# Patient Record
Sex: MALE | Race: WHITE | Employment: OTHER | URBAN - METROPOLITAN AREA
[De-identification: names, ages, dates, MRNs, and addresses within clinical notes are randomized per-mention and may not be internally consistent; named-entity substitution may affect disease eponyms.]

---

## 2017-11-29 PROBLEM — I42.9 MYOCARDIOPATHY (HCC): Status: ACTIVE | Noted: 2017-11-29

## 2017-11-29 LAB
LEFT VENTRICULAR EJECTION FRACTION HIGH VALUE: 45 %
LEFT VENTRICULAR EJECTION FRACTION MODE: NORMAL
LV EF: 40 %

## 2018-05-16 ENCOUNTER — OFFICE VISIT (OUTPATIENT)
Dept: CARDIOLOGY CLINIC | Age: 73
End: 2018-05-16
Payer: MEDICARE

## 2018-05-16 VITALS
HEART RATE: 54 BPM | DIASTOLIC BLOOD PRESSURE: 70 MMHG | BODY MASS INDEX: 26.92 KG/M2 | RESPIRATION RATE: 12 BRPM | HEIGHT: 70 IN | WEIGHT: 188 LBS | SYSTOLIC BLOOD PRESSURE: 112 MMHG

## 2018-05-16 DIAGNOSIS — I10 ESSENTIAL HYPERTENSION: ICD-10-CM

## 2018-05-16 DIAGNOSIS — I49.3 PVC'S (PREMATURE VENTRICULAR CONTRACTIONS): ICD-10-CM

## 2018-05-16 DIAGNOSIS — I44.4 LAFB (LEFT ANTERIOR FASCICULAR BLOCK): ICD-10-CM

## 2018-05-16 DIAGNOSIS — G24.8 FOCAL DYSTONIA: ICD-10-CM

## 2018-05-16 DIAGNOSIS — I45.4 IVCD (INTRAVENTRICULAR CONDUCTION DEFECT): ICD-10-CM

## 2018-05-16 DIAGNOSIS — I42.0 DILATED CARDIOMYOPATHY (HCC): ICD-10-CM

## 2018-05-16 DIAGNOSIS — Z98.890 H/O DETACHED RETINA REPAIR: ICD-10-CM

## 2018-05-16 DIAGNOSIS — E11.8 DM (DIABETES MELLITUS) WITH COMPLICATIONS (HCC): ICD-10-CM

## 2018-05-16 DIAGNOSIS — Z86.69 H/O DETACHED RETINA REPAIR: ICD-10-CM

## 2018-05-16 DIAGNOSIS — E78.2 MIXED HYPERLIPIDEMIA: ICD-10-CM

## 2018-05-16 DIAGNOSIS — I25.10 CORONARY ARTERY DISEASE INVOLVING NATIVE CORONARY ARTERY OF NATIVE HEART WITHOUT ANGINA PECTORIS: Primary | ICD-10-CM

## 2018-05-16 PROCEDURE — 99213 OFFICE O/P EST LOW 20 MIN: CPT | Performed by: INTERNAL MEDICINE

## 2018-05-16 PROCEDURE — 1036F TOBACCO NON-USER: CPT | Performed by: INTERNAL MEDICINE

## 2018-05-16 PROCEDURE — 2022F DILAT RTA XM EVC RTNOPTHY: CPT | Performed by: INTERNAL MEDICINE

## 2018-05-16 PROCEDURE — G8419 CALC BMI OUT NRM PARAM NOF/U: HCPCS | Performed by: INTERNAL MEDICINE

## 2018-05-16 PROCEDURE — 3017F COLORECTAL CA SCREEN DOC REV: CPT | Performed by: INTERNAL MEDICINE

## 2018-05-16 PROCEDURE — 4040F PNEUMOC VAC/ADMIN/RCVD: CPT | Performed by: INTERNAL MEDICINE

## 2018-05-16 PROCEDURE — G8599 NO ASA/ANTIPLAT THER USE RNG: HCPCS | Performed by: INTERNAL MEDICINE

## 2018-05-16 PROCEDURE — 3045F PR MOST RECENT HEMOGLOBIN A1C LEVEL 7.0-9.0%: CPT | Performed by: INTERNAL MEDICINE

## 2018-05-16 PROCEDURE — 93000 ELECTROCARDIOGRAM COMPLETE: CPT | Performed by: INTERNAL MEDICINE

## 2018-05-16 PROCEDURE — 1123F ACP DISCUSS/DSCN MKR DOCD: CPT | Performed by: INTERNAL MEDICINE

## 2018-05-16 PROCEDURE — G8427 DOCREV CUR MEDS BY ELIG CLIN: HCPCS | Performed by: INTERNAL MEDICINE

## 2018-05-16 RX ORDER — INSULIN DETEMIR 100 [IU]/ML
20 INJECTION, SOLUTION SUBCUTANEOUS 2 TIMES DAILY
COMMUNITY
Start: 2018-04-24

## 2018-08-22 ENCOUNTER — NURSE ONLY (OUTPATIENT)
Dept: FAMILY MEDICINE CLINIC | Age: 73
End: 2018-08-22
Payer: MEDICARE

## 2018-08-22 ENCOUNTER — HOSPITAL ENCOUNTER (OUTPATIENT)
Age: 73
Discharge: HOME | End: 2018-08-24
Payer: MEDICARE

## 2018-08-22 DIAGNOSIS — I10 ESSENTIAL HYPERTENSION: ICD-10-CM

## 2018-08-22 LAB
ALBUMIN SERPL-MCNC: 4.1 G/DL (ref 3.5–5.2)
ALP BLD-CCNC: 58 U/L (ref 40–129)
ALT SERPL-CCNC: 23 U/L (ref 0–40)
ANION GAP SERPL CALCULATED.3IONS-SCNC: 13 MMOL/L (ref 7–16)
AST SERPL-CCNC: 20 U/L (ref 0–39)
BASOPHILS ABSOLUTE: 0.08 E9/L (ref 0–0.2)
BASOPHILS RELATIVE PERCENT: 1 % (ref 0–2)
BILIRUB SERPL-MCNC: 0.9 MG/DL (ref 0–1.2)
BUN BLDV-MCNC: 21 MG/DL (ref 8–23)
CALCIUM SERPL-MCNC: 9.2 MG/DL (ref 8.6–10.2)
CHLORIDE BLD-SCNC: 102 MMOL/L (ref 98–107)
CHOLESTEROL, TOTAL: 168 MG/DL (ref 0–199)
CO2: 26 MMOL/L (ref 22–29)
CREAT SERPL-MCNC: 1 MG/DL (ref 0.7–1.2)
CREATININE URINE: 303 MG/DL (ref 40–278)
EOSINOPHILS ABSOLUTE: 0.45 E9/L (ref 0.05–0.5)
EOSINOPHILS RELATIVE PERCENT: 5.9 % (ref 0–6)
GFR AFRICAN AMERICAN: >60
GFR NON-AFRICAN AMERICAN: >60 ML/MIN/1.73
GLUCOSE BLD-MCNC: 198 MG/DL (ref 74–109)
HBA1C MFR BLD: 7.7 % (ref 4–5.6)
HCT VFR BLD CALC: 45.6 % (ref 37–54)
HDLC SERPL-MCNC: 57 MG/DL
HEMOGLOBIN: 15 G/DL (ref 12.5–16.5)
IMMATURE GRANULOCYTES #: 0.02 E9/L
IMMATURE GRANULOCYTES %: 0.3 % (ref 0–5)
LDL CHOLESTEROL CALCULATED: 98 MG/DL (ref 0–99)
LYMPHOCYTES ABSOLUTE: 3.05 E9/L (ref 1.5–4)
LYMPHOCYTES RELATIVE PERCENT: 39.8 % (ref 20–42)
MCH RBC QN AUTO: 31.9 PG (ref 26–35)
MCHC RBC AUTO-ENTMCNC: 32.9 % (ref 32–34.5)
MCV RBC AUTO: 97 FL (ref 80–99.9)
MICROALBUMIN UR-MCNC: <12 MG/L
MICROALBUMIN/CREAT UR-RTO: ABNORMAL (ref 0–30)
MONOCYTES ABSOLUTE: 0.78 E9/L (ref 0.1–0.95)
MONOCYTES RELATIVE PERCENT: 10.2 % (ref 2–12)
NEUTROPHILS ABSOLUTE: 3.29 E9/L (ref 1.8–7.3)
NEUTROPHILS RELATIVE PERCENT: 42.8 % (ref 43–80)
PDW BLD-RTO: 12.8 FL (ref 11.5–15)
PLATELET # BLD: 173 E9/L (ref 130–450)
PMV BLD AUTO: 12.6 FL (ref 7–12)
POTASSIUM SERPL-SCNC: 4.6 MMOL/L (ref 3.5–5)
RBC # BLD: 4.7 E12/L (ref 3.8–5.8)
SODIUM BLD-SCNC: 141 MMOL/L (ref 132–146)
T4 FREE: 0.96 NG/DL (ref 0.93–1.7)
TOTAL PROTEIN: 6.9 G/DL (ref 6.4–8.3)
TRIGL SERPL-MCNC: 67 MG/DL (ref 0–149)
TSH SERPL DL<=0.05 MIU/L-ACNC: 2.02 UIU/ML (ref 0.27–4.2)
VITAMIN B-12: 897 PG/ML (ref 211–946)
VITAMIN D 25-HYDROXY: 101 NG/ML (ref 30–100)
VLDLC SERPL CALC-MCNC: 13 MG/DL
WBC # BLD: 7.7 E9/L (ref 4.5–11.5)

## 2018-08-22 PROCEDURE — 82044 UR ALBUMIN SEMIQUANTITATIVE: CPT

## 2018-08-22 PROCEDURE — 84439 ASSAY OF FREE THYROXINE: CPT

## 2018-08-22 PROCEDURE — 83036 HEMOGLOBIN GLYCOSYLATED A1C: CPT

## 2018-08-22 PROCEDURE — 82306 VITAMIN D 25 HYDROXY: CPT

## 2018-08-22 PROCEDURE — 80053 COMPREHEN METABOLIC PANEL: CPT

## 2018-08-22 PROCEDURE — 85025 COMPLETE CBC W/AUTO DIFF WBC: CPT

## 2018-08-22 PROCEDURE — 82570 ASSAY OF URINE CREATININE: CPT

## 2018-08-22 PROCEDURE — 83721 ASSAY OF BLOOD LIPOPROTEIN: CPT

## 2018-08-22 PROCEDURE — 82607 VITAMIN B-12: CPT

## 2018-08-22 PROCEDURE — 84443 ASSAY THYROID STIM HORMONE: CPT

## 2018-08-22 PROCEDURE — 36415 COLL VENOUS BLD VENIPUNCTURE: CPT | Performed by: INTERNAL MEDICINE

## 2018-08-22 PROCEDURE — 80061 LIPID PANEL: CPT

## 2018-09-02 LAB
Lab: NORMAL
REPORT: NORMAL
THIS TEST SENT TO: NORMAL

## 2018-11-19 ENCOUNTER — HOSPITAL ENCOUNTER (OUTPATIENT)
Age: 73
Discharge: HOME | End: 2018-11-21
Payer: MEDICARE

## 2018-11-19 ENCOUNTER — NURSE ONLY (OUTPATIENT)
Dept: FAMILY MEDICINE CLINIC | Age: 73
End: 2018-11-19

## 2018-11-19 DIAGNOSIS — I10 ESSENTIAL HYPERTENSION: ICD-10-CM

## 2018-11-19 LAB
ALBUMIN SERPL-MCNC: 4.6 G/DL (ref 3.5–5.2)
ALP BLD-CCNC: 62 U/L (ref 40–129)
ALT SERPL-CCNC: 28 U/L (ref 0–40)
ANION GAP SERPL CALCULATED.3IONS-SCNC: 17 MMOL/L (ref 7–16)
AST SERPL-CCNC: 22 U/L (ref 0–39)
BILIRUB SERPL-MCNC: 1 MG/DL (ref 0–1.2)
BUN BLDV-MCNC: 27 MG/DL (ref 8–23)
CALCIUM SERPL-MCNC: 9.7 MG/DL (ref 8.6–10.2)
CHLORIDE BLD-SCNC: 99 MMOL/L (ref 98–107)
CHOLESTEROL, TOTAL: 174 MG/DL (ref 0–199)
CO2: 26 MMOL/L (ref 22–29)
CREAT SERPL-MCNC: 1.2 MG/DL (ref 0.7–1.2)
CREATININE URINE: 253 MG/DL (ref 40–278)
GFR AFRICAN AMERICAN: >60
GFR NON-AFRICAN AMERICAN: 59 ML/MIN/1.73
GLUCOSE BLD-MCNC: 211 MG/DL (ref 74–99)
HBA1C MFR BLD: 7.9 % (ref 4–5.6)
HDLC SERPL-MCNC: 63 MG/DL
LDL CHOLESTEROL CALCULATED: 95 MG/DL (ref 0–99)
MICROALBUMIN UR-MCNC: <12 MG/L
MICROALBUMIN/CREAT UR-RTO: ABNORMAL (ref 0–30)
POTASSIUM SERPL-SCNC: 4.6 MMOL/L (ref 3.5–5)
SODIUM BLD-SCNC: 142 MMOL/L (ref 132–146)
T4 FREE: 1.08 NG/DL (ref 0.93–1.7)
TOTAL PROTEIN: 7.3 G/DL (ref 6.4–8.3)
TRIGL SERPL-MCNC: 79 MG/DL (ref 0–149)
TSH SERPL DL<=0.05 MIU/L-ACNC: 2.85 UIU/ML (ref 0.27–4.2)
VITAMIN B-12: 945 PG/ML (ref 211–946)
VITAMIN D 25-HYDROXY: 86 NG/ML (ref 30–100)
VLDLC SERPL CALC-MCNC: 16 MG/DL

## 2018-11-19 PROCEDURE — 83721 ASSAY OF BLOOD LIPOPROTEIN: CPT

## 2018-11-19 PROCEDURE — 80053 COMPREHEN METABOLIC PANEL: CPT

## 2018-11-19 PROCEDURE — 80061 LIPID PANEL: CPT

## 2018-11-19 PROCEDURE — 82044 UR ALBUMIN SEMIQUANTITATIVE: CPT

## 2018-11-19 PROCEDURE — 85025 COMPLETE CBC W/AUTO DIFF WBC: CPT

## 2018-11-19 PROCEDURE — 85027 COMPLETE CBC AUTOMATED: CPT

## 2018-11-19 PROCEDURE — 83036 HEMOGLOBIN GLYCOSYLATED A1C: CPT

## 2018-11-19 PROCEDURE — 82607 VITAMIN B-12: CPT

## 2018-11-19 PROCEDURE — 84439 ASSAY OF FREE THYROXINE: CPT

## 2018-11-19 PROCEDURE — 84443 ASSAY THYROID STIM HORMONE: CPT

## 2018-11-19 PROCEDURE — 82570 ASSAY OF URINE CREATININE: CPT

## 2018-11-19 PROCEDURE — 82306 VITAMIN D 25 HYDROXY: CPT

## 2018-11-20 LAB
BASOPHILS ABSOLUTE: ABNORMAL E9/L (ref 0–0.2)
BASOPHILS RELATIVE PERCENT: ABNORMAL % (ref 0–2)
EOSINOPHILS ABSOLUTE: ABNORMAL E9/L (ref 0.05–0.5)
EOSINOPHILS RELATIVE PERCENT: ABNORMAL % (ref 0–6)
HCT VFR BLD CALC: 48 % (ref 37–54)
HEMOGLOBIN: 16.1 G/DL (ref 12.5–16.5)
IMMATURE GRANULOCYTES #: ABNORMAL E9/L
IMMATURE GRANULOCYTES %: ABNORMAL % (ref 0–5)
LYMPHOCYTES ABSOLUTE: ABNORMAL E9/L (ref 1.5–4)
LYMPHOCYTES RELATIVE PERCENT: ABNORMAL % (ref 20–42)
MCH RBC QN AUTO: 32 PG (ref 26–35)
MCHC RBC AUTO-ENTMCNC: 33.5 % (ref 32–34.5)
MCV RBC AUTO: 95.4 FL (ref 80–99.9)
MONOCYTES ABSOLUTE: ABNORMAL E9/L (ref 0.1–0.95)
MONOCYTES RELATIVE PERCENT: ABNORMAL % (ref 2–12)
NEUTROPHILS ABSOLUTE: ABNORMAL E9/L (ref 1.8–7.3)
NEUTROPHILS RELATIVE PERCENT: ABNORMAL % (ref 43–80)
PDW BLD-RTO: 12.4 FL (ref 11.5–15)
PLATELET # BLD: 188 E9/L (ref 130–450)
PMV BLD AUTO: 12.4 FL (ref 7–12)
RBC # BLD: 5.03 E12/L (ref 3.8–5.8)
WBC # BLD: 9 E9/L (ref 4.5–11.5)

## 2018-11-26 LAB
Lab: NORMAL
REPORT: NORMAL
THIS TEST SENT TO: NORMAL

## 2019-02-26 ENCOUNTER — NURSE ONLY (OUTPATIENT)
Dept: FAMILY MEDICINE CLINIC | Age: 74
End: 2019-02-26

## 2019-02-26 ENCOUNTER — HOSPITAL ENCOUNTER (OUTPATIENT)
Age: 74
Discharge: HOME | End: 2019-02-28
Payer: MEDICARE

## 2019-02-26 DIAGNOSIS — E11.8 DM (DIABETES MELLITUS) WITH COMPLICATIONS (HCC): ICD-10-CM

## 2019-02-26 LAB
ALBUMIN SERPL-MCNC: 4.2 G/DL (ref 3.5–5.2)
ALP BLD-CCNC: 54 U/L (ref 40–129)
ALT SERPL-CCNC: 19 U/L (ref 0–40)
ANION GAP SERPL CALCULATED.3IONS-SCNC: 10 MMOL/L (ref 7–16)
AST SERPL-CCNC: 21 U/L (ref 0–39)
BASOPHILS ABSOLUTE: 0.05 E9/L (ref 0–0.2)
BASOPHILS RELATIVE PERCENT: 0.7 % (ref 0–2)
BILIRUB SERPL-MCNC: 0.9 MG/DL (ref 0–1.2)
BUN BLDV-MCNC: 27 MG/DL (ref 8–23)
C-REACTIVE PROTEIN: <0.1 MG/DL (ref 0–0.4)
CALCIUM SERPL-MCNC: 8.9 MG/DL (ref 8.6–10.2)
CHLORIDE BLD-SCNC: 99 MMOL/L (ref 98–107)
CHOLESTEROL, TOTAL: 152 MG/DL (ref 0–199)
CO2: 29 MMOL/L (ref 22–29)
CREAT SERPL-MCNC: 1.1 MG/DL (ref 0.7–1.2)
EOSINOPHILS ABSOLUTE: 0.23 E9/L (ref 0.05–0.5)
EOSINOPHILS RELATIVE PERCENT: 3.4 % (ref 0–6)
GFR AFRICAN AMERICAN: >60
GFR NON-AFRICAN AMERICAN: >60 ML/MIN/1.73
GLUCOSE BLD-MCNC: 168 MG/DL (ref 74–99)
HCT VFR BLD CALC: 45.5 % (ref 37–54)
HDLC SERPL-MCNC: 51 MG/DL
HEMOGLOBIN: 15.1 G/DL (ref 12.5–16.5)
IMMATURE GRANULOCYTES #: 0.01 E9/L
IMMATURE GRANULOCYTES %: 0.1 % (ref 0–5)
LDL CHOLESTEROL CALCULATED: 85 MG/DL (ref 0–99)
LYMPHOCYTES ABSOLUTE: 3.07 E9/L (ref 1.5–4)
LYMPHOCYTES RELATIVE PERCENT: 45.7 % (ref 20–42)
MCH RBC QN AUTO: 31.5 PG (ref 26–35)
MCHC RBC AUTO-ENTMCNC: 33.2 % (ref 32–34.5)
MCV RBC AUTO: 95 FL (ref 80–99.9)
MONOCYTES ABSOLUTE: 0.75 E9/L (ref 0.1–0.95)
MONOCYTES RELATIVE PERCENT: 11.2 % (ref 2–12)
NEUTROPHILS ABSOLUTE: 2.61 E9/L (ref 1.8–7.3)
NEUTROPHILS RELATIVE PERCENT: 38.9 % (ref 43–80)
PDW BLD-RTO: 12.3 FL (ref 11.5–15)
PLATELET # BLD: 169 E9/L (ref 130–450)
PMV BLD AUTO: 12.5 FL (ref 7–12)
POTASSIUM SERPL-SCNC: 4.5 MMOL/L (ref 3.5–5)
RBC # BLD: 4.79 E12/L (ref 3.8–5.8)
SODIUM BLD-SCNC: 138 MMOL/L (ref 132–146)
T4 FREE: 1.15 NG/DL (ref 0.93–1.7)
TOTAL PROTEIN: 6.7 G/DL (ref 6.4–8.3)
TRIGL SERPL-MCNC: 78 MG/DL (ref 0–149)
TSH SERPL DL<=0.05 MIU/L-ACNC: 2.28 UIU/ML (ref 0.27–4.2)
VLDLC SERPL CALC-MCNC: 16 MG/DL
WBC # BLD: 6.7 E9/L (ref 4.5–11.5)

## 2019-02-26 PROCEDURE — 86140 C-REACTIVE PROTEIN: CPT

## 2019-02-26 PROCEDURE — 85025 COMPLETE CBC W/AUTO DIFF WBC: CPT

## 2019-02-26 PROCEDURE — 84443 ASSAY THYROID STIM HORMONE: CPT

## 2019-02-26 PROCEDURE — 84439 ASSAY OF FREE THYROXINE: CPT

## 2019-02-26 PROCEDURE — 80053 COMPREHEN METABOLIC PANEL: CPT

## 2019-02-26 PROCEDURE — 84681 ASSAY OF C-PEPTIDE: CPT

## 2019-02-26 PROCEDURE — 80061 LIPID PANEL: CPT

## 2019-03-01 LAB — C-PEPTIDE: 0.6 NG/ML (ref 0.8–3.5)

## 2019-05-15 ENCOUNTER — OFFICE VISIT (OUTPATIENT)
Dept: CARDIOLOGY CLINIC | Age: 74
End: 2019-05-15
Payer: MEDICARE

## 2019-05-15 VITALS
SYSTOLIC BLOOD PRESSURE: 138 MMHG | DIASTOLIC BLOOD PRESSURE: 72 MMHG | HEIGHT: 70 IN | RESPIRATION RATE: 16 BRPM | BODY MASS INDEX: 27.26 KG/M2 | WEIGHT: 190.4 LBS | HEART RATE: 59 BPM

## 2019-05-15 DIAGNOSIS — E78.2 MIXED HYPERLIPIDEMIA: ICD-10-CM

## 2019-05-15 DIAGNOSIS — I25.10 CORONARY ARTERY DISEASE INVOLVING NATIVE CORONARY ARTERY OF NATIVE HEART WITHOUT ANGINA PECTORIS: Primary | ICD-10-CM

## 2019-05-15 DIAGNOSIS — R05.8 ACE-INHIBITOR COUGH: ICD-10-CM

## 2019-05-15 DIAGNOSIS — I42.8 NON-ISCHEMIC CARDIOMYOPATHY (HCC): ICD-10-CM

## 2019-05-15 DIAGNOSIS — E11.9 INSULIN-REQUIRING OR DEPENDENT TYPE II DIABETES MELLITUS (HCC): ICD-10-CM

## 2019-05-15 DIAGNOSIS — I44.4 LAFB (LEFT ANTERIOR FASCICULAR BLOCK): ICD-10-CM

## 2019-05-15 DIAGNOSIS — T46.4X5A ACE-INHIBITOR COUGH: ICD-10-CM

## 2019-05-15 DIAGNOSIS — I10 ESSENTIAL HYPERTENSION: ICD-10-CM

## 2019-05-15 DIAGNOSIS — Z79.4 INSULIN-REQUIRING OR DEPENDENT TYPE II DIABETES MELLITUS (HCC): ICD-10-CM

## 2019-05-15 DIAGNOSIS — I45.4 IVCD (INTRAVENTRICULAR CONDUCTION DEFECT): ICD-10-CM

## 2019-05-15 DIAGNOSIS — I49.3 PVC'S (PREMATURE VENTRICULAR CONTRACTIONS): ICD-10-CM

## 2019-05-15 PROCEDURE — G8427 DOCREV CUR MEDS BY ELIG CLIN: HCPCS | Performed by: INTERNAL MEDICINE

## 2019-05-15 PROCEDURE — 93000 ELECTROCARDIOGRAM COMPLETE: CPT | Performed by: INTERNAL MEDICINE

## 2019-05-15 PROCEDURE — 3046F HEMOGLOBIN A1C LEVEL >9.0%: CPT | Performed by: INTERNAL MEDICINE

## 2019-05-15 PROCEDURE — G8599 NO ASA/ANTIPLAT THER USE RNG: HCPCS | Performed by: INTERNAL MEDICINE

## 2019-05-15 PROCEDURE — 3017F COLORECTAL CA SCREEN DOC REV: CPT | Performed by: INTERNAL MEDICINE

## 2019-05-15 PROCEDURE — 1036F TOBACCO NON-USER: CPT | Performed by: INTERNAL MEDICINE

## 2019-05-15 PROCEDURE — 1123F ACP DISCUSS/DSCN MKR DOCD: CPT | Performed by: INTERNAL MEDICINE

## 2019-05-15 PROCEDURE — 2022F DILAT RTA XM EVC RTNOPTHY: CPT | Performed by: INTERNAL MEDICINE

## 2019-05-15 PROCEDURE — 4040F PNEUMOC VAC/ADMIN/RCVD: CPT | Performed by: INTERNAL MEDICINE

## 2019-05-15 PROCEDURE — G8419 CALC BMI OUT NRM PARAM NOF/U: HCPCS | Performed by: INTERNAL MEDICINE

## 2019-05-15 PROCEDURE — 99213 OFFICE O/P EST LOW 20 MIN: CPT | Performed by: INTERNAL MEDICINE

## 2019-05-15 RX ORDER — TRIAMTERENE AND HYDROCHLOROTHIAZIDE 37.5; 25 MG/1; MG/1
TABLET ORAL
COMMUNITY
End: 2019-05-15 | Stop reason: SDUPTHER

## 2019-05-15 RX ORDER — ROSUVASTATIN CALCIUM 40 MG/1
40 TABLET, COATED ORAL EVERY OTHER DAY
COMMUNITY
Start: 2019-03-20

## 2019-05-15 RX ORDER — LOSARTAN POTASSIUM 50 MG/1
50 TABLET ORAL DAILY
COMMUNITY

## 2019-05-15 SDOH — HEALTH STABILITY: MENTAL HEALTH: HOW MANY STANDARD DRINKS CONTAINING ALCOHOL DO YOU HAVE ON A TYPICAL DAY?: 1 OR 2

## 2019-05-15 SDOH — HEALTH STABILITY: MENTAL HEALTH: HOW OFTEN DO YOU HAVE A DRINK CONTAINING ALCOHOL?: 2-3 TIMES A WEEK

## 2019-05-15 NOTE — PATIENT INSTRUCTIONS
Patient Education        A Healthy Heart: Care Instructions  Your Care Instructions    Heart disease occurs when a substance called plaque builds up in the vessels that supply oxygen-rich blood to your heart. This can narrow the blood vessels and reduce blood flow. A heart attack happens when blood flow is completely blocked. A high-fat diet, smoking, and other factors increase the risk of heart disease. Your doctor has found that you have a chance of having heart disease. You can do lots of things to keep your heart healthy. It may not be easy, but you can change your diet, exercise more, and quit smoking. These steps really work to lower your chance of heart disease. Follow-up care is a key part of your treatment and safety. Be sure to make and go to all appointments, and call your doctor if you are having problems. It's also a good idea to know your test results and keep a list of the medicines you take. How can you care for yourself at home? Diet    · Use less salt when you cook and eat. This helps lower your blood pressure. Taste food before salting. Add only a little salt when you think you need it. With time, your taste buds will adjust to less salt.     · Eat fewer snack items, fast foods, canned soups, and other high-salt, high-fat, processed foods.     · Read food labels and try to avoid saturated and trans fats. They increase your risk of heart disease by raising cholesterol levels.     · Limit the amount of solid fat-butter, margarine, and shortening-you eat. Use olive, peanut, or canola oil when you cook. Bake, broil, and steam foods instead of frying them.     · Eating fish can lower your risk for heart disease. Eat at least 2 servings of fish a week. [place], mackerel, herring, sardines, and chunk light tuna are very good choices. These fish contain omega-3 fatty acids.     · Eat a variety of fruit and vegetables every day.  Dark green, deep orange, red, or yellow fruits and vegetables are especially good for you. Examples include spinach, carrots, peaches, and berries.     · Foods high in fiber can reduce your cholesterol and provide important vitamins and minerals. High-fiber foods include whole-grain cereals and breads, oatmeal, beans, brown rice, citrus fruits, and apples.     · Limit drinks and foods with added sugar. These include candy, desserts, and soda pop.    Lifestyle changes    · If your doctor recommends it, get more exercise. Walking is a good choice. Bit by bit, increase the amount you walk every day. Try for at least 30 minutes on most days of the week. You also may want to swim, bike, or do other activities.     · Do not smoke. If you need help quitting, talk to your doctor about stop-smoking programs and medicines. These can increase your chances of quitting for good. Quitting smoking may be the most important step you can take to protect your heart. It is never too late to quit. You will get health benefits right away.     · Limit alcohol to 2 drinks a day for men and 1 drink a day for women. Too much alcohol can cause health problems. Medicines    · Take your medicines exactly as prescribed. Call your doctor if you think you are having a problem with your medicine.     · If your doctor recommends aspirin, take the amount directed each day. Make sure you take aspirin and not another kind of pain reliever, such as acetaminophen (Tylenol). If you take ibuprofen (such as Advil or Motrin) for other problems, take aspirin at least 2 hours before taking ibuprofen. When should you call for help? Call 911 if you have symptoms of a heart attack.  These may include:    · Chest pain or pressure, or a strange feeling in the chest.     · Sweating.     · Shortness of breath.     · Pain, pressure, or a strange feeling in the back, neck, jaw, or upper belly or in one or both shoulders or arms.     · Lightheadedness or sudden weakness.     · A fast or irregular heartbeat.    After you call 911, the  may tell you to chew 1 adult-strength or 2 to 4 low-dose aspirin. Wait for an ambulance. Do not try to drive yourself.   Watch closely for changes in your health, and be sure to contact your doctor if you have any problems. Where can you learn more? Go to [email or web address] and sign in to your [employer] account. Enter J025 in the [employer] box to learn more about \"A Healthy Heart: Care Instructions. \"     If you do not have an account, please click on the \"Sign Up Now\" link. Current as of: July 22, 2018  Content Version: 12.0  © 9428-0678 [employer], [employer]. Care instructions adapted under license by [facility] ([facility]). If you have questions about a medical condition or this instruction, always ask your healthcare professional. [address] any warranty or liability for your use of this information.

## 2019-05-15 NOTE — PROGRESS NOTES
OFFICE VISIT        PRIMARY CARE PHYSICIAN:    Vito Arnold MD       ALLERGIES / SENSITIVITIES:    Allergies   Allergen Reactions    Atorvastatin     Bethaprim [Sulfamethoxazole-Trimethoprim]     Erythromycin     Lipitor      Muscle aches. REVIEWED MEDICATIONS:      Current Outpatient Medications:     losartan (COZAAR) 50 MG tablet, Take 50 mg by mouth daily, Disp: , Rfl:     rosuvastatin (CRESTOR) 40 MG tablet, Take 40 mg by mouth daily , Disp: , Rfl:     LEVEMIR FLEXTOUCH 100 UNIT/ML injection pen, Inject 18 Units into the skin 2 times daily , Disp: , Rfl:     metoprolol succinate (TOPROL XL) 50 MG extended release tablet, Take 1 tablet by mouth daily, Disp: 90 tablet, Rfl: 3    Omega-3 Fatty Acids (FISH OIL) 1000 MG CAPS, Take 1,000 mg by mouth daily, Disp: , Rfl:     triamterene-hydrochlorothiazide (MAXZIDE-25) 37.5-25 MG per tablet, Take 1 tablet by mouth every other day , Disp: , Rfl:     insulin aspart (NOVOLOG FLEXPEN) 100 UNIT/ML injection, Inject 5 units with breakfast,6 units lunch,8-10 units at supper ,4 units with bedtime snack (Patient taking differently: Follows sliding scale qid), Disp: 15 Pen, Rfl: 1    Cholecalciferol (VITAMIN D3) 3000 UNITS TABS, Take 5,000 Units by mouth every other day , Disp: , Rfl:     Multiple Vitamin (THERAPEUTIC) TABS, Take  by mouth. qd , Disp: , Rfl:     Alpha-Lipoic Acid 300 MG CAPS, Take 200 mg by mouth daily , Disp: , Rfl:     Grape Seed 100 MG CAPS, Take 100 mg by mouth every other day , Disp: , Rfl:     Garlic 337 MG TABS, Take 500 mg by mouth daily. , Disp: , Rfl:     Chromium 200 MCG CAPS, Take 200 mg by mouth every other day , Disp: , Rfl:     Coenzyme Q10 (COQ10) 150 MG CAPS, Take 100 mg by mouth daily , Disp: , Rfl:     B Complex Vitamins (VITAMIN B COMPLEX) CAPS, Take 100 mg by mouth every other day , Disp: , Rfl:     Loratadine (CLARITIN) 10 MG CAPS, Take 10 mg by mouth as needed.   , Disp: , Rfl:       S: REASON FOR VISIT: size and function. Mildly dilated atria. Aneurysmal intraatrial septum, mild prolapse of the posterior mitral leaflet with trace mitral regurgitation. Mild tricuspid regurgitation. Mild pulmonary hypertension. b.  Echocardiogram done on 11/29/2017 showed a mildly dilated left ventricle with normal left ventricular wall thickness with no regional wall motion abnormality but with [id number] impaired global left ventricular systolic function with an estimated ejection fraction of 40-45% with stage I left ventricular diastolic dysfunction. Mildly dilated but normally contractile right ventricle. Moderately dilated left atrium with mildly dilated right atrium. Aneurysmal interatrial septum. Mild prolapse of the posterior mitral leaflet with mild mitral regurgitation. Mild tricuspid regurgitation. 5.  Coronary artery disease:   a. 9/25/02: Treadmill nuclear stress test: Samy protocol. 11 minutes and 30 seconds. 98% of the maximum predicted heart rate. No chest pain. No ischemic EKG changes. Physiologic blood pressure   response. Nuclear images showed significant attenuation artifacts but non transmural myocardial infarction with mild additional stress induced ischemia involving the anteroseptal wall could not be   totally excluded. The computer calculated ejection fraction was 44%. b. 10/4/03 - Cardiac Catheterization. Left main non existent, separate ostia to the LAD and the LCX. LAD, mild luminal irregularities with 20% proximal luminal narrowing. LCX, very large and dominant   vessel with no disease. RCA, trivial nondominant vessel with no disease. Normal left ventricular size with mildly impaired left ventricular systolic function with an ejection fraction estimated at 45%. c. 11/6/2013 Treadmill nuclear stress test: Samy protocol. 7 minutes and 1 second. 94% of the maximum predicted heart rate. Physiologic blood pressure response. No chest pain. No ischemic   EKG changes.  Frequent ventricular ectopy. The myocardial perfusion images were within normal limits showing soft tissue attenuation artifact with no convincing evidence of any scars or any  stress-induced ischemia with the gated views showing paradoxical septal motion with mild generalized hypokinesis with an estimated ejection fraction of 45-50% (calculated at 55). 6.  Hypertension. 7.  IVCD. 8.  Frequent PVC's noted on ECG in 10/2013 and again on EKG in 2017:  a.  Holter monitor done on 2017 for 24 hours showed very frequent PVC's (23%). b.  Holter monitor done on 2017 for 24 hours, after patient was started on beta blocker therapy, showed significant reduction in the PVC's at 9.8%.     PAST MEDICAL HISTORY:   1.  As under cardiovascular history. 2.  Kidney stone in . 3.  Focal dystonia of right hand in . 4.  Basal cell carcinoma, excision x2 (left temporal area). 5.  Status post right inguinal herniorrhaphy in . 6.  Focal dystonia right hand diagnosed in , for which he receives Botox injections. 7.  Detached retina, left eye on 07, laser repair. , residual scar tissue detected on exam in : Patient had reported some deterioration in visual acuity. 8.  Sudden left ear hearing loss (now comes and goes) in 2012.      FAMILY HISTORY:   Mother  at age 80: Had diabetes mellitus, cardiomyopathy and congestive heart failure. Father  at age 59 from myocardial infarction complicated by LV thrombus and CHF.      SOCIAL HISTORY:   Patient does not smoke. He drinks alcohol on social occasions. O:  COMPLETE PHYSICAL EXAM:      /72   Pulse 59   Resp 16   Ht 5' 10\" (1.778 m)   Wt 190 lb 6.4 oz (86.4 kg)   BMI 27.32 kg/m²      General:  Elderly male in no acute distress. Head/Neck:  Atraumatic, normocephalic. No JVD. No carotid bruits. Carotid upstrokes normal bilaterally. No thyromegaly. Sclerae nonicteric. No xanthelasmas.  Mucous membranes moist.   Chest:

## 2020-04-16 ENCOUNTER — TELEPHONE (OUTPATIENT)
Dept: CARDIOLOGY CLINIC | Age: 75
End: 2020-04-16

## 2020-04-27 ENCOUNTER — VIRTUAL VISIT (OUTPATIENT)
Dept: CARDIOLOGY CLINIC | Age: 75
End: 2020-04-27
Payer: MEDICARE

## 2020-04-27 VITALS
HEIGHT: 70 IN | HEART RATE: 70 BPM | SYSTOLIC BLOOD PRESSURE: 131 MMHG | WEIGHT: 178 LBS | BODY MASS INDEX: 25.48 KG/M2 | DIASTOLIC BLOOD PRESSURE: 83 MMHG

## 2020-04-27 PROCEDURE — 3017F COLORECTAL CA SCREEN DOC REV: CPT | Performed by: INTERNAL MEDICINE

## 2020-04-27 PROCEDURE — 4040F PNEUMOC VAC/ADMIN/RCVD: CPT | Performed by: INTERNAL MEDICINE

## 2020-04-27 PROCEDURE — 99441 PR PHYS/QHP TELEPHONE EVALUATION 5-10 MIN: CPT | Performed by: INTERNAL MEDICINE

## 2020-04-27 PROCEDURE — 2022F DILAT RTA XM EVC RTNOPTHY: CPT | Performed by: INTERNAL MEDICINE

## 2020-04-27 PROCEDURE — 3046F HEMOGLOBIN A1C LEVEL >9.0%: CPT | Performed by: INTERNAL MEDICINE

## 2020-04-27 PROCEDURE — G8417 CALC BMI ABV UP PARAM F/U: HCPCS | Performed by: INTERNAL MEDICINE

## 2020-04-27 PROCEDURE — 1036F TOBACCO NON-USER: CPT | Performed by: INTERNAL MEDICINE

## 2020-04-27 PROCEDURE — G8427 DOCREV CUR MEDS BY ELIG CLIN: HCPCS | Performed by: INTERNAL MEDICINE

## 2020-04-27 PROCEDURE — 1123F ACP DISCUSS/DSCN MKR DOCD: CPT | Performed by: INTERNAL MEDICINE

## 2020-11-18 ENCOUNTER — OFFICE VISIT (OUTPATIENT)
Dept: CARDIOLOGY CLINIC | Age: 75
End: 2020-11-18
Payer: MEDICARE

## 2020-11-18 VITALS
HEART RATE: 61 BPM | DIASTOLIC BLOOD PRESSURE: 74 MMHG | HEIGHT: 70 IN | RESPIRATION RATE: 16 BRPM | WEIGHT: 185 LBS | BODY MASS INDEX: 26.48 KG/M2 | SYSTOLIC BLOOD PRESSURE: 112 MMHG

## 2020-11-18 PROCEDURE — G8484 FLU IMMUNIZE NO ADMIN: HCPCS | Performed by: INTERNAL MEDICINE

## 2020-11-18 PROCEDURE — 93000 ELECTROCARDIOGRAM COMPLETE: CPT | Performed by: INTERNAL MEDICINE

## 2020-11-18 PROCEDURE — 1123F ACP DISCUSS/DSCN MKR DOCD: CPT | Performed by: INTERNAL MEDICINE

## 2020-11-18 PROCEDURE — G8427 DOCREV CUR MEDS BY ELIG CLIN: HCPCS | Performed by: INTERNAL MEDICINE

## 2020-11-18 PROCEDURE — 99213 OFFICE O/P EST LOW 20 MIN: CPT | Performed by: INTERNAL MEDICINE

## 2020-11-18 PROCEDURE — 4040F PNEUMOC VAC/ADMIN/RCVD: CPT | Performed by: INTERNAL MEDICINE

## 2020-11-18 PROCEDURE — G8417 CALC BMI ABV UP PARAM F/U: HCPCS | Performed by: INTERNAL MEDICINE

## 2020-11-18 PROCEDURE — 1036F TOBACCO NON-USER: CPT | Performed by: INTERNAL MEDICINE

## 2020-11-18 RX ORDER — ASCORBIC ACID 500 MG
500 TABLET ORAL EVERY OTHER DAY
COMMUNITY

## 2020-11-18 RX ORDER — ASPIRIN 81 MG/1
81 TABLET ORAL EVERY OTHER DAY
COMMUNITY

## 2020-11-19 NOTE — PROGRESS NOTES
OFFICE VISIT        PRIMARY CARE PHYSICIAN:    Sona Sanon MD       ALLERGIES / SENSITIVITIES:    Allergies   Allergen Reactions    Atorvastatin     Bethaprim [Sulfamethoxazole-Trimethoprim]     Erythromycin     Lipitor      Muscle aches. REVIEWED MEDICATIONS:      Current Outpatient Medications:     aspirin 81 MG EC tablet, Take 81 mg by mouth every other day, Disp: , Rfl:     HYDROcodone-homatropine (HYCODAN) 5-1.5 MG/5ML syrup, Take 2.5 mLs by mouth 4 times daily as needed. , Disp: , Rfl:     TURMERIC PO, Take by mouth, Disp: , Rfl:     vitamin C (ASCORBIC ACID) 500 MG tablet, Take 500 mg by mouth every other day, Disp: , Rfl:     losartan (COZAAR) 50 MG tablet, Take 50 mg by mouth daily, Disp: , Rfl:     rosuvastatin (CRESTOR) 40 MG tablet, Take 40 mg by mouth every other day , Disp: , Rfl:     LEVEMIR FLEXTOUCH 100 UNIT/ML injection pen, Inject 19 Units into the skin 2 times daily , Disp: , Rfl:     metoprolol succinate (TOPROL XL) 50 MG extended release tablet, Take 1 tablet by mouth daily, Disp: 90 tablet, Rfl: 3    Omega-3 Fatty Acids (FISH OIL) 1000 MG CAPS, Take 1,000 mg by mouth daily, Disp: , Rfl:     triamterene-hydrochlorothiazide (MAXZIDE-25) 37.5-25 MG per tablet, Take 1 tablet by mouth every other day , Disp: , Rfl:     insulin aspart (NOVOLOG FLEXPEN) 100 UNIT/ML injection, Inject 5 units with breakfast,6 units lunch,8-10 units at supper ,4 units with bedtime snack (Patient taking differently: Follows sliding scale qid), Disp: 15 Pen, Rfl: 1    Cholecalciferol (VITAMIN D3) 3000 UNITS TABS, Take 5,000 Units by mouth every other day , Disp: , Rfl:     Multiple Vitamin (THERAPEUTIC) TABS, Take  by mouth. qd , Disp: , Rfl:     Alpha-Lipoic Acid 300 MG CAPS, Take 200 mg by mouth daily , Disp: , Rfl:     Grape Seed 100 MG CAPS, Take 100 mg by mouth every other day , Disp: , Rfl:     Garlic 700 MG TABS, Take 500 mg by mouth daily.   , Disp: , Rfl:     Chromium 200 MCG CAPS, Take 200 mg by mouth every other day , Disp: , Rfl:     Coenzyme Q10 (COQ10) 150 MG CAPS, Take 100 mg by mouth daily , Disp: , Rfl:     B Complex Vitamins (VITAMIN B COMPLEX) CAPS, Take 100 mg by mouth every other day , Disp: , Rfl:     Loratadine (CLARITIN) 10 MG CAPS, Take 10 mg by mouth as needed. , Disp: , Rfl:         S: REASON FOR VISIT:    Cardiomyopathy with LV systolic dysfunction, coronary artery disease and history of frequent PVC's. Dandy Benson is a pleasant, 58-year-old gentleman with cardiovascular history as described below. He goes to the gym twice a week. He also walks on a track. He mows his lawn. He does weights and bench pressing. He denies any chest pain or dyspnea with his daily activities. He denies orthopnea, PND's, lower extremity swelling, palpitations, dizziness, presyncope or syncope. His most recent Botox injection for his right arm was on 11/10/2020. REVIEW OF SYSTEMS:    CONSTITUTIONAL: Denies fevers, chills, night sweats, or fatigue. HEENT: Denies any unusual headaches. He has diminished visual acuity in his left eye, but denies any recent changes in vision. Denies any recent changes in hearing. Denies dysphagia, hoarseness, hemoptysis, hematemesis, or epistaxis. ENDOCRINE: Denies polyuria, polydipsia, polyphagia.  He denies heat or cold intolerance.    MUSCULOSKELETAL: Denies arthralgias or myalgias. SKIN: Denies rashes, ulcers, or itching. HEME/LYMPH: Denies lymphadenopathy or bleeding. HEART: As above. LUNGS: Denies cough or sputum production. GI: Denies abdominal pain, nausea, vomiting, diarrhea, constipation, rectal bleeding, or tarry stools. : Denies hematuria or dysuria. PSYCHIATRIC: Denies mood changes, anxiety or depression. NEUROLOGIC: He has right hand focal dystonia. Denies any motor weakness otherwise.  Denies tingling, tremors or memory loss.        PAST MEDICAL / SURGICAL HISTORY:   1.  Diabetes mellitus diagnosed in 1995.    2.  Brief episode disease. Normal left ventricular size with mildly impaired left ventricular systolic function with an ejection fraction estimated at 45%. c. 2013 Treadmill nuclear stress test: Samy protocol. 7 minutes and 1 second. 94% of the maximum predicted heart rate. Physiologic blood pressure response. No chest pain. No ischemic   EKG changes. Frequent ventricular ectopy. The myocardial perfusion images were within normal limits showing soft tissue attenuation artifact with no convincing evidence of any scars or any  stress-induced ischemia with the gated views showing paradoxical septal motion with mild generalized hypokinesis with an estimated ejection fraction of 45-50% (calculated at 55). 6.  Hypertension. 7.  IVCD. 8.  Frequent PVC's noted on ECG in 10/2013 and again on EKG in 2017:  a. Avanell Nidhi monitor done on 2017 for 24 hours showed very frequent PVC's (23%). b. Avanell Nidhi monitor done on 2017 for 24 hours, after patient was started on beta blocker therapy, showed significant reduction in the PVC's at 9.8%. 9.  Kidney stone in . 10.  Focal dystonia of right hand in . 11.  Basal cell carcinoma, excision x2 (left temporal area). 12.  Status post right inguinal herniorrhaphy in . 15.  Focal dystonia right hand diagnosed in , for which he receives Botox injections.   14.  Detached retina, left eye on 07, laser repair. , residual scar tissue detected on exam in : Patient had reported some deterioration in visual acuity. 13.  Sudden left ear hearing loss (now comes and goes) in 2012.      FAMILY HISTORY:   Mother  at age 80: Had diabetes mellitus, cardiomyopathy and congestive heart failure. Father  at age 59 from myocardial infarction complicated by LV thrombus and CHF.      SOCIAL HISTORY:   Patient does not smoke.  He drinks alcohol on social occasions.       O:  COMPLETE PHYSICAL EXAM:      /74   Pulse 61   Resp 16   Ht 5' 10\" (1.778 m)   Wt 185 lb (83.9 kg)   BMI 26.54 kg/m²      General:   Well-developed, well-nourished male in no acute distress. Head/Neck:   Atraumatic, normocephalic. No JVD. No carotid bruits. Carotid upstrokes normal bilaterally. No thyromegaly. Sclerae   nonicteric. No xanthelasmas. Mucous membranes moist.   Chest:   Symmetrical and nontender. No deformities. Lungs:   Clear to auscultation bilaterally. Heart:    Normal S1 and S2. No S3 or S4. No murmurs or rubs. Abdomen:   Soft, nontender, without organomegaly or masses. Normal bowel sounds.  No bruits. Extremities:   No edema. Dorsalis pedis and posterior tibialis pulses normal bilaterally. Skin:    Normal turgor. No rashes or ulcers noted. Neuro:   Oriented x3. No motor or sensory deficit detected. REVIEW OF DIAGNOSTIC TESTS:    1. Blood tests from 11/9/2020 reviewed:  Hemoglobin A1C 7.9, total cholesterol 161, triglycerides 74. HDL 61, LDL 85. TSH and free T4 normal.  CBC unremarkable, BUN 18, creatinine 0.88, potassium 4.2, GFR > 60, LFT's unremarkable. 2.  EKG done today showed sinus rhythm with IVCD/left anterior fascicular block. ASSESSMENT / DIAGNOSIS:   1.  Coronary artery disease: Clinically stable. 2.  Cardiomyopathy with mild LV systolic dysfunction:  Compensated with no signs or symptoms of congestive heart failure/fluid overload. 3.  Diabetes mellitus: On insulin, not adequately controlled. 4.  Hyperlipidemia: Adequately controlled. 5.  Hypertension: Adequately controlled. 6.  IVCD and LAFB. 7.  History of frequent PVC's. 8.  Lisinopril associated cough. 9.  Mild prolapse of the posterior mitral leaflet with mild mitral regurgitation and mild tricuspid regurgitation on echocardiogram in 11/2017. TREATMENT PLAN:  1. Reassure. 2.  Continue current medications. 3.  Diabetes mellitus management as per Endocrinology. 4.  Patient advised to remain active as is.    5.  Follow up with Cardiology in 1 year or on a sarai nicolas. [address].  [facility] [id number]  [phone]

## 2022-01-07 ENCOUNTER — OFFICE VISIT (OUTPATIENT)
Dept: CARDIOLOGY CLINIC | Age: 77
End: 2022-01-07
Payer: MEDICARE

## 2022-01-07 VITALS
HEIGHT: 70 IN | WEIGHT: 188.8 LBS | HEART RATE: 65 BPM | BODY MASS INDEX: 27.03 KG/M2 | RESPIRATION RATE: 18 BRPM | SYSTOLIC BLOOD PRESSURE: 138 MMHG | OXYGEN SATURATION: 97 % | DIASTOLIC BLOOD PRESSURE: 64 MMHG

## 2022-01-07 DIAGNOSIS — E11.9 INSULIN-REQUIRING OR DEPENDENT TYPE II DIABETES MELLITUS (HCC): ICD-10-CM

## 2022-01-07 DIAGNOSIS — I34.1 MITRAL VALVE PROLAPSE: ICD-10-CM

## 2022-01-07 DIAGNOSIS — I49.3 PVC'S (PREMATURE VENTRICULAR CONTRACTIONS): ICD-10-CM

## 2022-01-07 DIAGNOSIS — G24.8 FOCAL DYSTONIA: ICD-10-CM

## 2022-01-07 DIAGNOSIS — I44.4 LAFB (LEFT ANTERIOR FASCICULAR BLOCK): ICD-10-CM

## 2022-01-07 DIAGNOSIS — I10 PRIMARY HYPERTENSION: ICD-10-CM

## 2022-01-07 DIAGNOSIS — R05.8 ACE-INHIBITOR COUGH: ICD-10-CM

## 2022-01-07 DIAGNOSIS — T46.4X5A ACE-INHIBITOR COUGH: ICD-10-CM

## 2022-01-07 DIAGNOSIS — I25.10 CORONARY ARTERY DISEASE INVOLVING NATIVE CORONARY ARTERY OF NATIVE HEART WITHOUT ANGINA PECTORIS: Primary | ICD-10-CM

## 2022-01-07 DIAGNOSIS — I45.4 IVCD (INTRAVENTRICULAR CONDUCTION DEFECT): ICD-10-CM

## 2022-01-07 DIAGNOSIS — Z79.4 INSULIN-REQUIRING OR DEPENDENT TYPE II DIABETES MELLITUS (HCC): ICD-10-CM

## 2022-01-07 DIAGNOSIS — E78.2 MIXED HYPERLIPIDEMIA: ICD-10-CM

## 2022-01-07 DIAGNOSIS — I42.8 NON-ISCHEMIC CARDIOMYOPATHY (HCC): ICD-10-CM

## 2022-01-07 DIAGNOSIS — I34.0 MILD MITRAL REGURGITATION BY PRIOR ECHOCARDIOGRAM: ICD-10-CM

## 2022-01-07 PROCEDURE — G8484 FLU IMMUNIZE NO ADMIN: HCPCS | Performed by: INTERNAL MEDICINE

## 2022-01-07 PROCEDURE — G8427 DOCREV CUR MEDS BY ELIG CLIN: HCPCS | Performed by: INTERNAL MEDICINE

## 2022-01-07 PROCEDURE — 93000 ELECTROCARDIOGRAM COMPLETE: CPT | Performed by: INTERNAL MEDICINE

## 2022-01-07 PROCEDURE — 1036F TOBACCO NON-USER: CPT | Performed by: INTERNAL MEDICINE

## 2022-01-07 PROCEDURE — 4040F PNEUMOC VAC/ADMIN/RCVD: CPT | Performed by: INTERNAL MEDICINE

## 2022-01-07 PROCEDURE — 1123F ACP DISCUSS/DSCN MKR DOCD: CPT | Performed by: INTERNAL MEDICINE

## 2022-01-07 PROCEDURE — G8417 CALC BMI ABV UP PARAM F/U: HCPCS | Performed by: INTERNAL MEDICINE

## 2022-01-07 PROCEDURE — 99213 OFFICE O/P EST LOW 20 MIN: CPT | Performed by: INTERNAL MEDICINE

## 2022-01-07 RX ORDER — B-COMPLEX WITH VITAMIN C
TABLET ORAL
COMMUNITY

## 2022-01-10 NOTE — PROGRESS NOTES
OFFICE VISIT        PRIMARY CARE PHYSICIAN:    Urvashi Richardson MD     ALLERGIES / SENSITIVITIES:    Allergies   Allergen Reactions    Atorvastatin     Bethaprim [Sulfamethoxazole-Trimethoprim]     Erythromycin     Lipitor      Muscle aches. REVIEWED MEDICATIONS:      Current Outpatient Medications:     Zinc 100 MG TABS, Take by mouth, Disp: , Rfl:     aspirin 81 MG EC tablet, Take 81 mg by mouth every other day, Disp: , Rfl:     HYDROcodone-homatropine (HYCODAN) 5-1.5 MG/5ML syrup, Take 2.5 mLs by mouth 4 times daily as needed. , Disp: , Rfl:     TURMERIC PO, Take by mouth, Disp: , Rfl:     vitamin C (ASCORBIC ACID) 500 MG tablet, Take 500 mg by mouth every other day, Disp: , Rfl:     losartan (COZAAR) 50 MG tablet, Take 50 mg by mouth daily, Disp: , Rfl:     rosuvastatin (CRESTOR) 40 MG tablet, Take 40 mg by mouth every other day , Disp: , Rfl:     LEVEMIR FLEXTOUCH 100 UNIT/ML injection pen, Inject 20 Units into the skin 2 times daily , Disp: , Rfl:     metoprolol succinate (TOPROL XL) 50 MG extended release tablet, Take 1 tablet by mouth daily, Disp: 90 tablet, Rfl: 3    Omega-3 Fatty Acids (FISH OIL) 1000 MG CAPS, Take 1,000 mg by mouth daily, Disp: , Rfl:     triamterene-hydrochlorothiazide (MAXZIDE-25) 37.5-25 MG per tablet, Take 1 tablet by mouth every other day , Disp: , Rfl:     insulin aspart (NOVOLOG FLEXPEN) 100 UNIT/ML injection, Inject 5 units with breakfast,6 units lunch,8-10 units at supper ,4 units with bedtime snack (Patient taking differently: Follows sliding scale qid), Disp: 15 Pen, Rfl: 1    Cholecalciferol (VITAMIN D3) 3000 UNITS TABS, Take 5,000 Units by mouth every other day , Disp: , Rfl:     Multiple Vitamin (THERAPEUTIC) TABS, Take  by mouth. qd , Disp: , Rfl:     Alpha-Lipoic Acid 300 MG CAPS, Take 200 mg by mouth daily , Disp: , Rfl:     Grape Seed 100 MG CAPS, Take 100 mg by mouth every other day , Disp: , Rfl:     Garlic 426 MG TABS, Take 500 mg by mouth daily. , Disp: , Rfl:     Chromium 200 MCG CAPS, Take 200 mg by mouth every other day , Disp: , Rfl:     Coenzyme Q10 (COQ10) 150 MG CAPS, Take 100 mg by mouth daily , Disp: , Rfl:     B Complex Vitamins (VITAMIN B COMPLEX) CAPS, Take 100 mg by mouth every other day , Disp: , Rfl:     Loratadine (CLARITIN) 10 MG CAPS, Take 10 mg by mouth as needed. , Disp: , Rfl:     S: REASON FOR VISIT:    Cardiomyopathy with LV systolic dysfunction, CAD and history of frequent PVCs. Polina Bennett is a pleasant 27-year-old male with cardiovascular history as described below. He remains active and goes to the gym twice a week and walks on a track, mows his lawn, does weights and bench pressing. He denies chest pain or dyspnea with his daily activities. He denies orthopnea, PNDs, lower extremity swelling, palpitations, dizziness, presyncope or syncope. He suffered from Covid-19 injection in 12/2020 and tells me that he needed two weeks after the infection resolved to go back to his same level of energy. REVIEW OF SYSTEMS:    CONSTITUTIONAL: Denies fevers, chills, night sweats, or fatigue. HEENT: Denies any unusual headaches. He has diminished visual acuity in his left eye, but denies any recent changes in vision. Denies any recent changes in hearing. Denies dysphagia, hoarseness, hemoptysis, hematemesis, or epistaxis. ENDOCRINE: Denies polyuria, polydipsia, polyphagia.  He denies heat or cold intolerance.    MUSCULOSKELETAL: Denies arthralgias or myalgias. SKIN: Denies rashes, ulcers, or itching. HEME/LYMPH: Denies lymphadenopathy or bleeding. HEART: As above. LUNGS: Denies cough or sputum production. GI: Denies abdominal pain, nausea, vomiting, diarrhea, constipation, rectal bleeding, or tarry stools. : Denies hematuria or dysuria. PSYCHIATRIC: Denies mood changes, anxiety or depression. NEUROLOGIC: He has right hand focal dystonia.  Denies any motor weakness otherwise.  Denies tingling, tremors or memory loss.      PAST MEDICAL / SURGICAL HISTORY:   1.  Diabetes mellitus diagnosed in 1995. 2.  Brief episode of atrial fibrillation in 1984. 3.  Hyperlipidemia. 4.  Cardiomyopathy:  a.  Echocardiogram done on 11/18/2013 showed normal left ventricular size and wall thickness with mildly reduced global left ventricular systolic function with an estimated ejection fraction of 45-50% with stage I left ventricular diastolic dysfunction, normal right ventricular size and function. Mildly dilated atria. Aneurysmal intraatrial septum, mild prolapse of the posterior mitral leaflet with trace mitral regurgitation. Mild tricuspid regurgitation. Mild pulmonary hypertension.   b.  Echocardiogram done on 11/29/2017 showed a mildly dilated left ventricle with normal left ventricular wall thickness with no regional wall motion abnormality but with [id number] impaired global left ventricular systolic function with an estimated ejection fraction of 40-45% with stage I left ventricular diastolic dysfunction.  Mildly dilated but normally contractile right ventricle.  Moderately dilated left atrium with mildly dilated right atrium.  Aneurysmal interatrial septum.  Mild prolapse of the posterior mitral leaflet with mild mitral regurgitation.  Mild tricuspid regurgitation.    5.  Coronary artery disease:   a. 9/25/02: Treadmill nuclear stress test: Samy protocol. 11 minutes and 30 seconds. 98% of the maximum predicted heart rate. No chest pain. No ischemic EKG changes. Physiologic blood pressure   response. Nuclear images showed significant attenuation artifacts but non transmural myocardial infarction with mild additional stress induced ischemia involving the anteroseptal wall could not be   totally excluded. The computer calculated ejection fraction was 44%. b. 10/4/03 - Cardiac Catheterization. Left main non existent, separate ostia to the LAD and the LCX. LAD, mild luminal irregularities with 20% proximal luminal narrowing.  LCX, very large and dominant   vessel with no disease. RCA, trivial nondominant vessel with no disease. Normal left ventricular size with mildly impaired left ventricular systolic function with an ejection fraction estimated at 45%. c. 2013 Treadmill nuclear stress test: Samy protocol. 7 minutes and 1 second. 94% of the maximum predicted heart rate. Physiologic blood pressure response. No chest pain. No ischemic   EKG changes. Frequent ventricular ectopy. The myocardial perfusion images were within normal limits showing soft tissue attenuation artifact with no convincing evidence of any scars or any  stress-induced ischemia with the gated views showing paradoxical septal motion with mild generalized hypokinesis with an estimated ejection fraction of 45-50% (calculated at 55). 6.  Hypertension. 7.  IVCD. 8.  Frequent PVC's noted on ECG in 10/2013 and again on EKG in 2017:  a. Trejo Lowe monitor done on 2017 for 24 hours showed very frequent PVC's (23%). b. Trejo Lowe monitor done on 2017 for 24 hours, after patient was started on beta blocker therapy, showed significant reduction in the PVC's at 9.8%. 9.  Kidney stone in .   10.  Focal dystonia of right hand in .   11.  Basal cell carcinoma, excision x2 (left temporal area).   12.  Status post right inguinal herniorrhaphy in .   15.  Focal dystonia right hand diagnosed in , for which he receives Botox injections.   14.  Detached retina, left eye on 07, laser repair. , residual scar tissue detected on exam in : Patient had reported some deterioration in visual acuity.   15.  Sudden left ear hearing loss (now comes and goes) in 2012. 12.  Covid-19 infection in 2020.     FAMILY HISTORY:   Mother  at age 80: Had diabetes mellitus, cardiomyopathy and congestive heart failure. Father  at age 59 from myocardial infarction complicated by LV thrombus and CHF.      SOCIAL HISTORY:   Patient does not smoke.  He drinks alcohol on social occasions.     O:  COMPLETE PHYSICAL EXAM:      /64 (Site: Left Upper Arm, Position: Sitting, Cuff Size: Medium Adult)   Pulse 65   Resp 18   Ht 5' 10\" (1.778 m)   Wt 188 lb 12.8 oz (85.6 kg)   SpO2 97%   BMI 27.09 kg/m²      General:          Well-developed, well-nourished male in no acute distress. Head/Neck:     Atraumatic, normocephalic. No JVD. No carotid bruits. Carotid upstrokes normal bilaterally. No thyromegaly. Sclerae nonicteric. No xanthelasmas. Mucous membranes moist.   Chest:              Symmetrical and nontender. No deformities. Lungs:             Clear to auscultation bilaterally. Heart:              Normal S1 and S2. No S3 or S4. No murmurs or rubs. Abdomen:        Soft, nontender, without organomegaly or masses. Normal bowel sounds.  No bruits. Extremities:     No edema. Dorsalis pedis and posterior tibialis pulses normal bilaterally. Skin:                Normal turgor. No rashes or ulcers noted. Neuro:             Oriented x3. No motor or sensory deficit detected.                  REVIEW OF DIAGNOSTIC TESTS:    -Blood tests from 11/15/2021 reviewed:  HGB A1c 8.4. BUN 21, creatinine 1.04, GFR >60, potassium 4.3; LFTs normal.  -Blood tests from 07/22/2021 reviewed: Total cholesterol 163, triglycerides 91, HDL 57, LDL 88. Vitamin D 25 hydroxy normal.  CBC unremarkable. -EKG done today showed sinus rhythm with frequent PVCs with LAFB/IVCD.                 ASSESSMENT / DIAGNOSIS:   1.  Coronary artery disease: Clinically stable. 2.  Cardiomyopathy with mild LV systolic dysfunction:  Compensated with no signs or symptoms of congestive heart failure/fluid overload. 3.  Diabetes mellitus: On insulin, not adequately controlled. 4.  Hyperlipidemia: Adequately controlled. 5.  Hypertension: Adequately controlled. 6.  IVCD and LAFB. 7.  Frequent PVC's. 8.  Lisinopril associated cough.    9.  Mild prolapse of the posterior mitral leaflet with mild mitral regurgitation and mild tricuspid regurgitation on echocardiogram in 11/2017.    10. Covid-19 infection in 12/2020. TREATMENT PLAN:  1. Reassure. 2.  Continue current cardiac medications. 3.  Patient strongly advised watching diet more carefully and losing a few pounds. 4.  Follow up with Cardiology in one year or on prn basis. Nura Gonzalez.  [place] [id number]  [phone]

## 2022-11-07 LAB
ALBUMIN SERPL-MCNC: NORMAL G/DL
ALP BLD-CCNC: NORMAL U/L
ALT SERPL-CCNC: NORMAL U/L
ANION GAP SERPL CALCULATED.3IONS-SCNC: NORMAL MMOL/L
AST SERPL-CCNC: NORMAL U/L
AVERAGE GLUCOSE: NORMAL
BILIRUB SERPL-MCNC: NORMAL MG/DL
BUN BLDV-MCNC: NORMAL MG/DL
CALCIUM SERPL-MCNC: NORMAL MG/DL
CHLORIDE BLD-SCNC: NORMAL MMOL/L
CHOLESTEROL, TOTAL: 153 MG/DL
CHOLESTEROL/HDL RATIO: 4
CO2: NORMAL
CREAT SERPL-MCNC: NORMAL MG/DL
CREATININE, URINE: 156
GFR SERPL CREATININE-BSD FRML MDRD: NORMAL ML/MIN/{1.73_M2}
GLUCOSE BLD-MCNC: NORMAL MG/DL
HBA1C MFR BLD: 8.4 %
HDLC SERPL-MCNC: 40 MG/DL (ref 35–70)
LDL CHOLESTEROL CALCULATED: 67 MG/DL (ref 0–160)
MICROALBUMIN/CREAT 24H UR: 2.42 MG/G{CREAT}
MICROALBUMIN/CREAT UR-RTO: 15.5
NONHDLC SERPL-MCNC: NORMAL MG/DL
POTASSIUM SERPL-SCNC: NORMAL MMOL/L
SODIUM BLD-SCNC: NORMAL MMOL/L
TOTAL PROTEIN: NORMAL
TRIGL SERPL-MCNC: 228 MG/DL
VITAMIN D 25-HYDROXY: NORMAL
VITAMIN D2, 25 HYDROXY: NORMAL
VITAMIN D3,25 HYDROXY: NORMAL
VLDLC SERPL CALC-MCNC: NORMAL MG/DL

## 2023-01-27 ENCOUNTER — OFFICE VISIT (OUTPATIENT)
Dept: CARDIOLOGY CLINIC | Age: 78
End: 2023-01-27

## 2023-01-27 VITALS
SYSTOLIC BLOOD PRESSURE: 136 MMHG | HEART RATE: 64 BPM | HEIGHT: 70 IN | WEIGHT: 190.2 LBS | BODY MASS INDEX: 27.23 KG/M2 | RESPIRATION RATE: 20 BRPM | DIASTOLIC BLOOD PRESSURE: 60 MMHG | OXYGEN SATURATION: 98 %

## 2023-01-27 DIAGNOSIS — T46.4X5A ACE-INHIBITOR COUGH: ICD-10-CM

## 2023-01-27 DIAGNOSIS — E11.9 INSULIN-REQUIRING OR DEPENDENT TYPE II DIABETES MELLITUS (HCC): ICD-10-CM

## 2023-01-27 DIAGNOSIS — I49.3 PVC'S (PREMATURE VENTRICULAR CONTRACTIONS): ICD-10-CM

## 2023-01-27 DIAGNOSIS — I44.7 LBBB (LEFT BUNDLE BRANCH BLOCK): ICD-10-CM

## 2023-01-27 DIAGNOSIS — E78.2 MIXED HYPERLIPIDEMIA: ICD-10-CM

## 2023-01-27 DIAGNOSIS — Z79.4 INSULIN-REQUIRING OR DEPENDENT TYPE II DIABETES MELLITUS (HCC): ICD-10-CM

## 2023-01-27 DIAGNOSIS — I34.0 MILD MITRAL REGURGITATION BY PRIOR ECHOCARDIOGRAM: ICD-10-CM

## 2023-01-27 DIAGNOSIS — I44.4 LAFB (LEFT ANTERIOR FASCICULAR BLOCK): ICD-10-CM

## 2023-01-27 DIAGNOSIS — G24.8 FOCAL DYSTONIA: ICD-10-CM

## 2023-01-27 DIAGNOSIS — I45.4 IVCD (INTRAVENTRICULAR CONDUCTION DEFECT): ICD-10-CM

## 2023-01-27 DIAGNOSIS — R05.8 ACE-INHIBITOR COUGH: ICD-10-CM

## 2023-01-27 DIAGNOSIS — I38 VHD (VALVULAR HEART DISEASE): ICD-10-CM

## 2023-01-27 DIAGNOSIS — I25.10 CORONARY ARTERY DISEASE INVOLVING NATIVE CORONARY ARTERY OF NATIVE HEART WITHOUT ANGINA PECTORIS: Primary | ICD-10-CM

## 2023-01-27 DIAGNOSIS — I34.1 MITRAL VALVE PROLAPSE: ICD-10-CM

## 2023-01-27 DIAGNOSIS — I42.8 NON-ISCHEMIC CARDIOMYOPATHY (HCC): ICD-10-CM

## 2023-01-27 DIAGNOSIS — I10 PRIMARY HYPERTENSION: ICD-10-CM

## 2023-01-27 NOTE — PROGRESS NOTES
OFFICE VISIT        PRIMARY CARE PHYSICIAN:    Florina Venegas MD     ALLERGIES / SENSITIVITIES:    Allergies   Allergen Reactions    Atorvastatin     Bethaprim [Sulfamethoxazole-Trimethoprim]     Erythromycin     Lipitor      Muscle aches. REVIEWED MEDICATIONS:      Current Outpatient Medications:     Zinc 100 MG TABS, Take 50 mg by mouth every other day, Disp: , Rfl:     aspirin 81 MG EC tablet, Take 81 mg by mouth every other day, Disp: , Rfl:     HYDROcodone-homatropine (HYCODAN) 5-1.5 MG/5ML syrup, Take 2.5 mLs by mouth 4 times daily as needed. , Disp: , Rfl:     TURMERIC PO, Take by mouth every other day, Disp: , Rfl:     vitamin C (ASCORBIC ACID) 500 MG tablet, Take 500 mg by mouth every other day, Disp: , Rfl:     losartan (COZAAR) 50 MG tablet, Take 50 mg by mouth daily, Disp: , Rfl:     rosuvastatin (CRESTOR) 40 MG tablet, Take 40 mg by mouth three times a week, Disp: , Rfl:     LEVEMIR FLEXTOUCH 100 UNIT/ML injection pen, Inject 20 Units into the skin 2 times daily , Disp: , Rfl:     metoprolol succinate (TOPROL XL) 50 MG extended release tablet, Take 1 tablet by mouth daily, Disp: 90 tablet, Rfl: 3    Omega-3 Fatty Acids (FISH OIL) 1000 MG CAPS, Take 1,000 mg by mouth daily, Disp: , Rfl:     triamterene-hydrochlorothiazide (MAXZIDE-25) 37.5-25 MG per tablet, Take 1 tablet by mouth three times a week, Disp: , Rfl:     insulin aspart (NOVOLOG FLEXPEN) 100 UNIT/ML injection, Inject 5 units with breakfast,6 units lunch,8-10 units at supper ,4 units with bedtime snack (Patient taking differently: Follows sliding scale qid), Disp: 15 Pen, Rfl: 1    Cholecalciferol (VITAMIN D3) 3000 UNITS TABS, Take 5,000 Units by mouth every other day , Disp: , Rfl:     Multiple Vitamin (THERAPEUTIC) TABS, Take 1 tablet by mouth daily, Disp: , Rfl:     Alpha-Lipoic Acid 300 MG CAPS, Take 200 mg by mouth daily , Disp: , Rfl:     Grape Seed 100 MG CAPS, Take 100 mg by mouth every other day , Disp: , Rfl:     Garlic 481 MG TABS, Take 500 mg by mouth daily. , Disp: , Rfl:     Chromium 200 MCG CAPS, Take 200 mg by mouth every other day , Disp: , Rfl:     Coenzyme Q10 (COQ10) 150 MG CAPS, Take 100 mg by mouth daily , Disp: , Rfl:     B Complex Vitamins (VITAMIN B COMPLEX) CAPS, Take 100 mg by mouth every other day , Disp: , Rfl:     loratadine (CLARITIN) 10 MG capsule, Take 10 mg by mouth as needed. , Disp: , Rfl:     S: REASON FOR VISIT:    Cardiomyopathy with LV systolic dysfunction, coronary artery disease and frequent PVCs. Rogerio Mendez is a pleasant 74-year-old male with cardiovascular history as described below. He remains active and continues to go to the gym twice a week walking on a track for 2 miles, walking on a treadmill and bench pressing. He denies chest pain or dyspnea with his daily activities. He denies orthopnea, PNDs or significant lower extremity swelling. He denies palpitations, dizziness, presyncope or syncope. He had left eye cataract surgery on 01/23/2023. He is scheduled to have right eye cataract surgery on 02/06/2023. REVIEW OF SYSTEMS:    CONSTITUTIONAL: Denies fevers, chills, night sweats, or fatigue. HEENT: Denies any unusual headaches. He has diminished visual acuity in his left eye, but denies any recent changes in vision. Denies any recent changes in hearing. Denies dysphagia, hoarseness, hemoptysis, hematemesis, or epistaxis. ENDOCRINE: Denies polyuria, polydipsia, polyphagia. He denies heat or cold intolerance. MUSCULOSKELETAL: Denies arthralgias or myalgias. SKIN: Denies rashes, ulcers, or itching. HEME/LYMPH: Denies lymphadenopathy or bleeding. HEART: As above. LUNGS: Denies cough or sputum production. GI: Denies abdominal pain, nausea, vomiting, diarrhea, constipation, rectal bleeding, or tarry stools. : Denies hematuria or dysuria. PSYCHIATRIC: Denies mood changes, anxiety or depression. NEUROLOGIC: He has right hand focal dystonia. Denies any motor weakness otherwise. Denies tingling, tremors or memory loss. PAST MEDICAL / SURGICAL HISTORY:   1.  Diabetes mellitus diagnosed in 1995.   2.  Brief episode of atrial fibrillation in 1984. 3.  Hyperlipidemia. 4.  Cardiomyopathy:  a.  Echocardiogram done on 11/18/2013 showed normal left ventricular size and wall thickness with mildly reduced global left ventricular systolic function with an estimated ejection fraction of 45-50% with stage I left ventricular diastolic dysfunction, normal right ventricular size and function. Mildly dilated atria. Aneurysmal intraatrial septum, mild prolapse of the posterior mitral leaflet with trace mitral regurgitation. Mild tricuspid regurgitation. Mild pulmonary hypertension. b.  Echocardiogram done on 11/29/2017 showed a mildly dilated left ventricle with normal left ventricular wall thickness with no regional wall motion abnormality but with [id number] impaired global left ventricular systolic function with an estimated ejection fraction of 40-45% with stage I left ventricular diastolic dysfunction. Mildly dilated but normally contractile right ventricle. Moderately dilated left atrium with mildly dilated right atrium. Aneurysmal interatrial septum. Mild prolapse of the posterior mitral leaflet with mild mitral regurgitation. Mild tricuspid regurgitation. 5.  Coronary artery disease:   a. 9/25/02: Treadmill nuclear stress test: Samy protocol. 11 minutes and 30 seconds. 98% of the maximum predicted heart rate. No chest pain. No ischemic EKG changes. Physiologic blood pressure   response. Nuclear images showed significant attenuation artifacts but non transmural myocardial infarction with mild additional stress induced ischemia involving the anteroseptal wall could not be   totally excluded. The computer calculated ejection fraction was 44%. b. 10/4/03 - Cardiac Catheterization. Left main non existent, separate ostia to the LAD and the LCX.  LAD, mild luminal irregularities with 20% proximal luminal narrowing. LCX, very large and dominant   vessel with no disease. RCA, trivial nondominant vessel with no disease. Normal left ventricular size with mildly impaired left ventricular systolic function with an ejection fraction estimated at 45%. c. 2013 Treadmill nuclear stress test: Samy protocol. 7 minutes and 1 second. 94% of the maximum predicted heart rate. Physiologic blood pressure response. No chest pain. No ischemic   EKG changes. Frequent ventricular ectopy. The myocardial perfusion images were within normal limits showing soft tissue attenuation artifact with no convincing evidence of any scars or any  stress-induced ischemia with the gated views showing paradoxical septal motion with mild generalized hypokinesis with an estimated ejection fraction of 45-50% (calculated at 55). 6.  Hypertension. 7. LAFB/IVCD/LBBB. 8.  Frequent PVC's noted on ECG in 10/2013 and again on EKG in 2017:  a.  Holter monitor done on 2017 for 24 hours showed very frequent PVC's (23%). b.  Holter monitor done on 2017 for 24 hours, after patient was started on beta blocker therapy, showed significant reduction in the PVC's at 9.8%. 9.  Kidney stone in .   10.  Focal dystonia of right hand in .   11.  Basal cell carcinoma, excision x2 (left temporal area). 12.  Status post right inguinal herniorrhaphy in . 13.  Focal dystonia right hand diagnosed in , for which he receives Botox injections. 14.  Detached retina, left eye on 07, laser repair. , residual scar tissue detected on exam in : Patient had reported some deterioration in visual acuity. 15.  Sudden left ear hearing loss (now comes and goes) in 2012. 16.  Covid-19 infection in 2020. 17.  Left eye cataract surgery with lens implant 2023. Scheduled for right eye cataract surgery with lens implant 2023. FAMILY HISTORY:   Mother  at age 80:  Had diabetes mellitus, cardiomyopathy and congestive heart failure. Father  at age 59 from myocardial infarction complicated by LV thrombus and CHF. SOCIAL HISTORY:   Patient does not smoke. He drinks alcohol on social occasions. O:  COMPLETE PHYSICAL EXAM:      /60 (Site: Left Upper Arm, Position: Sitting, Cuff Size: Medium Adult)   Pulse 64   Resp 20   Ht 5' 10\" (1.778 m)   Wt 190 lb 3.2 oz (86.3 kg)   SpO2 98%   BMI 27.29 kg/m²      General:          Well-developed, well-nourished male in no acute distress. Head/Neck:     Atraumatic, normocephalic. No JVD. No carotid bruits. Carotid upstrokes normal bilaterally. No thyromegaly. Sclerae nonicteric. No xanthelasmas. Mucous membranes moist.   Chest:              Symmetrical and nontender. No deformities. Lungs:             Clear to auscultation bilaterally. Heart:              Normal S1 and S2. No S3 or S4. Grade 1/6 systolic murmur heard at the right upper sternal border. Extra systoles auscultated. Abdomen:        Soft, nontender, without organomegaly or masses. Normal bowel sounds. No bruits. Extremities:     No edema. Dorsalis pedis and posterior tibialis pulses normal bilaterally. Skin:                Normal turgor. No rashes or ulcers noted. Neuro:             Oriented x3. No motor or sensory deficit detected. REVIEW OF DIAGNOSTIC TESTS:    -Blood tests from his Endocrinologist from 2022 obtained/reviewed:  Vitamin D 25 hydroxy 54.7. BUN 11, creatinine 0.90, GFR >60, potassium 4.6; LFTs unremarkable. TSH 1.87. Triglycerides 228, total cholesterol 153, LDL 67, HDL 40. HGB A1c 8.4. CBC unremarkable. -EKG done today showed sinus rhythm with a heart rate of 64 BPM with PACs and PVCs with LAFB/IVCD/LBBB. ASSESSMENT / DIAGNOSIS:   1. Coronary artery disease: Clinically stable.    2.  Cardiomyopathy with mild LV systolic dysfunction:  Compensated with no signs or symptoms of congestive heart failure/fluid overload. 3.  Diabetes mellitus: On insulin, not adequately controlled. 4.  Hyperlipidemia: Adequately controlled. 5.  Hypertension: Adequately controlled. 6. LAFB/IVCD/LBBB. 7.  Frequent PVC's. 8.  Lisinopril associated cough. 9.  Mild prolapse of the posterior mitral leaflet with mild mitral regurgitation and mild tricuspid regurgitation on echocardiogram in 11/2017. 10.  Covid-19 infection in 12/2020. TREATMENT PLAN:  Reassure. Continue current cardiac medications. Patient advised to remain active and to follow a heart-healthy, diabetic diet and try to lose a few pounds. Echocardiogram for follow-up valvular heart disease. Tentative follow up with Cardiology in one year or on prn basis. Nura Mayer.  [facility] [id number]  [phone]

## 2023-03-01 ENCOUNTER — HOSPITAL ENCOUNTER (OUTPATIENT)
Dept: CARDIOLOGY | Age: 78
Discharge: HOME | End: 2023-03-01
Payer: MEDICARE

## 2023-03-01 DIAGNOSIS — I34.1 MITRAL VALVE PROLAPSE: ICD-10-CM

## 2023-03-01 DIAGNOSIS — I34.0 MILD MITRAL REGURGITATION BY PRIOR ECHOCARDIOGRAM: ICD-10-CM

## 2023-03-01 DIAGNOSIS — I42.8 NON-ISCHEMIC CARDIOMYOPATHY (HCC): ICD-10-CM

## 2023-03-01 DIAGNOSIS — I38 VHD (VALVULAR HEART DISEASE): ICD-10-CM

## 2023-03-01 LAB
LV EF: 40 %
LVEF MODALITY: NORMAL

## 2023-03-01 PROCEDURE — 93306 TTE W/DOPPLER COMPLETE: CPT

## 2024-01-17 ENCOUNTER — OFFICE VISIT (OUTPATIENT)
Dept: CARDIOLOGY CLINIC | Age: 79
End: 2024-01-17
Payer: COMMERCIAL

## 2024-01-17 VITALS
WEIGHT: 191.8 LBS | HEIGHT: 70 IN | HEART RATE: 75 BPM | SYSTOLIC BLOOD PRESSURE: 122 MMHG | RESPIRATION RATE: 16 BRPM | DIASTOLIC BLOOD PRESSURE: 74 MMHG | BODY MASS INDEX: 27.46 KG/M2

## 2024-01-17 DIAGNOSIS — I10 PRIMARY HYPERTENSION: ICD-10-CM

## 2024-01-17 DIAGNOSIS — I34.0 MILD MITRAL REGURGITATION BY PRIOR ECHOCARDIOGRAM: ICD-10-CM

## 2024-01-17 DIAGNOSIS — G24.8 FOCAL DYSTONIA: ICD-10-CM

## 2024-01-17 DIAGNOSIS — E78.2 MIXED HYPERLIPIDEMIA: ICD-10-CM

## 2024-01-17 DIAGNOSIS — Z79.4 INSULIN-REQUIRING OR DEPENDENT TYPE II DIABETES MELLITUS (HCC): ICD-10-CM

## 2024-01-17 DIAGNOSIS — I34.1 MITRAL VALVE PROLAPSE: ICD-10-CM

## 2024-01-17 DIAGNOSIS — E11.9 INSULIN-REQUIRING OR DEPENDENT TYPE II DIABETES MELLITUS (HCC): ICD-10-CM

## 2024-01-17 DIAGNOSIS — T46.4X5A ACE-INHIBITOR COUGH: ICD-10-CM

## 2024-01-17 DIAGNOSIS — I44.7 LBBB (LEFT BUNDLE BRANCH BLOCK): ICD-10-CM

## 2024-01-17 DIAGNOSIS — I38 VHD (VALVULAR HEART DISEASE): ICD-10-CM

## 2024-01-17 DIAGNOSIS — I25.10 CORONARY ARTERY DISEASE INVOLVING NATIVE CORONARY ARTERY OF NATIVE HEART WITHOUT ANGINA PECTORIS: ICD-10-CM

## 2024-01-17 DIAGNOSIS — R05.8 ACE-INHIBITOR COUGH: ICD-10-CM

## 2024-01-17 DIAGNOSIS — I45.4 IVCD (INTRAVENTRICULAR CONDUCTION DEFECT): ICD-10-CM

## 2024-01-17 DIAGNOSIS — I42.8 NON-ISCHEMIC CARDIOMYOPATHY (HCC): Primary | ICD-10-CM

## 2024-01-17 DIAGNOSIS — I49.3 PVC'S (PREMATURE VENTRICULAR CONTRACTIONS): ICD-10-CM

## 2024-01-17 DIAGNOSIS — I44.4 LAFB (LEFT ANTERIOR FASCICULAR BLOCK): ICD-10-CM

## 2024-01-17 PROCEDURE — 99214 OFFICE O/P EST MOD 30 MIN: CPT | Performed by: INTERNAL MEDICINE

## 2024-01-17 PROCEDURE — 3074F SYST BP LT 130 MM HG: CPT | Performed by: INTERNAL MEDICINE

## 2024-01-17 PROCEDURE — 3078F DIAST BP <80 MM HG: CPT | Performed by: INTERNAL MEDICINE

## 2024-01-17 PROCEDURE — G8417 CALC BMI ABV UP PARAM F/U: HCPCS | Performed by: INTERNAL MEDICINE

## 2024-01-17 PROCEDURE — G8484 FLU IMMUNIZE NO ADMIN: HCPCS | Performed by: INTERNAL MEDICINE

## 2024-01-17 PROCEDURE — 93000 ELECTROCARDIOGRAM COMPLETE: CPT | Performed by: INTERNAL MEDICINE

## 2024-01-17 PROCEDURE — 1123F ACP DISCUSS/DSCN MKR DOCD: CPT | Performed by: INTERNAL MEDICINE

## 2024-01-17 PROCEDURE — 1036F TOBACCO NON-USER: CPT | Performed by: INTERNAL MEDICINE

## 2024-01-17 PROCEDURE — G8427 DOCREV CUR MEDS BY ELIG CLIN: HCPCS | Performed by: INTERNAL MEDICINE

## 2024-01-17 NOTE — PROGRESS NOTES
OFFICE VISIT        PRIMARY CARE PHYSICIAN:    Terry Giraldo MD         ALLERGIES / SENSITIVITIES:    Allergies   Allergen Reactions    Atorvastatin     Bethaprim [Sulfamethoxazole-Trimethoprim]     Erythromycin     Lipitor      Muscle aches.          REVIEWED MEDICATIONS:      Current Outpatient Medications:     Zinc 100 MG TABS, Take 50 mg by mouth every other day, Disp: , Rfl:     aspirin 81 MG EC tablet, Take 1 tablet by mouth every other day, Disp: , Rfl:     HYDROcodone-homatropine (HYCODAN) 5-1.5 MG/5ML syrup, Take 2.5 mLs by mouth 4 times daily as needed., Disp: , Rfl:     TURMERIC PO, Take by mouth every other day, Disp: , Rfl:     vitamin C (ASCORBIC ACID) 500 MG tablet, Take 1 tablet by mouth every other day, Disp: , Rfl:     losartan (COZAAR) 50 MG tablet, Take 1 tablet by mouth daily, Disp: , Rfl:     rosuvastatin (CRESTOR) 40 MG tablet, Take 1 tablet by mouth three times a week, Disp: , Rfl:     LEVEMIR FLEXTOUCH 100 UNIT/ML injection pen, Inject 20 Units into the skin 2 times daily, Disp: , Rfl:     metoprolol succinate (TOPROL XL) 50 MG extended release tablet, Take 1 tablet by mouth daily, Disp: 90 tablet, Rfl: 3    Omega-3 Fatty Acids (FISH OIL) 1000 MG CAPS, Take 1 capsule by mouth daily, Disp: , Rfl:     triamterene-hydrochlorothiazide (MAXZIDE-25) 37.5-25 MG per tablet, Take 1 tablet by mouth three times a week, Disp: , Rfl:     insulin aspart (NOVOLOG FLEXPEN) 100 UNIT/ML injection, Inject 5 units with breakfast,6 units lunch,8-10 units at supper ,4 units with bedtime snack (Patient taking differently: Follows sliding scale qid), Disp: 15 Pen, Rfl: 1    Cholecalciferol (VITAMIN D3) 3000 UNITS TABS, Take 5,000 Units by mouth every other day , Disp: , Rfl:     Multiple Vitamin (THERAPEUTIC) TABS, Take 1 tablet by mouth daily, Disp: , Rfl:     Alpha-Lipoic Acid 300 MG CAPS, Take 200 mg by mouth daily , Disp: , Rfl:     Grape Seed 100 MG CAPS, Take 100 mg by mouth every other day , Disp: , Rfl:

## 2024-09-12 ENCOUNTER — OFFICE VISIT (OUTPATIENT)
Dept: PODIATRY | Age: 79
End: 2024-09-12
Payer: COMMERCIAL

## 2024-09-12 VITALS — HEIGHT: 70 IN | WEIGHT: 185 LBS | BODY MASS INDEX: 26.48 KG/M2

## 2024-09-12 DIAGNOSIS — M79.675 PAIN OF TOE OF LEFT FOOT: ICD-10-CM

## 2024-09-12 DIAGNOSIS — I87.2 VENOUS INSUFFICIENCY (CHRONIC) (PERIPHERAL): ICD-10-CM

## 2024-09-12 DIAGNOSIS — B35.1 ONYCHOMYCOSIS: Primary | ICD-10-CM

## 2024-09-12 DIAGNOSIS — I73.9 PVD (PERIPHERAL VASCULAR DISEASE) (HCC): ICD-10-CM

## 2024-09-12 DIAGNOSIS — R26.2 DIFFICULTY WALKING: ICD-10-CM

## 2024-09-12 DIAGNOSIS — M79.674 PAIN OF TOE OF RIGHT FOOT: ICD-10-CM

## 2024-09-12 DIAGNOSIS — E11.51 TYPE II DIABETES MELLITUS WITH PERIPHERAL CIRCULATORY DISORDER (HCC): ICD-10-CM

## 2024-09-12 PROCEDURE — G8417 CALC BMI ABV UP PARAM F/U: HCPCS | Performed by: PODIATRIST

## 2024-09-12 PROCEDURE — 1123F ACP DISCUSS/DSCN MKR DOCD: CPT | Performed by: PODIATRIST

## 2024-09-12 PROCEDURE — 99203 OFFICE O/P NEW LOW 30 MIN: CPT | Performed by: PODIATRIST

## 2024-09-12 PROCEDURE — G8427 DOCREV CUR MEDS BY ELIG CLIN: HCPCS | Performed by: PODIATRIST

## 2024-09-12 PROCEDURE — 11721 DEBRIDE NAIL 6 OR MORE: CPT | Performed by: PODIATRIST

## 2024-09-12 PROCEDURE — 1036F TOBACCO NON-USER: CPT | Performed by: PODIATRIST

## 2024-09-19 DIAGNOSIS — I38 VHD (VALVULAR HEART DISEASE): Primary | ICD-10-CM

## 2024-09-19 DIAGNOSIS — I45.4 IVCD (INTRAVENTRICULAR CONDUCTION DEFECT): ICD-10-CM

## 2024-09-20 ENCOUNTER — HOSPITAL ENCOUNTER (OUTPATIENT)
Age: 79
Discharge: HOME | End: 2024-09-20
Payer: MEDICARE

## 2024-09-20 DIAGNOSIS — I38 VHD (VALVULAR HEART DISEASE): ICD-10-CM

## 2024-09-20 DIAGNOSIS — I45.4 IVCD (INTRAVENTRICULAR CONDUCTION DEFECT): ICD-10-CM

## 2024-09-20 LAB
ANION GAP SERPL CALCULATED.3IONS-SCNC: 15 MMOL/L (ref 7–16)
BNP SERPL-MCNC: 2220 PG/ML (ref 0–450)
BUN SERPL-MCNC: 23 MG/DL (ref 6–23)
CALCIUM SERPL-MCNC: 9.5 MG/DL (ref 8.6–10.2)
CHLORIDE SERPL-SCNC: 98 MMOL/L (ref 98–107)
CO2 SERPL-SCNC: 23 MMOL/L (ref 22–29)
CREAT SERPL-MCNC: 1.1 MG/DL (ref 0.7–1.2)
GFR, ESTIMATED: 72 ML/MIN/1.73M2
GLUCOSE SERPL-MCNC: 244 MG/DL (ref 74–99)
POTASSIUM SERPL-SCNC: 4.3 MMOL/L (ref 3.5–5)
SODIUM SERPL-SCNC: 136 MMOL/L (ref 132–146)

## 2024-09-20 PROCEDURE — 80048 BASIC METABOLIC PNL TOTAL CA: CPT

## 2024-09-20 PROCEDURE — 83880 ASSAY OF NATRIURETIC PEPTIDE: CPT

## 2024-09-20 PROCEDURE — 36415 COLL VENOUS BLD VENIPUNCTURE: CPT

## 2024-09-21 ENCOUNTER — PATIENT MESSAGE (OUTPATIENT)
Dept: CARDIOLOGY CLINIC | Age: 79
End: 2024-09-21

## 2024-09-23 ENCOUNTER — TELEPHONE (OUTPATIENT)
Dept: CARDIOLOGY CLINIC | Age: 79
End: 2024-09-23

## 2024-09-23 RX ORDER — SPIRONOLACTONE 25 MG/1
25 TABLET ORAL DAILY
Qty: 30 TABLET | Refills: 5 | Status: ON HOLD | OUTPATIENT
Start: 2024-09-23

## 2024-09-23 RX ORDER — FUROSEMIDE 20 MG
20 TABLET ORAL DAILY
Qty: 30 TABLET | Refills: 5 | Status: ON HOLD | OUTPATIENT
Start: 2024-09-23

## 2024-09-26 ENCOUNTER — HOSPITAL ENCOUNTER (INPATIENT)
Age: 79
LOS: 25 days | Discharge: HOME HEALTH | DRG: 233 | End: 2024-10-21
Attending: EMERGENCY MEDICINE | Admitting: STUDENT IN AN ORGANIZED HEALTH CARE EDUCATION/TRAINING PROGRAM
Payer: MEDICARE

## 2024-09-26 ENCOUNTER — APPOINTMENT (OUTPATIENT)
Dept: GENERAL RADIOLOGY | Age: 79
DRG: 233 | End: 2024-09-26
Payer: MEDICARE

## 2024-09-26 DIAGNOSIS — I42.9 CARDIOMYOPATHY, UNSPECIFIED TYPE (HCC): ICD-10-CM

## 2024-09-26 DIAGNOSIS — I21.4 NSTEMI (NON-ST ELEVATED MYOCARDIAL INFARCTION) (HCC): ICD-10-CM

## 2024-09-26 DIAGNOSIS — R55 SYNCOPE AND COLLAPSE: ICD-10-CM

## 2024-09-26 DIAGNOSIS — I50.20 HFREF (HEART FAILURE WITH REDUCED EJECTION FRACTION) (HCC): ICD-10-CM

## 2024-09-26 DIAGNOSIS — I47.20 V TACH (HCC): Primary | ICD-10-CM

## 2024-09-26 DIAGNOSIS — Z01.818 PRE-OP EVALUATION: ICD-10-CM

## 2024-09-26 DIAGNOSIS — G89.18 ACUTE POST-OPERATIVE PAIN: ICD-10-CM

## 2024-09-26 DIAGNOSIS — Z95.1 S/P CABG (CORONARY ARTERY BYPASS GRAFT): ICD-10-CM

## 2024-09-26 DIAGNOSIS — I48.0 PAROXYSMAL ATRIAL FIBRILLATION (HCC): ICD-10-CM

## 2024-09-26 LAB
ALBUMIN SERPL-MCNC: 4.1 G/DL (ref 3.5–5.2)
ALP SERPL-CCNC: 78 U/L (ref 40–129)
ALT SERPL-CCNC: 46 U/L (ref 0–40)
ANION GAP SERPL CALCULATED.3IONS-SCNC: 12 MMOL/L (ref 7–16)
AST SERPL-CCNC: 42 U/L (ref 0–39)
BASOPHILS # BLD: 0.05 K/UL (ref 0–0.2)
BASOPHILS NFR BLD: 1 % (ref 0–2)
BILIRUB SERPL-MCNC: 1 MG/DL (ref 0–1.2)
BNP SERPL-MCNC: 1649 PG/ML (ref 0–450)
BUN SERPL-MCNC: 35 MG/DL (ref 6–23)
CALCIUM SERPL-MCNC: 9.5 MG/DL (ref 8.6–10.2)
CHLORIDE SERPL-SCNC: 104 MMOL/L (ref 98–107)
CO2 SERPL-SCNC: 27 MMOL/L (ref 22–29)
CREAT SERPL-MCNC: 1.4 MG/DL (ref 0.7–1.2)
EOSINOPHIL # BLD: 0.13 K/UL (ref 0.05–0.5)
EOSINOPHILS RELATIVE PERCENT: 2 % (ref 0–6)
ERYTHROCYTE [DISTWIDTH] IN BLOOD BY AUTOMATED COUNT: 13.1 % (ref 11.5–15)
GFR, ESTIMATED: 53 ML/MIN/1.73M2
GLUCOSE BLD-MCNC: 147 MG/DL (ref 74–99)
GLUCOSE SERPL-MCNC: 189 MG/DL (ref 74–99)
HCT VFR BLD AUTO: 48.5 % (ref 37–54)
HGB BLD-MCNC: 15.8 G/DL (ref 12.5–16.5)
IMM GRANULOCYTES # BLD AUTO: <0.03 K/UL (ref 0–0.58)
IMM GRANULOCYTES NFR BLD: 0 % (ref 0–5)
LYMPHOCYTES NFR BLD: 2.58 K/UL (ref 1.5–4)
LYMPHOCYTES RELATIVE PERCENT: 34 % (ref 20–42)
MAGNESIUM SERPL-MCNC: 2 MG/DL (ref 1.6–2.6)
MCH RBC QN AUTO: 30.9 PG (ref 26–35)
MCHC RBC AUTO-ENTMCNC: 32.6 G/DL (ref 32–34.5)
MCV RBC AUTO: 94.9 FL (ref 80–99.9)
MONOCYTES NFR BLD: 0.78 K/UL (ref 0.1–0.95)
MONOCYTES NFR BLD: 10 % (ref 2–12)
NEUTROPHILS NFR BLD: 53 % (ref 43–80)
NEUTS SEG NFR BLD: 4 K/UL (ref 1.8–7.3)
PLATELET, FLUORESCENCE: 132 K/UL (ref 130–450)
PMV BLD AUTO: 13.9 FL (ref 7–12)
POTASSIUM SERPL-SCNC: 4.7 MMOL/L (ref 3.5–5)
PROT SERPL-MCNC: 6.8 G/DL (ref 6.4–8.3)
RBC # BLD AUTO: 5.11 M/UL (ref 3.8–5.8)
SODIUM SERPL-SCNC: 143 MMOL/L (ref 132–146)
TROPONIN I SERPL HS-MCNC: 66 NG/L (ref 0–11)
TROPONIN I SERPL HS-MCNC: 73 NG/L (ref 0–11)
WBC OTHER # BLD: 7.6 K/UL (ref 4.5–11.5)

## 2024-09-26 PROCEDURE — 93005 ELECTROCARDIOGRAM TRACING: CPT | Performed by: EMERGENCY MEDICINE

## 2024-09-26 PROCEDURE — 71045 X-RAY EXAM CHEST 1 VIEW: CPT

## 2024-09-26 PROCEDURE — 99285 EMERGENCY DEPT VISIT HI MDM: CPT

## 2024-09-26 PROCEDURE — 80053 COMPREHEN METABOLIC PANEL: CPT

## 2024-09-26 PROCEDURE — 96375 TX/PRO/DX INJ NEW DRUG ADDON: CPT

## 2024-09-26 PROCEDURE — 82962 GLUCOSE BLOOD TEST: CPT

## 2024-09-26 PROCEDURE — 84484 ASSAY OF TROPONIN QUANT: CPT

## 2024-09-26 PROCEDURE — 83735 ASSAY OF MAGNESIUM: CPT

## 2024-09-26 PROCEDURE — 6360000002 HC RX W HCPCS: Performed by: EMERGENCY MEDICINE

## 2024-09-26 PROCEDURE — 2140000000 HC CCU INTERMEDIATE R&B

## 2024-09-26 PROCEDURE — 6370000000 HC RX 637 (ALT 250 FOR IP): Performed by: STUDENT IN AN ORGANIZED HEALTH CARE EDUCATION/TRAINING PROGRAM

## 2024-09-26 PROCEDURE — 96374 THER/PROPH/DIAG INJ IV PUSH: CPT

## 2024-09-26 PROCEDURE — 6360000002 HC RX W HCPCS: Performed by: NURSE PRACTITIONER

## 2024-09-26 PROCEDURE — 2580000003 HC RX 258: Performed by: EMERGENCY MEDICINE

## 2024-09-26 PROCEDURE — 6370000000 HC RX 637 (ALT 250 FOR IP): Performed by: NURSE PRACTITIONER

## 2024-09-26 PROCEDURE — 85025 COMPLETE CBC W/AUTO DIFF WBC: CPT

## 2024-09-26 PROCEDURE — 83880 ASSAY OF NATRIURETIC PEPTIDE: CPT

## 2024-09-26 PROCEDURE — 2580000003 HC RX 258: Performed by: NURSE PRACTITIONER

## 2024-09-26 RX ORDER — INSULIN GLARGINE 100 [IU]/ML
20 INJECTION, SOLUTION SUBCUTANEOUS 2 TIMES DAILY
Status: DISCONTINUED | OUTPATIENT
Start: 2024-09-26 | End: 2024-10-02

## 2024-09-26 RX ORDER — INSULIN LISPRO 100 [IU]/ML
0-8 INJECTION, SOLUTION INTRAVENOUS; SUBCUTANEOUS
Status: DISCONTINUED | OUTPATIENT
Start: 2024-09-27 | End: 2024-10-02

## 2024-09-26 RX ORDER — ONDANSETRON 4 MG/1
4 TABLET, ORALLY DISINTEGRATING ORAL EVERY 8 HOURS PRN
Status: DISCONTINUED | OUTPATIENT
Start: 2024-09-26 | End: 2024-10-08

## 2024-09-26 RX ORDER — ONDANSETRON 2 MG/ML
4 INJECTION INTRAMUSCULAR; INTRAVENOUS EVERY 6 HOURS PRN
Status: DISCONTINUED | OUTPATIENT
Start: 2024-09-26 | End: 2024-10-08

## 2024-09-26 RX ORDER — MAGNESIUM SULFATE IN WATER 40 MG/ML
2000 INJECTION, SOLUTION INTRAVENOUS ONCE
Status: COMPLETED | OUTPATIENT
Start: 2024-09-26 | End: 2024-09-26

## 2024-09-26 RX ORDER — INSULIN LISPRO 100 [IU]/ML
0-4 INJECTION, SOLUTION INTRAVENOUS; SUBCUTANEOUS NIGHTLY
Status: DISCONTINUED | OUTPATIENT
Start: 2024-09-26 | End: 2024-10-02

## 2024-09-26 RX ORDER — SODIUM CHLORIDE 0.9 % (FLUSH) 0.9 %
10 SYRINGE (ML) INJECTION PRN
Status: DISCONTINUED | OUTPATIENT
Start: 2024-09-26 | End: 2024-10-08

## 2024-09-26 RX ORDER — ROSUVASTATIN CALCIUM 20 MG/1
40 TABLET, COATED ORAL
Status: DISCONTINUED | OUTPATIENT
Start: 2024-09-27 | End: 2024-10-21 | Stop reason: HOSPADM

## 2024-09-26 RX ORDER — 0.9 % SODIUM CHLORIDE 0.9 %
1000 INTRAVENOUS SOLUTION INTRAVENOUS ONCE
Status: COMPLETED | OUTPATIENT
Start: 2024-09-26 | End: 2024-09-26

## 2024-09-26 RX ORDER — SPIRONOLACTONE 25 MG/1
25 TABLET ORAL DAILY
Status: DISCONTINUED | OUTPATIENT
Start: 2024-09-26 | End: 2024-10-08

## 2024-09-26 RX ORDER — DEXTROSE MONOHYDRATE 100 MG/ML
INJECTION, SOLUTION INTRAVENOUS CONTINUOUS PRN
Status: DISCONTINUED | OUTPATIENT
Start: 2024-09-26 | End: 2024-10-08

## 2024-09-26 RX ORDER — LOSARTAN POTASSIUM 50 MG/1
50 TABLET ORAL DAILY
Status: DISCONTINUED | OUTPATIENT
Start: 2024-09-26 | End: 2024-09-27

## 2024-09-26 RX ORDER — ENOXAPARIN SODIUM 100 MG/ML
40 INJECTION SUBCUTANEOUS DAILY
Status: DISCONTINUED | OUTPATIENT
Start: 2024-09-26 | End: 2024-09-28

## 2024-09-26 RX ORDER — LIDOCAINE HCL/PF 100 MG/5ML
1 SYRINGE (ML) INJECTION ONCE
Status: COMPLETED | OUTPATIENT
Start: 2024-09-26 | End: 2024-09-26

## 2024-09-26 RX ORDER — TRIAMTERENE AND HYDROCHLOROTHIAZIDE 37.5; 25 MG/1; MG/1
1 TABLET ORAL
Status: DISCONTINUED | OUTPATIENT
Start: 2024-09-27 | End: 2024-09-27

## 2024-09-26 RX ORDER — ASPIRIN 81 MG/1
81 TABLET ORAL EVERY OTHER DAY
Status: DISCONTINUED | OUTPATIENT
Start: 2024-09-26 | End: 2024-10-08

## 2024-09-26 RX ORDER — CETIRIZINE HYDROCHLORIDE 10 MG/1
10 TABLET ORAL DAILY
Status: DISCONTINUED | OUTPATIENT
Start: 2024-09-26 | End: 2024-10-08

## 2024-09-26 RX ORDER — ACETAMINOPHEN 325 MG/1
650 TABLET ORAL EVERY 6 HOURS PRN
Status: DISCONTINUED | OUTPATIENT
Start: 2024-09-26 | End: 2024-10-08

## 2024-09-26 RX ORDER — POTASSIUM CHLORIDE 7.45 MG/ML
10 INJECTION INTRAVENOUS PRN
Status: DISCONTINUED | OUTPATIENT
Start: 2024-09-26 | End: 2024-10-08

## 2024-09-26 RX ORDER — SODIUM CHLORIDE 0.9 % (FLUSH) 0.9 %
10 SYRINGE (ML) INJECTION EVERY 12 HOURS SCHEDULED
Status: DISCONTINUED | OUTPATIENT
Start: 2024-09-26 | End: 2024-10-08

## 2024-09-26 RX ORDER — ONDANSETRON 2 MG/ML
4 INJECTION INTRAMUSCULAR; INTRAVENOUS ONCE
Status: DISCONTINUED | OUTPATIENT
Start: 2024-09-26 | End: 2024-09-27

## 2024-09-26 RX ORDER — ACETAMINOPHEN 650 MG/1
650 SUPPOSITORY RECTAL EVERY 6 HOURS PRN
Status: DISCONTINUED | OUTPATIENT
Start: 2024-09-26 | End: 2024-10-08

## 2024-09-26 RX ORDER — FUROSEMIDE 20 MG/1
20 TABLET ORAL DAILY
Status: DISCONTINUED | OUTPATIENT
Start: 2024-09-26 | End: 2024-09-27

## 2024-09-26 RX ORDER — METOPROLOL SUCCINATE 50 MG/1
50 TABLET, EXTENDED RELEASE ORAL DAILY
Status: DISPENSED | OUTPATIENT
Start: 2024-09-26 | End: 2024-10-07

## 2024-09-26 RX ORDER — SODIUM CHLORIDE 9 MG/ML
INJECTION, SOLUTION INTRAVENOUS PRN
Status: DISCONTINUED | OUTPATIENT
Start: 2024-09-26 | End: 2024-10-08

## 2024-09-26 RX ORDER — GLUCAGON 1 MG/ML
1 KIT INJECTION PRN
Status: DISCONTINUED | OUTPATIENT
Start: 2024-09-26 | End: 2024-10-08

## 2024-09-26 RX ORDER — POTASSIUM CHLORIDE 1500 MG/1
40 TABLET, EXTENDED RELEASE ORAL PRN
Status: DISCONTINUED | OUTPATIENT
Start: 2024-09-26 | End: 2024-10-08

## 2024-09-26 RX ORDER — MAGNESIUM SULFATE IN WATER 40 MG/ML
2000 INJECTION, SOLUTION INTRAVENOUS PRN
Status: DISCONTINUED | OUTPATIENT
Start: 2024-09-26 | End: 2024-10-08

## 2024-09-26 RX ORDER — SENNOSIDES A AND B 8.6 MG/1
1 TABLET, FILM COATED ORAL DAILY PRN
Status: DISCONTINUED | OUTPATIENT
Start: 2024-09-26 | End: 2024-10-08

## 2024-09-26 RX ADMIN — LIDOCAINE HYDROCHLORIDE 82.6 MG: 20 INJECTION INTRAVENOUS at 18:40

## 2024-09-26 RX ADMIN — SODIUM CHLORIDE 1000 ML: 9 INJECTION, SOLUTION INTRAVENOUS at 19:05

## 2024-09-26 RX ADMIN — ENOXAPARIN SODIUM 40 MG: 100 INJECTION SUBCUTANEOUS at 22:54

## 2024-09-26 RX ADMIN — MAGNESIUM SULFATE HEPTAHYDRATE 2000 MG: 40 INJECTION, SOLUTION INTRAVENOUS at 18:45

## 2024-09-26 RX ADMIN — SODIUM CHLORIDE 1000 ML: 9 INJECTION, SOLUTION INTRAVENOUS at 18:06

## 2024-09-26 RX ADMIN — AMIODARONE HYDROCHLORIDE 1 MG/MIN: 50 INJECTION, SOLUTION INTRAVENOUS at 20:20

## 2024-09-26 RX ADMIN — INSULIN GLARGINE 20 UNITS: 100 INJECTION, SOLUTION SUBCUTANEOUS at 22:54

## 2024-09-26 RX ADMIN — SODIUM CHLORIDE, PRESERVATIVE FREE 10 ML: 5 INJECTION INTRAVENOUS at 22:55

## 2024-09-26 RX ADMIN — ASPIRIN 81 MG: 81 TABLET, COATED ORAL at 22:54

## 2024-09-26 RX ADMIN — LOSARTAN POTASSIUM 50 MG: 50 TABLET, FILM COATED ORAL at 22:54

## 2024-09-26 ASSESSMENT — PAIN - FUNCTIONAL ASSESSMENT: PAIN_FUNCTIONAL_ASSESSMENT: NONE - DENIES PAIN

## 2024-09-26 NOTE — ED PROVIDER NOTES
[facility] EMERGENCY DEPARTMENT  EMERGENCY DEPARTMENT ENCOUNTER        Pt Name: Pantera Payne  MRN: [id number]  Birthdate 1945  Date of evaluation: 9/26/2024  Provider: Yessica Moreno DO  PCP: Terry Giraldo MD  Note Started: 6:08 PM EDT 9/26/24    CHIEF COMPLAINT       Chief Complaint   Patient presents with    Loss of Consciousness     Pt reported feeling faint on the golf course. Pt states  and decided to eat an energy bar. Pt passed out on golf course and started to vomit. EMS reported frequent PVC's and runs of V-tach       HISTORY OF PRESENT ILLNESS: 1 or more Elements   History From: Patient    Limitations to history : None    Pantera Payne is a 79 y.o. male who presents with concern for syncope and collapse.  EMS did note that the patient was potentially in V. tach versus very frequent PVCs.  Patient notes that he had been out golfing, was on the third hole began to feel nauseous and lightheaded, he felt that his blood sugar was low so he was eating a normal diet in the car and that the last thing he remembered, his brother says that he fell out of the car.  Did not hit his head, he is on anticoagulation, no headaches vision changes, no chest pain or difficulty breathing, he has not been short of breath, no swelling in his lower extremities, no other associated complaints.      EXTERNAL NOTE REVIEW:      On chart review last saw pulmonology for follow-up on obstructive sleep apnea on 9/16/2024.  He last cardiology on 1/17/2024 for nonischemic cardiomyopathy.  Last echo was 3/1/2023 with an ejection fraction of 40%.    REVIEW OF SYSTEMS :      Positives and Pertinent negatives as per HPI.     SURGICAL HISTORY     Past Surgical History:   Procedure Laterality Date    CARDIOVASCULAR STRESS TEST  9/25/2002 Treadmill nuclear stress test:  Samy protocol.  11 minutes, 30 seconds.  98% of maximum predicted heart rate.     No chest pain. No ischemic EKG

## 2024-09-27 ENCOUNTER — HOSPITAL ENCOUNTER (INPATIENT)
Age: 79
Discharge: HOME | DRG: 233 | End: 2024-09-29
Attending: INTERNAL MEDICINE
Payer: MEDICARE

## 2024-09-27 ENCOUNTER — ANESTHESIA EVENT (OUTPATIENT)
Age: 79
End: 2024-09-27
Payer: MEDICARE

## 2024-09-27 ENCOUNTER — ANESTHESIA (OUTPATIENT)
Age: 79
End: 2024-09-27
Payer: MEDICARE

## 2024-09-27 VITALS
SYSTOLIC BLOOD PRESSURE: 115 MMHG | HEART RATE: 70 BPM | WEIGHT: 182 LBS | RESPIRATION RATE: 20 BRPM | HEIGHT: 70 IN | TEMPERATURE: 97.6 F | BODY MASS INDEX: 26.05 KG/M2 | OXYGEN SATURATION: 96 % | DIASTOLIC BLOOD PRESSURE: 69 MMHG

## 2024-09-27 LAB
ALBUMIN SERPL-MCNC: 4 G/DL (ref 3.5–5.2)
ALP SERPL-CCNC: 64 U/L (ref 40–129)
ALT SERPL-CCNC: 49 U/L (ref 0–40)
ANION GAP SERPL CALCULATED.3IONS-SCNC: 15 MMOL/L (ref 7–16)
AST SERPL-CCNC: 36 U/L (ref 0–39)
BASOPHILS # BLD: 0.05 K/UL (ref 0–0.2)
BASOPHILS NFR BLD: 1 % (ref 0–2)
BILIRUB DIRECT SERPL-MCNC: 0.3 MG/DL (ref 0–0.3)
BILIRUB SERPL-MCNC: 1.6 MG/DL (ref 0–1.2)
BUN SERPL-MCNC: 29 MG/DL (ref 6–23)
CALCIUM SERPL-MCNC: 8.7 MG/DL (ref 8.6–10.2)
CHLORIDE SERPL-SCNC: 104 MMOL/L (ref 98–107)
CO2 SERPL-SCNC: 21 MMOL/L (ref 22–29)
CREAT SERPL-MCNC: 1.1 MG/DL (ref 0.7–1.2)
ECHO AO SINUS VALSALVA DIAM: 3.6 CM
ECHO AO SINUS VALSALVA INDEX: 1.79 CM/M2
ECHO BSA: 2.02 M2
ECHO EST RA PRESSURE: 8 MMHG
ECHO RIGHT VENTRICULAR SYSTOLIC PRESSURE (RVSP): 40 MMHG
ECHO TV REGURGITANT MAX VELOCITY: 2.84 M/S
ECHO TV REGURGITANT PEAK GRADIENT: 32 MMHG
EKG ATRIAL RATE: 340 BPM
EKG P AXIS: -89 DEGREES
EKG P-R INTERVAL: 116 MS
EKG Q-T INTERVAL: 368 MS
EKG QRS DURATION: 132 MS
EKG QTC CALCULATION (BAZETT): 483 MS
EKG R AXIS: -63 DEGREES
EKG T AXIS: 84 DEGREES
EKG VENTRICULAR RATE: 104 BPM
EOSINOPHIL # BLD: 0.05 K/UL (ref 0.05–0.5)
EOSINOPHILS RELATIVE PERCENT: 1 % (ref 0–6)
ERYTHROCYTE [DISTWIDTH] IN BLOOD BY AUTOMATED COUNT: 13.2 % (ref 11.5–15)
GFR, ESTIMATED: 70 ML/MIN/1.73M2
GLUCOSE BLD-MCNC: 202 MG/DL (ref 74–99)
GLUCOSE BLD-MCNC: 230 MG/DL (ref 74–99)
GLUCOSE BLD-MCNC: 313 MG/DL (ref 74–99)
GLUCOSE BLD-MCNC: 322 MG/DL (ref 74–99)
GLUCOSE SERPL-MCNC: 222 MG/DL (ref 74–99)
HBA1C MFR BLD: 8 % (ref 4–5.6)
HCT VFR BLD AUTO: 46.1 % (ref 37–54)
HGB BLD-MCNC: 15 G/DL (ref 12.5–16.5)
IMM GRANULOCYTES # BLD AUTO: 0.04 K/UL (ref 0–0.58)
IMM GRANULOCYTES NFR BLD: 0 % (ref 0–5)
LYMPHOCYTES NFR BLD: 3.63 K/UL (ref 1.5–4)
LYMPHOCYTES RELATIVE PERCENT: 34 % (ref 20–42)
MCH RBC QN AUTO: 30.7 PG (ref 26–35)
MCHC RBC AUTO-ENTMCNC: 32.5 G/DL (ref 32–34.5)
MCV RBC AUTO: 94.3 FL (ref 80–99.9)
MONOCYTES NFR BLD: 0.93 K/UL (ref 0.1–0.95)
MONOCYTES NFR BLD: 9 % (ref 2–12)
NEUTROPHILS NFR BLD: 55 % (ref 43–80)
NEUTS SEG NFR BLD: 5.86 K/UL (ref 1.8–7.3)
PLATELET, FLUORESCENCE: 132 K/UL (ref 130–450)
PMV BLD AUTO: 14.4 FL (ref 7–12)
POTASSIUM SERPL-SCNC: 4 MMOL/L (ref 3.5–5)
PROT SERPL-MCNC: 6.3 G/DL (ref 6.4–8.3)
RBC # BLD AUTO: 4.89 M/UL (ref 3.8–5.8)
SODIUM SERPL-SCNC: 140 MMOL/L (ref 132–146)
TSH SERPL DL<=0.05 MIU/L-ACNC: 1.63 UIU/ML (ref 0.27–4.2)
WBC OTHER # BLD: 10.6 K/UL (ref 4.5–11.5)

## 2024-09-27 PROCEDURE — 85025 COMPLETE CBC W/AUTO DIFF WBC: CPT

## 2024-09-27 PROCEDURE — 2580000003 HC RX 258: Performed by: EMERGENCY MEDICINE

## 2024-09-27 PROCEDURE — 7100000010 HC PHASE II RECOVERY - FIRST 15 MIN: Performed by: INTERNAL MEDICINE

## 2024-09-27 PROCEDURE — 93312 ECHO TRANSESOPHAGEAL: CPT

## 2024-09-27 PROCEDURE — 36415 COLL VENOUS BLD VENIPUNCTURE: CPT

## 2024-09-27 PROCEDURE — 84443 ASSAY THYROID STIM HORMONE: CPT

## 2024-09-27 PROCEDURE — 83036 HEMOGLOBIN GLYCOSYLATED A1C: CPT

## 2024-09-27 PROCEDURE — 2500000003 HC RX 250 WO HCPCS

## 2024-09-27 PROCEDURE — 6370000000 HC RX 637 (ALT 250 FOR IP): Performed by: INTERNAL MEDICINE

## 2024-09-27 PROCEDURE — 6360000002 HC RX W HCPCS: Performed by: STUDENT IN AN ORGANIZED HEALTH CARE EDUCATION/TRAINING PROGRAM

## 2024-09-27 PROCEDURE — 99223 1ST HOSP IP/OBS HIGH 75: CPT | Performed by: INTERNAL MEDICINE

## 2024-09-27 PROCEDURE — 6370000000 HC RX 637 (ALT 250 FOR IP): Performed by: STUDENT IN AN ORGANIZED HEALTH CARE EDUCATION/TRAINING PROGRAM

## 2024-09-27 PROCEDURE — B24BZZ4 ULTRASONOGRAPHY OF HEART WITH AORTA, TRANSESOPHAGEAL: ICD-10-PCS | Performed by: INTERNAL MEDICINE

## 2024-09-27 PROCEDURE — 6370000000 HC RX 637 (ALT 250 FOR IP): Performed by: NURSE PRACTITIONER

## 2024-09-27 PROCEDURE — 3700000000 HC ANESTHESIA ATTENDED CARE: Performed by: INTERNAL MEDICINE

## 2024-09-27 PROCEDURE — 6360000002 HC RX W HCPCS: Performed by: NURSE PRACTITIONER

## 2024-09-27 PROCEDURE — 93312 ECHO TRANSESOPHAGEAL: CPT | Performed by: INTERNAL MEDICINE

## 2024-09-27 PROCEDURE — 3700000001 HC ADD 15 MINUTES (ANESTHESIA): Performed by: INTERNAL MEDICINE

## 2024-09-27 PROCEDURE — 2140000000 HC CCU INTERMEDIATE R&B

## 2024-09-27 PROCEDURE — 92960 CARDIOVERSION ELECTRIC EXT: CPT | Performed by: INTERNAL MEDICINE

## 2024-09-27 PROCEDURE — 5A2204Z RESTORATION OF CARDIAC RHYTHM, SINGLE: ICD-10-PCS | Performed by: INTERNAL MEDICINE

## 2024-09-27 PROCEDURE — 6360000002 HC RX W HCPCS: Performed by: EMERGENCY MEDICINE

## 2024-09-27 PROCEDURE — 6360000002 HC RX W HCPCS

## 2024-09-27 PROCEDURE — 93325 DOPPLER ECHO COLOR FLOW MAPG: CPT | Performed by: INTERNAL MEDICINE

## 2024-09-27 PROCEDURE — 2580000003 HC RX 258: Performed by: NURSE PRACTITIONER

## 2024-09-27 PROCEDURE — 93320 DOPPLER ECHO COMPLETE: CPT | Performed by: INTERNAL MEDICINE

## 2024-09-27 PROCEDURE — 80053 COMPREHEN METABOLIC PANEL: CPT

## 2024-09-27 PROCEDURE — 93010 ELECTROCARDIOGRAM REPORT: CPT | Performed by: INTERNAL MEDICINE

## 2024-09-27 PROCEDURE — 2580000003 HC RX 258: Performed by: STUDENT IN AN ORGANIZED HEALTH CARE EDUCATION/TRAINING PROGRAM

## 2024-09-27 PROCEDURE — 82962 GLUCOSE BLOOD TEST: CPT

## 2024-09-27 PROCEDURE — 82248 BILIRUBIN DIRECT: CPT

## 2024-09-27 PROCEDURE — 92960 CARDIOVERSION ELECTRIC EXT: CPT

## 2024-09-27 RX ORDER — SODIUM CHLORIDE 9 MG/ML
INJECTION, SOLUTION INTRAVENOUS CONTINUOUS
Status: DISCONTINUED | OUTPATIENT
Start: 2024-09-27 | End: 2024-09-27

## 2024-09-27 RX ORDER — FUROSEMIDE 20 MG/1
40 TABLET ORAL DAILY
Status: DISCONTINUED | OUTPATIENT
Start: 2024-09-28 | End: 2024-10-08

## 2024-09-27 RX ORDER — FUROSEMIDE 10 MG/ML
20 INJECTION INTRAMUSCULAR; INTRAVENOUS DAILY
Status: DISCONTINUED | OUTPATIENT
Start: 2024-09-28 | End: 2024-09-27

## 2024-09-27 RX ORDER — PROPOFOL 10 MG/ML
INJECTION, EMULSION INTRAVENOUS
Status: DISCONTINUED | OUTPATIENT
Start: 2024-09-27 | End: 2024-09-27 | Stop reason: SDUPTHER

## 2024-09-27 RX ORDER — DEXMEDETOMIDINE HYDROCHLORIDE 100 UG/ML
INJECTION, SOLUTION, CONCENTRATE INTRAVENOUS
Status: DISCONTINUED | OUTPATIENT
Start: 2024-09-27 | End: 2024-09-27 | Stop reason: SDUPTHER

## 2024-09-27 RX ORDER — AMIODARONE HYDROCHLORIDE 200 MG/1
200 TABLET ORAL EVERY 8 HOURS SCHEDULED
Status: DISCONTINUED | OUTPATIENT
Start: 2024-09-27 | End: 2024-09-28

## 2024-09-27 RX ADMIN — Medication 6.25 MG: at 10:56

## 2024-09-27 RX ADMIN — PHENYLEPHRINE HYDROCHLORIDE 100 MCG: 10 INJECTION INTRAVENOUS at 12:17

## 2024-09-27 RX ADMIN — PROPOFOL 30 MG: 10 INJECTION, EMULSION INTRAVENOUS at 12:17

## 2024-09-27 RX ADMIN — SODIUM CHLORIDE, PRESERVATIVE FREE 10 ML: 5 INJECTION INTRAVENOUS at 21:15

## 2024-09-27 RX ADMIN — AMIODARONE HYDROCHLORIDE 0.5 MG/MIN: 50 INJECTION, SOLUTION INTRAVENOUS at 02:20

## 2024-09-27 RX ADMIN — DEXMEDETOMIDINE HYDROCHLORIDE 8 MCG: 100 INJECTION, SOLUTION, CONCENTRATE INTRAVENOUS at 12:16

## 2024-09-27 RX ADMIN — AMIODARONE HYDROCHLORIDE 0.5 MG/MIN: 50 INJECTION, SOLUTION INTRAVENOUS at 19:46

## 2024-09-27 RX ADMIN — ENOXAPARIN SODIUM 40 MG: 100 INJECTION SUBCUTANEOUS at 10:27

## 2024-09-27 RX ADMIN — INSULIN GLARGINE 20 UNITS: 100 INJECTION, SOLUTION SUBCUTANEOUS at 21:39

## 2024-09-27 RX ADMIN — PHENYLEPHRINE HYDROCHLORIDE 100 MCG: 10 INJECTION INTRAVENOUS at 12:26

## 2024-09-27 RX ADMIN — INSULIN LISPRO 6 UNITS: 100 INJECTION, SOLUTION INTRAVENOUS; SUBCUTANEOUS at 17:59

## 2024-09-27 RX ADMIN — METOPROLOL SUCCINATE 50 MG: 50 TABLET, EXTENDED RELEASE ORAL at 10:22

## 2024-09-27 RX ADMIN — SACUBITRIL AND VALSARTAN 1 TABLET: 24; 26 TABLET, FILM COATED ORAL at 21:40

## 2024-09-27 RX ADMIN — PHENYLEPHRINE HYDROCHLORIDE 50 MCG: 10 INJECTION INTRAVENOUS at 12:32

## 2024-09-27 RX ADMIN — PROPOFOL 40 MG: 10 INJECTION, EMULSION INTRAVENOUS at 12:26

## 2024-09-27 RX ADMIN — AMIODARONE HYDROCHLORIDE 200 MG: 200 TABLET ORAL at 16:02

## 2024-09-27 RX ADMIN — SODIUM CHLORIDE: 9 INJECTION, SOLUTION INTRAVENOUS at 10:24

## 2024-09-27 RX ADMIN — AMIODARONE HYDROCHLORIDE 200 MG: 200 TABLET ORAL at 21:39

## 2024-09-27 RX ADMIN — INSULIN LISPRO 2 UNITS: 100 INJECTION, SOLUTION INTRAVENOUS; SUBCUTANEOUS at 13:34

## 2024-09-27 NOTE — FLOWSHEET NOTE
09/27/24 0614   Vital Signs   Temp 98.2 °F (36.8 °C)   Temp Source Oral   Pulse 98   Heart Rate Source Monitor   Respirations 19   BP (!) 106/58   MAP (Calculated) 74   BP Location Right upper arm   BP Method Automatic   Patient Position Sitting   Pain Assessment   Pain Assessment None - Denies Pain   Opioid-Induced Sedation   POSS Score 1   Oxygen Therapy   SpO2 98 %   O2 Device None (Room air)     Pt had an episode where he felt SOB, nausea. Vitals are stable as above. Per CMR, pt heart rhythm has not changed. Gave pt ginger ale and encouraged deep breathing, raised HOB. Per pt he feels improvement.

## 2024-09-27 NOTE — ACP (ADVANCE CARE PLANNING)
Advance Care Planning   Healthcare Decision Maker:    Primary Decision Maker: Shoshana Payne - Spouse - [phone]    Click here to complete Healthcare Decision Makers including selection of the Healthcare Decision Maker Relationship (ie \"Primary\").  Today we documented Decision Maker(s) consistent with Legal Next of Kin hierarchy.       Electronically signed by Laury Vale RN on 9/27/2024 at 3:27 PM

## 2024-09-27 NOTE — PROCEDURES
PROCEDURE NOTE  Date: 9/27/2024   Name: Pantera Payne  YOB: 1945    Procedures:    Preprocedure diagnosis:  New onset AF    Procedures, cardioversion elective arrhythmia external    Preprocedure diagnosis: A. Fib/flutter    Prior tests anticoagulated    Primary procedure  Written informed consent was obtained, the ANGELINA was performed under stable fasting condition, self-adhesive anterior-posterior defibrillation pads were applied, the defibrillator was programmed to deliver energy in a synchronized fashion with a EKG. The patient was set up for monitoring of surface EKG and pulse oximetry continuously. Blood pressure was monitored and with automatic cuff measurements. Supplemental oxygen was administered. The procedure was performed under anesthesia by anesthesiology. After ensuring adequate anesthesia, DC CV was performed by delivering 200 J of direct current delivered in a synchronized fashion.     Result: Successful cardioversion to sinus rhythm,  confirmed on 12-lead EKG.    Complication: None    Patient was monitored until awake and vitals remained stable.    Nicki Mason M.D.  [facility] cardiology

## 2024-09-27 NOTE — CARE COORDINATION
Internal Medicine On-call Care Coordination Note    I was called by the ED physician because they recommended admission for this patient and we cover their PCP.  The history as I understand it after discussion with the ED physician is as follows:    Out golfing today  Nauseous, syncopal episode  EMT monitor showed runs of vtach on monitor  BBB at baseline  10-11 runs of vtach  Mg, lidocaine, amio gtt    I placed admission orders.  Including:    General admission orders  Reconciled home meds  EP consult    Dr. Story, or our coverage will see the patient tomorrow for H&P.    Electronically signed by MARIAN Romero CNP on 9/26/2024 at 8:31 PM

## 2024-09-27 NOTE — CARE COORDINATION
9/27:  Transition of care:  Pt presented to the ER for syncope, collapse & Vtach from home.  Pt is on room at 96%, Iv Lasix daily, Iv Phenergan, Iv Amiodarone gtt & SQ Lovenox.  Cardiology & EP are consulted.  ANGELINA & Cardioversion done today.  CM spoke with pt,  his wife & brother to discuss CM role & dc planning.  Pt's PCP is Dr Terry Giraldo & uses [facility].  Pt lives with his wife in a ranch house with 2 steps to enter.  PTA pt was independent with a glucometer, freestyle ruel & treats with SQ injections.  Pt has no hx HHC/SNF.  Pt's dc plan is home & family can transport.  Pt was working on getting a cap at home via Dr Kemp[facility].  CM left message to verify where things stand.  Per wife they would like to use [facility].  Sw/CM will continue to follow.  Electronically signed by Laury Vale RN on 9/27/2024 at 3:27 PM     Case Management Assessment  Initial Evaluation    Date/Time of Evaluation: 9/27/2024 3:28 PM  Assessment Completed by: Laury Vale RN    If patient is discharged prior to next notation, then this note serves as note for discharge by case management.    Patient Name: Pantera Payne                   YOB: 1945  Diagnosis: Syncope and collapse [R55]  V tach (HCC) [I47.20]  V-tach (HCC) [I47.20]                   Date / Time: 9/26/2024  5:48 PM    Patient Admission Status: Inpatient   Readmission Risk (Low < 19, Mod (19-27), High > 27): Readmission Risk Score: 9.9    Current PCP: Terry Giraldo MD  PCP verified by CM? Yes    Chart Reviewed: Yes      History Provided by: Patient  Patient Orientation: Alert and Oriented, Person, Place, Situation, Self    Patient Cognition: Alert    Hospitalization in the last 30 days (Readmission):  No    If yes, Readmission Assessment in CM Navigator will be completed.    Advance Directives:      Code Status: Full Code   Patient's Primary Decision Maker is:      Primary Decision Maker: Shoshana Payne - Spouse -

## 2024-09-27 NOTE — CARE COORDINATION
9/27:  Update CM Note:  INDER spoke with Dr [facility]emp[facility] who advise that pt has an appointment on 10/9 that the sleep study was done on 9/16 & requires 2 wks to be read.  Electronically signed by Laury Vale RN on 9/27/2024 at 3:54 PM

## 2024-09-27 NOTE — H&P
Internal Medicine History & Physical     Name: Pantera Payne  : 1945  Chief Complaint: Loss of Consciousness (Pt reported feeling faint on the golf course. Pt states  and decided to eat an energy bar. Pt passed out on golf course and started to vomit. EMS reported frequent PVC's and runs of V-tach)  Primary Care Physician: Terry Giradlo MD  Admission date: 2024  Date of service: 2024     History of Present Illness  Pantera is a 79 y.o. year old male who presented with a chief complaint of LOC     Patient and brother were driving in a golf cart, patient passed out completely LOC head landed onto brothers shoulder, 10 seconds total. Stopped the cart patient regained consciousness and vomiting. He had several episodes of emesis. He was taken to [facility] for [facility] evaluation. He had COVID in  and had an episode of syncope at that time he tells me. He does not smoke, rarely drinks. Had a few sips of a beer prior to this episode but no heavy drinking. He states he was well hydrated and was eating through out the day. He checked his sugar and it was 202.     Past Medical History:   Diagnosis Date    Atrial fibrillation (HCC) , brief episode.    CAD (coronary artery disease)     Cancer (HCC)     Basal cell carcinoma, excisionx 2 (left temporal area).    Detached retina 2007    Left eye.  Laser repair.  2009: Residual scar tissue detected on exam in 2009.  Patient reported some deterioration in visual acuity.    Focal dystonia 2002    Right hand.    Hearing problem     Hyperlipidemia     Hypertension     Kidney stone .    Mild tricuspid regurgitation 13    Mitral valve prolapse 13    mild    Pulmonary hypertension (HCC) 13    Type II or unspecified type diabetes mellitus without mention of complication, not stated as uncontrolled Diagnosed in .       Past Surgical History:   Procedure Laterality Date    CARDIOVASCULAR STRESS TEST  2002 Treadmill

## 2024-09-28 LAB
ALBUMIN SERPL-MCNC: 3.9 G/DL (ref 3.5–5.2)
ALP SERPL-CCNC: 71 U/L (ref 40–129)
ALT SERPL-CCNC: 45 U/L (ref 0–40)
ANION GAP SERPL CALCULATED.3IONS-SCNC: 12 MMOL/L (ref 7–16)
AST SERPL-CCNC: 34 U/L (ref 0–39)
BILIRUB SERPL-MCNC: 1.5 MG/DL (ref 0–1.2)
BUN SERPL-MCNC: 34 MG/DL (ref 6–23)
CALCIUM SERPL-MCNC: 8.9 MG/DL (ref 8.6–10.2)
CHLORIDE SERPL-SCNC: 104 MMOL/L (ref 98–107)
CO2 SERPL-SCNC: 22 MMOL/L (ref 22–29)
CREAT SERPL-MCNC: 1.2 MG/DL (ref 0.7–1.2)
GFR, ESTIMATED: 62 ML/MIN/1.73M2
GLUCOSE BLD-MCNC: 172 MG/DL (ref 74–99)
GLUCOSE BLD-MCNC: 184 MG/DL (ref 74–99)
GLUCOSE BLD-MCNC: 245 MG/DL (ref 74–99)
GLUCOSE BLD-MCNC: 251 MG/DL (ref 74–99)
GLUCOSE BLD-MCNC: 265 MG/DL (ref 74–99)
GLUCOSE SERPL-MCNC: 209 MG/DL (ref 74–99)
POTASSIUM SERPL-SCNC: 4.5 MMOL/L (ref 3.5–5)
PROT SERPL-MCNC: 6.4 G/DL (ref 6.4–8.3)
SODIUM SERPL-SCNC: 138 MMOL/L (ref 132–146)

## 2024-09-28 PROCEDURE — 6360000002 HC RX W HCPCS: Performed by: INTERNAL MEDICINE

## 2024-09-28 PROCEDURE — 2700000000 HC OXYGEN THERAPY PER DAY

## 2024-09-28 PROCEDURE — 36415 COLL VENOUS BLD VENIPUNCTURE: CPT

## 2024-09-28 PROCEDURE — 94660 CPAP INITIATION&MGMT: CPT

## 2024-09-28 PROCEDURE — 6370000000 HC RX 637 (ALT 250 FOR IP): Performed by: STUDENT IN AN ORGANIZED HEALTH CARE EDUCATION/TRAINING PROGRAM

## 2024-09-28 PROCEDURE — 99233 SBSQ HOSP IP/OBS HIGH 50: CPT | Performed by: INTERNAL MEDICINE

## 2024-09-28 PROCEDURE — 6370000000 HC RX 637 (ALT 250 FOR IP): Performed by: INTERNAL MEDICINE

## 2024-09-28 PROCEDURE — 5A09557 ASSISTANCE WITH RESPIRATORY VENTILATION, GREATER THAN 96 CONSECUTIVE HOURS, CONTINUOUS POSITIVE AIRWAY PRESSURE: ICD-10-PCS | Performed by: STUDENT IN AN ORGANIZED HEALTH CARE EDUCATION/TRAINING PROGRAM

## 2024-09-28 PROCEDURE — 99223 1ST HOSP IP/OBS HIGH 75: CPT | Performed by: INTERNAL MEDICINE

## 2024-09-28 PROCEDURE — 82962 GLUCOSE BLOOD TEST: CPT

## 2024-09-28 PROCEDURE — 2580000003 HC RX 258: Performed by: NURSE PRACTITIONER

## 2024-09-28 PROCEDURE — 6370000000 HC RX 637 (ALT 250 FOR IP): Performed by: NURSE PRACTITIONER

## 2024-09-28 PROCEDURE — 2140000000 HC CCU INTERMEDIATE R&B

## 2024-09-28 PROCEDURE — 80053 COMPREHEN METABOLIC PANEL: CPT

## 2024-09-28 RX ORDER — ENOXAPARIN SODIUM 100 MG/ML
1 INJECTION SUBCUTANEOUS 2 TIMES DAILY
Status: DISCONTINUED | OUTPATIENT
Start: 2024-09-28 | End: 2024-10-01

## 2024-09-28 RX ORDER — AMIODARONE HYDROCHLORIDE 200 MG/1
400 TABLET ORAL EVERY 8 HOURS SCHEDULED
Status: DISCONTINUED | OUTPATIENT
Start: 2024-09-28 | End: 2024-09-29

## 2024-09-28 RX ORDER — FUROSEMIDE 10 MG/ML
40 INJECTION INTRAMUSCULAR; INTRAVENOUS ONCE
Status: COMPLETED | OUTPATIENT
Start: 2024-09-28 | End: 2024-09-28

## 2024-09-28 RX ADMIN — INSULIN GLARGINE 20 UNITS: 100 INJECTION, SOLUTION SUBCUTANEOUS at 07:55

## 2024-09-28 RX ADMIN — SACUBITRIL AND VALSARTAN 1 TABLET: 24; 26 TABLET, FILM COATED ORAL at 20:43

## 2024-09-28 RX ADMIN — AMIODARONE HYDROCHLORIDE 400 MG: 200 TABLET ORAL at 14:06

## 2024-09-28 RX ADMIN — METOPROLOL SUCCINATE 50 MG: 50 TABLET, EXTENDED RELEASE ORAL at 07:55

## 2024-09-28 RX ADMIN — SPIRONOLACTONE 25 MG: 25 TABLET ORAL at 07:55

## 2024-09-28 RX ADMIN — CETIRIZINE HYDROCHLORIDE 10 MG: 10 TABLET, FILM COATED ORAL at 07:55

## 2024-09-28 RX ADMIN — INSULIN LISPRO 4 UNITS: 100 INJECTION, SOLUTION INTRAVENOUS; SUBCUTANEOUS at 14:14

## 2024-09-28 RX ADMIN — SODIUM CHLORIDE, PRESERVATIVE FREE 10 ML: 5 INJECTION INTRAVENOUS at 20:46

## 2024-09-28 RX ADMIN — INSULIN LISPRO 2 UNITS: 100 INJECTION, SOLUTION INTRAVENOUS; SUBCUTANEOUS at 17:08

## 2024-09-28 RX ADMIN — FUROSEMIDE 40 MG: 10 INJECTION, SOLUTION INTRAMUSCULAR; INTRAVENOUS at 14:14

## 2024-09-28 RX ADMIN — FUROSEMIDE 40 MG: 40 TABLET ORAL at 07:55

## 2024-09-28 RX ADMIN — ENOXAPARIN SODIUM 90 MG: 100 INJECTION SUBCUTANEOUS at 20:43

## 2024-09-28 RX ADMIN — INSULIN GLARGINE 20 UNITS: 100 INJECTION, SOLUTION SUBCUTANEOUS at 20:43

## 2024-09-28 RX ADMIN — SACUBITRIL AND VALSARTAN 1 TABLET: 24; 26 TABLET, FILM COATED ORAL at 07:55

## 2024-09-28 RX ADMIN — ASPIRIN 81 MG: 81 TABLET, COATED ORAL at 07:55

## 2024-09-28 RX ADMIN — AMIODARONE HYDROCHLORIDE 200 MG: 200 TABLET ORAL at 06:08

## 2024-09-28 RX ADMIN — SODIUM CHLORIDE, PRESERVATIVE FREE 10 ML: 5 INJECTION INTRAVENOUS at 07:56

## 2024-09-28 RX ADMIN — AMIODARONE HYDROCHLORIDE 400 MG: 200 TABLET ORAL at 20:43

## 2024-09-29 PROBLEM — I48.19 PERSISTENT ATRIAL FIBRILLATION (HCC): Status: ACTIVE | Noted: 2024-09-29

## 2024-09-29 PROBLEM — I50.43 ACUTE ON CHRONIC COMBINED SYSTOLIC AND DIASTOLIC CONGESTIVE HEART FAILURE (HCC): Status: ACTIVE | Noted: 2024-09-29

## 2024-09-29 LAB
ALBUMIN SERPL-MCNC: 3.9 G/DL (ref 3.5–5.2)
ALP SERPL-CCNC: 68 U/L (ref 40–129)
ALT SERPL-CCNC: 39 U/L (ref 0–40)
ANION GAP SERPL CALCULATED.3IONS-SCNC: 14 MMOL/L (ref 7–16)
AST SERPL-CCNC: 29 U/L (ref 0–39)
ATYPICAL LYMPHOCYTE ABSOLUTE COUNT: 0.53 K/UL (ref 0–0.46)
ATYPICAL LYMPHOCYTES: 4 % (ref 0–4)
BASOPHILS # BLD: 0 K/UL (ref 0–0.2)
BASOPHILS NFR BLD: 0 % (ref 0–2)
BILIRUB SERPL-MCNC: 1.7 MG/DL (ref 0–1.2)
BUN SERPL-MCNC: 30 MG/DL (ref 6–23)
CALCIUM SERPL-MCNC: 8.9 MG/DL (ref 8.6–10.2)
CHLORIDE SERPL-SCNC: 101 MMOL/L (ref 98–107)
CO2 SERPL-SCNC: 24 MMOL/L (ref 22–29)
CREAT SERPL-MCNC: 1.2 MG/DL (ref 0.7–1.2)
EKG ATRIAL RATE: 64 BPM
EKG P AXIS: 71 DEGREES
EKG P-R INTERVAL: 226 MS
EKG Q-T INTERVAL: 478 MS
EKG QRS DURATION: 146 MS
EKG QTC CALCULATION (BAZETT): 493 MS
EKG R AXIS: -61 DEGREES
EKG T AXIS: 102 DEGREES
EKG VENTRICULAR RATE: 64 BPM
EOSINOPHIL # BLD: 0 K/UL (ref 0.05–0.5)
EOSINOPHILS RELATIVE PERCENT: 0 % (ref 0–6)
ERYTHROCYTE [DISTWIDTH] IN BLOOD BY AUTOMATED COUNT: 13 % (ref 11.5–15)
GFR, ESTIMATED: 64 ML/MIN/1.73M2
GLUCOSE BLD-MCNC: 138 MG/DL (ref 74–99)
GLUCOSE BLD-MCNC: 141 MG/DL (ref 74–99)
GLUCOSE BLD-MCNC: 167 MG/DL (ref 74–99)
GLUCOSE BLD-MCNC: 224 MG/DL (ref 74–99)
GLUCOSE BLD-MCNC: 292 MG/DL (ref 74–99)
GLUCOSE BLD-MCNC: 62 MG/DL (ref 74–99)
GLUCOSE SERPL-MCNC: 160 MG/DL (ref 74–99)
HCT VFR BLD AUTO: 47 % (ref 37–54)
HGB BLD-MCNC: 15.6 G/DL (ref 12.5–16.5)
LYMPHOCYTES NFR BLD: 1.87 K/UL (ref 1.5–4)
LYMPHOCYTES RELATIVE PERCENT: 12 % (ref 20–42)
MCH RBC QN AUTO: 31 PG (ref 26–35)
MCHC RBC AUTO-ENTMCNC: 33.2 G/DL (ref 32–34.5)
MCV RBC AUTO: 93.4 FL (ref 80–99.9)
MONOCYTES NFR BLD: 1.2 K/UL (ref 0.1–0.95)
MONOCYTES NFR BLD: 8 % (ref 2–12)
NEUTROPHILS NFR BLD: 76 % (ref 43–80)
NEUTS SEG NFR BLD: 11.6 K/UL (ref 1.8–7.3)
PLATELET, FLUORESCENCE: 126 K/UL (ref 130–450)
PMV BLD AUTO: 14.1 FL (ref 7–12)
POTASSIUM SERPL-SCNC: 3.6 MMOL/L (ref 3.5–5)
PROT SERPL-MCNC: 6.3 G/DL (ref 6.4–8.3)
RBC # BLD AUTO: 5.03 M/UL (ref 3.8–5.8)
RBC # BLD: ABNORMAL 10*6/UL
SODIUM SERPL-SCNC: 139 MMOL/L (ref 132–146)
WBC OTHER # BLD: 15.2 K/UL (ref 4.5–11.5)

## 2024-09-29 PROCEDURE — 99232 SBSQ HOSP IP/OBS MODERATE 35: CPT | Performed by: INTERNAL MEDICINE

## 2024-09-29 PROCEDURE — 85025 COMPLETE CBC W/AUTO DIFF WBC: CPT

## 2024-09-29 PROCEDURE — 94660 CPAP INITIATION&MGMT: CPT

## 2024-09-29 PROCEDURE — 6370000000 HC RX 637 (ALT 250 FOR IP): Performed by: INTERNAL MEDICINE

## 2024-09-29 PROCEDURE — 6370000000 HC RX 637 (ALT 250 FOR IP): Performed by: STUDENT IN AN ORGANIZED HEALTH CARE EDUCATION/TRAINING PROGRAM

## 2024-09-29 PROCEDURE — 2580000003 HC RX 258: Performed by: NURSE PRACTITIONER

## 2024-09-29 PROCEDURE — 2140000000 HC CCU INTERMEDIATE R&B

## 2024-09-29 PROCEDURE — 6360000002 HC RX W HCPCS: Performed by: INTERNAL MEDICINE

## 2024-09-29 PROCEDURE — 6370000000 HC RX 637 (ALT 250 FOR IP): Performed by: NURSE PRACTITIONER

## 2024-09-29 PROCEDURE — 80053 COMPREHEN METABOLIC PANEL: CPT

## 2024-09-29 PROCEDURE — 36415 COLL VENOUS BLD VENIPUNCTURE: CPT

## 2024-09-29 PROCEDURE — 97165 OT EVAL LOW COMPLEX 30 MIN: CPT

## 2024-09-29 PROCEDURE — 2700000000 HC OXYGEN THERAPY PER DAY

## 2024-09-29 PROCEDURE — 82962 GLUCOSE BLOOD TEST: CPT

## 2024-09-29 RX ORDER — AMIODARONE HYDROCHLORIDE 200 MG/1
400 TABLET ORAL 2 TIMES DAILY
Status: DISCONTINUED | OUTPATIENT
Start: 2024-09-29 | End: 2024-10-21 | Stop reason: HOSPADM

## 2024-09-29 RX ORDER — FUROSEMIDE 10 MG/ML
40 INJECTION INTRAMUSCULAR; INTRAVENOUS ONCE
Status: COMPLETED | OUTPATIENT
Start: 2024-09-29 | End: 2024-09-29

## 2024-09-29 RX ADMIN — INSULIN GLARGINE 20 UNITS: 100 INJECTION, SOLUTION SUBCUTANEOUS at 07:39

## 2024-09-29 RX ADMIN — ENOXAPARIN SODIUM 90 MG: 100 INJECTION SUBCUTANEOUS at 07:39

## 2024-09-29 RX ADMIN — ENOXAPARIN SODIUM 90 MG: 100 INJECTION SUBCUTANEOUS at 21:43

## 2024-09-29 RX ADMIN — AMIODARONE HYDROCHLORIDE 400 MG: 200 TABLET ORAL at 04:19

## 2024-09-29 RX ADMIN — SPIRONOLACTONE 25 MG: 25 TABLET ORAL at 07:39

## 2024-09-29 RX ADMIN — CETIRIZINE HYDROCHLORIDE 10 MG: 10 TABLET, FILM COATED ORAL at 07:39

## 2024-09-29 RX ADMIN — AMIODARONE HYDROCHLORIDE 400 MG: 200 TABLET ORAL at 21:42

## 2024-09-29 RX ADMIN — POTASSIUM BICARBONATE 20 MEQ: 782 TABLET, EFFERVESCENT ORAL at 09:15

## 2024-09-29 RX ADMIN — INSULIN LISPRO 2 UNITS: 100 INJECTION, SOLUTION INTRAVENOUS; SUBCUTANEOUS at 17:34

## 2024-09-29 RX ADMIN — SODIUM CHLORIDE, PRESERVATIVE FREE 10 ML: 5 INJECTION INTRAVENOUS at 07:42

## 2024-09-29 RX ADMIN — SODIUM CHLORIDE, PRESERVATIVE FREE 10 ML: 5 INJECTION INTRAVENOUS at 21:47

## 2024-09-29 RX ADMIN — SACUBITRIL AND VALSARTAN 1 TABLET: 24; 26 TABLET, FILM COATED ORAL at 21:42

## 2024-09-29 RX ADMIN — FUROSEMIDE 40 MG: 10 INJECTION, SOLUTION INTRAMUSCULAR; INTRAVENOUS at 09:16

## 2024-09-29 RX ADMIN — SACUBITRIL AND VALSARTAN 1 TABLET: 24; 26 TABLET, FILM COATED ORAL at 07:39

## 2024-09-29 RX ADMIN — INSULIN LISPRO 6 UNITS: 100 INJECTION, SOLUTION INTRAVENOUS; SUBCUTANEOUS at 13:19

## 2024-09-29 RX ADMIN — METOPROLOL SUCCINATE 50 MG: 50 TABLET, EXTENDED RELEASE ORAL at 07:39

## 2024-09-29 RX ADMIN — AMIODARONE HYDROCHLORIDE 400 MG: 200 TABLET ORAL at 13:20

## 2024-09-30 ENCOUNTER — APPOINTMENT (OUTPATIENT)
Dept: CT IMAGING | Age: 79
DRG: 233 | End: 2024-09-30
Payer: MEDICARE

## 2024-09-30 ENCOUNTER — APPOINTMENT (OUTPATIENT)
Dept: GENERAL RADIOLOGY | Age: 79
DRG: 233 | End: 2024-09-30
Payer: MEDICARE

## 2024-09-30 PROBLEM — I47.20 V TACH (HCC): Status: ACTIVE | Noted: 2024-09-30

## 2024-09-30 LAB
ABO + RH BLD: NORMAL
ALBUMIN SERPL-MCNC: 3.5 G/DL (ref 3.5–5.2)
ALP SERPL-CCNC: 62 U/L (ref 40–129)
ALT SERPL-CCNC: 34 U/L (ref 0–40)
ANION GAP SERPL CALCULATED.3IONS-SCNC: 13 MMOL/L (ref 7–16)
ARM BAND NUMBER: NORMAL
AST SERPL-CCNC: 26 U/L (ref 0–39)
B PARAP IS1001 DNA NPH QL NAA+NON-PROBE: NOT DETECTED
B PERT DNA SPEC QL NAA+PROBE: NOT DETECTED
BILIRUB SERPL-MCNC: 2 MG/DL (ref 0–1.2)
BILIRUB UR QL STRIP: NEGATIVE
BLOOD BANK SAMPLE EXPIRATION: NORMAL
BLOOD GROUP ANTIBODIES SERPL: NEGATIVE
BUN SERPL-MCNC: 21 MG/DL (ref 6–23)
C PNEUM DNA NPH QL NAA+NON-PROBE: NOT DETECTED
CALCIUM SERPL-MCNC: 8.8 MG/DL (ref 8.6–10.2)
CHLORIDE SERPL-SCNC: 102 MMOL/L (ref 98–107)
CLARITY UR: CLEAR
CO2 SERPL-SCNC: 25 MMOL/L (ref 22–29)
COLOR UR: ABNORMAL
COMMENT: ABNORMAL
CREAT SERPL-MCNC: 1.2 MG/DL (ref 0.7–1.2)
ECHO BSA: 2.02 M2
FLUAV RNA NPH QL NAA+NON-PROBE: NOT DETECTED
FLUBV RNA NPH QL NAA+NON-PROBE: NOT DETECTED
GFR, ESTIMATED: 63 ML/MIN/1.73M2
GLUCOSE BLD-MCNC: 125 MG/DL (ref 74–99)
GLUCOSE BLD-MCNC: 192 MG/DL (ref 74–99)
GLUCOSE BLD-MCNC: 315 MG/DL (ref 74–99)
GLUCOSE BLD-MCNC: 320 MG/DL (ref 74–99)
GLUCOSE SERPL-MCNC: 133 MG/DL (ref 74–99)
GLUCOSE UR STRIP-MCNC: 500 MG/DL
HADV DNA NPH QL NAA+NON-PROBE: NOT DETECTED
HCOV 229E RNA NPH QL NAA+NON-PROBE: NOT DETECTED
HCOV HKU1 RNA NPH QL NAA+NON-PROBE: NOT DETECTED
HCOV NL63 RNA NPH QL NAA+NON-PROBE: NOT DETECTED
HCOV OC43 RNA NPH QL NAA+NON-PROBE: NOT DETECTED
HGB UR QL STRIP.AUTO: NEGATIVE
HMPV RNA NPH QL NAA+NON-PROBE: NOT DETECTED
HPIV1 RNA NPH QL NAA+NON-PROBE: NOT DETECTED
HPIV2 RNA NPH QL NAA+NON-PROBE: NOT DETECTED
HPIV3 RNA NPH QL NAA+NON-PROBE: NOT DETECTED
HPIV4 RNA NPH QL NAA+NON-PROBE: NOT DETECTED
KETONES UR STRIP-MCNC: 15 MG/DL
LEUKOCYTE ESTERASE UR QL STRIP: NEGATIVE
M PNEUMO DNA NPH QL NAA+NON-PROBE: NOT DETECTED
NITRITE UR QL STRIP: NEGATIVE
PH UR STRIP: 5.5 [PH] (ref 5–9)
POTASSIUM SERPL-SCNC: 3.6 MMOL/L (ref 3.5–5)
PROT SERPL-MCNC: 6 G/DL (ref 6.4–8.3)
PROT UR STRIP-MCNC: NEGATIVE MG/DL
RSV RNA NPH QL NAA+NON-PROBE: NOT DETECTED
RV+EV RNA NPH QL NAA+NON-PROBE: NOT DETECTED
SARS-COV-2 RNA NPH QL NAA+NON-PROBE: NOT DETECTED
SODIUM SERPL-SCNC: 140 MMOL/L (ref 132–146)
SP GR UR STRIP: 1.02 (ref 1–1.03)
SPECIMEN DESCRIPTION: NORMAL
UROBILINOGEN UR STRIP-ACNC: 1 EU/DL (ref 0–1)

## 2024-09-30 PROCEDURE — 86850 RBC ANTIBODY SCREEN: CPT

## 2024-09-30 PROCEDURE — 6370000000 HC RX 637 (ALT 250 FOR IP): Performed by: INTERNAL MEDICINE

## 2024-09-30 PROCEDURE — 99233 SBSQ HOSP IP/OBS HIGH 50: CPT | Performed by: INTERNAL MEDICINE

## 2024-09-30 PROCEDURE — 6370000000 HC RX 637 (ALT 250 FOR IP): Performed by: STUDENT IN AN ORGANIZED HEALTH CARE EDUCATION/TRAINING PROGRAM

## 2024-09-30 PROCEDURE — 86901 BLOOD TYPING SEROLOGIC RH(D): CPT

## 2024-09-30 PROCEDURE — B2111ZZ FLUOROSCOPY OF MULTIPLE CORONARY ARTERIES USING LOW OSMOLAR CONTRAST: ICD-10-PCS | Performed by: INTERNAL MEDICINE

## 2024-09-30 PROCEDURE — 86900 BLOOD TYPING SEROLOGIC ABO: CPT

## 2024-09-30 PROCEDURE — 6370000000 HC RX 637 (ALT 250 FOR IP): Performed by: NURSE PRACTITIONER

## 2024-09-30 PROCEDURE — 87086 URINE CULTURE/COLONY COUNT: CPT

## 2024-09-30 PROCEDURE — 81003 URINALYSIS AUTO W/O SCOPE: CPT

## 2024-09-30 PROCEDURE — 71045 X-RAY EXAM CHEST 1 VIEW: CPT

## 2024-09-30 PROCEDURE — 4A023N7 MEASUREMENT OF CARDIAC SAMPLING AND PRESSURE, LEFT HEART, PERCUTANEOUS APPROACH: ICD-10-PCS | Performed by: INTERNAL MEDICINE

## 2024-09-30 PROCEDURE — 2709999900 HC NON-CHARGEABLE SUPPLY: Performed by: INTERNAL MEDICINE

## 2024-09-30 PROCEDURE — C1769 GUIDE WIRE: HCPCS | Performed by: INTERNAL MEDICINE

## 2024-09-30 PROCEDURE — 2500000003 HC RX 250 WO HCPCS: Performed by: INTERNAL MEDICINE

## 2024-09-30 PROCEDURE — 2140000000 HC CCU INTERMEDIATE R&B

## 2024-09-30 PROCEDURE — 87081 CULTURE SCREEN ONLY: CPT

## 2024-09-30 PROCEDURE — 6360000002 HC RX W HCPCS: Performed by: INTERNAL MEDICINE

## 2024-09-30 PROCEDURE — 6360000004 HC RX CONTRAST MEDICATION: Performed by: INTERNAL MEDICINE

## 2024-09-30 PROCEDURE — 94660 CPAP INITIATION&MGMT: CPT

## 2024-09-30 PROCEDURE — 82962 GLUCOSE BLOOD TEST: CPT

## 2024-09-30 PROCEDURE — 99232 SBSQ HOSP IP/OBS MODERATE 35: CPT | Performed by: INTERNAL MEDICINE

## 2024-09-30 PROCEDURE — 2580000003 HC RX 258: Performed by: NURSE PRACTITIONER

## 2024-09-30 PROCEDURE — B2151ZZ FLUOROSCOPY OF LEFT HEART USING LOW OSMOLAR CONTRAST: ICD-10-PCS | Performed by: INTERNAL MEDICINE

## 2024-09-30 PROCEDURE — C1894 INTRO/SHEATH, NON-LASER: HCPCS | Performed by: INTERNAL MEDICINE

## 2024-09-30 PROCEDURE — 36415 COLL VENOUS BLD VENIPUNCTURE: CPT

## 2024-09-30 PROCEDURE — 93458 L HRT ARTERY/VENTRICLE ANGIO: CPT | Performed by: INTERNAL MEDICINE

## 2024-09-30 PROCEDURE — 71250 CT THORAX DX C-: CPT

## 2024-09-30 PROCEDURE — 80053 COMPREHEN METABOLIC PANEL: CPT

## 2024-09-30 PROCEDURE — 0202U NFCT DS 22 TRGT SARS-COV-2: CPT

## 2024-09-30 RX ORDER — BENZONATATE 100 MG/1
200 CAPSULE ORAL 3 TIMES DAILY
Status: DISCONTINUED | OUTPATIENT
Start: 2024-09-30 | End: 2024-10-01

## 2024-09-30 RX ORDER — NITROGLYCERIN 20 MG/100ML
INJECTION INTRAVENOUS PRN
Status: DISCONTINUED | OUTPATIENT
Start: 2024-09-30 | End: 2024-09-30 | Stop reason: HOSPADM

## 2024-09-30 RX ORDER — VERAPAMIL HYDROCHLORIDE 2.5 MG/ML
INJECTION, SOLUTION INTRAVENOUS PRN
Status: DISCONTINUED | OUTPATIENT
Start: 2024-09-30 | End: 2024-09-30 | Stop reason: HOSPADM

## 2024-09-30 RX ORDER — GUAIFENESIN/DEXTROMETHORPHAN 100-10MG/5
10 SYRUP ORAL EVERY 4 HOURS PRN
Status: DISCONTINUED | OUTPATIENT
Start: 2024-09-30 | End: 2024-10-08

## 2024-09-30 RX ORDER — BENZONATATE 100 MG/1
100 CAPSULE ORAL 3 TIMES DAILY PRN
Status: DISCONTINUED | OUTPATIENT
Start: 2024-09-30 | End: 2024-09-30

## 2024-09-30 RX ORDER — HEPARIN SODIUM 10000 [USP'U]/ML
INJECTION, SOLUTION INTRAVENOUS; SUBCUTANEOUS PRN
Status: DISCONTINUED | OUTPATIENT
Start: 2024-09-30 | End: 2024-09-30 | Stop reason: HOSPADM

## 2024-09-30 RX ORDER — HYDROCODONE BITARTRATE AND HOMATROPINE METHYLBROMIDE ORAL SOLUTION 5; 1.5 MG/5ML; MG/5ML
5 LIQUID ORAL EVERY 6 HOURS PRN
Status: DISCONTINUED | OUTPATIENT
Start: 2024-09-30 | End: 2024-10-08

## 2024-09-30 RX ORDER — IOPAMIDOL 755 MG/ML
INJECTION, SOLUTION INTRAVASCULAR PRN
Status: DISCONTINUED | OUTPATIENT
Start: 2024-09-30 | End: 2024-09-30 | Stop reason: HOSPADM

## 2024-09-30 RX ADMIN — SODIUM CHLORIDE, PRESERVATIVE FREE 10 ML: 5 INJECTION INTRAVENOUS at 11:43

## 2024-09-30 RX ADMIN — METOPROLOL SUCCINATE 50 MG: 50 TABLET, EXTENDED RELEASE ORAL at 07:41

## 2024-09-30 RX ADMIN — CETIRIZINE HYDROCHLORIDE 10 MG: 10 TABLET, FILM COATED ORAL at 11:42

## 2024-09-30 RX ADMIN — HYDROCODONE BITARTRATE AND HOMATROPINE METHYLBROMIDE 5 ML: 5; 1.5 SOLUTION ORAL at 15:52

## 2024-09-30 RX ADMIN — GUAIFENESIN SYRUP AND DEXTROMETHORPHAN 10 ML: 100; 10 SYRUP ORAL at 18:27

## 2024-09-30 RX ADMIN — BENZONATATE 100 MG: 100 CAPSULE ORAL at 11:34

## 2024-09-30 RX ADMIN — SACUBITRIL AND VALSARTAN 1 TABLET: 24; 26 TABLET, FILM COATED ORAL at 07:41

## 2024-09-30 RX ADMIN — INSULIN LISPRO 6 UNITS: 100 INJECTION, SOLUTION INTRAVENOUS; SUBCUTANEOUS at 17:51

## 2024-09-30 RX ADMIN — BENZONATATE 200 MG: 100 CAPSULE ORAL at 16:07

## 2024-09-30 RX ADMIN — ASPIRIN 81 MG: 81 TABLET, COATED ORAL at 07:42

## 2024-09-30 RX ADMIN — AMIODARONE HYDROCHLORIDE 400 MG: 200 TABLET ORAL at 07:41

## 2024-09-30 RX ADMIN — ROSUVASTATIN 40 MG: 20 TABLET, FILM COATED ORAL at 11:42

## 2024-09-30 RX ADMIN — AMIODARONE HYDROCHLORIDE 400 MG: 200 TABLET ORAL at 21:04

## 2024-09-30 RX ADMIN — BENZONATATE 200 MG: 100 CAPSULE ORAL at 20:27

## 2024-09-30 RX ADMIN — ENOXAPARIN SODIUM 90 MG: 100 INJECTION SUBCUTANEOUS at 20:28

## 2024-09-30 RX ADMIN — SPIRONOLACTONE 25 MG: 25 TABLET ORAL at 11:42

## 2024-09-30 RX ADMIN — INSULIN LISPRO 4 UNITS: 100 INJECTION, SOLUTION INTRAVENOUS; SUBCUTANEOUS at 21:03

## 2024-09-30 NOTE — CARE COORDINATION
9/30:  Update CM Note:  Pt presented to the ER for syncope, collapse & Vtach from home. Pt is on room at 98% & SQ Lovenox.  Pt had a heart cath today & had CTS consulted.  Pulmonary consulted.  Pt's dc plan is home & family can transport. CM recd Sleep study testing & sent information over to Rose Mary from [facility].  Per pt, wife & family at bedside they are interested in HHC.  They have no preference & declined list.  CM sent referral to [facility] -order placed.  Sw/INDER will continue to follow.  Electronically signed by Laury Vale RN on 9/30/2024 at 3:16 PM

## 2024-10-01 ENCOUNTER — APPOINTMENT (OUTPATIENT)
Dept: ULTRASOUND IMAGING | Age: 79
DRG: 233 | End: 2024-10-01
Payer: MEDICARE

## 2024-10-01 ENCOUNTER — APPOINTMENT (OUTPATIENT)
Age: 79
DRG: 233 | End: 2024-10-01
Payer: MEDICARE

## 2024-10-01 ENCOUNTER — APPOINTMENT (OUTPATIENT)
Dept: INTERVENTIONAL RADIOLOGY/VASCULAR | Age: 79
DRG: 233 | End: 2024-10-01
Payer: MEDICARE

## 2024-10-01 PROBLEM — I48.0 PAROXYSMAL ATRIAL FIBRILLATION (HCC): Status: ACTIVE | Noted: 2024-10-01

## 2024-10-01 LAB
ALBUMIN SERPL-MCNC: 3.4 G/DL (ref 3.5–5.2)
ALP SERPL-CCNC: 57 U/L (ref 40–129)
ALT SERPL-CCNC: 29 U/L (ref 0–40)
ANION GAP SERPL CALCULATED.3IONS-SCNC: 13 MMOL/L (ref 7–16)
AST SERPL-CCNC: 21 U/L (ref 0–39)
BILIRUB SERPL-MCNC: 1.5 MG/DL (ref 0–1.2)
BUN SERPL-MCNC: 24 MG/DL (ref 6–23)
CALCIUM SERPL-MCNC: 8.7 MG/DL (ref 8.6–10.2)
CHLORIDE SERPL-SCNC: 100 MMOL/L (ref 98–107)
CO2 SERPL-SCNC: 25 MMOL/L (ref 22–29)
CREAT SERPL-MCNC: 1.3 MG/DL (ref 0.7–1.2)
ECHO AO ASC DIAM: 3.1 CM
ECHO AO ASCENDING AORTA INDEX: 1.52 CM/M2
ECHO AV AREA PEAK VELOCITY: 2.7 CM2
ECHO AV AREA VTI: 2.6 CM2
ECHO AV AREA/BSA PEAK VELOCITY: 1.3 CM2/M2
ECHO AV AREA/BSA VTI: 1.3 CM2/M2
ECHO AV CUSP MM: 1.8 CM
ECHO AV MEAN GRADIENT: 6 MMHG
ECHO AV MEAN VELOCITY: 1.1 M/S
ECHO AV PEAK GRADIENT: 10 MMHG
ECHO AV PEAK VELOCITY: 1.6 M/S
ECHO AV VELOCITY RATIO: 0.5
ECHO AV VTI: 29.3 CM
ECHO BSA: 2.02 M2
ECHO BSA: 2.02 M2
ECHO EST RA PRESSURE: 8 MMHG
ECHO LA DIAMETER INDEX: 2.75 CM/M2
ECHO LA DIAMETER: 5.6 CM
ECHO LA VOL A-L A2C: 121 ML (ref 18–58)
ECHO LA VOL A-L A4C: 148 ML (ref 18–58)
ECHO LA VOL MOD A2C: 114 ML (ref 18–58)
ECHO LA VOL MOD A4C: 137 ML (ref 18–58)
ECHO LA VOLUME AREA LENGTH: 136 ML
ECHO LA VOLUME INDEX A-L A2C: 59 ML/M2 (ref 16–34)
ECHO LA VOLUME INDEX A-L A4C: 73 ML/M2 (ref 16–34)
ECHO LA VOLUME INDEX AREA LENGTH: 67 ML/M2 (ref 16–34)
ECHO LA VOLUME INDEX MOD A2C: 56 ML/M2 (ref 16–34)
ECHO LA VOLUME INDEX MOD A4C: 67 ML/M2 (ref 16–34)
ECHO LV EDV A2C: 202 ML
ECHO LV EDV A4C: 193 ML
ECHO LV EDV BP: 198 ML (ref 67–155)
ECHO LV EDV INDEX A4C: 95 ML/M2
ECHO LV EDV INDEX BP: 97 ML/M2
ECHO LV EDV NDEX A2C: 99 ML/M2
ECHO LV EF PHYSICIAN: 35 %
ECHO LV EJECTION FRACTION A2C: 42 %
ECHO LV EJECTION FRACTION A4C: 29 %
ECHO LV EJECTION FRACTION BIPLANE: 36 % (ref 55–100)
ECHO LV ESV A2C: 117 ML
ECHO LV ESV A4C: 137 ML
ECHO LV ESV BP: 128 ML (ref 22–58)
ECHO LV ESV INDEX A2C: 57 ML/M2
ECHO LV ESV INDEX A4C: 67 ML/M2
ECHO LV ESV INDEX BP: 63 ML/M2
ECHO LV FRACTIONAL SHORTENING: 22 % (ref 28–44)
ECHO LV INTERNAL DIMENSION DIASTOLE INDEX: 2.7 CM/M2
ECHO LV INTERNAL DIMENSION DIASTOLIC: 5.5 CM (ref 4.2–5.9)
ECHO LV INTERNAL DIMENSION SYSTOLIC INDEX: 2.11 CM/M2
ECHO LV INTERNAL DIMENSION SYSTOLIC: 4.3 CM
ECHO LV IVSD: 0.9 CM (ref 0.6–1)
ECHO LV IVSS: 1 CM
ECHO LV MASS 2D: 288.2 G (ref 88–224)
ECHO LV MASS INDEX 2D: 141.3 G/M2 (ref 49–115)
ECHO LV POSTERIOR WALL DIASTOLIC: 1.6 CM (ref 0.6–1)
ECHO LV POSTERIOR WALL SYSTOLIC: 1.8 CM
ECHO LV RELATIVE WALL THICKNESS RATIO: 0.58
ECHO LVOT AREA: 4.9 CM2
ECHO LVOT AV VTI INDEX: 0.53
ECHO LVOT DIAM: 2.5 CM
ECHO LVOT MEAN GRADIENT: 1 MMHG
ECHO LVOT PEAK GRADIENT: 3 MMHG
ECHO LVOT PEAK VELOCITY: 0.8 M/S
ECHO LVOT STROKE VOLUME INDEX: 37 ML/M2
ECHO LVOT SV: 75.6 ML
ECHO LVOT VTI: 15.4 CM
ECHO MV A VELOCITY: 0.57 M/S
ECHO MV AREA PHT: 2.6 CM2
ECHO MV AREA VTI: 2.6 CM2
ECHO MV E DECELERATION TIME (DT): 195.1 MS
ECHO MV E VELOCITY: 0.94 M/S
ECHO MV E/A RATIO: 1.65
ECHO MV LVOT VTI INDEX: 1.86
ECHO MV MAX VELOCITY: 0.9 M/S
ECHO MV MEAN GRADIENT: 2 MMHG
ECHO MV MEAN VELOCITY: 0.6 M/S
ECHO MV PEAK GRADIENT: 4 MMHG
ECHO MV PRESSURE HALF TIME (PHT): 85.7 MS
ECHO MV REGURGITANT PEAK GRADIENT: 46 MMHG
ECHO MV REGURGITANT PEAK VELOCITY: 3.4 M/S
ECHO MV VTI: 28.7 CM
ECHO PV MAX VELOCITY: 0.9 M/S
ECHO PV MEAN GRADIENT: 1 MMHG
ECHO PV MEAN VELOCITY: 0.5 M/S
ECHO PV PEAK GRADIENT: 3 MMHG
ECHO PV VTI: 14.4 CM
ECHO RIGHT VENTRICULAR SYSTOLIC PRESSURE (RVSP): 59 MMHG
ECHO RV INTERNAL DIMENSION: 3.7 CM
ECHO RVOT MEAN GRADIENT: 1 MMHG
ECHO RVOT PEAK GRADIENT: 2 MMHG
ECHO RVOT PEAK VELOCITY: 0.8 M/S
ECHO RVOT VTI: 11.6 CM
ECHO TV REGURGITANT MAX VELOCITY: 3.58 M/S
ECHO TV REGURGITANT PEAK GRADIENT: 51 MMHG
GFR, ESTIMATED: 56 ML/MIN/1.73M2
GLUCOSE BLD-MCNC: 182 MG/DL (ref 74–99)
GLUCOSE BLD-MCNC: 272 MG/DL (ref 74–99)
GLUCOSE BLD-MCNC: 347 MG/DL (ref 74–99)
GLUCOSE SERPL-MCNC: 180 MG/DL (ref 74–99)
MICROORGANISM SPEC CULT: NO GROWTH
POTASSIUM SERPL-SCNC: 3.9 MMOL/L (ref 3.5–5)
PROT SERPL-MCNC: 5.7 G/DL (ref 6.4–8.3)
SODIUM SERPL-SCNC: 138 MMOL/L (ref 132–146)
SPECIMEN DESCRIPTION: NORMAL

## 2024-10-01 PROCEDURE — 80053 COMPREHEN METABOLIC PANEL: CPT

## 2024-10-01 PROCEDURE — 6370000000 HC RX 637 (ALT 250 FOR IP): Performed by: INTERNAL MEDICINE

## 2024-10-01 PROCEDURE — C8929 TTE W OR WO FOL WCON,DOPPLER: HCPCS

## 2024-10-01 PROCEDURE — 93922 UPR/L XTREMITY ART 2 LEVELS: CPT

## 2024-10-01 PROCEDURE — 99232 SBSQ HOSP IP/OBS MODERATE 35: CPT | Performed by: INTERNAL MEDICINE

## 2024-10-01 PROCEDURE — 36415 COLL VENOUS BLD VENIPUNCTURE: CPT

## 2024-10-01 PROCEDURE — 93306 TTE W/DOPPLER COMPLETE: CPT | Performed by: INTERNAL MEDICINE

## 2024-10-01 PROCEDURE — 93970 EXTREMITY STUDY: CPT

## 2024-10-01 PROCEDURE — 6360000004 HC RX CONTRAST MEDICATION: Performed by: PHYSICIAN ASSISTANT

## 2024-10-01 PROCEDURE — 2580000003 HC RX 258: Performed by: INTERNAL MEDICINE

## 2024-10-01 PROCEDURE — 94660 CPAP INITIATION&MGMT: CPT

## 2024-10-01 PROCEDURE — 99222 1ST HOSP IP/OBS MODERATE 55: CPT | Performed by: STUDENT IN AN ORGANIZED HEALTH CARE EDUCATION/TRAINING PROGRAM

## 2024-10-01 PROCEDURE — 82962 GLUCOSE BLOOD TEST: CPT

## 2024-10-01 PROCEDURE — 94375 RESPIRATORY FLOW VOLUME LOOP: CPT

## 2024-10-01 PROCEDURE — 93880 EXTRACRANIAL BILAT STUDY: CPT

## 2024-10-01 PROCEDURE — 94729 DIFFUSING CAPACITY: CPT

## 2024-10-01 PROCEDURE — 1200000000 HC SEMI PRIVATE

## 2024-10-01 PROCEDURE — 6360000002 HC RX W HCPCS: Performed by: INTERNAL MEDICINE

## 2024-10-01 PROCEDURE — 93922 UPR/L XTREMITY ART 2 LEVELS: CPT | Performed by: SURGERY

## 2024-10-01 PROCEDURE — 99233 SBSQ HOSP IP/OBS HIGH 50: CPT | Performed by: INTERNAL MEDICINE

## 2024-10-01 RX ORDER — ENOXAPARIN SODIUM 100 MG/ML
1 INJECTION SUBCUTANEOUS 2 TIMES DAILY
Status: COMPLETED | OUTPATIENT
Start: 2024-10-01 | End: 2024-10-07

## 2024-10-01 RX ADMIN — ENOXAPARIN SODIUM 90 MG: 100 INJECTION SUBCUTANEOUS at 11:19

## 2024-10-01 RX ADMIN — SPIRONOLACTONE 25 MG: 25 TABLET ORAL at 11:16

## 2024-10-01 RX ADMIN — BENZONATATE 200 MG: 100 CAPSULE ORAL at 11:16

## 2024-10-01 RX ADMIN — GUAIFENESIN SYRUP AND DEXTROMETHORPHAN 10 ML: 100; 10 SYRUP ORAL at 14:08

## 2024-10-01 RX ADMIN — INSULIN LISPRO 4 UNITS: 100 INJECTION, SOLUTION INTRAVENOUS; SUBCUTANEOUS at 12:13

## 2024-10-01 RX ADMIN — SODIUM CHLORIDE, PRESERVATIVE FREE 10 ML: 5 INJECTION INTRAVENOUS at 20:53

## 2024-10-01 RX ADMIN — HYDROCODONE BITARTRATE AND HOMATROPINE METHYLBROMIDE 5 ML: 5; 1.5 SOLUTION ORAL at 16:19

## 2024-10-01 RX ADMIN — SODIUM CHLORIDE, PRESERVATIVE FREE 10 ML: 5 INJECTION INTRAVENOUS at 11:22

## 2024-10-01 RX ADMIN — PERFLUTREN 1.5 ML: 6.52 INJECTION, SUSPENSION INTRAVENOUS at 15:12

## 2024-10-01 RX ADMIN — INSULIN LISPRO 4 UNITS: 100 INJECTION, SOLUTION INTRAVENOUS; SUBCUTANEOUS at 20:52

## 2024-10-01 RX ADMIN — AMIODARONE HYDROCHLORIDE 400 MG: 200 TABLET ORAL at 11:15

## 2024-10-01 RX ADMIN — INSULIN LISPRO 6 UNITS: 100 INJECTION, SOLUTION INTRAVENOUS; SUBCUTANEOUS at 18:24

## 2024-10-01 RX ADMIN — ENOXAPARIN SODIUM 90 MG: 100 INJECTION SUBCUTANEOUS at 20:53

## 2024-10-01 RX ADMIN — HYDROCODONE BITARTRATE AND HOMATROPINE METHYLBROMIDE 5 ML: 5; 1.5 SOLUTION ORAL at 08:44

## 2024-10-01 RX ADMIN — GUAIFENESIN SYRUP AND DEXTROMETHORPHAN 10 ML: 100; 10 SYRUP ORAL at 18:17

## 2024-10-01 RX ADMIN — AMIODARONE HYDROCHLORIDE 400 MG: 200 TABLET ORAL at 20:52

## 2024-10-01 RX ADMIN — METOPROLOL SUCCINATE 50 MG: 50 TABLET, EXTENDED RELEASE ORAL at 11:18

## 2024-10-01 RX ADMIN — CETIRIZINE HYDROCHLORIDE 10 MG: 10 TABLET, FILM COATED ORAL at 11:18

## 2024-10-01 NOTE — CARE COORDINATION
10/1:  Update CM Note:  Pt presented to the ER for syncope, collapse & Vtach from home. Pt is on 2L/NC at 100% & SQ Lovenox.  Per CTS - best served with surgical intervention.  TTE & Cardiac MRI will need need.  Pt's dc plan is home & family can transport. CM recd Sleep study testing & sent order to Rose Mary from [facility].  Per pt, wife & family at bedside they are interested in HHC.  They have no preference & declined list.  CM sent referral to [facility] -order placed.  Sw/INDER will continue to follow.  Electronically signed by Laury Vale RN on 10/1/2024 at 2:09 PM

## 2024-10-02 PROBLEM — Z91.119 DIETARY NONCOMPLIANCE: Status: ACTIVE | Noted: 2024-10-02

## 2024-10-02 LAB
ALBUMIN SERPL-MCNC: 3.4 G/DL (ref 3.5–5.2)
ALP SERPL-CCNC: 65 U/L (ref 40–129)
ALT SERPL-CCNC: 30 U/L (ref 0–40)
ANION GAP SERPL CALCULATED.3IONS-SCNC: 10 MMOL/L (ref 7–16)
AST SERPL-CCNC: 25 U/L (ref 0–39)
BILIRUB SERPL-MCNC: 0.9 MG/DL (ref 0–1.2)
BUN SERPL-MCNC: 22 MG/DL (ref 6–23)
CALCIUM SERPL-MCNC: 8.7 MG/DL (ref 8.6–10.2)
CHLORIDE SERPL-SCNC: 98 MMOL/L (ref 98–107)
CO2 SERPL-SCNC: 25 MMOL/L (ref 22–29)
CREAT SERPL-MCNC: 1.1 MG/DL (ref 0.7–1.2)
GFR, ESTIMATED: 69 ML/MIN/1.73M2
GLUCOSE BLD-MCNC: 226 MG/DL (ref 74–99)
GLUCOSE BLD-MCNC: 246 MG/DL (ref 74–99)
GLUCOSE BLD-MCNC: 303 MG/DL (ref 74–99)
GLUCOSE SERPL-MCNC: 264 MG/DL (ref 74–99)
MICROORGANISM SPEC CULT: NORMAL
POTASSIUM SERPL-SCNC: 4.2 MMOL/L (ref 3.5–5)
PROT SERPL-MCNC: 5.7 G/DL (ref 6.4–8.3)
SODIUM SERPL-SCNC: 133 MMOL/L (ref 132–146)
SPECIMEN DESCRIPTION: NORMAL

## 2024-10-02 PROCEDURE — 82962 GLUCOSE BLOOD TEST: CPT

## 2024-10-02 PROCEDURE — 6360000002 HC RX W HCPCS: Performed by: INTERNAL MEDICINE

## 2024-10-02 PROCEDURE — 80053 COMPREHEN METABOLIC PANEL: CPT

## 2024-10-02 PROCEDURE — 6370000000 HC RX 637 (ALT 250 FOR IP): Performed by: INTERNAL MEDICINE

## 2024-10-02 PROCEDURE — 36415 COLL VENOUS BLD VENIPUNCTURE: CPT

## 2024-10-02 PROCEDURE — 99232 SBSQ HOSP IP/OBS MODERATE 35: CPT | Performed by: INTERNAL MEDICINE

## 2024-10-02 PROCEDURE — 94729 DIFFUSING CAPACITY: CPT | Performed by: INTERNAL MEDICINE

## 2024-10-02 PROCEDURE — 94010 BREATHING CAPACITY TEST: CPT | Performed by: INTERNAL MEDICINE

## 2024-10-02 PROCEDURE — 2700000000 HC OXYGEN THERAPY PER DAY

## 2024-10-02 PROCEDURE — 94660 CPAP INITIATION&MGMT: CPT

## 2024-10-02 PROCEDURE — 2580000003 HC RX 258: Performed by: INTERNAL MEDICINE

## 2024-10-02 PROCEDURE — 99222 1ST HOSP IP/OBS MODERATE 55: CPT | Performed by: INTERNAL MEDICINE

## 2024-10-02 PROCEDURE — 1200000000 HC SEMI PRIVATE

## 2024-10-02 PROCEDURE — 6370000000 HC RX 637 (ALT 250 FOR IP): Performed by: STUDENT IN AN ORGANIZED HEALTH CARE EDUCATION/TRAINING PROGRAM

## 2024-10-02 RX ORDER — INSULIN LISPRO 100 [IU]/ML
7 INJECTION, SOLUTION INTRAVENOUS; SUBCUTANEOUS ONCE
Status: COMPLETED | OUTPATIENT
Start: 2024-10-02 | End: 2024-10-02

## 2024-10-02 RX ORDER — INSULIN LISPRO 100 [IU]/ML
0-4 INJECTION, SOLUTION INTRAVENOUS; SUBCUTANEOUS NIGHTLY
Status: DISCONTINUED | OUTPATIENT
Start: 2024-10-02 | End: 2024-10-08

## 2024-10-02 RX ORDER — INSULIN LISPRO 100 [IU]/ML
6 INJECTION, SOLUTION INTRAVENOUS; SUBCUTANEOUS
Status: DISCONTINUED | OUTPATIENT
Start: 2024-10-02 | End: 2024-10-04

## 2024-10-02 RX ORDER — FUROSEMIDE 10 MG/ML
40 INJECTION INTRAMUSCULAR; INTRAVENOUS ONCE
Status: COMPLETED | OUTPATIENT
Start: 2024-10-02 | End: 2024-10-02

## 2024-10-02 RX ORDER — INSULIN GLARGINE 100 [IU]/ML
22 INJECTION, SOLUTION SUBCUTANEOUS NIGHTLY
Status: DISCONTINUED | OUTPATIENT
Start: 2024-10-02 | End: 2024-10-03

## 2024-10-02 RX ORDER — INSULIN LISPRO 100 [IU]/ML
0-12 INJECTION, SOLUTION INTRAVENOUS; SUBCUTANEOUS
Status: DISCONTINUED | OUTPATIENT
Start: 2024-10-02 | End: 2024-10-08

## 2024-10-02 RX ADMIN — INSULIN LISPRO 6 UNITS: 100 INJECTION, SOLUTION INTRAVENOUS; SUBCUTANEOUS at 12:42

## 2024-10-02 RX ADMIN — SODIUM CHLORIDE, PRESERVATIVE FREE 10 ML: 5 INJECTION INTRAVENOUS at 09:30

## 2024-10-02 RX ADMIN — CETIRIZINE HYDROCHLORIDE 10 MG: 10 TABLET, FILM COATED ORAL at 09:30

## 2024-10-02 RX ADMIN — INSULIN LISPRO 6 UNITS: 100 INJECTION, SOLUTION INTRAVENOUS; SUBCUTANEOUS at 17:34

## 2024-10-02 RX ADMIN — INSULIN LISPRO 4 UNITS: 100 INJECTION, SOLUTION INTRAVENOUS; SUBCUTANEOUS at 17:34

## 2024-10-02 RX ADMIN — AMIODARONE HYDROCHLORIDE 400 MG: 200 TABLET ORAL at 09:29

## 2024-10-02 RX ADMIN — ENOXAPARIN SODIUM 90 MG: 100 INJECTION SUBCUTANEOUS at 20:17

## 2024-10-02 RX ADMIN — AMIODARONE HYDROCHLORIDE 400 MG: 200 TABLET ORAL at 20:18

## 2024-10-02 RX ADMIN — HYDROCODONE BITARTRATE AND HOMATROPINE METHYLBROMIDE 5 ML: 5; 1.5 SOLUTION ORAL at 06:08

## 2024-10-02 RX ADMIN — SODIUM CHLORIDE, PRESERVATIVE FREE 10 ML: 5 INJECTION INTRAVENOUS at 20:18

## 2024-10-02 RX ADMIN — INSULIN LISPRO 2 UNITS: 100 INJECTION, SOLUTION INTRAVENOUS; SUBCUTANEOUS at 09:31

## 2024-10-02 RX ADMIN — ASPIRIN 81 MG: 81 TABLET, COATED ORAL at 11:18

## 2024-10-02 RX ADMIN — INSULIN LISPRO 7 UNITS: 100 INJECTION, SOLUTION INTRAVENOUS; SUBCUTANEOUS at 12:42

## 2024-10-02 RX ADMIN — ROSUVASTATIN 40 MG: 20 TABLET, FILM COATED ORAL at 09:30

## 2024-10-02 RX ADMIN — METOPROLOL SUCCINATE 50 MG: 50 TABLET, EXTENDED RELEASE ORAL at 09:30

## 2024-10-02 RX ADMIN — SPIRONOLACTONE 25 MG: 25 TABLET ORAL at 09:30

## 2024-10-02 RX ADMIN — FUROSEMIDE 40 MG: 10 INJECTION, SOLUTION INTRAMUSCULAR; INTRAVENOUS at 11:18

## 2024-10-02 RX ADMIN — ENOXAPARIN SODIUM 90 MG: 100 INJECTION SUBCUTANEOUS at 09:30

## 2024-10-02 NOTE — PROCEDURES
[facility]              [address] [place] [address]                           PULMONARY FUNCTION      PATIENT NAME: BISHOP MILLER             : 1945  MED REC NO: [id number]                        ROOM: 6421  ACCOUNT NO: [id number]                       ADMIT DATE: 2024  PROVIDER: Jb Lai MD      DATE OF PROCEDURE: 10/01/2024    SURGEON:  Jb Lai MD    The spirometry shows severe reduction in FVC and moderate reduction in FEV1.  The FEV1/FVC is more than the predicted value.  The maximum voluntary ventilation is 60% of the predicted value.    According to Z-score criteria, the FVC and FEV1 are outside the area of curve, more than standard deviation of -1.64 whereas FEV1/FVC under the area of curve, within standard deviation of -1.64.    The diffusion capacity is mildly decreased.    IMPRESSION:  The above study shows severe restrictive ventilatory impairment.    The restrictive impairment may be due to pleural effusion and compression atelectasis as reported on the image done 2024.  Clinical correlation needed.          JB LAI MD      D:  10/01/2024 10:38:53     T:  10/02/2024 01:11:13     MAMTA/JOSE R  Job #:  [id number]     Doc#:  [id number]

## 2024-10-02 NOTE — CARE COORDINATION
10/2:  Update CM Note:  Pt presented to the ER for syncope, collapse & Vtach from home. Pt is on room air at 95% & SQ Lovenox.  Per CTS - best served with surgical intervention.  TTE & Cardiac MRI maybe needed.  CM requested input from CTS to verify plan & any new orders.  Pt's dc plan is home  with [facility] & family can transport. [facility] accepted & order placed.  CM recd Sleep study testing & sent order to Rose Mary from [facility] for [facility].  CM left vmail with Rose Mary/[facility].  Sw/INDER will continue to follow.  Electronically signed by Laury Vale RN on 10/2/2024 at 9:16 AM

## 2024-10-03 LAB
ALBUMIN SERPL-MCNC: 3.4 G/DL (ref 3.5–5.2)
ALP SERPL-CCNC: 59 U/L (ref 40–129)
ALT SERPL-CCNC: 32 U/L (ref 0–40)
ANION GAP SERPL CALCULATED.3IONS-SCNC: 17 MMOL/L (ref 7–16)
AST SERPL-CCNC: 25 U/L (ref 0–39)
BILIRUB SERPL-MCNC: 1.1 MG/DL (ref 0–1.2)
BUN SERPL-MCNC: 20 MG/DL (ref 6–23)
CALCIUM SERPL-MCNC: 8.7 MG/DL (ref 8.6–10.2)
CHLORIDE SERPL-SCNC: 96 MMOL/L (ref 98–107)
CO2 SERPL-SCNC: 24 MMOL/L (ref 22–29)
CREAT SERPL-MCNC: 1.2 MG/DL (ref 0.7–1.2)
GFR, ESTIMATED: 64 ML/MIN/1.73M2
GLUCOSE BLD-MCNC: 143 MG/DL (ref 74–99)
GLUCOSE BLD-MCNC: 165 MG/DL (ref 74–99)
GLUCOSE BLD-MCNC: 192 MG/DL (ref 74–99)
GLUCOSE BLD-MCNC: 199 MG/DL (ref 74–99)
GLUCOSE BLD-MCNC: 291 MG/DL (ref 74–99)
GLUCOSE BLD-MCNC: 299 MG/DL (ref 74–99)
GLUCOSE SERPL-MCNC: 208 MG/DL (ref 74–99)
POTASSIUM SERPL-SCNC: 4 MMOL/L (ref 3.5–5)
PROT SERPL-MCNC: 5.8 G/DL (ref 6.4–8.3)
SODIUM SERPL-SCNC: 137 MMOL/L (ref 132–146)

## 2024-10-03 PROCEDURE — 1200000000 HC SEMI PRIVATE

## 2024-10-03 PROCEDURE — 36415 COLL VENOUS BLD VENIPUNCTURE: CPT

## 2024-10-03 PROCEDURE — 2580000003 HC RX 258: Performed by: INTERNAL MEDICINE

## 2024-10-03 PROCEDURE — 6370000000 HC RX 637 (ALT 250 FOR IP): Performed by: INTERNAL MEDICINE

## 2024-10-03 PROCEDURE — 99232 SBSQ HOSP IP/OBS MODERATE 35: CPT | Performed by: INTERNAL MEDICINE

## 2024-10-03 PROCEDURE — 94660 CPAP INITIATION&MGMT: CPT

## 2024-10-03 PROCEDURE — 82962 GLUCOSE BLOOD TEST: CPT

## 2024-10-03 PROCEDURE — 99232 SBSQ HOSP IP/OBS MODERATE 35: CPT | Performed by: STUDENT IN AN ORGANIZED HEALTH CARE EDUCATION/TRAINING PROGRAM

## 2024-10-03 PROCEDURE — 6360000002 HC RX W HCPCS: Performed by: INTERNAL MEDICINE

## 2024-10-03 PROCEDURE — 80053 COMPREHEN METABOLIC PANEL: CPT

## 2024-10-03 RX ORDER — INSULIN GLARGINE 100 [IU]/ML
16 INJECTION, SOLUTION SUBCUTANEOUS NIGHTLY
Status: DISCONTINUED | OUTPATIENT
Start: 2024-10-03 | End: 2024-10-04

## 2024-10-03 RX ADMIN — CETIRIZINE HYDROCHLORIDE 10 MG: 10 TABLET, FILM COATED ORAL at 08:25

## 2024-10-03 RX ADMIN — INSULIN LISPRO 2 UNITS: 100 INJECTION, SOLUTION INTRAVENOUS; SUBCUTANEOUS at 08:41

## 2024-10-03 RX ADMIN — INSULIN LISPRO 6 UNITS: 100 INJECTION, SOLUTION INTRAVENOUS; SUBCUTANEOUS at 12:43

## 2024-10-03 RX ADMIN — SODIUM CHLORIDE, PRESERVATIVE FREE 10 ML: 5 INJECTION INTRAVENOUS at 21:06

## 2024-10-03 RX ADMIN — ENOXAPARIN SODIUM 90 MG: 100 INJECTION SUBCUTANEOUS at 21:06

## 2024-10-03 RX ADMIN — INSULIN LISPRO 6 UNITS: 100 INJECTION, SOLUTION INTRAVENOUS; SUBCUTANEOUS at 17:51

## 2024-10-03 RX ADMIN — INSULIN LISPRO 2 UNITS: 100 INJECTION, SOLUTION INTRAVENOUS; SUBCUTANEOUS at 17:50

## 2024-10-03 RX ADMIN — SPIRONOLACTONE 25 MG: 25 TABLET ORAL at 08:25

## 2024-10-03 RX ADMIN — METOPROLOL SUCCINATE 50 MG: 50 TABLET, EXTENDED RELEASE ORAL at 08:25

## 2024-10-03 RX ADMIN — INSULIN GLARGINE 16 UNITS: 100 INJECTION, SOLUTION SUBCUTANEOUS at 21:07

## 2024-10-03 RX ADMIN — INSULIN LISPRO 6 UNITS: 100 INJECTION, SOLUTION INTRAVENOUS; SUBCUTANEOUS at 08:41

## 2024-10-03 RX ADMIN — SODIUM CHLORIDE, PRESERVATIVE FREE 10 ML: 5 INJECTION INTRAVENOUS at 08:26

## 2024-10-03 RX ADMIN — AMIODARONE HYDROCHLORIDE 400 MG: 200 TABLET ORAL at 08:25

## 2024-10-03 RX ADMIN — ENOXAPARIN SODIUM 90 MG: 100 INJECTION SUBCUTANEOUS at 08:24

## 2024-10-03 RX ADMIN — AMIODARONE HYDROCHLORIDE 400 MG: 200 TABLET ORAL at 21:05

## 2024-10-03 ASSESSMENT — PAIN SCALES - GENERAL: PAINLEVEL_OUTOF10: 0

## 2024-10-03 NOTE — CARE COORDINATION
Pre-op testing completed, Entresto on hold (last dose 9/30) and patient pending cardiac MRI. Patient plans to return home, no needs, [facility] has accepted, orders placed and family to transport home when discharged. Order for bipap faxed to [facility], spoke to Suha, she states paperwork was received, additional info is needed; call transferred to [facility] to confirm what is needed, no answer; left message regarding the call.    Electronically signed by GAMALIEL Schmidt on 10/3/2024 at 2:37 PM

## 2024-10-04 ENCOUNTER — APPOINTMENT (OUTPATIENT)
Dept: MRI IMAGING | Age: 79
DRG: 233 | End: 2024-10-04
Payer: MEDICARE

## 2024-10-04 LAB
ALBUMIN SERPL-MCNC: 3.4 G/DL (ref 3.5–5.2)
ALP SERPL-CCNC: 58 U/L (ref 40–129)
ALT SERPL-CCNC: 35 U/L (ref 0–40)
ANION GAP SERPL CALCULATED.3IONS-SCNC: 14 MMOL/L (ref 7–16)
AST SERPL-CCNC: 27 U/L (ref 0–39)
BILIRUB SERPL-MCNC: 0.8 MG/DL (ref 0–1.2)
BUN SERPL-MCNC: 21 MG/DL (ref 6–23)
CALCIUM SERPL-MCNC: 8.7 MG/DL (ref 8.6–10.2)
CHLORIDE SERPL-SCNC: 99 MMOL/L (ref 98–107)
CO2 SERPL-SCNC: 23 MMOL/L (ref 22–29)
CREAT SERPL-MCNC: 1.1 MG/DL (ref 0.7–1.2)
GFR, ESTIMATED: 65 ML/MIN/1.73M2
GLUCOSE BLD-MCNC: 151 MG/DL (ref 74–99)
GLUCOSE BLD-MCNC: 185 MG/DL (ref 74–99)
GLUCOSE BLD-MCNC: 233 MG/DL (ref 74–99)
GLUCOSE BLD-MCNC: 261 MG/DL (ref 74–99)
GLUCOSE BLD-MCNC: 271 MG/DL (ref 74–99)
GLUCOSE SERPL-MCNC: 255 MG/DL (ref 74–99)
POTASSIUM SERPL-SCNC: 4.4 MMOL/L (ref 3.5–5)
PROT SERPL-MCNC: 5.8 G/DL (ref 6.4–8.3)
SODIUM SERPL-SCNC: 136 MMOL/L (ref 132–146)

## 2024-10-04 PROCEDURE — 6360000004 HC RX CONTRAST MEDICATION: Performed by: RADIOLOGY

## 2024-10-04 PROCEDURE — 99232 SBSQ HOSP IP/OBS MODERATE 35: CPT | Performed by: STUDENT IN AN ORGANIZED HEALTH CARE EDUCATION/TRAINING PROGRAM

## 2024-10-04 PROCEDURE — 94660 CPAP INITIATION&MGMT: CPT

## 2024-10-04 PROCEDURE — 6370000000 HC RX 637 (ALT 250 FOR IP): Performed by: INTERNAL MEDICINE

## 2024-10-04 PROCEDURE — 99232 SBSQ HOSP IP/OBS MODERATE 35: CPT | Performed by: INTERNAL MEDICINE

## 2024-10-04 PROCEDURE — 75561 CARDIAC MRI FOR MORPH W/DYE: CPT

## 2024-10-04 PROCEDURE — 80053 COMPREHEN METABOLIC PANEL: CPT

## 2024-10-04 PROCEDURE — A9579 GAD-BASE MR CONTRAST NOS,1ML: HCPCS | Performed by: RADIOLOGY

## 2024-10-04 PROCEDURE — 82962 GLUCOSE BLOOD TEST: CPT

## 2024-10-04 PROCEDURE — 6360000002 HC RX W HCPCS: Performed by: INTERNAL MEDICINE

## 2024-10-04 PROCEDURE — 36415 COLL VENOUS BLD VENIPUNCTURE: CPT

## 2024-10-04 PROCEDURE — 2580000003 HC RX 258: Performed by: INTERNAL MEDICINE

## 2024-10-04 PROCEDURE — 99231 SBSQ HOSP IP/OBS SF/LOW 25: CPT | Performed by: INTERNAL MEDICINE

## 2024-10-04 PROCEDURE — 1200000000 HC SEMI PRIVATE

## 2024-10-04 RX ORDER — INSULIN LISPRO 100 [IU]/ML
7 INJECTION, SOLUTION INTRAVENOUS; SUBCUTANEOUS
Status: DISCONTINUED | OUTPATIENT
Start: 2024-10-04 | End: 2024-10-08

## 2024-10-04 RX ORDER — INSULIN GLARGINE 100 [IU]/ML
18 INJECTION, SOLUTION SUBCUTANEOUS NIGHTLY
Status: DISCONTINUED | OUTPATIENT
Start: 2024-10-04 | End: 2024-10-05

## 2024-10-04 RX ORDER — INSULIN GLARGINE 100 [IU]/ML
22 INJECTION, SOLUTION SUBCUTANEOUS NIGHTLY
Status: DISCONTINUED | OUTPATIENT
Start: 2024-10-04 | End: 2024-10-04

## 2024-10-04 RX ADMIN — SODIUM CHLORIDE, PRESERVATIVE FREE 10 ML: 5 INJECTION INTRAVENOUS at 21:45

## 2024-10-04 RX ADMIN — INSULIN LISPRO 7 UNITS: 100 INJECTION, SOLUTION INTRAVENOUS; SUBCUTANEOUS at 17:35

## 2024-10-04 RX ADMIN — INSULIN LISPRO 6 UNITS: 100 INJECTION, SOLUTION INTRAVENOUS; SUBCUTANEOUS at 09:11

## 2024-10-04 RX ADMIN — ENOXAPARIN SODIUM 90 MG: 100 INJECTION SUBCUTANEOUS at 07:45

## 2024-10-04 RX ADMIN — AMIODARONE HYDROCHLORIDE 400 MG: 200 TABLET ORAL at 21:45

## 2024-10-04 RX ADMIN — INSULIN GLARGINE 18 UNITS: 100 INJECTION, SOLUTION SUBCUTANEOUS at 21:44

## 2024-10-04 RX ADMIN — METOPROLOL SUCCINATE 50 MG: 50 TABLET, EXTENDED RELEASE ORAL at 07:46

## 2024-10-04 RX ADMIN — CETIRIZINE HYDROCHLORIDE 10 MG: 10 TABLET, FILM COATED ORAL at 07:46

## 2024-10-04 RX ADMIN — INSULIN LISPRO 2 UNITS: 100 INJECTION, SOLUTION INTRAVENOUS; SUBCUTANEOUS at 17:34

## 2024-10-04 RX ADMIN — INSULIN LISPRO 7 UNITS: 100 INJECTION, SOLUTION INTRAVENOUS; SUBCUTANEOUS at 13:09

## 2024-10-04 RX ADMIN — INSULIN LISPRO 2 UNITS: 100 INJECTION, SOLUTION INTRAVENOUS; SUBCUTANEOUS at 13:09

## 2024-10-04 RX ADMIN — ENOXAPARIN SODIUM 90 MG: 100 INJECTION SUBCUTANEOUS at 21:44

## 2024-10-04 RX ADMIN — AMIODARONE HYDROCHLORIDE 400 MG: 200 TABLET ORAL at 07:45

## 2024-10-04 RX ADMIN — SODIUM CHLORIDE, PRESERVATIVE FREE 10 ML: 5 INJECTION INTRAVENOUS at 07:46

## 2024-10-04 RX ADMIN — SPIRONOLACTONE 25 MG: 25 TABLET ORAL at 07:45

## 2024-10-04 RX ADMIN — GADOTERIDOL 34 ML: 279.3 INJECTION, SOLUTION INTRAVENOUS at 11:27

## 2024-10-04 RX ADMIN — ROSUVASTATIN 40 MG: 20 TABLET, FILM COATED ORAL at 07:45

## 2024-10-04 NOTE — CARE COORDINATION
10/4:  Update CM Note:  Pt presented to the ER for syncope, collapse & Vtach from home. Pt is on room air at 97% & SQ Lovenox.  Pt is for a Cardiac MRI today.  Pt is for a possible CABG next week.  Pt's dc plan is home  with [facility] & family can transport. [facility] accepted & order placed.  CM recd Sleep study testing & sent order to Rose Mary [facility] for Bipap to verify if she recd.  She advise that once she receives the order & a note from Dr Giraldo for an Office Visit prior to 9/16/24 she can submit.  Bayron/INDER will continue to follow.  Electronically signed by Laury Vale RN on 10/4/2024 at 10:34 AM

## 2024-10-05 LAB
ALBUMIN SERPL-MCNC: 3.6 G/DL (ref 3.5–5.2)
ALP SERPL-CCNC: 57 U/L (ref 40–129)
ALT SERPL-CCNC: 33 U/L (ref 0–40)
ANION GAP SERPL CALCULATED.3IONS-SCNC: 13 MMOL/L (ref 7–16)
AST SERPL-CCNC: 23 U/L (ref 0–39)
BILIRUB SERPL-MCNC: 0.7 MG/DL (ref 0–1.2)
BUN SERPL-MCNC: 18 MG/DL (ref 6–23)
CALCIUM SERPL-MCNC: 8.7 MG/DL (ref 8.6–10.2)
CHLORIDE SERPL-SCNC: 103 MMOL/L (ref 98–107)
CO2 SERPL-SCNC: 23 MMOL/L (ref 22–29)
CREAT SERPL-MCNC: 1.1 MG/DL (ref 0.7–1.2)
GFR, ESTIMATED: 69 ML/MIN/1.73M2
GLUCOSE BLD-MCNC: 161 MG/DL (ref 74–99)
GLUCOSE BLD-MCNC: 174 MG/DL (ref 74–99)
GLUCOSE BLD-MCNC: 183 MG/DL (ref 74–99)
GLUCOSE BLD-MCNC: 277 MG/DL (ref 74–99)
GLUCOSE SERPL-MCNC: 198 MG/DL (ref 74–99)
POTASSIUM SERPL-SCNC: 4.2 MMOL/L (ref 3.5–5)
PROT SERPL-MCNC: 5.7 G/DL (ref 6.4–8.3)
SODIUM SERPL-SCNC: 139 MMOL/L (ref 132–146)

## 2024-10-05 PROCEDURE — 6360000002 HC RX W HCPCS: Performed by: INTERNAL MEDICINE

## 2024-10-05 PROCEDURE — 82962 GLUCOSE BLOOD TEST: CPT

## 2024-10-05 PROCEDURE — 75561 CARDIAC MRI FOR MORPH W/DYE: CPT | Performed by: INTERNAL MEDICINE

## 2024-10-05 PROCEDURE — 1200000000 HC SEMI PRIVATE

## 2024-10-05 PROCEDURE — 99232 SBSQ HOSP IP/OBS MODERATE 35: CPT | Performed by: INTERNAL MEDICINE

## 2024-10-05 PROCEDURE — 80053 COMPREHEN METABOLIC PANEL: CPT

## 2024-10-05 PROCEDURE — 99232 SBSQ HOSP IP/OBS MODERATE 35: CPT | Performed by: THORACIC SURGERY (CARDIOTHORACIC VASCULAR SURGERY)

## 2024-10-05 PROCEDURE — 6370000000 HC RX 637 (ALT 250 FOR IP): Performed by: INTERNAL MEDICINE

## 2024-10-05 PROCEDURE — 99233 SBSQ HOSP IP/OBS HIGH 50: CPT | Performed by: INTERNAL MEDICINE

## 2024-10-05 PROCEDURE — 2580000003 HC RX 258: Performed by: INTERNAL MEDICINE

## 2024-10-05 PROCEDURE — 94660 CPAP INITIATION&MGMT: CPT

## 2024-10-05 PROCEDURE — 36415 COLL VENOUS BLD VENIPUNCTURE: CPT

## 2024-10-05 RX ORDER — INSULIN GLARGINE 100 [IU]/ML
21 INJECTION, SOLUTION SUBCUTANEOUS NIGHTLY
Status: DISCONTINUED | OUTPATIENT
Start: 2024-10-05 | End: 2024-10-08

## 2024-10-05 RX ADMIN — SPIRONOLACTONE 25 MG: 25 TABLET ORAL at 09:11

## 2024-10-05 RX ADMIN — ENOXAPARIN SODIUM 90 MG: 100 INJECTION SUBCUTANEOUS at 09:08

## 2024-10-05 RX ADMIN — INSULIN LISPRO 6 UNITS: 100 INJECTION, SOLUTION INTRAVENOUS; SUBCUTANEOUS at 13:04

## 2024-10-05 RX ADMIN — CETIRIZINE HYDROCHLORIDE 10 MG: 10 TABLET, FILM COATED ORAL at 09:11

## 2024-10-05 RX ADMIN — INSULIN LISPRO 2 UNITS: 100 INJECTION, SOLUTION INTRAVENOUS; SUBCUTANEOUS at 17:56

## 2024-10-05 RX ADMIN — ENOXAPARIN SODIUM 90 MG: 100 INJECTION SUBCUTANEOUS at 21:39

## 2024-10-05 RX ADMIN — SODIUM CHLORIDE, PRESERVATIVE FREE 10 ML: 5 INJECTION INTRAVENOUS at 09:07

## 2024-10-05 RX ADMIN — INSULIN LISPRO 2 UNITS: 100 INJECTION, SOLUTION INTRAVENOUS; SUBCUTANEOUS at 09:10

## 2024-10-05 RX ADMIN — AMIODARONE HYDROCHLORIDE 400 MG: 200 TABLET ORAL at 09:11

## 2024-10-05 RX ADMIN — INSULIN LISPRO 7 UNITS: 100 INJECTION, SOLUTION INTRAVENOUS; SUBCUTANEOUS at 17:55

## 2024-10-05 RX ADMIN — METOPROLOL SUCCINATE 50 MG: 50 TABLET, EXTENDED RELEASE ORAL at 09:11

## 2024-10-05 RX ADMIN — AMIODARONE HYDROCHLORIDE 400 MG: 200 TABLET ORAL at 21:39

## 2024-10-05 RX ADMIN — INSULIN GLARGINE 21 UNITS: 100 INJECTION, SOLUTION SUBCUTANEOUS at 21:41

## 2024-10-05 RX ADMIN — INSULIN LISPRO 7 UNITS: 100 INJECTION, SOLUTION INTRAVENOUS; SUBCUTANEOUS at 13:04

## 2024-10-05 RX ADMIN — INSULIN LISPRO 7 UNITS: 100 INJECTION, SOLUTION INTRAVENOUS; SUBCUTANEOUS at 09:09

## 2024-10-05 RX ADMIN — SODIUM CHLORIDE, PRESERVATIVE FREE 10 ML: 5 INJECTION INTRAVENOUS at 21:42

## 2024-10-05 ASSESSMENT — PAIN SCALES - GENERAL: PAINLEVEL_OUTOF10: 0

## 2024-10-06 LAB
ALBUMIN SERPL-MCNC: 3.6 G/DL (ref 3.5–5.2)
ALP SERPL-CCNC: 61 U/L (ref 40–129)
ALT SERPL-CCNC: 47 U/L (ref 0–40)
ANION GAP SERPL CALCULATED.3IONS-SCNC: 11 MMOL/L (ref 7–16)
AST SERPL-CCNC: 33 U/L (ref 0–39)
BILIRUB SERPL-MCNC: 0.8 MG/DL (ref 0–1.2)
BUN SERPL-MCNC: 16 MG/DL (ref 6–23)
CALCIUM SERPL-MCNC: 9 MG/DL (ref 8.6–10.2)
CHLORIDE SERPL-SCNC: 104 MMOL/L (ref 98–107)
CO2 SERPL-SCNC: 25 MMOL/L (ref 22–29)
CREAT SERPL-MCNC: 1.2 MG/DL (ref 0.7–1.2)
GFR, ESTIMATED: 64 ML/MIN/1.73M2
GLUCOSE BLD-MCNC: 129 MG/DL (ref 74–99)
GLUCOSE BLD-MCNC: 208 MG/DL (ref 74–99)
GLUCOSE BLD-MCNC: 213 MG/DL (ref 74–99)
GLUCOSE BLD-MCNC: 214 MG/DL (ref 74–99)
GLUCOSE SERPL-MCNC: 130 MG/DL (ref 74–99)
POTASSIUM SERPL-SCNC: 4 MMOL/L (ref 3.5–5)
PROT SERPL-MCNC: 6.2 G/DL (ref 6.4–8.3)
SODIUM SERPL-SCNC: 140 MMOL/L (ref 132–146)

## 2024-10-06 PROCEDURE — 36415 COLL VENOUS BLD VENIPUNCTURE: CPT

## 2024-10-06 PROCEDURE — 6370000000 HC RX 637 (ALT 250 FOR IP): Performed by: INTERNAL MEDICINE

## 2024-10-06 PROCEDURE — 82962 GLUCOSE BLOOD TEST: CPT

## 2024-10-06 PROCEDURE — 6360000002 HC RX W HCPCS: Performed by: INTERNAL MEDICINE

## 2024-10-06 PROCEDURE — 99232 SBSQ HOSP IP/OBS MODERATE 35: CPT | Performed by: INTERNAL MEDICINE

## 2024-10-06 PROCEDURE — 99233 SBSQ HOSP IP/OBS HIGH 50: CPT | Performed by: INTERNAL MEDICINE

## 2024-10-06 PROCEDURE — 80053 COMPREHEN METABOLIC PANEL: CPT

## 2024-10-06 PROCEDURE — 94660 CPAP INITIATION&MGMT: CPT

## 2024-10-06 PROCEDURE — 2580000003 HC RX 258: Performed by: INTERNAL MEDICINE

## 2024-10-06 PROCEDURE — 99232 SBSQ HOSP IP/OBS MODERATE 35: CPT | Performed by: THORACIC SURGERY (CARDIOTHORACIC VASCULAR SURGERY)

## 2024-10-06 PROCEDURE — 1200000000 HC SEMI PRIVATE

## 2024-10-06 RX ADMIN — INSULIN LISPRO 7 UNITS: 100 INJECTION, SOLUTION INTRAVENOUS; SUBCUTANEOUS at 17:52

## 2024-10-06 RX ADMIN — SODIUM CHLORIDE, PRESERVATIVE FREE 10 ML: 5 INJECTION INTRAVENOUS at 09:04

## 2024-10-06 RX ADMIN — AMIODARONE HYDROCHLORIDE 400 MG: 200 TABLET ORAL at 09:07

## 2024-10-06 RX ADMIN — ENOXAPARIN SODIUM 90 MG: 100 INJECTION SUBCUTANEOUS at 21:42

## 2024-10-06 RX ADMIN — INSULIN LISPRO 4 UNITS: 100 INJECTION, SOLUTION INTRAVENOUS; SUBCUTANEOUS at 17:53

## 2024-10-06 RX ADMIN — INSULIN LISPRO 4 UNITS: 100 INJECTION, SOLUTION INTRAVENOUS; SUBCUTANEOUS at 12:20

## 2024-10-06 RX ADMIN — SPIRONOLACTONE 25 MG: 25 TABLET ORAL at 09:07

## 2024-10-06 RX ADMIN — ASPIRIN 81 MG: 81 TABLET, COATED ORAL at 12:20

## 2024-10-06 RX ADMIN — INSULIN GLARGINE 21 UNITS: 100 INJECTION, SOLUTION SUBCUTANEOUS at 21:41

## 2024-10-06 RX ADMIN — INSULIN LISPRO 7 UNITS: 100 INJECTION, SOLUTION INTRAVENOUS; SUBCUTANEOUS at 12:20

## 2024-10-06 RX ADMIN — ENOXAPARIN SODIUM 90 MG: 100 INJECTION SUBCUTANEOUS at 09:05

## 2024-10-06 RX ADMIN — METOPROLOL SUCCINATE 50 MG: 50 TABLET, EXTENDED RELEASE ORAL at 09:07

## 2024-10-06 RX ADMIN — INSULIN LISPRO 7 UNITS: 100 INJECTION, SOLUTION INTRAVENOUS; SUBCUTANEOUS at 09:06

## 2024-10-06 ASSESSMENT — PAIN SCALES - GENERAL
PAINLEVEL_OUTOF10: 0

## 2024-10-07 ENCOUNTER — ANESTHESIA EVENT (OUTPATIENT)
Dept: OPERATING ROOM | Age: 79
End: 2024-10-07
Payer: MEDICARE

## 2024-10-07 LAB
ALBUMIN SERPL-MCNC: 3.4 G/DL (ref 3.5–5.2)
ALP SERPL-CCNC: 59 U/L (ref 40–129)
ALT SERPL-CCNC: 43 U/L (ref 0–40)
ANION GAP SERPL CALCULATED.3IONS-SCNC: 13 MMOL/L (ref 7–16)
AST SERPL-CCNC: 26 U/L (ref 0–39)
BILIRUB SERPL-MCNC: 0.7 MG/DL (ref 0–1.2)
BUN SERPL-MCNC: 15 MG/DL (ref 6–23)
CALCIUM SERPL-MCNC: 9 MG/DL (ref 8.6–10.2)
CHLORIDE SERPL-SCNC: 107 MMOL/L (ref 98–107)
CO2 SERPL-SCNC: 22 MMOL/L (ref 22–29)
CREAT SERPL-MCNC: 1.1 MG/DL (ref 0.7–1.2)
ERYTHROCYTE [DISTWIDTH] IN BLOOD BY AUTOMATED COUNT: 13.2 % (ref 11.5–15)
GFR, ESTIMATED: 67 ML/MIN/1.73M2
GLUCOSE BLD-MCNC: 139 MG/DL (ref 74–99)
GLUCOSE BLD-MCNC: 161 MG/DL (ref 74–99)
GLUCOSE BLD-MCNC: 196 MG/DL (ref 74–99)
GLUCOSE BLD-MCNC: 343 MG/DL (ref 74–99)
GLUCOSE SERPL-MCNC: 144 MG/DL (ref 74–99)
HCT VFR BLD AUTO: 43.3 % (ref 37–54)
HGB BLD-MCNC: 14.4 G/DL (ref 12.5–16.5)
INR PPP: 1.2
MCH RBC QN AUTO: 31.2 PG (ref 26–35)
MCHC RBC AUTO-ENTMCNC: 33.3 G/DL (ref 32–34.5)
MCV RBC AUTO: 93.7 FL (ref 80–99.9)
PARTIAL THROMBOPLASTIN TIME: 44.1 SEC (ref 24.5–35.1)
PLATELET, FLUORESCENCE: 136 K/UL (ref 130–450)
PMV BLD AUTO: NORMAL FL (ref 7–12)
POTASSIUM SERPL-SCNC: 4 MMOL/L (ref 3.5–5)
PROT SERPL-MCNC: 5.7 G/DL (ref 6.4–8.3)
PROTHROMBIN TIME: 13.4 SEC (ref 9.3–12.4)
RBC # BLD AUTO: 4.62 M/UL (ref 3.8–5.8)
SODIUM SERPL-SCNC: 142 MMOL/L (ref 132–146)
WBC OTHER # BLD: 9.2 K/UL (ref 4.5–11.5)

## 2024-10-07 PROCEDURE — 6370000000 HC RX 637 (ALT 250 FOR IP): Performed by: PHYSICIAN ASSISTANT

## 2024-10-07 PROCEDURE — 97535 SELF CARE MNGMENT TRAINING: CPT

## 2024-10-07 PROCEDURE — 86850 RBC ANTIBODY SCREEN: CPT

## 2024-10-07 PROCEDURE — 94660 CPAP INITIATION&MGMT: CPT

## 2024-10-07 PROCEDURE — 6360000002 HC RX W HCPCS: Performed by: INTERNAL MEDICINE

## 2024-10-07 PROCEDURE — 80053 COMPREHEN METABOLIC PANEL: CPT

## 2024-10-07 PROCEDURE — 6370000000 HC RX 637 (ALT 250 FOR IP): Performed by: INTERNAL MEDICINE

## 2024-10-07 PROCEDURE — 99233 SBSQ HOSP IP/OBS HIGH 50: CPT | Performed by: INTERNAL MEDICINE

## 2024-10-07 PROCEDURE — 85730 THROMBOPLASTIN TIME PARTIAL: CPT

## 2024-10-07 PROCEDURE — 36415 COLL VENOUS BLD VENIPUNCTURE: CPT

## 2024-10-07 PROCEDURE — 86900 BLOOD TYPING SEROLOGIC ABO: CPT

## 2024-10-07 PROCEDURE — 99232 SBSQ HOSP IP/OBS MODERATE 35: CPT | Performed by: INTERNAL MEDICINE

## 2024-10-07 PROCEDURE — 86923 COMPATIBILITY TEST ELECTRIC: CPT

## 2024-10-07 PROCEDURE — 2580000003 HC RX 258: Performed by: INTERNAL MEDICINE

## 2024-10-07 PROCEDURE — 99232 SBSQ HOSP IP/OBS MODERATE 35: CPT | Performed by: STUDENT IN AN ORGANIZED HEALTH CARE EDUCATION/TRAINING PROGRAM

## 2024-10-07 PROCEDURE — 85610 PROTHROMBIN TIME: CPT

## 2024-10-07 PROCEDURE — 1200000000 HC SEMI PRIVATE

## 2024-10-07 PROCEDURE — 86901 BLOOD TYPING SEROLOGIC RH(D): CPT

## 2024-10-07 PROCEDURE — 85027 COMPLETE CBC AUTOMATED: CPT

## 2024-10-07 PROCEDURE — 82962 GLUCOSE BLOOD TEST: CPT

## 2024-10-07 RX ORDER — CHLORHEXIDINE GLUCONATE ORAL RINSE 1.2 MG/ML
15 SOLUTION DENTAL ONCE
Status: COMPLETED | OUTPATIENT
Start: 2024-10-08 | End: 2024-10-08

## 2024-10-07 RX ORDER — PROMETHAZINE HYDROCHLORIDE 12.5 MG/1
6.25 TABLET ORAL EVERY 6 HOURS PRN
Status: DISCONTINUED | OUTPATIENT
Start: 2024-10-07 | End: 2024-10-08

## 2024-10-07 RX ORDER — METOPROLOL TARTRATE 25 MG/1
12.5 TABLET, FILM COATED ORAL ONCE
Status: COMPLETED | OUTPATIENT
Start: 2024-10-08 | End: 2024-10-08

## 2024-10-07 RX ORDER — MUPIROCIN 20 MG/G
OINTMENT TOPICAL 2 TIMES DAILY
Status: DISCONTINUED | OUTPATIENT
Start: 2024-10-07 | End: 2024-10-08 | Stop reason: HOSPADM

## 2024-10-07 RX ORDER — CHLORHEXIDINE GLUCONATE 40 MG/ML
SOLUTION TOPICAL SEE ADMIN INSTRUCTIONS
Status: DISCONTINUED | OUTPATIENT
Start: 2024-10-07 | End: 2024-10-08

## 2024-10-07 RX ADMIN — AMIODARONE HYDROCHLORIDE 400 MG: 200 TABLET ORAL at 08:37

## 2024-10-07 RX ADMIN — AMIODARONE HYDROCHLORIDE 400 MG: 200 TABLET ORAL at 00:47

## 2024-10-07 RX ADMIN — MUPIROCIN: 20 OINTMENT TOPICAL at 23:12

## 2024-10-07 RX ADMIN — SODIUM CHLORIDE, PRESERVATIVE FREE 10 ML: 5 INJECTION INTRAVENOUS at 21:31

## 2024-10-07 RX ADMIN — CHLORHEXIDINE GLUCONATE 118 ML: 4 SOLUTION TOPICAL at 22:07

## 2024-10-07 RX ADMIN — SPIRONOLACTONE 25 MG: 25 TABLET ORAL at 08:38

## 2024-10-07 RX ADMIN — INSULIN LISPRO 2 UNITS: 100 INJECTION, SOLUTION INTRAVENOUS; SUBCUTANEOUS at 17:35

## 2024-10-07 RX ADMIN — INSULIN LISPRO 7 UNITS: 100 INJECTION, SOLUTION INTRAVENOUS; SUBCUTANEOUS at 12:07

## 2024-10-07 RX ADMIN — INSULIN LISPRO 4 UNITS: 100 INJECTION, SOLUTION INTRAVENOUS; SUBCUTANEOUS at 22:01

## 2024-10-07 RX ADMIN — INSULIN LISPRO 7 UNITS: 100 INJECTION, SOLUTION INTRAVENOUS; SUBCUTANEOUS at 17:35

## 2024-10-07 RX ADMIN — SODIUM CHLORIDE, PRESERVATIVE FREE 10 ML: 5 INJECTION INTRAVENOUS at 08:40

## 2024-10-07 RX ADMIN — ROSUVASTATIN 40 MG: 20 TABLET, FILM COATED ORAL at 08:38

## 2024-10-07 RX ADMIN — AMIODARONE HYDROCHLORIDE 400 MG: 200 TABLET ORAL at 21:21

## 2024-10-07 RX ADMIN — CETIRIZINE HYDROCHLORIDE 10 MG: 10 TABLET, FILM COATED ORAL at 08:38

## 2024-10-07 RX ADMIN — METOPROLOL SUCCINATE 50 MG: 50 TABLET, EXTENDED RELEASE ORAL at 08:38

## 2024-10-07 RX ADMIN — ENOXAPARIN SODIUM 90 MG: 100 INJECTION SUBCUTANEOUS at 08:37

## 2024-10-07 RX ADMIN — INSULIN LISPRO 7 UNITS: 100 INJECTION, SOLUTION INTRAVENOUS; SUBCUTANEOUS at 08:40

## 2024-10-07 RX ADMIN — INSULIN LISPRO 2 UNITS: 100 INJECTION, SOLUTION INTRAVENOUS; SUBCUTANEOUS at 12:07

## 2024-10-08 ENCOUNTER — APPOINTMENT (OUTPATIENT)
Dept: GENERAL RADIOLOGY | Age: 79
DRG: 233 | End: 2024-10-08
Payer: MEDICARE

## 2024-10-08 ENCOUNTER — ANESTHESIA (OUTPATIENT)
Dept: OPERATING ROOM | Age: 79
End: 2024-10-08
Payer: MEDICARE

## 2024-10-08 PROBLEM — R57.0 CARDIOGENIC SHOCK: Status: ACTIVE | Noted: 2024-10-08

## 2024-10-08 PROBLEM — J95.89 ACUTE POSTOPERATIVE RESPIRATORY INSUFFICIENCY: Status: ACTIVE | Noted: 2024-10-08

## 2024-10-08 PROBLEM — N18.2 STAGE 2 CHRONIC KIDNEY DISEASE: Status: ACTIVE | Noted: 2024-10-08

## 2024-10-08 PROBLEM — G89.18 ACUTE POST-OPERATIVE PAIN: Status: ACTIVE | Noted: 2024-10-08

## 2024-10-08 PROBLEM — E11.65 TYPE 2 DIABETES MELLITUS WITH HYPERGLYCEMIA, WITHOUT LONG-TERM CURRENT USE OF INSULIN (HCC): Status: ACTIVE | Noted: 2024-10-08

## 2024-10-08 LAB
AADO2: 417.4 MMHG
ACTIVATED CLOTTING TIME, HIGH RANGE: 126 SEC
ACTIVATED CLOTTING TIME, HIGH RANGE: 443 SEC
ACTIVATED CLOTTING TIME, HIGH RANGE: 464 SEC
ACTIVATED CLOTTING TIME, HIGH RANGE: 494 SEC
ACTIVATED CLOTTING TIME, HIGH RANGE: 525 SEC
ACTIVATED CLOTTING TIME, HIGH RANGE: 633 SEC
ACTIVATED CLOTTING TIME, HIGH RANGE: 92 SEC
ACTIVATED CLOTTING TIME, HIGH RANGE: >1005 SEC
ALBUMIN SERPL-MCNC: 3.9 G/DL (ref 3.5–5.2)
ALP SERPL-CCNC: 85 U/L (ref 40–129)
ALT SERPL-CCNC: 55 U/L (ref 0–40)
ANION GAP SERPL CALCULATED.3IONS-SCNC: 13 MMOL/L (ref 7–16)
ANION GAP SERPL CALCULATED.3IONS-SCNC: 14 MMOL/L (ref 7–16)
AST SERPL-CCNC: 32 U/L (ref 0–39)
B.E.: -2.5 MMOL/L (ref -3–3)
BILIRUB SERPL-MCNC: 0.7 MG/DL (ref 0–1.2)
BUN BLD-MCNC: 15 MG/DL (ref 6–23)
BUN SERPL-MCNC: 16 MG/DL (ref 6–23)
BUN SERPL-MCNC: 16 MG/DL (ref 6–23)
CA-I BLD-SCNC: 1.06 MMOL/L (ref 1.15–1.33)
CA-I BLD-SCNC: 1.12 MMOL/L (ref 1.15–1.33)
CA-I BLD-SCNC: 1.2 MMOL/L (ref 1.15–1.33)
CA-I BLD-SCNC: 1.24 MMOL/L (ref 1.15–1.33)
CALCIUM SERPL-MCNC: 8.5 MG/DL (ref 8.6–10.2)
CALCIUM SERPL-MCNC: 9 MG/DL (ref 8.6–10.2)
CHLORIDE BLD-SCNC: 100 MMOL/L (ref 100–108)
CHLORIDE BLD-SCNC: 103 MMOL/L (ref 100–108)
CHLORIDE BLD-SCNC: 98 MMOL/L (ref 100–108)
CHLORIDE SERPL-SCNC: 100 MMOL/L (ref 98–107)
CHLORIDE SERPL-SCNC: 107 MMOL/L (ref 98–107)
CO2 BLD CALC-SCNC: 23 MMOL/L (ref 22–29)
CO2 BLD CALC-SCNC: 24 MMOL/L (ref 22–29)
CO2 BLD CALC-SCNC: 27 MMOL/L (ref 22–29)
CO2 SERPL-SCNC: 20 MMOL/L (ref 22–29)
CO2 SERPL-SCNC: 22 MMOL/L (ref 22–29)
COHB: 0.4 % (ref 0–1.5)
CREAT BLD-MCNC: 1.3 MG/DL (ref 0.7–1.2)
CREAT BLD-MCNC: 1.4 MG/DL (ref 0.7–1.2)
CREAT BLD-MCNC: 1.4 MG/DL (ref 0.7–1.2)
CREAT SERPL-MCNC: 1.3 MG/DL (ref 0.7–1.2)
CREAT SERPL-MCNC: 1.5 MG/DL (ref 0.7–1.2)
CRITICAL ACTION: NORMAL
CRITICAL NOTIFICATION DATE/TIME: NORMAL
CRITICAL NOTIFICATION: NORMAL
CRITICAL VALUE READ BACK: YES
CRITICAL: ABNORMAL
DATE ANALYZED: ABNORMAL
DATE OF COLLECTION: ABNORMAL
EGFR, POC: 51 ML/MIN/1.73M2
EGFR, POC: 51 ML/MIN/1.73M2
EGFR, POC: 56 ML/MIN/1.73M2
ERYTHROCYTE [DISTWIDTH] IN BLOOD BY AUTOMATED COUNT: 13.2 % (ref 11.5–15)
FIO2: 50
FIO2: 60
FIO2: 70
FIO2: 70
FIO2: 80 %
GFR, ESTIMATED: 45 ML/MIN/1.73M2
GFR, ESTIMATED: 54 ML/MIN/1.73M2
GLUCOSE BLD-MCNC: 152 MG/DL (ref 74–99)
GLUCOSE BLD-MCNC: 165 MG/DL (ref 74–99)
GLUCOSE BLD-MCNC: 173 MG/DL (ref 74–99)
GLUCOSE BLD-MCNC: 186 MG/DL (ref 74–99)
GLUCOSE BLD-MCNC: 194 MG/DL (ref 74–99)
GLUCOSE BLD-MCNC: 196 MG/DL (ref 74–99)
GLUCOSE BLD-MCNC: 197 MG/DL (ref 74–99)
GLUCOSE BLD-MCNC: 199 MG/DL (ref 74–99)
GLUCOSE BLD-MCNC: 204 MG/DL (ref 74–99)
GLUCOSE BLD-MCNC: 209 MG/DL (ref 74–99)
GLUCOSE BLD-MCNC: 236 MG/DL (ref 74–99)
GLUCOSE BLD-MCNC: 238 MG/DL (ref 74–99)
GLUCOSE BLD-MCNC: 261 MG/DL (ref 74–99)
GLUCOSE BLD-MCNC: 273 MG/DL (ref 74–99)
GLUCOSE BLD-MCNC: 291 MG/DL (ref 74–99)
GLUCOSE SERPL-MCNC: 168 MG/DL (ref 74–99)
GLUCOSE SERPL-MCNC: 380 MG/DL (ref 74–99)
HCO3, MIXED: 23.4 MMOL/L
HCO3: 22.8 MMOL/L (ref 22–26)
HCT VFR BLD AUTO: 24 % (ref 37–54)
HCT VFR BLD AUTO: 25 % (ref 37–54)
HCT VFR BLD AUTO: 26 % (ref 37–54)
HCT VFR BLD AUTO: 28 % (ref 37–54)
HCT VFR BLD AUTO: 28 % (ref 37–54)
HCT VFR BLD AUTO: 31 % (ref 37–54)
HCT VFR BLD AUTO: 34.6 % (ref 37–54)
HGB BLD-MCNC: 11.5 G/DL (ref 12.5–16.5)
HHB: 2.2 % (ref 0–5)
INR PPP: 1.4
LAB: ABNORMAL
Lab: 1315
MAGNESIUM SERPL-MCNC: 2.8 MG/DL (ref 1.6–2.6)
MCH RBC QN AUTO: 31.8 PG (ref 26–35)
MCHC RBC AUTO-ENTMCNC: 33.2 G/DL (ref 32–34.5)
MCV RBC AUTO: 95.6 FL (ref 80–99.9)
METHB: 0.1 % (ref 0–1.5)
MODE: AC
NEGATIVE BASE EXCESS, ART: 1 MMOL/L
NEGATIVE BASE EXCESS, ART: 1.6 MMOL/L
NEGATIVE BASE EXCESS, ART: 1.8 MMOL/L
NEGATIVE BASE EXCESS, ART: 1.9 MMOL/L
NEGATIVE BASE EXCESS, ART: 2.8 MMOL/L
NEGATIVE BASE EXCESS, ART: 3.2 MMOL/L
NEGATIVE BASE EXCESS, ART: 3.9 MMOL/L
NEGATIVE BASE EXCESS, MIXED: 2.7 MMOL/L
O2 DELIVERY DEVICE: ABNORMAL
O2 SAT, MIXED: 69.8 %
O2 SATURATION: 97.8 % (ref 92–98.5)
O2HB: 97.3 % (ref 94–97)
OPERATOR ID: 359
PARTIAL THROMBOPLASTIN TIME: 28.7 SEC (ref 24.5–35.1)
PATIENT TEMP: 37 C
PCO2 MIXED: 45.5 MM HG
PCO2: 41.3 MMHG (ref 35–45)
PEEP/CPAP: 8 CMH2O
PEEP: 8
PFO2: 1.37 MMHG/%
PH BLOOD GAS: 7.36 (ref 7.35–7.45)
PH, MIXED: 7.32 (ref 7.35–7.45)
PLATELET CONFIRMATION: NORMAL
PLATELET, FLUORESCENCE: 93 K/UL (ref 130–450)
PMV BLD AUTO: 15.1 FL (ref 7–12)
PO2 MIXED: 39.7 MM HG
PO2: 109.6 MMHG (ref 75–100)
POC ANION GAP: 12 MMOL/L (ref 7–16)
POC ANION GAP: 12 MMOL/L (ref 7–16)
POC ANION GAP: 13 MMOL/L (ref 7–16)
POC HCO3: 21.5 MMOL/L (ref 22–26)
POC HCO3: 21.6 MMOL/L (ref 22–26)
POC HCO3: 21.8 MMOL/L (ref 22–26)
POC HCO3: 22.8 MMOL/L (ref 22–26)
POC HCO3: 22.8 MMOL/L (ref 22–26)
POC HCO3: 23.3 MMOL/L (ref 22–26)
POC HCO3: 23.8 MMOL/L (ref 22–26)
POC HCO3: 26.7 MMOL/L (ref 22–26)
POC HEMOGLOBIN (CALC): 10.7 G/DL (ref 12.5–15.5)
POC HEMOGLOBIN (CALC): 8.3 G/DL (ref 12.5–15.5)
POC HEMOGLOBIN (CALC): 8.7 G/DL (ref 12.5–15.5)
POC HEMOGLOBIN (CALC): 8.7 G/DL (ref 12.5–15.5)
POC HEMOGLOBIN (CALC): 8.8 G/DL (ref 12.5–15.5)
POC HEMOGLOBIN (CALC): 8.9 G/DL (ref 12.5–15.5)
POC HEMOGLOBIN (CALC): 8.9 G/DL (ref 12.5–15.5)
POC HEMOGLOBIN (CALC): 9.4 G/DL (ref 12.5–15.5)
POC HEMOGLOBIN (CALC): 9.5 G/DL (ref 12.5–15.5)
POC LACTIC ACID: 1.9 MMOL/L (ref 0.5–2.2)
POC LACTIC ACID: 2 MMOL/L (ref 0.5–2.2)
POC LACTIC ACID: 2.9 MMOL/L (ref 0.5–2.2)
POC LACTIC ACID: 3.5 MMOL/L (ref 0.5–2.2)
POC LACTIC ACID: 3.5 MMOL/L (ref 0.5–2.2)
POC LACTIC ACID: 3.6 MMOL/L (ref 0.5–2.2)
POC LACTIC ACID: 4.3 MMOL/L (ref 0.5–2.2)
POC LACTIC ACID: 5.1 MMOL/L (ref 0.5–2.2)
POC O2 SATURATION: 100 % (ref 92–98.5)
POC O2 SATURATION: 95.7 % (ref 92–98.5)
POC O2 SATURATION: 98.7 % (ref 92–98.5)
POC O2 SATURATION: 99.2 % (ref 92–98.5)
POC O2 SATURATION: 99.4 % (ref 92–98.5)
POC O2 SATURATION: 99.6 % (ref 92–98.5)
POC O2 SATURATION: 99.8 % (ref 92–98.5)
POC O2 SATURATION: 99.9 % (ref 92–98.5)
POC PCO2: 35.6 MM HG (ref 35–45)
POC PCO2: 36 MM HG (ref 35–45)
POC PCO2: 36.2 MM HG (ref 35–45)
POC PCO2: 37.7 MM HG (ref 35–45)
POC PCO2: 39.5 MM HG (ref 35–45)
POC PCO2: 39.7 MM HG (ref 35–45)
POC PCO2: 40 MM HG (ref 35–45)
POC PCO2: 40.4 MM HG (ref 35–45)
POC PH: 7.34 (ref 7.35–7.45)
POC PH: 7.38 (ref 7.35–7.45)
POC PH: 7.38 (ref 7.35–7.45)
POC PH: 7.39 (ref 7.35–7.45)
POC PH: 7.41 (ref 7.35–7.45)
POC PH: 7.43 (ref 7.35–7.45)
POC PO2: 119.6 MM HG (ref 60–80)
POC PO2: 145 MM HG (ref 60–80)
POC PO2: 159.8 MM HG (ref 60–80)
POC PO2: 175.2 MM HG (ref 60–80)
POC PO2: 212.1 MM HG (ref 60–80)
POC PO2: 354 MM HG (ref 60–80)
POC PO2: 358.7 MM HG (ref 60–80)
POC PO2: 84.4 MM HG (ref 60–80)
POSITIVE BASE EXCESS, ART: 2.1 MMOL/L (ref 0–3)
POTASSIUM BLD-SCNC: 3.1 MMOL/L (ref 3.5–5)
POTASSIUM BLD-SCNC: 3.3 MMOL/L (ref 3.5–5)
POTASSIUM BLD-SCNC: 4 MMOL/L (ref 3.5–5)
POTASSIUM BLD-SCNC: 4.3 MMOL/L (ref 3.5–5)
POTASSIUM SERPL-SCNC: 3.6 MMOL/L (ref 3.5–5)
POTASSIUM SERPL-SCNC: 4.3 MMOL/L (ref 3.5–5)
POTASSIUM SERPL-SCNC: 4.7 MMOL/L (ref 3.5–5)
PROT SERPL-MCNC: 6.6 G/DL (ref 6.4–8.3)
PROTHROMBIN TIME: 15.6 SEC (ref 9.3–12.4)
RBC # BLD AUTO: 3.62 M/UL (ref 3.8–5.8)
RI(T): 3.81
RR MECHANICAL: 15 B/MIN
SET RATE, POC: 15
SODIUM BLD-SCNC: 134 MMOL/L (ref 132–146)
SODIUM BLD-SCNC: 136 MMOL/L (ref 132–146)
SODIUM BLD-SCNC: 142 MMOL/L (ref 132–146)
SODIUM SERPL-SCNC: 134 MMOL/L (ref 132–146)
SODIUM SERPL-SCNC: 142 MMOL/L (ref 132–146)
SOURCE, BLOOD GAS: ABNORMAL
THB: 12.2 G/DL (ref 11.5–16.5)
TIDAL VOLUME: 500
TIME ANALYZED: 1319
VT MECHANICAL: 500 ML
WBC OTHER # BLD: 30.1 K/UL (ref 4.5–11.5)

## 2024-10-08 PROCEDURE — 6370000000 HC RX 637 (ALT 250 FOR IP): Performed by: NURSE ANESTHETIST, CERTIFIED REGISTERED

## 2024-10-08 PROCEDURE — 6360000002 HC RX W HCPCS: Performed by: PHYSICIAN ASSISTANT

## 2024-10-08 PROCEDURE — 7100000001 HC PACU RECOVERY - ADDTL 15 MIN

## 2024-10-08 PROCEDURE — 2500000003 HC RX 250 WO HCPCS

## 2024-10-08 PROCEDURE — 7100000000 HC PACU RECOVERY - FIRST 15 MIN

## 2024-10-08 PROCEDURE — 3700000001 HC ADD 15 MINUTES (ANESTHESIA): Performed by: STUDENT IN AN ORGANIZED HEALTH CARE EDUCATION/TRAINING PROGRAM

## 2024-10-08 PROCEDURE — P9045 ALBUMIN (HUMAN), 5%, 250 ML: HCPCS

## 2024-10-08 PROCEDURE — 33967 INSERT I-AORT PERCUT DEVICE: CPT | Performed by: THORACIC SURGERY (CARDIOTHORACIC VASCULAR SURGERY)

## 2024-10-08 PROCEDURE — 85014 HEMATOCRIT: CPT

## 2024-10-08 PROCEDURE — 2720000010 HC SURG SUPPLY STERILE: Performed by: STUDENT IN AN ORGANIZED HEALTH CARE EDUCATION/TRAINING PROGRAM

## 2024-10-08 PROCEDURE — 021109W BYPASS CORONARY ARTERY, TWO ARTERIES FROM AORTA WITH AUTOLOGOUS VENOUS TISSUE, OPEN APPROACH: ICD-10-PCS | Performed by: STUDENT IN AN ORGANIZED HEALTH CARE EDUCATION/TRAINING PROGRAM

## 2024-10-08 PROCEDURE — 37799 UNLISTED PX VASCULAR SURGERY: CPT

## 2024-10-08 PROCEDURE — 6360000002 HC RX W HCPCS: Performed by: NURSE PRACTITIONER

## 2024-10-08 PROCEDURE — 2500000003 HC RX 250 WO HCPCS: Performed by: NURSE PRACTITIONER

## 2024-10-08 PROCEDURE — 6370000000 HC RX 637 (ALT 250 FOR IP)

## 2024-10-08 PROCEDURE — 33533 CABG ARTERIAL SINGLE: CPT | Performed by: STUDENT IN AN ORGANIZED HEALTH CARE EDUCATION/TRAINING PROGRAM

## 2024-10-08 PROCEDURE — 84132 ASSAY OF SERUM POTASSIUM: CPT

## 2024-10-08 PROCEDURE — 2580000003 HC RX 258: Performed by: PHYSICIAN ASSISTANT

## 2024-10-08 PROCEDURE — 06BQ4ZZ EXCISION OF LEFT SAPHENOUS VEIN, PERCUTANEOUS ENDOSCOPIC APPROACH: ICD-10-PCS | Performed by: STUDENT IN AN ORGANIZED HEALTH CARE EDUCATION/TRAINING PROGRAM

## 2024-10-08 PROCEDURE — 82805 BLOOD GASES W/O2 SATURATION: CPT

## 2024-10-08 PROCEDURE — 5A1221Z PERFORMANCE OF CARDIAC OUTPUT, CONTINUOUS: ICD-10-PCS | Performed by: STUDENT IN AN ORGANIZED HEALTH CARE EDUCATION/TRAINING PROGRAM

## 2024-10-08 PROCEDURE — P9045 ALBUMIN (HUMAN), 5%, 250 ML: HCPCS | Performed by: NURSE PRACTITIONER

## 2024-10-08 PROCEDURE — 82962 GLUCOSE BLOOD TEST: CPT

## 2024-10-08 PROCEDURE — 83605 ASSAY OF LACTIC ACID: CPT

## 2024-10-08 PROCEDURE — 85027 COMPLETE CBC AUTOMATED: CPT

## 2024-10-08 PROCEDURE — 94664 DEMO&/EVAL PT USE INHALER: CPT

## 2024-10-08 PROCEDURE — 33967 INSERT I-AORT PERCUT DEVICE: CPT | Performed by: STUDENT IN AN ORGANIZED HEALTH CARE EDUCATION/TRAINING PROGRAM

## 2024-10-08 PROCEDURE — 80053 COMPREHEN METABOLIC PANEL: CPT

## 2024-10-08 PROCEDURE — 3600000009 HC SURGERY ROBOT BASE: Performed by: STUDENT IN AN ORGANIZED HEALTH CARE EDUCATION/TRAINING PROGRAM

## 2024-10-08 PROCEDURE — 6360000002 HC RX W HCPCS

## 2024-10-08 PROCEDURE — 33518 CABG ARTERY-VEIN TWO: CPT | Performed by: STUDENT IN AN ORGANIZED HEALTH CARE EDUCATION/TRAINING PROGRAM

## 2024-10-08 PROCEDURE — 33508 ENDOSCOPIC VEIN HARVEST: CPT | Performed by: STUDENT IN AN ORGANIZED HEALTH CARE EDUCATION/TRAINING PROGRAM

## 2024-10-08 PROCEDURE — 85610 PROTHROMBIN TIME: CPT

## 2024-10-08 PROCEDURE — 5A02210 ASSISTANCE WITH CARDIAC OUTPUT USING BALLOON PUMP, CONTINUOUS: ICD-10-PCS | Performed by: STUDENT IN AN ORGANIZED HEALTH CARE EDUCATION/TRAINING PROGRAM

## 2024-10-08 PROCEDURE — 94002 VENT MGMT INPAT INIT DAY: CPT

## 2024-10-08 PROCEDURE — 2580000003 HC RX 258: Performed by: STUDENT IN AN ORGANIZED HEALTH CARE EDUCATION/TRAINING PROGRAM

## 2024-10-08 PROCEDURE — 33533 CABG ARTERIAL SINGLE: CPT | Performed by: THORACIC SURGERY (CARDIOTHORACIC VASCULAR SURGERY)

## 2024-10-08 PROCEDURE — C1729 CATH, DRAINAGE: HCPCS | Performed by: STUDENT IN AN ORGANIZED HEALTH CARE EDUCATION/TRAINING PROGRAM

## 2024-10-08 PROCEDURE — 6360000002 HC RX W HCPCS: Performed by: STUDENT IN AN ORGANIZED HEALTH CARE EDUCATION/TRAINING PROGRAM

## 2024-10-08 PROCEDURE — 2709999900 HC NON-CHARGEABLE SUPPLY: Performed by: STUDENT IN AN ORGANIZED HEALTH CARE EDUCATION/TRAINING PROGRAM

## 2024-10-08 PROCEDURE — P9041 ALBUMIN (HUMAN),5%, 50ML: HCPCS | Performed by: NURSE ANESTHETIST, CERTIFIED REGISTERED

## 2024-10-08 PROCEDURE — 0PH000Z INSERTION OF RIGID PLATE INTERNAL FIXATION DEVICE INTO STERNUM, OPEN APPROACH: ICD-10-PCS | Performed by: STUDENT IN AN ORGANIZED HEALTH CARE EDUCATION/TRAINING PROGRAM

## 2024-10-08 PROCEDURE — 2580000003 HC RX 258: Performed by: NURSE ANESTHETIST, CERTIFIED REGISTERED

## 2024-10-08 PROCEDURE — 3700000000 HC ANESTHESIA ATTENDED CARE: Performed by: STUDENT IN AN ORGANIZED HEALTH CARE EDUCATION/TRAINING PROGRAM

## 2024-10-08 PROCEDURE — 85347 COAGULATION TIME ACTIVATED: CPT

## 2024-10-08 PROCEDURE — 80047 BASIC METABLC PNL IONIZED CA: CPT

## 2024-10-08 PROCEDURE — 02580ZZ DESTRUCTION OF CONDUCTION MECHANISM, OPEN APPROACH: ICD-10-PCS | Performed by: STUDENT IN AN ORGANIZED HEALTH CARE EDUCATION/TRAINING PROGRAM

## 2024-10-08 PROCEDURE — 36415 COLL VENOUS BLD VENIPUNCTURE: CPT

## 2024-10-08 PROCEDURE — 99291 CRITICAL CARE FIRST HOUR: CPT | Performed by: NURSE PRACTITIONER

## 2024-10-08 PROCEDURE — 94660 CPAP INITIATION&MGMT: CPT

## 2024-10-08 PROCEDURE — C1713 ANCHOR/SCREW BN/BN,TIS/BN: HCPCS | Performed by: STUDENT IN AN ORGANIZED HEALTH CARE EDUCATION/TRAINING PROGRAM

## 2024-10-08 PROCEDURE — 2000000000 HC ICU R&B

## 2024-10-08 PROCEDURE — 83735 ASSAY OF MAGNESIUM: CPT

## 2024-10-08 PROCEDURE — C1889 IMPLANT/INSERT DEVICE, NOC: HCPCS | Performed by: STUDENT IN AN ORGANIZED HEALTH CARE EDUCATION/TRAINING PROGRAM

## 2024-10-08 PROCEDURE — 85730 THROMBOPLASTIN TIME PARTIAL: CPT

## 2024-10-08 PROCEDURE — 6360000002 HC RX W HCPCS: Performed by: NURSE ANESTHETIST, CERTIFIED REGISTERED

## 2024-10-08 PROCEDURE — 2580000003 HC RX 258

## 2024-10-08 PROCEDURE — A4217 STERILE WATER/SALINE, 500 ML: HCPCS | Performed by: STUDENT IN AN ORGANIZED HEALTH CARE EDUCATION/TRAINING PROGRAM

## 2024-10-08 PROCEDURE — 80048 BASIC METABOLIC PNL TOTAL CA: CPT

## 2024-10-08 PROCEDURE — 33518 CABG ARTERY-VEIN TWO: CPT | Performed by: THORACIC SURGERY (CARDIOTHORACIC VASCULAR SURGERY)

## 2024-10-08 PROCEDURE — 82803 BLOOD GASES ANY COMBINATION: CPT

## 2024-10-08 PROCEDURE — 6370000000 HC RX 637 (ALT 250 FOR IP): Performed by: PHYSICIAN ASSISTANT

## 2024-10-08 PROCEDURE — 3600000019 HC SURGERY ROBOT ADDTL 15MIN: Performed by: STUDENT IN AN ORGANIZED HEALTH CARE EDUCATION/TRAINING PROGRAM

## 2024-10-08 PROCEDURE — 99233 SBSQ HOSP IP/OBS HIGH 50: CPT | Performed by: INTERNAL MEDICINE

## 2024-10-08 PROCEDURE — 94640 AIRWAY INHALATION TREATMENT: CPT

## 2024-10-08 PROCEDURE — 33257 ABLATE ATRIA LMTD ADD-ON: CPT | Performed by: THORACIC SURGERY (CARDIOTHORACIC VASCULAR SURGERY)

## 2024-10-08 PROCEDURE — 82330 ASSAY OF CALCIUM: CPT

## 2024-10-08 PROCEDURE — 02100Z9 BYPASS CORONARY ARTERY, ONE ARTERY FROM LEFT INTERNAL MAMMARY, OPEN APPROACH: ICD-10-PCS | Performed by: STUDENT IN AN ORGANIZED HEALTH CARE EDUCATION/TRAINING PROGRAM

## 2024-10-08 PROCEDURE — 2580000003 HC RX 258: Performed by: NURSE PRACTITIONER

## 2024-10-08 PROCEDURE — 02L70CK OCCLUSION OF LEFT ATRIAL APPENDAGE WITH EXTRALUMINAL DEVICE, OPEN APPROACH: ICD-10-PCS | Performed by: STUDENT IN AN ORGANIZED HEALTH CARE EDUCATION/TRAINING PROGRAM

## 2024-10-08 PROCEDURE — S2900 ROBOTIC SURGICAL SYSTEM: HCPCS | Performed by: STUDENT IN AN ORGANIZED HEALTH CARE EDUCATION/TRAINING PROGRAM

## 2024-10-08 PROCEDURE — 71045 X-RAY EXAM CHEST 1 VIEW: CPT

## 2024-10-08 PROCEDURE — 33257 ABLATE ATRIA LMTD ADD-ON: CPT | Performed by: STUDENT IN AN ORGANIZED HEALTH CARE EDUCATION/TRAINING PROGRAM

## 2024-10-08 PROCEDURE — 2500000003 HC RX 250 WO HCPCS: Performed by: NURSE ANESTHETIST, CERTIFIED REGISTERED

## 2024-10-08 DEVICE — LOCKING SCREW,AXS,SELF-DRILLING
Type: IMPLANTABLE DEVICE | Site: STERNUM | Status: FUNCTIONAL
Brand: AXS, SMARTLOCK

## 2024-10-08 DEVICE — STERNALPLATE, BOX: Type: IMPLANTABLE DEVICE | Site: STERNUM | Status: FUNCTIONAL

## 2024-10-08 DEVICE — DEVICE OCCL CLP L40MM PLUNG GRP FLX SHFT FOR GILLINOV: Type: IMPLANTABLE DEVICE | Status: FUNCTIONAL

## 2024-10-08 DEVICE — STERNALPLATE, X: Type: IMPLANTABLE DEVICE | Site: STERNUM | Status: FUNCTIONAL

## 2024-10-08 RX ORDER — ALBUMIN, HUMAN INJ 5% 5 %
25 SOLUTION INTRAVENOUS PRN
Status: DISCONTINUED | OUTPATIENT
Start: 2024-10-08 | End: 2024-10-14

## 2024-10-08 RX ORDER — FENTANYL CITRATE 0.05 MG/ML
INJECTION, SOLUTION INTRAMUSCULAR; INTRAVENOUS
Status: DISCONTINUED | OUTPATIENT
Start: 2024-10-08 | End: 2024-10-08 | Stop reason: SDUPTHER

## 2024-10-08 RX ORDER — ACETAMINOPHEN 325 MG/1
650 TABLET ORAL EVERY 4 HOURS PRN
Status: DISCONTINUED | OUTPATIENT
Start: 2024-10-11 | End: 2024-10-14

## 2024-10-08 RX ORDER — ETOMIDATE 2 MG/ML
INJECTION INTRAVENOUS
Status: DISCONTINUED | OUTPATIENT
Start: 2024-10-08 | End: 2024-10-08 | Stop reason: SDUPTHER

## 2024-10-08 RX ORDER — DEXTROSE MONOHYDRATE 100 MG/ML
INJECTION, SOLUTION INTRAVENOUS CONTINUOUS PRN
Status: DISCONTINUED | OUTPATIENT
Start: 2024-10-08 | End: 2024-10-14

## 2024-10-08 RX ORDER — MILRINONE LACTATE 1 MG/ML
INJECTION, SOLUTION INTRAVENOUS
Status: DISCONTINUED | OUTPATIENT
Start: 2024-10-08 | End: 2024-10-08 | Stop reason: SDUPTHER

## 2024-10-08 RX ORDER — OXYCODONE HYDROCHLORIDE 5 MG/1
5 TABLET ORAL EVERY 4 HOURS PRN
Status: DISCONTINUED | OUTPATIENT
Start: 2024-10-08 | End: 2024-10-21 | Stop reason: HOSPADM

## 2024-10-08 RX ORDER — LIDOCAINE 4 G/G
1 PATCH TOPICAL DAILY
Status: DISCONTINUED | OUTPATIENT
Start: 2024-10-08 | End: 2024-10-21 | Stop reason: HOSPADM

## 2024-10-08 RX ORDER — MEPERIDINE HYDROCHLORIDE 25 MG/ML
25 INJECTION INTRAMUSCULAR; INTRAVENOUS; SUBCUTANEOUS
Status: ACTIVE | OUTPATIENT
Start: 2024-10-08 | End: 2024-10-09

## 2024-10-08 RX ORDER — HEPARIN SODIUM 10000 [USP'U]/ML
INJECTION, SOLUTION INTRAVENOUS; SUBCUTANEOUS
Status: DISCONTINUED | OUTPATIENT
Start: 2024-10-08 | End: 2024-10-08 | Stop reason: SDUPTHER

## 2024-10-08 RX ORDER — GLUCAGON 1 MG/ML
1 KIT INJECTION PRN
Status: DISCONTINUED | OUTPATIENT
Start: 2024-10-08 | End: 2024-10-14

## 2024-10-08 RX ORDER — IPRATROPIUM BROMIDE AND ALBUTEROL SULFATE 2.5; .5 MG/3ML; MG/3ML
1 SOLUTION RESPIRATORY (INHALATION)
Status: DISCONTINUED | OUTPATIENT
Start: 2024-10-08 | End: 2024-10-14

## 2024-10-08 RX ORDER — BISACODYL 10 MG
10 SUPPOSITORY, RECTAL RECTAL DAILY PRN
Status: DISCONTINUED | OUTPATIENT
Start: 2024-10-09 | End: 2024-10-15 | Stop reason: SDUPTHER

## 2024-10-08 RX ORDER — SODIUM CHLORIDE 9 MG/ML
INJECTION, SOLUTION INTRAVENOUS PRN
Status: DISCONTINUED | OUTPATIENT
Start: 2024-10-08 | End: 2024-10-14

## 2024-10-08 RX ORDER — LACTULOSE 10 G/15ML
20 SOLUTION ORAL 3 TIMES DAILY
Status: ACTIVE | OUTPATIENT
Start: 2024-10-11 | End: 2024-10-11

## 2024-10-08 RX ORDER — MILRINONE LACTATE 0.2 MG/ML
INJECTION, SOLUTION INTRAVENOUS
Status: DISCONTINUED | OUTPATIENT
Start: 2024-10-08 | End: 2024-10-08 | Stop reason: SDUPTHER

## 2024-10-08 RX ORDER — MILRINONE LACTATE 0.2 MG/ML
0.5 INJECTION, SOLUTION INTRAVENOUS CONTINUOUS
Status: DISCONTINUED | OUTPATIENT
Start: 2024-10-08 | End: 2024-10-08

## 2024-10-08 RX ORDER — SODIUM CHLORIDE 0.9 % (FLUSH) 0.9 %
5-40 SYRINGE (ML) INJECTION EVERY 12 HOURS SCHEDULED
Status: DISCONTINUED | OUTPATIENT
Start: 2024-10-08 | End: 2024-10-21 | Stop reason: HOSPADM

## 2024-10-08 RX ORDER — SODIUM CHLORIDE 0.9 % (FLUSH) 0.9 %
5-40 SYRINGE (ML) INJECTION PRN
Status: DISCONTINUED | OUTPATIENT
Start: 2024-10-08 | End: 2024-10-21 | Stop reason: HOSPADM

## 2024-10-08 RX ORDER — ACETAMINOPHEN 650 MG/1
650 SUPPOSITORY RECTAL EVERY 4 HOURS PRN
Status: DISCONTINUED | OUTPATIENT
Start: 2024-10-08 | End: 2024-10-14

## 2024-10-08 RX ORDER — NITROGLYCERIN 5 MG/ML
INJECTION, SOLUTION INTRAVENOUS
Status: DISCONTINUED | OUTPATIENT
Start: 2024-10-08 | End: 2024-10-08

## 2024-10-08 RX ORDER — ALBUMIN, HUMAN INJ 5% 5 %
SOLUTION INTRAVENOUS
Status: COMPLETED
Start: 2024-10-08 | End: 2024-10-08

## 2024-10-08 RX ORDER — LANOLIN ALCOHOL/MO/W.PET/CERES
400 CREAM (GRAM) TOPICAL 2 TIMES DAILY
Status: DISCONTINUED | OUTPATIENT
Start: 2024-10-09 | End: 2024-10-21 | Stop reason: HOSPADM

## 2024-10-08 RX ORDER — PROPOFOL 10 MG/ML
INJECTION, EMULSION INTRAVENOUS
Status: COMPLETED
Start: 2024-10-08 | End: 2024-10-08

## 2024-10-08 RX ORDER — CHLORHEXIDINE GLUCONATE ORAL RINSE 1.2 MG/ML
15 SOLUTION DENTAL 2 TIMES DAILY
Status: DISCONTINUED | OUTPATIENT
Start: 2024-10-08 | End: 2024-10-14

## 2024-10-08 RX ORDER — POLYETHYLENE GLYCOL 3350 17 G/17G
17 POWDER, FOR SOLUTION ORAL DAILY
Status: DISCONTINUED | OUTPATIENT
Start: 2024-10-09 | End: 2024-10-15

## 2024-10-08 RX ORDER — ACETAMINOPHEN 500 MG
1000 TABLET ORAL EVERY 6 HOURS
Status: DISPENSED | OUTPATIENT
Start: 2024-10-08 | End: 2024-10-11

## 2024-10-08 RX ORDER — INSULIN LISPRO 100 [IU]/ML
0-16 INJECTION, SOLUTION INTRAVENOUS; SUBCUTANEOUS EVERY 4 HOURS
Status: DISCONTINUED | OUTPATIENT
Start: 2024-10-08 | End: 2024-10-12

## 2024-10-08 RX ORDER — ONDANSETRON 4 MG/1
4 TABLET, ORALLY DISINTEGRATING ORAL EVERY 8 HOURS PRN
Status: DISCONTINUED | OUTPATIENT
Start: 2024-10-08 | End: 2024-10-14

## 2024-10-08 RX ORDER — 0.9 % SODIUM CHLORIDE 0.9 %
250 INTRAVENOUS SOLUTION INTRAVENOUS CONTINUOUS PRN
Status: DISCONTINUED | OUTPATIENT
Start: 2024-10-08 | End: 2024-10-14

## 2024-10-08 RX ORDER — LIDOCAINE HYDROCHLORIDE 20 MG/ML
INJECTION, SOLUTION INTRAVENOUS
Status: DISCONTINUED | OUTPATIENT
Start: 2024-10-08 | End: 2024-10-08 | Stop reason: SDUPTHER

## 2024-10-08 RX ORDER — SODIUM CHLORIDE, SODIUM LACTATE, POTASSIUM CHLORIDE, CALCIUM CHLORIDE 600; 310; 30; 20 MG/100ML; MG/100ML; MG/100ML; MG/100ML
INJECTION, SOLUTION INTRAVENOUS
Status: DISCONTINUED | OUTPATIENT
Start: 2024-10-08 | End: 2024-10-08 | Stop reason: SDUPTHER

## 2024-10-08 RX ORDER — NOREPINEPHRINE BITARTRATE 0.06 MG/ML
INJECTION, SOLUTION INTRAVENOUS
Status: DISPENSED
Start: 2024-10-08 | End: 2024-10-09

## 2024-10-08 RX ORDER — ALBUMIN, HUMAN INJ 5% 5 %
SOLUTION INTRAVENOUS
Status: DISCONTINUED | OUTPATIENT
Start: 2024-10-08 | End: 2024-10-08 | Stop reason: SDUPTHER

## 2024-10-08 RX ORDER — MIDAZOLAM HYDROCHLORIDE 1 MG/ML
INJECTION INTRAMUSCULAR; INTRAVENOUS
Status: DISCONTINUED | OUTPATIENT
Start: 2024-10-08 | End: 2024-10-08 | Stop reason: SDUPTHER

## 2024-10-08 RX ORDER — ONDANSETRON 2 MG/ML
4 INJECTION INTRAMUSCULAR; INTRAVENOUS EVERY 6 HOURS PRN
Status: DISCONTINUED | OUTPATIENT
Start: 2024-10-08 | End: 2024-10-14

## 2024-10-08 RX ORDER — PANTOPRAZOLE SODIUM 40 MG/1
40 TABLET, DELAYED RELEASE ORAL
Status: DISCONTINUED | OUTPATIENT
Start: 2024-10-09 | End: 2024-10-14

## 2024-10-08 RX ORDER — NOREPINEPHRINE BITARTRATE 1 MG/ML
INJECTION, SOLUTION INTRAVENOUS
Status: DISCONTINUED | OUTPATIENT
Start: 2024-10-08 | End: 2024-10-08 | Stop reason: SDUPTHER

## 2024-10-08 RX ORDER — MAGNESIUM SULFATE IN WATER 40 MG/ML
2000 INJECTION, SOLUTION INTRAVENOUS PRN
Status: DISCONTINUED | OUTPATIENT
Start: 2024-10-08 | End: 2024-10-14

## 2024-10-08 RX ORDER — PHENYLEPHRINE HYDROCHLORIDE 10 MG/ML
INJECTION INTRAVENOUS
Status: DISCONTINUED | OUTPATIENT
Start: 2024-10-08 | End: 2024-10-08 | Stop reason: SDUPTHER

## 2024-10-08 RX ORDER — VECURONIUM BROMIDE 1 MG/ML
INJECTION, POWDER, LYOPHILIZED, FOR SOLUTION INTRAVENOUS
Status: DISCONTINUED | OUTPATIENT
Start: 2024-10-08 | End: 2024-10-08 | Stop reason: SDUPTHER

## 2024-10-08 RX ORDER — ASPIRIN 81 MG/1
81 TABLET ORAL DAILY
Status: DISCONTINUED | OUTPATIENT
Start: 2024-10-09 | End: 2024-10-14

## 2024-10-08 RX ORDER — ALBUMIN, HUMAN INJ 5% 5 %
12.5 SOLUTION INTRAVENOUS ONCE
Status: COMPLETED | OUTPATIENT
Start: 2024-10-08 | End: 2024-10-08

## 2024-10-08 RX ORDER — BISACODYL 10 MG
10 SUPPOSITORY, RECTAL RECTAL DAILY
Status: DISCONTINUED | OUTPATIENT
Start: 2024-10-11 | End: 2024-10-15

## 2024-10-08 RX ORDER — AMIODARONE HYDROCHLORIDE 200 MG/1
400 TABLET ORAL PRN
Status: DISCONTINUED | OUTPATIENT
Start: 2024-10-08 | End: 2024-10-14

## 2024-10-08 RX ORDER — AMINOCAPROIC ACID 250 MG/ML
INJECTION, SOLUTION INTRAVENOUS
Status: DISCONTINUED | OUTPATIENT
Start: 2024-10-08 | End: 2024-10-08 | Stop reason: SDUPTHER

## 2024-10-08 RX ORDER — FUROSEMIDE 10 MG/ML
INJECTION INTRAMUSCULAR; INTRAVENOUS
Status: DISCONTINUED | OUTPATIENT
Start: 2024-10-08 | End: 2024-10-08 | Stop reason: SDUPTHER

## 2024-10-08 RX ORDER — SENNA AND DOCUSATE SODIUM 50; 8.6 MG/1; MG/1
1 TABLET, FILM COATED ORAL 2 TIMES DAILY
Status: DISCONTINUED | OUTPATIENT
Start: 2024-10-08 | End: 2024-10-21 | Stop reason: HOSPADM

## 2024-10-08 RX ORDER — POTASSIUM CHLORIDE 29.8 MG/ML
20 INJECTION INTRAVENOUS PRN
Status: DISCONTINUED | OUTPATIENT
Start: 2024-10-08 | End: 2024-10-14

## 2024-10-08 RX ORDER — TAMSULOSIN HYDROCHLORIDE 0.4 MG/1
0.4 CAPSULE ORAL DAILY
Status: DISCONTINUED | OUTPATIENT
Start: 2024-10-09 | End: 2024-10-21 | Stop reason: HOSPADM

## 2024-10-08 RX ORDER — CALCIUM CHLORIDE 100 MG/ML
INJECTION INTRAVENOUS; INTRAVENTRICULAR
Status: DISCONTINUED | OUTPATIENT
Start: 2024-10-08 | End: 2024-10-08 | Stop reason: SDUPTHER

## 2024-10-08 RX ORDER — SODIUM CHLORIDE 9 MG/ML
INJECTION, SOLUTION INTRAVENOUS
Status: DISCONTINUED | OUTPATIENT
Start: 2024-10-08 | End: 2024-10-08 | Stop reason: SDUPTHER

## 2024-10-08 RX ORDER — CLOPIDOGREL BISULFATE 75 MG/1
75 TABLET ORAL DAILY
Status: DISCONTINUED | OUTPATIENT
Start: 2024-10-09 | End: 2024-10-08

## 2024-10-08 RX ORDER — PROPOFOL 10 MG/ML
10 INJECTION, EMULSION INTRAVENOUS CONTINUOUS PRN
Status: DISCONTINUED | OUTPATIENT
Start: 2024-10-08 | End: 2024-10-14

## 2024-10-08 RX ORDER — VASOPRESSIN 20 U/ML
INJECTION PARENTERAL
Status: DISCONTINUED | OUTPATIENT
Start: 2024-10-08 | End: 2024-10-08 | Stop reason: SDUPTHER

## 2024-10-08 RX ORDER — PROTAMINE SULFATE 10 MG/ML
INJECTION, SOLUTION INTRAVENOUS
Status: DISCONTINUED | OUTPATIENT
Start: 2024-10-08 | End: 2024-10-08 | Stop reason: SDUPTHER

## 2024-10-08 RX ORDER — SODIUM CHLORIDE 9 MG/ML
INJECTION, SOLUTION INTRAVENOUS CONTINUOUS
Status: DISCONTINUED | OUTPATIENT
Start: 2024-10-08 | End: 2024-10-14

## 2024-10-08 RX ORDER — MUPIROCIN 20 MG/G
OINTMENT TOPICAL 2 TIMES DAILY
Status: COMPLETED | OUTPATIENT
Start: 2024-10-08 | End: 2024-10-12

## 2024-10-08 RX ORDER — INSULIN GLARGINE 100 [IU]/ML
0.15 INJECTION, SOLUTION SUBCUTANEOUS NIGHTLY
Status: DISCONTINUED | OUTPATIENT
Start: 2024-10-09 | End: 2024-10-14

## 2024-10-08 RX ORDER — ASPIRIN 81 MG/1
81 TABLET, CHEWABLE ORAL ONCE
Status: COMPLETED | OUTPATIENT
Start: 2024-10-08 | End: 2024-10-08

## 2024-10-08 RX ORDER — NOREPINEPHRINE BITARTRATE 0.06 MG/ML
1-100 INJECTION, SOLUTION INTRAVENOUS CONTINUOUS PRN
Status: DISCONTINUED | OUTPATIENT
Start: 2024-10-08 | End: 2024-10-14

## 2024-10-08 RX ORDER — OXYCODONE HYDROCHLORIDE 10 MG/1
10 TABLET ORAL EVERY 4 HOURS PRN
Status: DISCONTINUED | OUTPATIENT
Start: 2024-10-08 | End: 2024-10-21 | Stop reason: HOSPADM

## 2024-10-08 RX ORDER — NITROGLYCERIN 5 MG/ML
INJECTION, SOLUTION INTRAVENOUS
Status: DISCONTINUED | OUTPATIENT
Start: 2024-10-08 | End: 2024-10-08 | Stop reason: SDUPTHER

## 2024-10-08 RX ADMIN — VASOPRESSIN 0.02 UNITS/MIN: 20 INJECTION INTRAVENOUS at 07:59

## 2024-10-08 RX ADMIN — SODIUM CHLORIDE: 9 INJECTION, SOLUTION INTRAVENOUS at 06:45

## 2024-10-08 RX ADMIN — NITROGLYCERIN 50 MCG: 5 INJECTION, SOLUTION INTRAVENOUS at 08:17

## 2024-10-08 RX ADMIN — SENNOSIDES AND DOCUSATE SODIUM 1 TABLET: 50; 8.6 TABLET ORAL at 23:06

## 2024-10-08 RX ADMIN — NOREPINEPHRINE BITARTRATE 8 MCG: 1 INJECTION INTRAVENOUS at 12:14

## 2024-10-08 RX ADMIN — VASOPRESSIN 1 UNITS: 20 INJECTION INTRAVENOUS at 09:37

## 2024-10-08 RX ADMIN — EPINEPHRINE 6.5 MCG/MIN: 1 INJECTION INTRAMUSCULAR; INTRAVENOUS; SUBCUTANEOUS at 13:13

## 2024-10-08 RX ADMIN — FUROSEMIDE 20 MG: 10 INJECTION, SOLUTION INTRAMUSCULAR; INTRAVENOUS at 08:30

## 2024-10-08 RX ADMIN — MILRINONE LACTATE IN DEXTROSE 0.5 MCG/KG/MIN: 200 INJECTION, SOLUTION INTRAVENOUS at 11:30

## 2024-10-08 RX ADMIN — VASOPRESSIN 0.07 UNITS/MIN: 20 INJECTION INTRAVENOUS at 22:07

## 2024-10-08 RX ADMIN — EPINEPHRINE 0.04 MCG/MIN: 1 INJECTION PARENTERAL at 11:04

## 2024-10-08 RX ADMIN — Medication 2 MCG/MIN: at 15:08

## 2024-10-08 RX ADMIN — SODIUM CHLORIDE, SODIUM LACTATE, POTASSIUM CHLORIDE, CALCIUM CHLORIDE: 600; 310; 30; 20 INJECTION, SOLUTION INTRAVENOUS at 07:30

## 2024-10-08 RX ADMIN — ALBUMIN (HUMAN) 25 G: 12.5 INJECTION, SOLUTION INTRAVENOUS at 14:10

## 2024-10-08 RX ADMIN — PHENYLEPHRINE HYDROCHLORIDE 100 MCG: 10 INJECTION, SOLUTION INTRAVENOUS at 07:47

## 2024-10-08 RX ADMIN — MUPIROCIN: 20 OINTMENT TOPICAL at 14:51

## 2024-10-08 RX ADMIN — ACETAMINOPHEN 1000 MG: 500 TABLET ORAL at 23:05

## 2024-10-08 RX ADMIN — CHLORHEXIDINE GLUCONATE 15 ML: 1.2 RINSE ORAL at 04:10

## 2024-10-08 RX ADMIN — PROPOFOL 10 MCG/KG/MIN: 10 INJECTION, EMULSION INTRAVENOUS at 13:15

## 2024-10-08 RX ADMIN — SENNOSIDES AND DOCUSATE SODIUM 1 TABLET: 50; 8.6 TABLET ORAL at 14:51

## 2024-10-08 RX ADMIN — HYDROMORPHONE HYDROCHLORIDE 0.25 MG: 1 INJECTION, SOLUTION INTRAMUSCULAR; INTRAVENOUS; SUBCUTANEOUS at 16:40

## 2024-10-08 RX ADMIN — POTASSIUM CHLORIDE 20 MEQ: 29.8 INJECTION, SOLUTION INTRAVENOUS at 17:04

## 2024-10-08 RX ADMIN — FENTANYL CITRATE 150 MCG: 50 INJECTION, SOLUTION INTRAMUSCULAR; INTRAVENOUS at 08:14

## 2024-10-08 RX ADMIN — NITROGLYCERIN 25 MCG: 5 INJECTION, SOLUTION INTRAVENOUS at 08:14

## 2024-10-08 RX ADMIN — MILRINONE LACTATE 5000 MCG: 1 INJECTION, SOLUTION INTRAVENOUS at 11:16

## 2024-10-08 RX ADMIN — VECURONIUM BROMIDE 2 MG: 1 INJECTION, POWDER, LYOPHILIZED, FOR SOLUTION INTRAVENOUS at 08:30

## 2024-10-08 RX ADMIN — PHENYLEPHRINE HYDROCHLORIDE 100 MCG: 10 INJECTION, SOLUTION INTRAVENOUS at 07:56

## 2024-10-08 RX ADMIN — HEPARIN SODIUM 35000 UNITS: 10000 INJECTION INTRAVENOUS; SUBCUTANEOUS at 09:09

## 2024-10-08 RX ADMIN — VASOPRESSIN 1 UNITS: 20 INJECTION INTRAVENOUS at 08:59

## 2024-10-08 RX ADMIN — PHENYLEPHRINE HYDROCHLORIDE 100 MCG: 10 INJECTION, SOLUTION INTRAVENOUS at 08:37

## 2024-10-08 RX ADMIN — FENTANYL CITRATE 200 MCG: 50 INJECTION, SOLUTION INTRAMUSCULAR; INTRAVENOUS at 07:32

## 2024-10-08 RX ADMIN — METOPROLOL TARTRATE 12.5 MG: 25 TABLET, FILM COATED ORAL at 04:06

## 2024-10-08 RX ADMIN — VASOPRESSIN 1 UNITS: 20 INJECTION INTRAVENOUS at 09:32

## 2024-10-08 RX ADMIN — VASOPRESSIN 1 UNITS: 20 INJECTION INTRAVENOUS at 08:39

## 2024-10-08 RX ADMIN — MIDAZOLAM 2 MG: 1 INJECTION INTRAMUSCULAR; INTRAVENOUS at 07:10

## 2024-10-08 RX ADMIN — FENTANYL CITRATE 150 MCG: 50 INJECTION, SOLUTION INTRAMUSCULAR; INTRAVENOUS at 08:06

## 2024-10-08 RX ADMIN — SODIUM CHLORIDE, POTASSIUM CHLORIDE, SODIUM LACTATE AND CALCIUM CHLORIDE: 600; 310; 30; 20 INJECTION, SOLUTION INTRAVENOUS at 07:30

## 2024-10-08 RX ADMIN — VECURONIUM BROMIDE 10 MG: 1 INJECTION, POWDER, LYOPHILIZED, FOR SOLUTION INTRAVENOUS at 07:32

## 2024-10-08 RX ADMIN — PHENYLEPHRINE HYDROCHLORIDE 300 MCG: 10 INJECTION, SOLUTION INTRAVENOUS at 08:00

## 2024-10-08 RX ADMIN — HYDROMORPHONE HYDROCHLORIDE 0.25 MG: 1 INJECTION, SOLUTION INTRAMUSCULAR; INTRAVENOUS; SUBCUTANEOUS at 20:59

## 2024-10-08 RX ADMIN — SODIUM BICARBONATE 50 MEQ: 84 INJECTION INTRAVENOUS at 15:08

## 2024-10-08 RX ADMIN — ALBUMIN (HUMAN) 12.5 G: 12.5 INJECTION, SOLUTION INTRAVENOUS at 14:52

## 2024-10-08 RX ADMIN — ETOMIDATE 20 MG: 2 INJECTION, SOLUTION INTRAVENOUS at 07:32

## 2024-10-08 RX ADMIN — PROPOFOL 15 MCG/KG/MIN: 10 INJECTION, EMULSION INTRAVENOUS at 20:03

## 2024-10-08 RX ADMIN — ALBUMIN (HUMAN) 12.5 G: 12.5 INJECTION, SOLUTION INTRAVENOUS at 22:38

## 2024-10-08 RX ADMIN — VASOPRESSIN 1 UNITS: 20 INJECTION INTRAVENOUS at 08:42

## 2024-10-08 RX ADMIN — PHENYLEPHRINE HYDROCHLORIDE 100 MCG: 10 INJECTION, SOLUTION INTRAVENOUS at 08:58

## 2024-10-08 RX ADMIN — MUPIROCIN: 20 OINTMENT TOPICAL at 23:07

## 2024-10-08 RX ADMIN — PROTAMINE SULFATE 250 MG: 10 INJECTION, SOLUTION INTRAVENOUS at 11:55

## 2024-10-08 RX ADMIN — CEFAZOLIN 2000 MG: 2 INJECTION, POWDER, FOR SOLUTION INTRAMUSCULAR; INTRAVENOUS at 11:47

## 2024-10-08 RX ADMIN — MIDAZOLAM 2 MG: 1 INJECTION INTRAMUSCULAR; INTRAVENOUS at 07:22

## 2024-10-08 RX ADMIN — VASOPRESSIN 1 UNITS: 20 INJECTION INTRAVENOUS at 07:59

## 2024-10-08 RX ADMIN — IPRATROPIUM BROMIDE AND ALBUTEROL SULFATE 1 DOSE: 2.5; .5 SOLUTION RESPIRATORY (INHALATION) at 21:11

## 2024-10-08 RX ADMIN — SODIUM CHLORIDE 4 UNITS/HR: 9 INJECTION, SOLUTION INTRAVENOUS at 08:04

## 2024-10-08 RX ADMIN — PANTOPRAZOLE SODIUM 40 MG: 40 INJECTION, POWDER, FOR SOLUTION INTRAVENOUS at 14:51

## 2024-10-08 RX ADMIN — MILRINONE LACTATE 5000 MCG: 1 INJECTION, SOLUTION INTRAVENOUS at 08:01

## 2024-10-08 RX ADMIN — WATER 2000 MG: 1 INJECTION INTRAMUSCULAR; INTRAVENOUS; SUBCUTANEOUS at 14:35

## 2024-10-08 RX ADMIN — SODIUM CHLORIDE 3 UNITS/HR: 9 INJECTION, SOLUTION INTRAVENOUS at 17:08

## 2024-10-08 RX ADMIN — IPRATROPIUM BROMIDE AND ALBUTEROL SULFATE 1 DOSE: 2.5; .5 SOLUTION RESPIRATORY (INHALATION) at 16:37

## 2024-10-08 RX ADMIN — PHENYLEPHRINE HYDROCHLORIDE 100 MCG: 10 INJECTION, SOLUTION INTRAVENOUS at 07:49

## 2024-10-08 RX ADMIN — CHLORHEXIDINE GLUCONATE, 0.12% ORAL RINSE 15 ML: 1.2 SOLUTION DENTAL at 23:06

## 2024-10-08 RX ADMIN — VASOPRESSIN 1 UNITS: 20 INJECTION INTRAVENOUS at 08:56

## 2024-10-08 RX ADMIN — CHLORHEXIDINE GLUCONATE, 0.12% ORAL RINSE 15 ML: 1.2 SOLUTION DENTAL at 14:51

## 2024-10-08 RX ADMIN — CALCIUM CHLORIDE INJECTION 1 G: 100 INJECTION, SOLUTION INTRAVENOUS at 11:55

## 2024-10-08 RX ADMIN — VASOPRESSIN 1 UNITS: 20 INJECTION INTRAVENOUS at 09:36

## 2024-10-08 RX ADMIN — LIDOCAINE HYDROCHLORIDE 60 MG: 20 INJECTION, SOLUTION INTRAVENOUS at 07:32

## 2024-10-08 RX ADMIN — CEFAZOLIN 2000 MG: 2 INJECTION, POWDER, FOR SOLUTION INTRAMUSCULAR; INTRAVENOUS at 07:47

## 2024-10-08 RX ADMIN — SODIUM BICARBONATE 50 MEQ: 84 INJECTION INTRAVENOUS at 12:39

## 2024-10-08 RX ADMIN — WATER 2000 MG: 1 INJECTION INTRAMUSCULAR; INTRAVENOUS; SUBCUTANEOUS at 23:06

## 2024-10-08 RX ADMIN — VECURONIUM BROMIDE 4 MG: 1 INJECTION, POWDER, LYOPHILIZED, FOR SOLUTION INTRAVENOUS at 10:16

## 2024-10-08 RX ADMIN — INSULIN HUMAN 7 UNITS: 100 INJECTION, SOLUTION PARENTERAL at 09:14

## 2024-10-08 RX ADMIN — PHENYLEPHRINE HYDROCHLORIDE 100 MCG: 10 INJECTION, SOLUTION INTRAVENOUS at 09:32

## 2024-10-08 RX ADMIN — FENTANYL CITRATE 50 MCG: 50 INJECTION, SOLUTION INTRAMUSCULAR; INTRAVENOUS at 08:28

## 2024-10-08 RX ADMIN — ASPIRIN 81 MG 81 MG: 81 TABLET ORAL at 14:51

## 2024-10-08 RX ADMIN — SODIUM CHLORIDE 0.04 MCG/KG/MIN: 0.9 INJECTION, SOLUTION INTRAVENOUS at 12:33

## 2024-10-08 RX ADMIN — AMINOCAPROIC ACID 5000 MG: 250 INJECTION, SOLUTION INTRAVENOUS at 07:40

## 2024-10-08 RX ADMIN — VASOPRESSIN 1 UNITS: 20 INJECTION INTRAVENOUS at 09:02

## 2024-10-08 RX ADMIN — ALBUMIN (HUMAN) 12.5 G: 12.5 INJECTION, SOLUTION INTRAVENOUS at 12:33

## 2024-10-08 RX ADMIN — NOREPINEPHRINE BITARTRATE 8 MCG: 1 INJECTION INTRAVENOUS at 12:16

## 2024-10-08 RX ADMIN — SODIUM CHLORIDE, PRESERVATIVE FREE 10 ML: 5 INJECTION INTRAVENOUS at 23:07

## 2024-10-08 RX ADMIN — NITROGLYCERIN 25 MCG: 5 INJECTION, SOLUTION INTRAVENOUS at 08:16

## 2024-10-08 RX ADMIN — FENTANYL CITRATE 100 MCG: 50 INJECTION, SOLUTION INTRAMUSCULAR; INTRAVENOUS at 13:00

## 2024-10-08 RX ADMIN — VECURONIUM BROMIDE 4 MG: 1 INJECTION, POWDER, LYOPHILIZED, FOR SOLUTION INTRAVENOUS at 09:15

## 2024-10-08 RX ADMIN — VASOPRESSIN 0.06 UNITS/MIN: 20 INJECTION INTRAVENOUS at 13:12

## 2024-10-08 RX ADMIN — ACETAMINOPHEN 1000 MG: 500 TABLET ORAL at 14:51

## 2024-10-08 RX ADMIN — AMINOCAPROIC ACID 1 G/HR: 250 INJECTION, SOLUTION INTRAVENOUS at 07:41

## 2024-10-08 RX ADMIN — PHENYLEPHRINE HYDROCHLORIDE 100 MCG: 10 INJECTION, SOLUTION INTRAVENOUS at 09:35

## 2024-10-08 RX ADMIN — INSULIN LISPRO 4 UNITS: 100 INJECTION, SOLUTION INTRAVENOUS; SUBCUTANEOUS at 13:24

## 2024-10-08 RX ADMIN — VASOPRESSIN 0.06 UNITS/MIN: 20 INJECTION INTRAVENOUS at 17:41

## 2024-10-08 RX ADMIN — INSULIN HUMAN 7 UNITS: 100 INJECTION, SOLUTION PARENTERAL at 08:45

## 2024-10-08 RX ADMIN — SODIUM CHLORIDE: 9 INJECTION, SOLUTION INTRAVENOUS at 13:10

## 2024-10-08 ASSESSMENT — PULMONARY FUNCTION TESTS
PIF_VALUE: 23
PIF_VALUE: 22
PIF_VALUE: 22
PIF_VALUE: 24
PIF_VALUE: 23
PIF_VALUE: 22
PIF_VALUE: 23
PIF_VALUE: 23
PIF_VALUE: 21
PIF_VALUE: 22
PIF_VALUE: 22
PIF_VALUE: 24
PIF_VALUE: 23
PIF_VALUE: 24
PIF_VALUE: 23
PIF_VALUE: 24
PIF_VALUE: 23
PIF_VALUE: 28
PIF_VALUE: 23
PIF_VALUE: 25
PIF_VALUE: 20
PIF_VALUE: 23
PIF_VALUE: 22
PIF_VALUE: 23
PIF_VALUE: 24
PIF_VALUE: 23
PIF_VALUE: 23

## 2024-10-08 ASSESSMENT — PAIN SCALES - GENERAL
PAINLEVEL_OUTOF10: 3
PAINLEVEL_OUTOF10: 0
PAINLEVEL_OUTOF10: 6
PAINLEVEL_OUTOF10: 0
PAINLEVEL_OUTOF10: 4
PAINLEVEL_OUTOF10: 0
PAINLEVEL_OUTOF10: 0
PAINLEVEL_OUTOF10: 4
PAINLEVEL_OUTOF10: 2
PAINLEVEL_OUTOF10: 0

## 2024-10-08 ASSESSMENT — LIFESTYLE VARIABLES: SMOKING_STATUS: 0

## 2024-10-08 ASSESSMENT — PAIN DESCRIPTION - LOCATION: LOCATION: STERNUM

## 2024-10-08 ASSESSMENT — PAIN DESCRIPTION - ORIENTATION: ORIENTATION: MID

## 2024-10-08 ASSESSMENT — PAIN - FUNCTIONAL ASSESSMENT: PAIN_FUNCTIONAL_ASSESSMENT: PREVENTS OR INTERFERES SOME ACTIVE ACTIVITIES AND ADLS

## 2024-10-08 NOTE — ANESTHESIA PROCEDURE NOTES
Arterial Line:    An arterial line was placed using ultrasound guidance and surface landmarks, in the OR for the following indication(s): continuous blood pressure monitoring and blood sampling needed.    A 20 gauge (size), 5 cm (length), Arrow (type) catheter was placed, Seldinger technique not used, into the right brachial artery, secured by tape and Tegaderm.  Anesthesia type: Local  Local infiltration: Injection    Events:  patient tolerated procedure well with no complications.10/8/2024 7:26 AM  Resident/CRNA: Ran Metz APRN - CRNA  Other anesthesia staff: Manuel Blackwell RN  Performed: Resident/CRNA and Other anesthesia staff   Preanesthetic Checklist  Completed: patient identified, IV checked, site marked, risks and benefits discussed, surgical/procedural consents, equipment checked, pre-op evaluation, timeout performed, anesthesia consent given, oxygen available and monitors applied/VS acknowledged

## 2024-10-08 NOTE — ANESTHESIA POSTPROCEDURE EVALUATION
Department of Anesthesiology  Postprocedure Note    Patient: Pantera Payne  MRN: [id number]  YOB: 1945  Date of evaluation: 10/8/2024    Procedure Summary       Date: 10/08/24 Room / Location: [address]    Anesthesia Start: 0645 Anesthesia Stop: 1310    Procedure: CORONARY ARTERY BYPASS GRAFT, MAZE, INTRA-AORTIC BALLON PUMP Diagnosis:       CAD (coronary artery disease)      (CAD (coronary artery disease) [I25.10])    Surgeons: Marcela Osuna DO Responsible Provider: Selin Vargas MD    Anesthesia Type: general ASA Status: 4            Anesthesia Type: No value filed.    Katina Phase I:      Katina Phase II:      Anesthesia Post Evaluation    Patient location during evaluation: ICU  Patient participation: complete - patient cannot participate  Level of consciousness: sedated and ventilated  Pain score: 0  Airway patency: patent  Nausea & Vomiting: no nausea  Cardiovascular status: hemodynamically stable and vasoactive/inotropes  Respiratory status: ventilator  Hydration status: stable  Multimodal analgesia pain management approach    No notable events documented.

## 2024-10-08 NOTE — OP NOTE
patient returned to sinus bradycardia.  Ventricular pacing wires were placed. Hemostasis within the pericardial well was noted to be adequate. One chest tube and two drains were placed in the mediastinum. Drains were secured. The ANGELINA was reviewed and intracardiac air was removed via the aortic root vent. The patient was then weaned from cardiopulmonary bypass with the assistance of inotropic support. Shortly after weaning bypass, pressor requirements continued to increase therefore he was placed back on cardiopulmonary bypass and an intraaortic balloon pump was placed. Under ultrasound guidance, the right common femoral artery was accessed and a wire was placed into the proximal descending aorta. The 8 Fr balloon pump sheath was placed. The balloon pump was then inserted under ultrasound guidance. IABP was placed 1:1. The patient was again weaned from cardiopulmonary bypass. The patient was decannulated in the usual fashion and protamine was given.  Mediastinum was again inspected for hemostasis.  Hemostasis of the chest wall was achieved using Bovie electrocautery.  The sternum was re-approximated using a combination of sternal wires and plates. The wound was irrigated copiously.  The remainder of the wound was closed in multiple layers of absorbable stitch.  Dressings were applied over top. The patient tolerated the procedure well.  The patient was transferred to cardiothoracic intensive care unit in stable, but guarded condition.  All sponge, instruments, and needle counts were correct at the end of the case. The cardiopulmonary bypass time was 128 minutes and crossclamp time was 65 minutes. Dr. Cooper as present and assisted for all critical portions of the procedure including exposure, anastomosis, suture placement and all other applicable steps as needed. No adequately trained resident available to assist for procedure. Hafsa Leo was present and assisted for all portions of the procedure from opening,

## 2024-10-08 NOTE — ANESTHESIA PROCEDURE NOTES
Procedure Performed: ANGELINA       Start Time:        End Time:      Anesthesia Information  Performed Personally  Anesthesiologist:  Selin Vargas MD        Preanesthesia Checklist:  Patient identified, IV assessed, risks and benefits discussed, monitors and equipment assessed, procedure being performed at surgeon's request and anesthesia consent obtained.    General Procedure Information  Diagnostic Indications for Echo:  assessment of ascending aorta, hemodynamic monitoring and assessment of valve function  Location performed:  OR  Intubated  Bite block placed  Heart visualized  Probe Insertion:  Easy  Probe Type:  3D  Modalities:  Color flow mapping, 2D only, continuous wave Doppler, pulse wave Doppler, 3D and 2D    Echocardiographic and Doppler Measurements    Ventricles    Ventricle  Cavity Size  Cavity          Dimension  Hypertrophy  Thrombus  Global FXN  EF    RV  normal    No  No  normal      LV  normal    No  No  severely impaired (<30%)  25%       Ventricular Regional Function:  1- Basal Anteroseptal:  akinetic  2- Basal Anterior:  hypokinetic  3- Basal Anterolateral:  hypokinetic  4- Basal Inferolateral:  hypokinetic  5- Basal Inferior:  akinetic  6- Basal Inferoseptal:  akinetic  7- Mid Anteroseptal:  akinetic  8- Mid Anterior:  hypokinetic  9- Mid Anterolateral:  hypokinetic  10- Mid Inferolateral:  hypokinetic  11- Mid Inferior:  akinetic  12- Mid Inferoseptal:  akinetic  13- Apical Anterior:  hypokinetic  14- Apical Lateral:  hypokinetic  15- Apical Inferior:  hypokinetic  16- Apical Septal:  hypokinetic  17- [place]:  hypokinetic        Valves     Valves  Annulus  Stenosis Measurements   Regurg  Leaflet   Morph  Leaflet   Motion Valve Comments    Aortic normal Stenosis none.            none   normal normal       Mitral normal none             mild normal normal    Tricuspid normal   not present         moderate   normal   normal      Pulmonic normal   not present         mild              Aorta

## 2024-10-08 NOTE — BRIEF OP NOTE
Brief Postoperative Note    Pantera Payne  YOB: 1945  [id number]    Pre-operative Diagnosis: syncope, ventricular tachycardia, DM, HTN, HLD, atrial fibrillation    Post-operative Diagnosis: Same    Operation: urgent sternotomy, urgent CABG x3 (LIMA0-LAD, SVG-OM, SVG-R PDA), modified MAZE procedure, left atrial appendage ligation with 35 mm Atriclip, EVH, IABP placement     Anesthesia: General    Surgeon: Thao    Assistants: Ahmet Cooper Doran    Estimated Blood Loss: 1000    Complications: none apparent    Implants: 35 mm Atriclip

## 2024-10-08 NOTE — ANESTHESIA PRE PROCEDURE
Sirisha Boss MD        Or    potassium bicarb-citric acid (EFFER-K) effervescent tablet 40 mEq  40 mEq Oral PRSirisha Cid MD        Or    potassium chloride 10 mEq/100 mL IVPB (Peripheral Line)  10 mEq IntraVENous PRSirisha Cid MD        magnesium sulfate 2000 mg in 50 mL IVPB premix  2,000 mg IntraVENous PRSirisha Cid MD        ondansetron (ZOFRAN-ODT) disintegrating tablet 4 mg  4 mg Oral Q8H PRSirisha Cid MD        Or    ondansetron (ZOFRAN) injection 4 mg  4 mg IntraVENous Q6H Sirisha Boss MD        senna (SENOKOT) tablet 8.6 mg  1 tablet Oral Daily PRSirisha Cid MD        acetaminophen (TYLENOL) tablet 650 mg  650 mg Oral Q6H Sirisha Boss MD        Or    acetaminophen (TYLENOL) suppository 650 mg  650 mg Rectal Q6H PRSirisha Cid MD        glucose chewable tablet 16 g  4 tablet Oral PRSirisha Cid MD        dextrose bolus 10% 125 mL  125 mL IntraVENous PRSirisha Cid MD        Or    dextrose bolus 10% 250 mL  250 mL IntraVENous PRSirisha Cid MD        glucagon injection 1 mg  1 mg SubCUTAneous PRSirisha Cid MD        dextrose 10 % infusion   IntraVENous Continuous PRSirisha Cid MD           Allergies:    Allergies   Allergen Reactions    Atorvastatin     Bethaprim [Sulfamethoxazole-Trimethoprim]     Erythromycin     Lipitor      Muscle aches.       Problem List:    Patient Active Problem List   Diagnosis Code    CAD (coronary artery disease) I25.10    Poorly controlled type 2 diabetes mellitus ([facility]) E11.65    Hyperlipidemia E78.5    HTN (hypertension) I10    PVC's (premature ventricular contractions) I49.3    Focal dystonia G24.8    H/O detached retina repair Z98.890, Z86.69    Myocardiopathy ([facility]) I42.9    LAFB (left anterior fascicular block) I44.4    IVCD (intraventricular conduction defect) I45.4    V-tach ([facility]) I47.20    Persistent atrial fibrillation ([facility]) I48.19    Acute on chronic combined systolic and diastolic

## 2024-10-08 NOTE — ANESTHESIA PROCEDURE NOTES
Central Venous Line:    A central venous line was placed using ultrasound guidance, in the OR for the following indication(s): central venous access and CVP monitoring.    Sterility preparation included the following: provider used sterile gloves, gown, hat and mask and maximum sterile barriers used during central venous catheter insertion.    The patient was placed in supine position.The right internal jugular vein was prepped.    The site was prepped with Chloraprep.  A 9 Fr (size), 10 (length), introducer slick was placed.    During the procedure, the following specific steps were taken: target vein identified, needle advanced into vein and blood aspirated and guidewire advanced into vein.    Intravenous verification was obtained by ultrasound and venous blood return.    Post insertion care included: all ports aspirated, all ports flushed easily, guidewire removed intact, Biopatch applied, line sutured in place and dressing applied.    During the procedure the patient experienced: patient tolerated procedure well with no complications.      Outcomes: uncomplicated  Real-time US image taken/store: Yes  Anesthesia type: localA(n) non-oximetric, 7 (size) Pulmonary Artery Catheter (PAC) was placed through the Introducer CVL in the right internal jugular vein.  The PAC placement was confirmed by pressure tracing changes and ANGELINA.  The patient experienced the following events during the procedure: patient tolerated procedure well with no complications.PA Cath placed?: Yes  Staffing  Performed: Anesthesiologist   Anesthesiologist: Selin Vargas MD  Performed by: Selin Vargas MD  Authorized by: Selin Vargas MD    Preanesthetic Checklist  Completed: patient identified, IV checked, site marked, risks and benefits discussed, surgical/procedural consents, equipment checked, pre-op evaluation, timeout performed, anesthesia consent given, oxygen available and monitors applied/VS acknowledged

## 2024-10-09 ENCOUNTER — APPOINTMENT (OUTPATIENT)
Dept: GENERAL RADIOLOGY | Age: 79
DRG: 233 | End: 2024-10-09
Payer: MEDICARE

## 2024-10-09 LAB
ALBUMIN SERPL-MCNC: 3.2 G/DL (ref 3.5–5.2)
ALBUMIN SERPL-MCNC: 3.4 G/DL (ref 3.5–5.2)
ALBUMIN SERPL-MCNC: 3.4 G/DL (ref 3.5–5.2)
ALP SERPL-CCNC: 103 U/L (ref 40–129)
ALP SERPL-CCNC: 32 U/L (ref 40–129)
ALP SERPL-CCNC: 37 U/L (ref 40–129)
ALT SERPL-CCNC: 14 U/L (ref 0–40)
ALT SERPL-CCNC: 16 U/L (ref 0–40)
ALT SERPL-CCNC: 22 U/L (ref 0–40)
ANION GAP SERPL CALCULATED.3IONS-SCNC: 10 MMOL/L (ref 7–16)
ANION GAP SERPL CALCULATED.3IONS-SCNC: 12 MMOL/L (ref 7–16)
ANION GAP SERPL CALCULATED.3IONS-SCNC: 13 MMOL/L (ref 7–16)
AST SERPL-CCNC: 43 U/L (ref 0–39)
AST SERPL-CCNC: 46 U/L (ref 0–39)
AST SERPL-CCNC: 48 U/L (ref 0–39)
BILIRUB SERPL-MCNC: 0.4 MG/DL (ref 0–1.2)
BILIRUB SERPL-MCNC: 0.6 MG/DL (ref 0–1.2)
BILIRUB SERPL-MCNC: 0.8 MG/DL (ref 0–1.2)
BUN SERPL-MCNC: 18 MG/DL (ref 6–23)
BUN SERPL-MCNC: 19 MG/DL (ref 6–23)
BUN SERPL-MCNC: 21 MG/DL (ref 6–23)
CA-I BLD-SCNC: 1.14 MMOL/L (ref 1.15–1.33)
CALCIUM SERPL-MCNC: 7.6 MG/DL (ref 8.6–10.2)
CALCIUM SERPL-MCNC: 7.9 MG/DL (ref 8.6–10.2)
CALCIUM SERPL-MCNC: 8.4 MG/DL (ref 8.6–10.2)
CHLORIDE SERPL-SCNC: 109 MMOL/L (ref 98–107)
CHLORIDE SERPL-SCNC: 112 MMOL/L (ref 98–107)
CHLORIDE SERPL-SCNC: 112 MMOL/L (ref 98–107)
CO2 SERPL-SCNC: 21 MMOL/L (ref 22–29)
CO2 SERPL-SCNC: 22 MMOL/L (ref 22–29)
CO2 SERPL-SCNC: 22 MMOL/L (ref 22–29)
CREAT SERPL-MCNC: 1.3 MG/DL (ref 0.7–1.2)
CREAT SERPL-MCNC: 1.3 MG/DL (ref 0.7–1.2)
CREAT SERPL-MCNC: 1.4 MG/DL (ref 0.7–1.2)
CRITICAL ACTION: NORMAL
CRITICAL NOTIFICATION DATE/TIME: NORMAL
CRITICAL NOTIFICATION: NORMAL
CRITICAL VALUE READ BACK: YES
ERYTHROCYTE [DISTWIDTH] IN BLOOD BY AUTOMATED COUNT: 13.4 % (ref 11.5–15)
FIO2: 40
FIO2: 50
FIO2: 50
GFR, ESTIMATED: 51 ML/MIN/1.73M2
GFR, ESTIMATED: 55 ML/MIN/1.73M2
GFR, ESTIMATED: 56 ML/MIN/1.73M2
GLUCOSE BLD-MCNC: 111 MG/DL (ref 74–99)
GLUCOSE BLD-MCNC: 115 MG/DL (ref 74–99)
GLUCOSE BLD-MCNC: 117 MG/DL (ref 74–99)
GLUCOSE BLD-MCNC: 125 MG/DL (ref 74–99)
GLUCOSE BLD-MCNC: 127 MG/DL (ref 74–99)
GLUCOSE BLD-MCNC: 133 MG/DL (ref 74–99)
GLUCOSE BLD-MCNC: 133 MG/DL (ref 74–99)
GLUCOSE BLD-MCNC: 141 MG/DL (ref 74–99)
GLUCOSE BLD-MCNC: 146 MG/DL (ref 74–99)
GLUCOSE BLD-MCNC: 152 MG/DL (ref 74–99)
GLUCOSE BLD-MCNC: 158 MG/DL (ref 74–99)
GLUCOSE BLD-MCNC: 177 MG/DL (ref 74–99)
GLUCOSE BLD-MCNC: 185 MG/DL (ref 74–99)
GLUCOSE BLD-MCNC: 217 MG/DL (ref 74–99)
GLUCOSE BLD-MCNC: 235 MG/DL (ref 74–99)
GLUCOSE BLD-MCNC: 240 MG/DL (ref 74–99)
GLUCOSE BLD-MCNC: 253 MG/DL (ref 74–99)
GLUCOSE BLD-MCNC: 87 MG/DL (ref 74–99)
GLUCOSE BLD-MCNC: 97 MG/DL (ref 74–99)
GLUCOSE SERPL-MCNC: 233 MG/DL (ref 74–99)
GLUCOSE SERPL-MCNC: 86 MG/DL (ref 74–99)
GLUCOSE SERPL-MCNC: 94 MG/DL (ref 74–99)
HCO3, MIXED: 20.9 MMOL/L
HCO3, MIXED: 21.6 MMOL/L
HCO3, MIXED: 22.8 MMOL/L
HCO3, MIXED: 23.7 MMOL/L
HCO3, MIXED: 25 MMOL/L
HCO3, MIXED: 25.1 MMOL/L
HCO3, MIXED: 25.4 MMOL/L
HCO3, MIXED: 25.7 MMOL/L
HCO3, MIXED: 25.9 MMOL/L
HCT VFR BLD AUTO: 20 % (ref 37–54)
HCT VFR BLD AUTO: 21 % (ref 37–54)
HCT VFR BLD AUTO: 22 % (ref 37–54)
HCT VFR BLD AUTO: 23 % (ref 37–54)
HCT VFR BLD AUTO: 24 % (ref 37–54)
HCT VFR BLD AUTO: 25 % (ref 37–54)
HCT VFR BLD AUTO: 25 % (ref 37–54)
HCT VFR BLD AUTO: 26 % (ref 37–54)
HCT VFR BLD AUTO: 27 % (ref 37–54)
HCT VFR BLD AUTO: 27 % (ref 37–54)
HCT VFR BLD AUTO: 29.2 % (ref 37–54)
HGB BLD-MCNC: 9.7 G/DL (ref 12.5–16.5)
INR PPP: 1.5
MAGNESIUM SERPL-MCNC: 2.2 MG/DL (ref 1.6–2.6)
MCH RBC QN AUTO: 31.9 PG (ref 26–35)
MCHC RBC AUTO-ENTMCNC: 33.2 G/DL (ref 32–34.5)
MCV RBC AUTO: 96.1 FL (ref 80–99.9)
MODE: AC
NEGATIVE BASE EXCESS, ART: 0.3 MMOL/L
NEGATIVE BASE EXCESS, ART: 1 MMOL/L
NEGATIVE BASE EXCESS, ART: 1.1 MMOL/L
NEGATIVE BASE EXCESS, ART: 1.4 MMOL/L
NEGATIVE BASE EXCESS, ART: 1.5 MMOL/L
NEGATIVE BASE EXCESS, ART: 1.5 MMOL/L
NEGATIVE BASE EXCESS, ART: 1.8 MMOL/L
NEGATIVE BASE EXCESS, ART: 2.8 MMOL/L
NEGATIVE BASE EXCESS, ART: 3 MMOL/L
NEGATIVE BASE EXCESS, ART: 3.1 MMOL/L
NEGATIVE BASE EXCESS, ART: 6.1 MMOL/L
NEGATIVE BASE EXCESS, MIXED: 0.2 MMOL/L
NEGATIVE BASE EXCESS, MIXED: 0.8 MMOL/L
NEGATIVE BASE EXCESS, MIXED: 1.5 MMOL/L
NEGATIVE BASE EXCESS, MIXED: 2.5 MMOL/L
NEGATIVE BASE EXCESS, MIXED: 3.3 MMOL/L
NEGATIVE BASE EXCESS, MIXED: 4.7 MMOL/L
O2 DELIVERY DEVICE: ABNORMAL
O2 DELIVERY DEVICE: NORMAL
O2 SAT, MIXED: 53.5 %
O2 SAT, MIXED: 55 %
O2 SAT, MIXED: 57.1 %
O2 SAT, MIXED: 59.5 %
O2 SAT, MIXED: 60.7 %
O2 SAT, MIXED: 61.9 %
O2 SAT, MIXED: 62.1 %
O2 SAT, MIXED: 62.3 %
O2 SAT, MIXED: 63 %
PATIENT TEMP: 37
PCO2 MIXED: 37.2 MM HG
PCO2 MIXED: 40 MM HG
PCO2 MIXED: 41 MM HG
PCO2 MIXED: 41.1 MM HG
PCO2 MIXED: 42.1 MM HG
PCO2 MIXED: 43.1 MM HG
PCO2 MIXED: 43.4 MM HG
PCO2 MIXED: 44.7 MM HG
PCO2 MIXED: 45.7 MM HG
PEEP: 8
PH, MIXED: 7.33 (ref 7.35–7.45)
PH, MIXED: 7.34 (ref 7.35–7.45)
PH, MIXED: 7.35 (ref 7.35–7.45)
PH, MIXED: 7.37 (ref 7.35–7.45)
PH, MIXED: 7.38 (ref 7.35–7.45)
PH, MIXED: 7.4 (ref 7.35–7.45)
PLATELET CONFIRMATION: NORMAL
PLATELET, FLUORESCENCE: 76 K/UL (ref 130–450)
PMV BLD AUTO: 14.6 FL (ref 7–12)
PO2 MIXED: 28.5 MM HG
PO2 MIXED: 29.9 MM HG
PO2 MIXED: 31.2 MM HG
PO2 MIXED: 32.8 MM HG
PO2 MIXED: 32.9 MM HG
PO2 MIXED: 33.1 MM HG
PO2 MIXED: 33.2 MM HG
PO2 MIXED: 33.3 MM HG
PO2 MIXED: 34.7 MM HG
POC HCO3: 18.8 MMOL/L (ref 22–26)
POC HCO3: 21.2 MMOL/L (ref 22–26)
POC HCO3: 21.3 MMOL/L (ref 22–26)
POC HCO3: 21.6 MMOL/L (ref 22–26)
POC HCO3: 22.5 MMOL/L (ref 22–26)
POC HCO3: 22.6 MMOL/L (ref 22–26)
POC HCO3: 22.9 MMOL/L (ref 22–26)
POC HCO3: 23 MMOL/L (ref 22–26)
POC HCO3: 23.1 MMOL/L (ref 22–26)
POC HCO3: 23.2 MMOL/L (ref 22–26)
POC HCO3: 24.3 MMOL/L (ref 22–26)
POC HCO3: 24.5 MMOL/L (ref 22–26)
POC HEMOGLOBIN (CALC): 6.9 G/DL (ref 12.5–15.5)
POC HEMOGLOBIN (CALC): 7 G/DL (ref 12.5–15.5)
POC HEMOGLOBIN (CALC): 7 G/DL (ref 12.5–15.5)
POC HEMOGLOBIN (CALC): 7.1 G/DL (ref 12.5–15.5)
POC HEMOGLOBIN (CALC): 7.2 G/DL (ref 12.5–15.5)
POC HEMOGLOBIN (CALC): 7.3 G/DL (ref 12.5–15.5)
POC HEMOGLOBIN (CALC): 7.3 G/DL (ref 12.5–15.5)
POC HEMOGLOBIN (CALC): 7.4 G/DL (ref 12.5–15.5)
POC HEMOGLOBIN (CALC): 7.5 G/DL (ref 12.5–15.5)
POC HEMOGLOBIN (CALC): 7.5 G/DL (ref 12.5–15.5)
POC HEMOGLOBIN (CALC): 7.7 G/DL (ref 12.5–15.5)
POC HEMOGLOBIN (CALC): 8.1 G/DL (ref 12.5–15.5)
POC HEMOGLOBIN (CALC): 8.4 G/DL (ref 12.5–15.5)
POC HEMOGLOBIN (CALC): 8.6 G/DL (ref 12.5–15.5)
POC HEMOGLOBIN (CALC): 8.7 G/DL (ref 12.5–15.5)
POC HEMOGLOBIN (CALC): 8.7 G/DL (ref 12.5–15.5)
POC HEMOGLOBIN (CALC): 8.8 G/DL (ref 12.5–15.5)
POC HEMOGLOBIN (CALC): 9.2 G/DL (ref 12.5–15.5)
POC HEMOGLOBIN (CALC): 9.2 G/DL (ref 12.5–15.5)
POC LACTIC ACID: 1.5 MMOL/L (ref 0.5–2.2)
POC LACTIC ACID: 1.5 MMOL/L (ref 0.5–2.2)
POC LACTIC ACID: 1.6 MMOL/L (ref 0.5–2.2)
POC LACTIC ACID: 1.7 MMOL/L (ref 0.5–2.2)
POC LACTIC ACID: 1.7 MMOL/L (ref 0.5–2.2)
POC LACTIC ACID: 1.8 MMOL/L (ref 0.5–2.2)
POC LACTIC ACID: 1.9 MMOL/L (ref 0.5–2.2)
POC LACTIC ACID: 2.3 MMOL/L (ref 0.5–2.2)
POC LACTIC ACID: 2.4 MMOL/L (ref 0.5–2.2)
POC LACTIC ACID: 3.5 MMOL/L (ref 0.5–2.2)
POC O2 SATURATION: 96.6 % (ref 92–98.5)
POC O2 SATURATION: 97.1 % (ref 92–98.5)
POC O2 SATURATION: 97.4 % (ref 92–98.5)
POC O2 SATURATION: 97.6 % (ref 92–98.5)
POC O2 SATURATION: 97.6 % (ref 92–98.5)
POC O2 SATURATION: 97.8 % (ref 92–98.5)
POC O2 SATURATION: 98.2 % (ref 92–98.5)
POC O2 SATURATION: 98.3 % (ref 92–98.5)
POC O2 SATURATION: 98.5 % (ref 92–98.5)
POC O2 SATURATION: 98.8 % (ref 92–98.5)
POC O2 SATURATION: 98.8 % (ref 92–98.5)
POC O2 SATURATION: 99.4 % (ref 92–98.5)
POC PCO2 TEMP: 33.4 MM HG
POC PCO2 TEMP: 33.6 MM HG
POC PCO2 TEMP: 33.8 MM HG
POC PCO2 TEMP: 34.4 MM HG
POC PCO2 TEMP: 35.4 MM HG
POC PCO2 TEMP: 37.2 MM HG
POC PCO2 TEMP: 38.3 MM HG
POC PCO2 TEMP: 40 MM HG
POC PCO2 TEMP: 41 MM HG
POC PCO2 TEMP: 42.1 MM HG
POC PCO2 TEMP: 43.1 MM HG
POC PCO2: 33.4 MM HG (ref 35–45)
POC PCO2: 33.6 MM HG (ref 35–45)
POC PCO2: 33.8 MM HG (ref 35–45)
POC PCO2: 34.4 MM HG (ref 35–45)
POC PCO2: 34.9 MM HG (ref 35–45)
POC PCO2: 35.3 MM HG (ref 35–45)
POC PCO2: 35.4 MM HG (ref 35–45)
POC PCO2: 35.6 MM HG (ref 35–45)
POC PCO2: 35.8 MM HG (ref 35–45)
POC PCO2: 37.2 MM HG (ref 35–45)
POC PCO2: 38.3 MM HG (ref 35–45)
POC PCO2: 38.4 MM HG (ref 35–45)
POC PH TEMP: 7.33
POC PH TEMP: 7.35
POC PH TEMP: 7.35
POC PH TEMP: 7.37
POC PH TEMP: 7.37
POC PH TEMP: 7.4
POC PH TEMP: 7.4
POC PH TEMP: 7.41
POC PH TEMP: 7.44
POC PH: 7.35 (ref 7.35–7.45)
POC PH: 7.4 (ref 7.35–7.45)
POC PH: 7.41 (ref 7.35–7.45)
POC PH: 7.42 (ref 7.35–7.45)
POC PH: 7.42 (ref 7.35–7.45)
POC PH: 7.44 (ref 7.35–7.45)
POC PO2 TEMP: 121 MM HG
POC PO2 TEMP: 28.5 MM HG
POC PO2 TEMP: 29.9 MM HG
POC PO2 TEMP: 31.2 MM HG
POC PO2 TEMP: 32.9 MM HG
POC PO2 TEMP: 33.2 MM HG
POC PO2 TEMP: 84.7 MM HG
POC PO2 TEMP: 87.1 MM HG
POC PO2 TEMP: 96.9 MM HG
POC PO2 TEMP: 97.7 MM HG
POC PO2 TEMP: 99.7 MM HG
POC PO2: 105.4 MM HG (ref 60–80)
POC PO2: 107.9 MM HG (ref 60–80)
POC PO2: 114.7 MM HG (ref 60–80)
POC PO2: 120.6 MM HG (ref 60–80)
POC PO2: 121 MM HG (ref 60–80)
POC PO2: 152.3 MM HG (ref 60–80)
POC PO2: 84.7 MM HG (ref 60–80)
POC PO2: 87.1 MM HG (ref 60–80)
POC PO2: 96.9 MM HG (ref 60–80)
POC PO2: 97.3 MM HG (ref 60–80)
POC PO2: 97.7 MM HG (ref 60–80)
POC PO2: 99.7 MM HG (ref 60–80)
POSITIVE BASE EXCESS, ART: 0 MMOL/L (ref 0–3)
POSITIVE BASE EXCESS, MIXED: 0.1 MMOL/L
POSITIVE BASE EXCESS, MIXED: 0.2 MMOL/L
POSITIVE BASE EXCESS, MIXED: 1 MMOL/L
POTASSIUM SERPL-SCNC: 4.6 MMOL/L (ref 3.5–5)
POTASSIUM SERPL-SCNC: 4.7 MMOL/L (ref 3.5–5)
POTASSIUM SERPL-SCNC: 4.9 MMOL/L (ref 3.5–5)
PROT SERPL-MCNC: 4.7 G/DL (ref 6.4–8.3)
PROT SERPL-MCNC: 4.8 G/DL (ref 6.4–8.3)
PROT SERPL-MCNC: 4.9 G/DL (ref 6.4–8.3)
PROTHROMBIN TIME: 16.3 SEC (ref 9.3–12.4)
RBC # BLD AUTO: 3.04 M/UL (ref 3.8–5.8)
SAMPLE SITE: ABNORMAL
SAMPLE SITE: NORMAL
SET RATE, POC: 14
SET RATE, POC: 15
SODIUM SERPL-SCNC: 140 MMOL/L (ref 132–146)
SODIUM SERPL-SCNC: 146 MMOL/L (ref 132–146)
SODIUM SERPL-SCNC: 147 MMOL/L (ref 132–146)
TIDAL VOLUME: 500
WBC OTHER # BLD: 16.4 K/UL (ref 4.5–11.5)

## 2024-10-09 PROCEDURE — 6370000000 HC RX 637 (ALT 250 FOR IP)

## 2024-10-09 PROCEDURE — 83605 ASSAY OF LACTIC ACID: CPT

## 2024-10-09 PROCEDURE — 82330 ASSAY OF CALCIUM: CPT

## 2024-10-09 PROCEDURE — 94640 AIRWAY INHALATION TREATMENT: CPT

## 2024-10-09 PROCEDURE — 84132 ASSAY OF SERUM POTASSIUM: CPT

## 2024-10-09 PROCEDURE — 85610 PROTHROMBIN TIME: CPT

## 2024-10-09 PROCEDURE — 2580000003 HC RX 258: Performed by: NURSE PRACTITIONER

## 2024-10-09 PROCEDURE — 71045 X-RAY EXAM CHEST 1 VIEW: CPT

## 2024-10-09 PROCEDURE — 94003 VENT MGMT INPAT SUBQ DAY: CPT

## 2024-10-09 PROCEDURE — 99291 CRITICAL CARE FIRST HOUR: CPT | Performed by: NURSE PRACTITIONER

## 2024-10-09 PROCEDURE — 6360000002 HC RX W HCPCS: Performed by: NURSE PRACTITIONER

## 2024-10-09 PROCEDURE — 85027 COMPLETE CBC AUTOMATED: CPT

## 2024-10-09 PROCEDURE — 85014 HEMATOCRIT: CPT

## 2024-10-09 PROCEDURE — 82962 GLUCOSE BLOOD TEST: CPT

## 2024-10-09 PROCEDURE — 80053 COMPREHEN METABOLIC PANEL: CPT

## 2024-10-09 PROCEDURE — 2000000000 HC ICU R&B

## 2024-10-09 PROCEDURE — 6360000002 HC RX W HCPCS

## 2024-10-09 PROCEDURE — 37799 UNLISTED PX VASCULAR SURGERY: CPT

## 2024-10-09 PROCEDURE — 82803 BLOOD GASES ANY COMBINATION: CPT

## 2024-10-09 PROCEDURE — 2500000003 HC RX 250 WO HCPCS: Performed by: NURSE PRACTITIONER

## 2024-10-09 PROCEDURE — 83735 ASSAY OF MAGNESIUM: CPT

## 2024-10-09 PROCEDURE — 99233 SBSQ HOSP IP/OBS HIGH 50: CPT | Performed by: INTERNAL MEDICINE

## 2024-10-09 PROCEDURE — 2580000003 HC RX 258

## 2024-10-09 PROCEDURE — P9047 ALBUMIN (HUMAN), 25%, 50ML: HCPCS | Performed by: NURSE PRACTITIONER

## 2024-10-09 RX ORDER — ALBUMIN (HUMAN) 12.5 G/50ML
25 SOLUTION INTRAVENOUS ONCE
Status: COMPLETED | OUTPATIENT
Start: 2024-10-09 | End: 2024-10-09

## 2024-10-09 RX ADMIN — AMIODARONE HYDROCHLORIDE 400 MG: 200 TABLET ORAL at 20:44

## 2024-10-09 RX ADMIN — TAMSULOSIN HYDROCHLORIDE 0.4 MG: 0.4 CAPSULE ORAL at 09:26

## 2024-10-09 RX ADMIN — SENNOSIDES AND DOCUSATE SODIUM 1 TABLET: 50; 8.6 TABLET ORAL at 20:44

## 2024-10-09 RX ADMIN — Medication 400 MG: at 09:26

## 2024-10-09 RX ADMIN — Medication 400 MG: at 20:44

## 2024-10-09 RX ADMIN — SODIUM CHLORIDE: 9 INJECTION, SOLUTION INTRAVENOUS at 09:18

## 2024-10-09 RX ADMIN — SODIUM CHLORIDE, PRESERVATIVE FREE 10 ML: 5 INJECTION INTRAVENOUS at 20:45

## 2024-10-09 RX ADMIN — ACETAMINOPHEN 1000 MG: 500 TABLET ORAL at 07:18

## 2024-10-09 RX ADMIN — VASOPRESSIN 0.06 UNITS/MIN: 20 INJECTION INTRAVENOUS at 20:37

## 2024-10-09 RX ADMIN — HYDROMORPHONE HYDROCHLORIDE 0.25 MG: 1 INJECTION, SOLUTION INTRAMUSCULAR; INTRAVENOUS; SUBCUTANEOUS at 00:55

## 2024-10-09 RX ADMIN — EPINEPHRINE 4 MCG/MIN: 1 INJECTION INTRAMUSCULAR; INTRAVENOUS; SUBCUTANEOUS at 08:51

## 2024-10-09 RX ADMIN — PROPOFOL 20 MCG/KG/MIN: 10 INJECTION, EMULSION INTRAVENOUS at 16:25

## 2024-10-09 RX ADMIN — ACETAMINOPHEN 1000 MG: 500 TABLET ORAL at 14:27

## 2024-10-09 RX ADMIN — VASOPRESSIN 0.04 UNITS/MIN: 20 INJECTION INTRAVENOUS at 11:09

## 2024-10-09 RX ADMIN — AMIODARONE HYDROCHLORIDE 400 MG: 200 TABLET ORAL at 09:26

## 2024-10-09 RX ADMIN — SODIUM CHLORIDE, PRESERVATIVE FREE 10 ML: 5 INJECTION INTRAVENOUS at 09:27

## 2024-10-09 RX ADMIN — VASOPRESSIN 0.06 UNITS/MIN: 20 INJECTION INTRAVENOUS at 04:30

## 2024-10-09 RX ADMIN — WATER 2000 MG: 1 INJECTION INTRAMUSCULAR; INTRAVENOUS; SUBCUTANEOUS at 07:21

## 2024-10-09 RX ADMIN — SODIUM CHLORIDE 8.9 UNITS/HR: 9 INJECTION, SOLUTION INTRAVENOUS at 08:43

## 2024-10-09 RX ADMIN — CALCIUM CHLORIDE INJECTION 1000 MG: 100 INJECTION, SOLUTION INTRAVENOUS at 15:19

## 2024-10-09 RX ADMIN — ASPIRIN 81 MG: 81 TABLET, COATED ORAL at 09:26

## 2024-10-09 RX ADMIN — IPRATROPIUM BROMIDE AND ALBUTEROL SULFATE 1 DOSE: 2.5; .5 SOLUTION RESPIRATORY (INHALATION) at 14:32

## 2024-10-09 RX ADMIN — PROPOFOL 20 MCG/KG/MIN: 10 INJECTION, EMULSION INTRAVENOUS at 07:20

## 2024-10-09 RX ADMIN — ALBUMIN (HUMAN) 25 G: 0.25 INJECTION, SOLUTION INTRAVENOUS at 08:57

## 2024-10-09 RX ADMIN — OXYCODONE HYDROCHLORIDE 10 MG: 10 TABLET ORAL at 14:42

## 2024-10-09 RX ADMIN — INSULIN GLARGINE 12 UNITS: 100 INJECTION, SOLUTION SUBCUTANEOUS at 20:43

## 2024-10-09 RX ADMIN — POLYETHYLENE GLYCOL 3350 17 G: 17 POWDER, FOR SOLUTION ORAL at 09:26

## 2024-10-09 RX ADMIN — WATER 2000 MG: 1 INJECTION INTRAMUSCULAR; INTRAVENOUS; SUBCUTANEOUS at 20:43

## 2024-10-09 RX ADMIN — HYDROMORPHONE HYDROCHLORIDE 0.5 MG: 1 INJECTION, SOLUTION INTRAMUSCULAR; INTRAVENOUS; SUBCUTANEOUS at 12:00

## 2024-10-09 RX ADMIN — IPRATROPIUM BROMIDE AND ALBUTEROL SULFATE 1 DOSE: 2.5; .5 SOLUTION RESPIRATORY (INHALATION) at 10:27

## 2024-10-09 RX ADMIN — WATER 2000 MG: 1 INJECTION INTRAMUSCULAR; INTRAVENOUS; SUBCUTANEOUS at 14:40

## 2024-10-09 RX ADMIN — IPRATROPIUM BROMIDE AND ALBUTEROL SULFATE 1 DOSE: 2.5; .5 SOLUTION RESPIRATORY (INHALATION) at 21:20

## 2024-10-09 RX ADMIN — SENNOSIDES AND DOCUSATE SODIUM 1 TABLET: 50; 8.6 TABLET ORAL at 09:26

## 2024-10-09 RX ADMIN — MUPIROCIN: 20 OINTMENT TOPICAL at 20:45

## 2024-10-09 RX ADMIN — OXYCODONE HYDROCHLORIDE 5 MG: 5 TABLET ORAL at 07:19

## 2024-10-09 RX ADMIN — ROSUVASTATIN 40 MG: 20 TABLET, FILM COATED ORAL at 09:26

## 2024-10-09 RX ADMIN — IPRATROPIUM BROMIDE AND ALBUTEROL SULFATE 1 DOSE: 2.5; .5 SOLUTION RESPIRATORY (INHALATION) at 06:30

## 2024-10-09 RX ADMIN — CHLORHEXIDINE GLUCONATE, 0.12% ORAL RINSE 15 ML: 1.2 SOLUTION DENTAL at 20:43

## 2024-10-09 RX ADMIN — CHLORHEXIDINE GLUCONATE, 0.12% ORAL RINSE 15 ML: 1.2 SOLUTION DENTAL at 09:27

## 2024-10-09 RX ADMIN — MUPIROCIN: 20 OINTMENT TOPICAL at 09:26

## 2024-10-09 RX ADMIN — ACETAMINOPHEN 1000 MG: 500 TABLET ORAL at 20:44

## 2024-10-09 ASSESSMENT — PAIN SCALES - GENERAL
PAINLEVEL_OUTOF10: 0
PAINLEVEL_OUTOF10: 6
PAINLEVEL_OUTOF10: 7
PAINLEVEL_OUTOF10: 0
PAINLEVEL_OUTOF10: 6
PAINLEVEL_OUTOF10: 0
PAINLEVEL_OUTOF10: 7
PAINLEVEL_OUTOF10: 0
PAINLEVEL_OUTOF10: 6
PAINLEVEL_OUTOF10: 0

## 2024-10-09 ASSESSMENT — PULMONARY FUNCTION TESTS
PIF_VALUE: 22
PIF_VALUE: 23
PIF_VALUE: 22
PIF_VALUE: 22
PIF_VALUE: 27
PIF_VALUE: 23
PIF_VALUE: 25
PIF_VALUE: 22
PIF_VALUE: 26
PIF_VALUE: 24
PIF_VALUE: 21
PIF_VALUE: 23
PIF_VALUE: 22
PIF_VALUE: 22
PIF_VALUE: 23
PIF_VALUE: 27
PIF_VALUE: 22
PIF_VALUE: 23
PIF_VALUE: 22
PIF_VALUE: 23
PIF_VALUE: 26
PIF_VALUE: 26
PIF_VALUE: 23
PIF_VALUE: 26
PIF_VALUE: 23
PIF_VALUE: 26
PIF_VALUE: 21
PIF_VALUE: 23
PIF_VALUE: 22
PIF_VALUE: 23
PIF_VALUE: 23
PIF_VALUE: 26
PIF_VALUE: 27
PIF_VALUE: 21
PIF_VALUE: 22
PIF_VALUE: 23
PIF_VALUE: 23
PIF_VALUE: 22
PIF_VALUE: 20
PIF_VALUE: 22
PIF_VALUE: 23
PIF_VALUE: 21
PIF_VALUE: 23
PIF_VALUE: 22

## 2024-10-09 ASSESSMENT — PAIN - FUNCTIONAL ASSESSMENT
PAIN_FUNCTIONAL_ASSESSMENT: PREVENTS OR INTERFERES SOME ACTIVE ACTIVITIES AND ADLS
PAIN_FUNCTIONAL_ASSESSMENT: PREVENTS OR INTERFERES SOME ACTIVE ACTIVITIES AND ADLS

## 2024-10-09 ASSESSMENT — PAIN DESCRIPTION - ORIENTATION
ORIENTATION: MID
ORIENTATION: MID

## 2024-10-09 ASSESSMENT — PAIN DESCRIPTION - DESCRIPTORS
DESCRIPTORS: PATIENT UNABLE TO DESCRIBE

## 2024-10-09 ASSESSMENT — PAIN DESCRIPTION - LOCATION
LOCATION: STERNUM
LOCATION: STERNUM

## 2024-10-09 NOTE — CARE COORDINATION
10/9 Care Coordination: Pt now in [facility]. POD#1, CABG x 3, Remains on Vent,On IAPB.IV Epi & Vaso,IV Propofol and Insulin drips. Per previous CM discharge plan was Home, [facility] is following. BiPAP has been set up with [facility], need to call them when discharging. CM/SW will continue to follow for discharge planning.   Curtis KNAPP,RN-CV-BC  [phone]

## 2024-10-10 ENCOUNTER — APPOINTMENT (OUTPATIENT)
Dept: GENERAL RADIOLOGY | Age: 79
DRG: 233 | End: 2024-10-10
Payer: MEDICARE

## 2024-10-10 LAB
ABO/RH: NORMAL
ALBUMIN SERPL-MCNC: 3.2 G/DL (ref 3.5–5.2)
ALBUMIN SERPL-MCNC: 3.2 G/DL (ref 3.5–5.2)
ALBUMIN SERPL-MCNC: 3.4 G/DL (ref 3.5–5.2)
ALBUMIN SERPL-MCNC: 3.4 G/DL (ref 3.5–5.2)
ALP SERPL-CCNC: 126 U/L (ref 40–129)
ALP SERPL-CCNC: 39 U/L (ref 40–129)
ALP SERPL-CCNC: 64 U/L (ref 40–129)
ALP SERPL-CCNC: 80 U/L (ref 40–129)
ALT SERPL-CCNC: 12 U/L (ref 0–40)
ALT SERPL-CCNC: 13 U/L (ref 0–40)
ALT SERPL-CCNC: 13 U/L (ref 0–40)
ALT SERPL-CCNC: 14 U/L (ref 0–40)
ANION GAP SERPL CALCULATED.3IONS-SCNC: 10 MMOL/L (ref 7–16)
ANION GAP SERPL CALCULATED.3IONS-SCNC: 11 MMOL/L (ref 7–16)
ANION GAP SERPL CALCULATED.3IONS-SCNC: 8 MMOL/L (ref 7–16)
ANION GAP SERPL CALCULATED.3IONS-SCNC: 8 MMOL/L (ref 7–16)
ANTIBODY SCREEN: NEGATIVE
ARM BAND NUMBER: NORMAL
AST SERPL-CCNC: 44 U/L (ref 0–39)
AST SERPL-CCNC: 45 U/L (ref 0–39)
AST SERPL-CCNC: 48 U/L (ref 0–39)
AST SERPL-CCNC: 48 U/L (ref 0–39)
BILIRUB SERPL-MCNC: 0.5 MG/DL (ref 0–1.2)
BILIRUB SERPL-MCNC: 0.6 MG/DL (ref 0–1.2)
BILIRUB SERPL-MCNC: 0.7 MG/DL (ref 0–1.2)
BILIRUB SERPL-MCNC: 0.8 MG/DL (ref 0–1.2)
BLOOD BANK DISPENSE STATUS: NORMAL
BLOOD BANK SAMPLE EXPIRATION: NORMAL
BPU ID: NORMAL
BUN SERPL-MCNC: 23 MG/DL (ref 6–23)
BUN SERPL-MCNC: 23 MG/DL (ref 6–23)
BUN SERPL-MCNC: 27 MG/DL (ref 6–23)
BUN SERPL-MCNC: 30 MG/DL (ref 6–23)
CA-I BLD-SCNC: 1.16 MMOL/L (ref 1.15–1.33)
CALCIUM SERPL-MCNC: 8.1 MG/DL (ref 8.6–10.2)
CALCIUM SERPL-MCNC: 8.2 MG/DL (ref 8.6–10.2)
CALCIUM SERPL-MCNC: 8.2 MG/DL (ref 8.6–10.2)
CALCIUM SERPL-MCNC: 8.3 MG/DL (ref 8.6–10.2)
CHLORIDE SERPL-SCNC: 108 MMOL/L (ref 98–107)
CHLORIDE SERPL-SCNC: 109 MMOL/L (ref 98–107)
CHLORIDE SERPL-SCNC: 109 MMOL/L (ref 98–107)
CHLORIDE SERPL-SCNC: 111 MMOL/L (ref 98–107)
CO2 SERPL-SCNC: 19 MMOL/L (ref 22–29)
CO2 SERPL-SCNC: 21 MMOL/L (ref 22–29)
CO2 SERPL-SCNC: 22 MMOL/L (ref 22–29)
CO2 SERPL-SCNC: 23 MMOL/L (ref 22–29)
COMPONENT: NORMAL
CREAT SERPL-MCNC: 1.1 MG/DL (ref 0.7–1.2)
CREAT SERPL-MCNC: 1.2 MG/DL (ref 0.7–1.2)
CREAT SERPL-MCNC: 1.2 MG/DL (ref 0.7–1.2)
CREAT SERPL-MCNC: 1.3 MG/DL (ref 0.7–1.2)
CRITICAL NOTIFICATION DATE/TIME: NORMAL
CROSSMATCH RESULT: NORMAL
ERYTHROCYTE [DISTWIDTH] IN BLOOD BY AUTOMATED COUNT: 13.8 % (ref 11.5–15)
FIO2: 40
GFR, ESTIMATED: 58 ML/MIN/1.73M2
GFR, ESTIMATED: 61 ML/MIN/1.73M2
GFR, ESTIMATED: 63 ML/MIN/1.73M2
GFR, ESTIMATED: 66 ML/MIN/1.73M2
GLUCOSE BLD-MCNC: 106 MG/DL (ref 74–99)
GLUCOSE BLD-MCNC: 118 MG/DL (ref 74–99)
GLUCOSE BLD-MCNC: 131 MG/DL (ref 74–99)
GLUCOSE BLD-MCNC: 134 MG/DL (ref 74–99)
GLUCOSE BLD-MCNC: 154 MG/DL (ref 74–99)
GLUCOSE BLD-MCNC: 168 MG/DL (ref 74–99)
GLUCOSE BLD-MCNC: 171 MG/DL (ref 74–99)
GLUCOSE BLD-MCNC: 172 MG/DL (ref 74–99)
GLUCOSE BLD-MCNC: 173 MG/DL (ref 74–99)
GLUCOSE BLD-MCNC: 188 MG/DL (ref 74–99)
GLUCOSE BLD-MCNC: 194 MG/DL (ref 74–99)
GLUCOSE BLD-MCNC: 196 MG/DL (ref 74–99)
GLUCOSE BLD-MCNC: 205 MG/DL (ref 74–99)
GLUCOSE BLD-MCNC: 217 MG/DL (ref 74–99)
GLUCOSE BLD-MCNC: 225 MG/DL (ref 74–99)
GLUCOSE BLD-MCNC: 226 MG/DL (ref 74–99)
GLUCOSE BLD-MCNC: 236 MG/DL (ref 74–99)
GLUCOSE BLD-MCNC: 81 MG/DL (ref 74–99)
GLUCOSE SERPL-MCNC: 102 MG/DL (ref 74–99)
GLUCOSE SERPL-MCNC: 147 MG/DL (ref 74–99)
GLUCOSE SERPL-MCNC: 163 MG/DL (ref 74–99)
GLUCOSE SERPL-MCNC: 200 MG/DL (ref 74–99)
HCO3, MIXED: 18.1 MMOL/L
HCO3, MIXED: 19.1 MMOL/L
HCO3, MIXED: 20.7 MMOL/L
HCO3, MIXED: 20.8 MMOL/L
HCO3, MIXED: 22.3 MMOL/L
HCO3, MIXED: 22.6 MMOL/L
HCO3, MIXED: 23.7 MMOL/L
HCO3, MIXED: 24.7 MMOL/L
HCO3, MIXED: 25.1 MMOL/L
HCT VFR BLD AUTO: 18 % (ref 37–54)
HCT VFR BLD AUTO: 19 % (ref 37–54)
HCT VFR BLD AUTO: 20 % (ref 37–54)
HCT VFR BLD AUTO: 21 % (ref 37–54)
HCT VFR BLD AUTO: 22 % (ref 37–54)
HCT VFR BLD AUTO: 23 % (ref 37–54)
HCT VFR BLD AUTO: 25.7 % (ref 37–54)
HGB BLD-MCNC: 8.2 G/DL (ref 12.5–16.5)
INR PPP: 1.6
MAGNESIUM SERPL-MCNC: 2.4 MG/DL (ref 1.6–2.6)
MCH RBC QN AUTO: 31.7 PG (ref 26–35)
MCHC RBC AUTO-ENTMCNC: 31.9 G/DL (ref 32–34.5)
MCV RBC AUTO: 99.2 FL (ref 80–99.9)
MODE: AC
NEGATIVE BASE EXCESS, ART: 0.8 MMOL/L
NEGATIVE BASE EXCESS, ART: 1.6 MMOL/L
NEGATIVE BASE EXCESS, ART: 2.6 MMOL/L
NEGATIVE BASE EXCESS, ART: 2.7 MMOL/L
NEGATIVE BASE EXCESS, ART: 4.4 MMOL/L
NEGATIVE BASE EXCESS, ART: 4.5 MMOL/L
NEGATIVE BASE EXCESS, ART: 5.3 MMOL/L
NEGATIVE BASE EXCESS, ART: 5.6 MMOL/L
NEGATIVE BASE EXCESS, MIXED: 0.2 MMOL/L
NEGATIVE BASE EXCESS, MIXED: 0.4 MMOL/L
NEGATIVE BASE EXCESS, MIXED: 1.1 MMOL/L
NEGATIVE BASE EXCESS, MIXED: 2.2 MMOL/L
NEGATIVE BASE EXCESS, MIXED: 2.8 MMOL/L
NEGATIVE BASE EXCESS, MIXED: 4.6 MMOL/L
NEGATIVE BASE EXCESS, MIXED: 4.6 MMOL/L
NEGATIVE BASE EXCESS, MIXED: 6.2 MMOL/L
NEGATIVE BASE EXCESS, MIXED: 7.5 MMOL/L
O2 DELIVERY DEVICE: ABNORMAL
O2 DELIVERY DEVICE: NORMAL
O2 DELIVERY DEVICE: NORMAL
O2 SAT, MIXED: 45.7 %
O2 SAT, MIXED: 58.7 %
O2 SAT, MIXED: 59.9 %
O2 SAT, MIXED: 61.3 %
O2 SAT, MIXED: 61.7 %
O2 SAT, MIXED: 62.7 %
O2 SAT, MIXED: 63 %
O2 SAT, MIXED: 66.1 %
O2 SAT, MIXED: 70.7 %
PCO2 MIXED: 36 MM HG
PCO2 MIXED: 36.1 MM HG
PCO2 MIXED: 37.1 MM HG
PCO2 MIXED: 38.3 MM HG
PCO2 MIXED: 38.6 MM HG
PCO2 MIXED: 38.7 MM HG
PCO2 MIXED: 39 MM HG
PCO2 MIXED: 41.8 MM HG
PCO2 MIXED: 42.8 MM HG
PEEP: 8
PH, MIXED: 7.31 (ref 7.35–7.45)
PH, MIXED: 7.33 (ref 7.35–7.45)
PH, MIXED: 7.34 (ref 7.35–7.45)
PH, MIXED: 7.34 (ref 7.35–7.45)
PH, MIXED: 7.37 (ref 7.35–7.45)
PH, MIXED: 7.38 (ref 7.35–7.45)
PH, MIXED: 7.38 (ref 7.35–7.45)
PH, MIXED: 7.39 (ref 7.35–7.45)
PH, MIXED: 7.39 (ref 7.35–7.45)
PLATELET CONFIRMATION: NORMAL
PLATELET, FLUORESCENCE: 48 K/UL (ref 130–450)
PMV BLD AUTO: ABNORMAL FL (ref 7–12)
PO2 MIXED: 25.2 MM HG
PO2 MIXED: 31.4 MM HG
PO2 MIXED: 32.1 MM HG
PO2 MIXED: 33 MM HG
PO2 MIXED: 33.4 MM HG
PO2 MIXED: 33.5 MM HG
PO2 MIXED: 33.7 MM HG
PO2 MIXED: 37.2 MM HG
PO2 MIXED: 39 MM HG
POC HCO3: 18.6 MMOL/L (ref 22–26)
POC HCO3: 19.1 MMOL/L (ref 22–26)
POC HCO3: 20 MMOL/L (ref 22–26)
POC HCO3: 20 MMOL/L (ref 22–26)
POC HCO3: 21 MMOL/L (ref 22–26)
POC HCO3: 21.7 MMOL/L (ref 22–26)
POC HCO3: 22.2 MMOL/L (ref 22–26)
POC HCO3: 23.5 MMOL/L (ref 22–26)
POC HCO3: 24.5 MMOL/L (ref 22–26)
POC HEMOGLOBIN (CALC): 6 G/DL (ref 12.5–15.5)
POC HEMOGLOBIN (CALC): 6.4 G/DL (ref 12.5–15.5)
POC HEMOGLOBIN (CALC): 6.6 G/DL (ref 12.5–15.5)
POC HEMOGLOBIN (CALC): 6.7 G/DL (ref 12.5–15.5)
POC HEMOGLOBIN (CALC): 6.7 G/DL (ref 12.5–15.5)
POC HEMOGLOBIN (CALC): 6.8 G/DL (ref 12.5–15.5)
POC HEMOGLOBIN (CALC): 6.9 G/DL (ref 12.5–15.5)
POC HEMOGLOBIN (CALC): 6.9 G/DL (ref 12.5–15.5)
POC HEMOGLOBIN (CALC): 7 G/DL (ref 12.5–15.5)
POC HEMOGLOBIN (CALC): 7 G/DL (ref 12.5–15.5)
POC HEMOGLOBIN (CALC): 7.2 G/DL (ref 12.5–15.5)
POC HEMOGLOBIN (CALC): 7.2 G/DL (ref 12.5–15.5)
POC HEMOGLOBIN (CALC): 7.3 G/DL (ref 12.5–15.5)
POC HEMOGLOBIN (CALC): 7.4 G/DL (ref 12.5–15.5)
POC HEMOGLOBIN (CALC): 7.5 G/DL (ref 12.5–15.5)
POC HEMOGLOBIN (CALC): 8 G/DL (ref 12.5–15.5)
POC LACTIC ACID: 1.1 MMOL/L (ref 0.5–2.2)
POC LACTIC ACID: 1.2 MMOL/L (ref 0.5–2.2)
POC LACTIC ACID: 1.3 MMOL/L (ref 0.5–2.2)
POC LACTIC ACID: 1.4 MMOL/L (ref 0.5–2.2)
POC O2 SATURATION: 98.5 % (ref 92–98.5)
POC O2 SATURATION: 98.6 % (ref 92–98.5)
POC O2 SATURATION: 98.7 % (ref 92–98.5)
POC O2 SATURATION: 99.4 % (ref 92–98.5)
POC O2 SATURATION: 99.6 % (ref 92–98.5)
POC O2 SATURATION: 99.6 % (ref 92–98.5)
POC O2 SATURATION: 99.7 % (ref 92–98.5)
POC PCO2: 30.4 MM HG (ref 35–45)
POC PCO2: 30.4 MM HG (ref 35–45)
POC PCO2: 31.2 MM HG (ref 35–45)
POC PCO2: 31.8 MM HG (ref 35–45)
POC PCO2: 32.7 MM HG (ref 35–45)
POC PCO2: 33.3 MM HG (ref 35–45)
POC PCO2: 34.6 MM HG (ref 35–45)
POC PCO2: 35.6 MM HG (ref 35–45)
POC PCO2: 37.2 MM HG (ref 35–45)
POC PH: 7.39 (ref 7.35–7.45)
POC PH: 7.41 (ref 7.35–7.45)
POC PH: 7.43 (ref 7.35–7.45)
POC PH: 7.43 (ref 7.35–7.45)
POC PH: 7.45 (ref 7.35–7.45)
POC PH: 7.45 (ref 7.35–7.45)
POC PO2: 112 MM HG (ref 60–80)
POC PO2: 114.3 MM HG (ref 60–80)
POC PO2: 116.2 MM HG (ref 60–80)
POC PO2: 143.7 MM HG (ref 60–80)
POC PO2: 150.8 MM HG (ref 60–80)
POC PO2: 159.5 MM HG (ref 60–80)
POC PO2: 171.4 MM HG (ref 60–80)
POC PO2: 173.4 MM HG (ref 60–80)
POC PO2: 179.5 MM HG (ref 60–80)
POSITIVE BASE EXCESS, ART: 0.1 MMOL/L (ref 0–3)
POTASSIUM SERPL-SCNC: 4.3 MMOL/L (ref 3.5–5)
POTASSIUM SERPL-SCNC: 4.5 MMOL/L (ref 3.5–5)
POTASSIUM SERPL-SCNC: 4.7 MMOL/L (ref 3.5–5)
POTASSIUM SERPL-SCNC: 4.9 MMOL/L (ref 3.5–5)
PROT SERPL-MCNC: 4.9 G/DL (ref 6.4–8.3)
PROT SERPL-MCNC: 5 G/DL (ref 6.4–8.3)
PROT SERPL-MCNC: 5.1 G/DL (ref 6.4–8.3)
PROT SERPL-MCNC: 5.1 G/DL (ref 6.4–8.3)
PROTHROMBIN TIME: 17.8 SEC (ref 9.3–12.4)
RBC # BLD AUTO: 2.59 M/UL (ref 3.8–5.8)
SET RATE, POC: 12
SET RATE, POC: 15
SET RATE, POC: 16
SODIUM SERPL-SCNC: 138 MMOL/L (ref 132–146)
SODIUM SERPL-SCNC: 139 MMOL/L (ref 132–146)
SODIUM SERPL-SCNC: 140 MMOL/L (ref 132–146)
SODIUM SERPL-SCNC: 142 MMOL/L (ref 132–146)
TIDAL VOLUME: 500
TRANSFUSION STATUS: NORMAL
UNIT DIVISION: 0
WBC OTHER # BLD: 19.9 K/UL (ref 4.5–11.5)

## 2024-10-10 PROCEDURE — 2580000003 HC RX 258

## 2024-10-10 PROCEDURE — 83735 ASSAY OF MAGNESIUM: CPT

## 2024-10-10 PROCEDURE — P9017 PLASMA 1 DONOR FRZ W/IN 8 HR: HCPCS

## 2024-10-10 PROCEDURE — 85027 COMPLETE CBC AUTOMATED: CPT

## 2024-10-10 PROCEDURE — P9073 PLATELETS PHERESIS PATH REDU: HCPCS

## 2024-10-10 PROCEDURE — 86901 BLOOD TYPING SEROLOGIC RH(D): CPT

## 2024-10-10 PROCEDURE — 36430 TRANSFUSION BLD/BLD COMPNT: CPT

## 2024-10-10 PROCEDURE — 82803 BLOOD GASES ANY COMBINATION: CPT

## 2024-10-10 PROCEDURE — 85014 HEMATOCRIT: CPT

## 2024-10-10 PROCEDURE — 6370000000 HC RX 637 (ALT 250 FOR IP)

## 2024-10-10 PROCEDURE — 37799 UNLISTED PX VASCULAR SURGERY: CPT

## 2024-10-10 PROCEDURE — 86927 PLASMA FRESH FROZEN: CPT

## 2024-10-10 PROCEDURE — 99291 CRITICAL CARE FIRST HOUR: CPT | Performed by: NURSE PRACTITIONER

## 2024-10-10 PROCEDURE — 6360000002 HC RX W HCPCS

## 2024-10-10 PROCEDURE — 2000000000 HC ICU R&B

## 2024-10-10 PROCEDURE — 71045 X-RAY EXAM CHEST 1 VIEW: CPT

## 2024-10-10 PROCEDURE — 99233 SBSQ HOSP IP/OBS HIGH 50: CPT | Performed by: INTERNAL MEDICINE

## 2024-10-10 PROCEDURE — 85610 PROTHROMBIN TIME: CPT

## 2024-10-10 PROCEDURE — 83605 ASSAY OF LACTIC ACID: CPT

## 2024-10-10 PROCEDURE — 94003 VENT MGMT INPAT SUBQ DAY: CPT

## 2024-10-10 PROCEDURE — 30233R1 TRANSFUSION OF NONAUTOLOGOUS PLATELETS INTO PERIPHERAL VEIN, PERCUTANEOUS APPROACH: ICD-10-PCS | Performed by: STUDENT IN AN ORGANIZED HEALTH CARE EDUCATION/TRAINING PROGRAM

## 2024-10-10 PROCEDURE — 94640 AIRWAY INHALATION TREATMENT: CPT

## 2024-10-10 PROCEDURE — 82962 GLUCOSE BLOOD TEST: CPT

## 2024-10-10 PROCEDURE — 82330 ASSAY OF CALCIUM: CPT

## 2024-10-10 PROCEDURE — 86900 BLOOD TYPING SEROLOGIC ABO: CPT

## 2024-10-10 PROCEDURE — 86850 RBC ANTIBODY SCREEN: CPT

## 2024-10-10 PROCEDURE — 80053 COMPREHEN METABOLIC PANEL: CPT

## 2024-10-10 PROCEDURE — 6360000002 HC RX W HCPCS: Performed by: NURSE PRACTITIONER

## 2024-10-10 PROCEDURE — 30233N1 TRANSFUSION OF NONAUTOLOGOUS RED BLOOD CELLS INTO PERIPHERAL VEIN, PERCUTANEOUS APPROACH: ICD-10-PCS | Performed by: STUDENT IN AN ORGANIZED HEALTH CARE EDUCATION/TRAINING PROGRAM

## 2024-10-10 PROCEDURE — 30233K1 TRANSFUSION OF NONAUTOLOGOUS FROZEN PLASMA INTO PERIPHERAL VEIN, PERCUTANEOUS APPROACH: ICD-10-PCS | Performed by: STUDENT IN AN ORGANIZED HEALTH CARE EDUCATION/TRAINING PROGRAM

## 2024-10-10 PROCEDURE — 2580000003 HC RX 258: Performed by: NURSE PRACTITIONER

## 2024-10-10 RX ORDER — SODIUM CHLORIDE 9 MG/ML
INJECTION, SOLUTION INTRAVENOUS PRN
Status: DISCONTINUED | OUTPATIENT
Start: 2024-10-10 | End: 2024-10-14

## 2024-10-10 RX ADMIN — Medication 400 MG: at 20:40

## 2024-10-10 RX ADMIN — Medication 400 MG: at 08:43

## 2024-10-10 RX ADMIN — TAMSULOSIN HYDROCHLORIDE 0.4 MG: 0.4 CAPSULE ORAL at 08:44

## 2024-10-10 RX ADMIN — VASOPRESSIN 0.06 UNITS/MIN: 20 INJECTION INTRAVENOUS at 03:00

## 2024-10-10 RX ADMIN — VASOPRESSIN 0.06 UNITS/MIN: 20 INJECTION INTRAVENOUS at 12:16

## 2024-10-10 RX ADMIN — ACETAMINOPHEN 1000 MG: 500 TABLET ORAL at 03:42

## 2024-10-10 RX ADMIN — INSULIN GLARGINE 12 UNITS: 100 INJECTION, SOLUTION SUBCUTANEOUS at 20:40

## 2024-10-10 RX ADMIN — MUPIROCIN: 20 OINTMENT TOPICAL at 20:40

## 2024-10-10 RX ADMIN — OXYCODONE HYDROCHLORIDE 5 MG: 5 TABLET ORAL at 03:42

## 2024-10-10 RX ADMIN — PROPOFOL 20 MCG/KG/MIN: 10 INJECTION, EMULSION INTRAVENOUS at 19:25

## 2024-10-10 RX ADMIN — AMIODARONE HYDROCHLORIDE 400 MG: 200 TABLET ORAL at 20:39

## 2024-10-10 RX ADMIN — IPRATROPIUM BROMIDE AND ALBUTEROL SULFATE 1 DOSE: 2.5; .5 SOLUTION RESPIRATORY (INHALATION) at 16:06

## 2024-10-10 RX ADMIN — PROPOFOL 20 MCG/KG/MIN: 10 INJECTION, EMULSION INTRAVENOUS at 00:52

## 2024-10-10 RX ADMIN — SODIUM CHLORIDE 3.3 UNITS/HR: 9 INJECTION, SOLUTION INTRAVENOUS at 08:26

## 2024-10-10 RX ADMIN — ACETAMINOPHEN 1000 MG: 500 TABLET ORAL at 08:44

## 2024-10-10 RX ADMIN — IPRATROPIUM BROMIDE AND ALBUTEROL SULFATE 1 DOSE: 2.5; .5 SOLUTION RESPIRATORY (INHALATION) at 20:56

## 2024-10-10 RX ADMIN — SENNOSIDES AND DOCUSATE SODIUM 1 TABLET: 50; 8.6 TABLET ORAL at 08:44

## 2024-10-10 RX ADMIN — CHLORHEXIDINE GLUCONATE, 0.12% ORAL RINSE 15 ML: 1.2 SOLUTION DENTAL at 08:44

## 2024-10-10 RX ADMIN — HYDROMORPHONE HYDROCHLORIDE 0.25 MG: 1 INJECTION, SOLUTION INTRAMUSCULAR; INTRAVENOUS; SUBCUTANEOUS at 18:20

## 2024-10-10 RX ADMIN — SENNOSIDES AND DOCUSATE SODIUM 1 TABLET: 50; 8.6 TABLET ORAL at 20:41

## 2024-10-10 RX ADMIN — PROPOFOL 20 MCG/KG/MIN: 10 INJECTION, EMULSION INTRAVENOUS at 09:58

## 2024-10-10 RX ADMIN — VASOPRESSIN 0.06 UNITS/MIN: 20 INJECTION INTRAVENOUS at 18:10

## 2024-10-10 RX ADMIN — AMIODARONE HYDROCHLORIDE 400 MG: 200 TABLET ORAL at 08:44

## 2024-10-10 RX ADMIN — SODIUM CHLORIDE, PRESERVATIVE FREE 10 ML: 5 INJECTION INTRAVENOUS at 08:45

## 2024-10-10 RX ADMIN — POLYETHYLENE GLYCOL 3350 17 G: 17 POWDER, FOR SOLUTION ORAL at 08:43

## 2024-10-10 RX ADMIN — ASPIRIN 81 MG: 81 TABLET, COATED ORAL at 08:44

## 2024-10-10 RX ADMIN — ACETAMINOPHEN 1000 MG: 500 TABLET ORAL at 20:38

## 2024-10-10 RX ADMIN — IPRATROPIUM BROMIDE AND ALBUTEROL SULFATE 1 DOSE: 2.5; .5 SOLUTION RESPIRATORY (INHALATION) at 10:33

## 2024-10-10 RX ADMIN — MUPIROCIN: 20 OINTMENT TOPICAL at 08:45

## 2024-10-10 RX ADMIN — CHLORHEXIDINE GLUCONATE, 0.12% ORAL RINSE 15 ML: 1.2 SOLUTION DENTAL at 20:39

## 2024-10-10 RX ADMIN — OXYCODONE HYDROCHLORIDE 5 MG: 5 TABLET ORAL at 16:41

## 2024-10-10 ASSESSMENT — PAIN SCALES - GENERAL
PAINLEVEL_OUTOF10: 0
PAINLEVEL_OUTOF10: 6
PAINLEVEL_OUTOF10: 0

## 2024-10-10 ASSESSMENT — PULMONARY FUNCTION TESTS
PIF_VALUE: 25
PIF_VALUE: 23
PIF_VALUE: 30
PIF_VALUE: 24
PIF_VALUE: 25
PIF_VALUE: 22
PIF_VALUE: 23
PIF_VALUE: 24
PIF_VALUE: 21
PIF_VALUE: 23
PIF_VALUE: 25
PIF_VALUE: 25
PIF_VALUE: 23
PIF_VALUE: 27
PIF_VALUE: 23
PIF_VALUE: 25
PIF_VALUE: 22
PIF_VALUE: 22
PIF_VALUE: 26
PIF_VALUE: 25
PIF_VALUE: 23
PIF_VALUE: 24
PIF_VALUE: 23
PIF_VALUE: 22
PIF_VALUE: 23
PIF_VALUE: 22
PIF_VALUE: 21
PIF_VALUE: 23
PIF_VALUE: 24
PIF_VALUE: 24
PIF_VALUE: 23
PIF_VALUE: 22
PIF_VALUE: 22
PIF_VALUE: 23
PIF_VALUE: 225
PIF_VALUE: 22
PIF_VALUE: 25
PIF_VALUE: 24
PIF_VALUE: 23
PIF_VALUE: 28
PIF_VALUE: 23
PIF_VALUE: 25
PIF_VALUE: 25
PIF_VALUE: 23
PIF_VALUE: 23
PIF_VALUE: 35
PIF_VALUE: 24
PIF_VALUE: 22
PIF_VALUE: 23
PIF_VALUE: 24
PIF_VALUE: 22
PIF_VALUE: 25
PIF_VALUE: 24
PIF_VALUE: 23

## 2024-10-10 ASSESSMENT — PAIN DESCRIPTION - DESCRIPTORS: DESCRIPTORS: PATIENT UNABLE TO DESCRIBE

## 2024-10-11 ENCOUNTER — APPOINTMENT (OUTPATIENT)
Dept: GENERAL RADIOLOGY | Age: 79
DRG: 233 | End: 2024-10-11
Payer: MEDICARE

## 2024-10-11 LAB
ALBUMIN SERPL-MCNC: 3 G/DL (ref 3.5–5.2)
ALBUMIN SERPL-MCNC: 3 G/DL (ref 3.5–5.2)
ALBUMIN SERPL-MCNC: 3.1 G/DL (ref 3.5–5.2)
ALBUMIN SERPL-MCNC: 3.2 G/DL (ref 3.5–5.2)
ALP SERPL-CCNC: 136 U/L (ref 40–129)
ALP SERPL-CCNC: 65 U/L (ref 40–129)
ALP SERPL-CCNC: 70 U/L (ref 40–129)
ALP SERPL-CCNC: 80 U/L (ref 40–129)
ALT SERPL-CCNC: 19 U/L (ref 0–40)
ALT SERPL-CCNC: 20 U/L (ref 0–40)
ANION GAP SERPL CALCULATED.3IONS-SCNC: 10 MMOL/L (ref 7–16)
ANION GAP SERPL CALCULATED.3IONS-SCNC: 11 MMOL/L (ref 7–16)
ANION GAP SERPL CALCULATED.3IONS-SCNC: 11 MMOL/L (ref 7–16)
ANION GAP SERPL CALCULATED.3IONS-SCNC: 6 MMOL/L (ref 7–16)
ARM BAND NUMBER: NORMAL
ARM BAND NUMBER: NORMAL
AST SERPL-CCNC: 41 U/L (ref 0–39)
AST SERPL-CCNC: 43 U/L (ref 0–39)
AST SERPL-CCNC: 48 U/L (ref 0–39)
AST SERPL-CCNC: 50 U/L (ref 0–39)
B.E.: -3.9 MMOL/L (ref -3–3)
BILIRUB SERPL-MCNC: 0.5 MG/DL (ref 0–1.2)
BILIRUB SERPL-MCNC: 0.5 MG/DL (ref 0–1.2)
BILIRUB SERPL-MCNC: 0.7 MG/DL (ref 0–1.2)
BILIRUB SERPL-MCNC: 0.7 MG/DL (ref 0–1.2)
BLOOD BANK BLOOD PRODUCT EXPIRATION DATE: NORMAL
BLOOD BANK BLOOD PRODUCT EXPIRATION DATE: NORMAL
BLOOD BANK DISPENSE STATUS: NORMAL
BLOOD BANK DISPENSE STATUS: NORMAL
BLOOD BANK ISBT PRODUCT BLOOD TYPE: 6200
BLOOD BANK ISBT PRODUCT BLOOD TYPE: 6200
BLOOD BANK PRODUCT CODE: NORMAL
BLOOD BANK PRODUCT CODE: NORMAL
BLOOD BANK UNIT TYPE AND RH: NORMAL
BLOOD BANK UNIT TYPE AND RH: NORMAL
BPU ID: NORMAL
BPU ID: NORMAL
BUN SERPL-MCNC: 31 MG/DL (ref 6–23)
BUN SERPL-MCNC: 31 MG/DL (ref 6–23)
BUN SERPL-MCNC: 34 MG/DL (ref 6–23)
BUN SERPL-MCNC: 35 MG/DL (ref 6–23)
CA-I BLD-SCNC: 1.16 MMOL/L (ref 1.15–1.33)
CALCIUM SERPL-MCNC: 7.4 MG/DL (ref 8.6–10.2)
CALCIUM SERPL-MCNC: 7.8 MG/DL (ref 8.6–10.2)
CALCIUM SERPL-MCNC: 8 MG/DL (ref 8.6–10.2)
CALCIUM SERPL-MCNC: 8 MG/DL (ref 8.6–10.2)
CHLORIDE SERPL-SCNC: 109 MMOL/L (ref 98–107)
CHLORIDE SERPL-SCNC: 109 MMOL/L (ref 98–107)
CHLORIDE SERPL-SCNC: 110 MMOL/L (ref 98–107)
CHLORIDE SERPL-SCNC: 111 MMOL/L (ref 98–107)
CO2 SERPL-SCNC: 21 MMOL/L (ref 22–29)
CO2 SERPL-SCNC: 22 MMOL/L (ref 22–29)
COHB: 0.3 % (ref 0–1.5)
COMPONENT: NORMAL
COMPONENT: NORMAL
CREAT SERPL-MCNC: 1.1 MG/DL (ref 0.7–1.2)
CREAT SERPL-MCNC: 1.2 MG/DL (ref 0.7–1.2)
CRITICAL: ABNORMAL
DATE ANALYZED: ABNORMAL
DATE OF COLLECTION: ABNORMAL
ERYTHROCYTE [DISTWIDTH] IN BLOOD BY AUTOMATED COUNT: 13.9 % (ref 11.5–15)
FIO2: 40
FLOW RATE: 4
GFR, ESTIMATED: 59 ML/MIN/1.73M2
GFR, ESTIMATED: 61 ML/MIN/1.73M2
GFR, ESTIMATED: 62 ML/MIN/1.73M2
GFR, ESTIMATED: 68 ML/MIN/1.73M2
GLUCOSE BLD-MCNC: 105 MG/DL (ref 74–99)
GLUCOSE BLD-MCNC: 111 MG/DL (ref 74–99)
GLUCOSE BLD-MCNC: 122 MG/DL (ref 74–99)
GLUCOSE BLD-MCNC: 129 MG/DL (ref 74–99)
GLUCOSE BLD-MCNC: 137 MG/DL (ref 74–99)
GLUCOSE BLD-MCNC: 137 MG/DL (ref 74–99)
GLUCOSE BLD-MCNC: 147 MG/DL (ref 74–99)
GLUCOSE BLD-MCNC: 176 MG/DL (ref 74–99)
GLUCOSE BLD-MCNC: 178 MG/DL (ref 74–99)
GLUCOSE BLD-MCNC: 185 MG/DL (ref 74–99)
GLUCOSE BLD-MCNC: 187 MG/DL (ref 74–99)
GLUCOSE BLD-MCNC: 191 MG/DL (ref 74–99)
GLUCOSE BLD-MCNC: 91 MG/DL (ref 74–99)
GLUCOSE SERPL-MCNC: 108 MG/DL (ref 74–99)
GLUCOSE SERPL-MCNC: 183 MG/DL (ref 74–99)
GLUCOSE SERPL-MCNC: 186 MG/DL (ref 74–99)
GLUCOSE SERPL-MCNC: 96 MG/DL (ref 74–99)
HCO3, MIXED: 21.5 MMOL/L
HCO3, MIXED: 23 MMOL/L
HCO3, MIXED: 23.3 MMOL/L
HCO3: 19.3 MMOL/L (ref 22–26)
HCT VFR BLD AUTO: 20 % (ref 37–54)
HCT VFR BLD AUTO: 20 % (ref 37–54)
HCT VFR BLD AUTO: 21 % (ref 37–54)
HCT VFR BLD AUTO: 21 % (ref 37–54)
HCT VFR BLD AUTO: 22 % (ref 37–54)
HCT VFR BLD AUTO: 24 % (ref 37–54)
HCT VFR BLD AUTO: 24.5 % (ref 37–54)
HCT VFR BLD AUTO: NORMAL % (ref 37–54)
HGB BLD-MCNC: 7.9 G/DL (ref 12.5–16.5)
HHB: 1.5 % (ref 0–5)
INR PPP: 1.4
LAB: ABNORMAL
Lab: 1615
MAGNESIUM SERPL-MCNC: 2.5 MG/DL (ref 1.6–2.6)
MCH RBC QN AUTO: 32 PG (ref 26–35)
MCHC RBC AUTO-ENTMCNC: 32.2 G/DL (ref 32–34.5)
MCV RBC AUTO: 99.2 FL (ref 80–99.9)
METHB: 0.3 % (ref 0–1.5)
MODE: ABNORMAL
MODE: ABNORMAL
MODE: AC
MODE: NORMAL
NEGATIVE BASE EXCESS, ART: 1.4 MMOL/L
NEGATIVE BASE EXCESS, ART: 2.4 MMOL/L
NEGATIVE BASE EXCESS, ART: 3.4 MMOL/L
NEGATIVE BASE EXCESS, ART: 3.9 MMOL/L
NEGATIVE BASE EXCESS, MIXED: 1.6 MMOL/L
NEGATIVE BASE EXCESS, MIXED: 1.8 MMOL/L
NEGATIVE BASE EXCESS, MIXED: 3.5 MMOL/L
O2 DELIVERY DEVICE: ABNORMAL
O2 DELIVERY DEVICE: NORMAL
O2 SAT, MIXED: 54.6 %
O2 SAT, MIXED: 57.5 %
O2 SAT, MIXED: 65.5 %
O2 SATURATION: 98.5 % (ref 92–98.5)
O2HB: 97.9 % (ref 94–97)
OPERATOR ID: 7291
PATIENT TEMP: 37
PATIENT TEMP: 37 C
PCO2 MIXED: 37.3 MM HG
PCO2 MIXED: 38.1 MM HG
PCO2 MIXED: 39.1 MM HG
PCO2: 28.2 MMHG (ref 35–45)
PEEP: 8
PH BLOOD GAS: 7.45 (ref 7.35–7.45)
PH, MIXED: 7.37 (ref 7.35–7.45)
PH, MIXED: 7.38 (ref 7.35–7.45)
PH, MIXED: 7.39 (ref 7.35–7.45)
PLATELET CONFIRMATION: NORMAL
PLATELET, FLUORESCENCE: 71 K/UL (ref 130–450)
PMV BLD AUTO: 14.4 FL (ref 7–12)
PO2 MIXED: 28.9 MM HG
PO2 MIXED: 30.4 MM HG
PO2 MIXED: 34.8 MM HG
PO2: 120.6 MMHG (ref 75–100)
POC HCO3: 20 MMOL/L (ref 22–26)
POC HCO3: 20.6 MMOL/L (ref 22–26)
POC HCO3: 21.5 MMOL/L (ref 22–26)
POC HCO3: 22.5 MMOL/L (ref 22–26)
POC HEMOGLOBIN (CALC): 6.9 G/DL (ref 12.5–15.5)
POC HEMOGLOBIN (CALC): 7 G/DL (ref 12.5–15.5)
POC HEMOGLOBIN (CALC): 7 G/DL (ref 12.5–15.5)
POC HEMOGLOBIN (CALC): 7.1 G/DL (ref 12.5–15.5)
POC HEMOGLOBIN (CALC): 7.6 G/DL (ref 12.5–15.5)
POC HEMOGLOBIN (CALC): 8 G/DL (ref 12.5–15.5)
POC HEMOGLOBIN (CALC): NORMAL G/DL (ref 12.5–15.5)
POC O2 SATURATION: 98.8 % (ref 92–98.5)
POC O2 SATURATION: 99.4 % (ref 92–98.5)
POC O2 SATURATION: 99.5 % (ref 92–98.5)
POC O2 SATURATION: 99.6 % (ref 92–98.5)
POC PCO2 TEMP: 30.8 MM HG
POC PCO2 TEMP: 30.8 MM HG
POC PCO2 TEMP: 37.3 MM HG
POC PCO2: 30.8 MM HG (ref 35–45)
POC PCO2: 30.8 MM HG (ref 35–45)
POC PCO2: 32.3 MM HG (ref 35–45)
POC PCO2: 33.3 MM HG (ref 35–45)
POC PH TEMP: 7.37
POC PH TEMP: 7.42
POC PH TEMP: 7.43
POC PH: 7.42 (ref 7.35–7.45)
POC PH: 7.43 (ref 7.35–7.45)
POC PH: 7.43 (ref 7.35–7.45)
POC PH: 7.44 (ref 7.35–7.45)
POC PO2 TEMP: 147.7 MM HG
POC PO2 TEMP: 166.7 MM HG
POC PO2 TEMP: 34.8 MM HG
POC PO2: 116.9 MM HG (ref 60–80)
POC PO2: 147.7 MM HG (ref 60–80)
POC PO2: 155.6 MM HG (ref 60–80)
POC PO2: 166.7 MM HG (ref 60–80)
POTASSIUM SERPL-SCNC: 4.3 MMOL/L (ref 3.5–5)
POTASSIUM SERPL-SCNC: 4.4 MMOL/L (ref 3.5–5)
POTASSIUM SERPL-SCNC: 4.4 MMOL/L (ref 3.5–5)
POTASSIUM SERPL-SCNC: 4.5 MMOL/L (ref 3.5–5)
PRESSURE SUPPORT: 8
PRESSURE SUPPORT: 8
PROT SERPL-MCNC: 5.1 G/DL (ref 6.4–8.3)
PROT SERPL-MCNC: 5.1 G/DL (ref 6.4–8.3)
PROT SERPL-MCNC: 5.3 G/DL (ref 6.4–8.3)
PROT SERPL-MCNC: 5.3 G/DL (ref 6.4–8.3)
PROTHROMBIN TIME: 15.3 SEC (ref 9.3–12.4)
RBC # BLD AUTO: 2.47 M/UL (ref 3.8–5.8)
SAMPLE SITE: ABNORMAL
SAMPLE SITE: ABNORMAL
SAMPLE SITE: NORMAL
SET RATE, POC: 12
SODIUM SERPL-SCNC: 138 MMOL/L (ref 132–146)
SODIUM SERPL-SCNC: 140 MMOL/L (ref 132–146)
SODIUM SERPL-SCNC: 141 MMOL/L (ref 132–146)
SODIUM SERPL-SCNC: 143 MMOL/L (ref 132–146)
SOURCE, BLOOD GAS: ABNORMAL
THB: 8.8 G/DL (ref 11.5–16.5)
TIDAL VOLUME: 500
TIME ANALYZED: 1624
TRANSFUSION STATUS: NORMAL
TRANSFUSION STATUS: NORMAL
UNIT DIVISION: 0
UNIT DIVISION: 0
UNIT ISSUE DATE/TIME: NORMAL
UNIT ISSUE DATE/TIME: NORMAL
WBC OTHER # BLD: 18.8 K/UL (ref 4.5–11.5)

## 2024-10-11 PROCEDURE — 99233 SBSQ HOSP IP/OBS HIGH 50: CPT | Performed by: INTERNAL MEDICINE

## 2024-10-11 PROCEDURE — 2000000000 HC ICU R&B

## 2024-10-11 PROCEDURE — 94660 CPAP INITIATION&MGMT: CPT

## 2024-10-11 PROCEDURE — 6370000000 HC RX 637 (ALT 250 FOR IP)

## 2024-10-11 PROCEDURE — 6360000002 HC RX W HCPCS

## 2024-10-11 PROCEDURE — 83735 ASSAY OF MAGNESIUM: CPT

## 2024-10-11 PROCEDURE — 2580000003 HC RX 258: Performed by: NURSE PRACTITIONER

## 2024-10-11 PROCEDURE — 99291 CRITICAL CARE FIRST HOUR: CPT | Performed by: NURSE PRACTITIONER

## 2024-10-11 PROCEDURE — 85027 COMPLETE CBC AUTOMATED: CPT

## 2024-10-11 PROCEDURE — 37799 UNLISTED PX VASCULAR SURGERY: CPT

## 2024-10-11 PROCEDURE — 6360000002 HC RX W HCPCS: Performed by: NURSE PRACTITIONER

## 2024-10-11 PROCEDURE — 82803 BLOOD GASES ANY COMBINATION: CPT

## 2024-10-11 PROCEDURE — 82330 ASSAY OF CALCIUM: CPT

## 2024-10-11 PROCEDURE — 85610 PROTHROMBIN TIME: CPT

## 2024-10-11 PROCEDURE — 71045 X-RAY EXAM CHEST 1 VIEW: CPT

## 2024-10-11 PROCEDURE — 85014 HEMATOCRIT: CPT

## 2024-10-11 PROCEDURE — 80053 COMPREHEN METABOLIC PANEL: CPT

## 2024-10-11 PROCEDURE — 2580000003 HC RX 258

## 2024-10-11 PROCEDURE — 82805 BLOOD GASES W/O2 SATURATION: CPT

## 2024-10-11 PROCEDURE — 94640 AIRWAY INHALATION TREATMENT: CPT

## 2024-10-11 PROCEDURE — 94669 MECHANICAL CHEST WALL OSCILL: CPT

## 2024-10-11 PROCEDURE — 94003 VENT MGMT INPAT SUBQ DAY: CPT

## 2024-10-11 PROCEDURE — 36415 COLL VENOUS BLD VENIPUNCTURE: CPT

## 2024-10-11 PROCEDURE — 94667 MNPJ CHEST WALL 1ST: CPT

## 2024-10-11 PROCEDURE — 82962 GLUCOSE BLOOD TEST: CPT

## 2024-10-11 RX ORDER — FUROSEMIDE 10 MG/ML
20 INJECTION INTRAMUSCULAR; INTRAVENOUS ONCE
Status: COMPLETED | OUTPATIENT
Start: 2024-10-11 | End: 2024-10-11

## 2024-10-11 RX ADMIN — CHLORHEXIDINE GLUCONATE, 0.12% ORAL RINSE 15 ML: 1.2 SOLUTION DENTAL at 09:15

## 2024-10-11 RX ADMIN — ASPIRIN 81 MG: 81 TABLET, COATED ORAL at 08:46

## 2024-10-11 RX ADMIN — PANTOPRAZOLE SODIUM 40 MG: 40 TABLET, DELAYED RELEASE ORAL at 05:56

## 2024-10-11 RX ADMIN — BISACODYL 10 MG: 10 SUPPOSITORY RECTAL at 09:00

## 2024-10-11 RX ADMIN — VASOPRESSIN 0.03 UNITS/MIN: 20 INJECTION INTRAVENOUS at 18:39

## 2024-10-11 RX ADMIN — OXYCODONE HYDROCHLORIDE 5 MG: 5 TABLET ORAL at 22:06

## 2024-10-11 RX ADMIN — MUPIROCIN: 20 OINTMENT TOPICAL at 09:16

## 2024-10-11 RX ADMIN — AMIODARONE HYDROCHLORIDE 400 MG: 200 TABLET ORAL at 08:46

## 2024-10-11 RX ADMIN — ACETAMINOPHEN 1000 MG: 500 TABLET ORAL at 05:56

## 2024-10-11 RX ADMIN — SODIUM CHLORIDE: 9 INJECTION, SOLUTION INTRAVENOUS at 20:40

## 2024-10-11 RX ADMIN — VASOPRESSIN 0.05 UNITS/MIN: 20 INJECTION INTRAVENOUS at 01:10

## 2024-10-11 RX ADMIN — TAMSULOSIN HYDROCHLORIDE 0.4 MG: 0.4 CAPSULE ORAL at 08:46

## 2024-10-11 RX ADMIN — MUPIROCIN: 20 OINTMENT TOPICAL at 21:05

## 2024-10-11 RX ADMIN — SENNOSIDES AND DOCUSATE SODIUM 1 TABLET: 50; 8.6 TABLET ORAL at 21:05

## 2024-10-11 RX ADMIN — Medication 400 MG: at 08:46

## 2024-10-11 RX ADMIN — SENNOSIDES AND DOCUSATE SODIUM 1 TABLET: 50; 8.6 TABLET ORAL at 08:46

## 2024-10-11 RX ADMIN — INSULIN LISPRO 4 UNITS: 100 INJECTION, SOLUTION INTRAVENOUS; SUBCUTANEOUS at 16:05

## 2024-10-11 RX ADMIN — AMIODARONE HYDROCHLORIDE 400 MG: 200 TABLET ORAL at 21:06

## 2024-10-11 RX ADMIN — SODIUM CHLORIDE, PRESERVATIVE FREE 10 ML: 5 INJECTION INTRAVENOUS at 21:06

## 2024-10-11 RX ADMIN — ROSUVASTATIN 40 MG: 20 TABLET, FILM COATED ORAL at 08:46

## 2024-10-11 RX ADMIN — FUROSEMIDE 20 MG: 10 INJECTION, SOLUTION INTRAMUSCULAR; INTRAVENOUS at 09:15

## 2024-10-11 RX ADMIN — IPRATROPIUM BROMIDE AND ALBUTEROL SULFATE 1 DOSE: 2.5; .5 SOLUTION RESPIRATORY (INHALATION) at 08:55

## 2024-10-11 RX ADMIN — Medication 400 MG: at 21:06

## 2024-10-11 RX ADMIN — IPRATROPIUM BROMIDE AND ALBUTEROL SULFATE 1 DOSE: 2.5; .5 SOLUTION RESPIRATORY (INHALATION) at 20:21

## 2024-10-11 RX ADMIN — IPRATROPIUM BROMIDE AND ALBUTEROL SULFATE 1 DOSE: 2.5; .5 SOLUTION RESPIRATORY (INHALATION) at 15:34

## 2024-10-11 RX ADMIN — SODIUM CHLORIDE, PRESERVATIVE FREE 10 ML: 5 INJECTION INTRAVENOUS at 09:16

## 2024-10-11 RX ADMIN — SODIUM CHLORIDE 2.6 UNITS/HR: 9 INJECTION, SOLUTION INTRAVENOUS at 18:18

## 2024-10-11 RX ADMIN — VASOPRESSIN 0.04 UNITS/MIN: 20 INJECTION INTRAVENOUS at 07:53

## 2024-10-11 RX ADMIN — INSULIN GLARGINE 12 UNITS: 100 INJECTION, SOLUTION SUBCUTANEOUS at 21:14

## 2024-10-11 RX ADMIN — INSULIN LISPRO 4 UNITS: 100 INJECTION, SOLUTION INTRAVENOUS; SUBCUTANEOUS at 10:39

## 2024-10-11 RX ADMIN — IPRATROPIUM BROMIDE AND ALBUTEROL SULFATE 1 DOSE: 2.5; .5 SOLUTION RESPIRATORY (INHALATION) at 11:22

## 2024-10-11 RX ADMIN — PROPOFOL 20 MCG/KG/MIN: 10 INJECTION, EMULSION INTRAVENOUS at 02:00

## 2024-10-11 RX ADMIN — ACETAMINOPHEN 1000 MG: 500 TABLET ORAL at 01:08

## 2024-10-11 RX ADMIN — EPINEPHRINE 4 MCG/MIN: 1 INJECTION INTRAMUSCULAR; INTRAVENOUS; SUBCUTANEOUS at 02:00

## 2024-10-11 RX ADMIN — CHLORHEXIDINE GLUCONATE, 0.12% ORAL RINSE 15 ML: 1.2 SOLUTION DENTAL at 21:14

## 2024-10-11 ASSESSMENT — PULMONARY FUNCTION TESTS
PIF_VALUE: 18
PIF_VALUE: 22
PIF_VALUE: 17
PIF_VALUE: 17
PIF_VALUE: 20
PIF_VALUE: 26
PIF_VALUE: 17
PIF_VALUE: 17
PIF_VALUE: 19
PIF_VALUE: 22
PIF_VALUE: 23
PIF_VALUE: 17
PIF_VALUE: 20
PIF_VALUE: 21
PIF_VALUE: 24
PIF_VALUE: 22
PIF_VALUE: 20
PIF_VALUE: 20
PIF_VALUE: 21
PIF_VALUE: 22

## 2024-10-11 ASSESSMENT — PAIN SCALES - GENERAL
PAINLEVEL_OUTOF10: 2
PAINLEVEL_OUTOF10: 5
PAINLEVEL_OUTOF10: 0
PAINLEVEL_OUTOF10: 0

## 2024-10-11 ASSESSMENT — PAIN DESCRIPTION - DESCRIPTORS: DESCRIPTORS: PRESSURE;SORE;ACHING

## 2024-10-11 ASSESSMENT — PAIN DESCRIPTION - LOCATION: LOCATION: SHOULDER

## 2024-10-11 ASSESSMENT — PAIN DESCRIPTION - ORIENTATION: ORIENTATION: LEFT

## 2024-10-11 NOTE — FLOWSHEET NOTE
Dr. Osuna updated with current VS, ABGs and cardiac parameters. States OK to leave intubated overnight and to wean vasopressin first if able to do so.

## 2024-10-12 ENCOUNTER — APPOINTMENT (OUTPATIENT)
Dept: GENERAL RADIOLOGY | Age: 79
DRG: 233 | End: 2024-10-12
Payer: MEDICARE

## 2024-10-12 LAB
AADO2: 135 MMHG
ALBUMIN SERPL-MCNC: 3 G/DL (ref 3.5–5.2)
ALBUMIN SERPL-MCNC: 3.2 G/DL (ref 3.5–5.2)
ALP SERPL-CCNC: 63 U/L (ref 40–129)
ALP SERPL-CCNC: 75 U/L (ref 40–129)
ALT SERPL-CCNC: 20 U/L (ref 0–40)
ALT SERPL-CCNC: 24 U/L (ref 0–40)
ANION GAP SERPL CALCULATED.3IONS-SCNC: 12 MMOL/L (ref 7–16)
ANION GAP SERPL CALCULATED.3IONS-SCNC: 9 MMOL/L (ref 7–16)
AST SERPL-CCNC: 39 U/L (ref 0–39)
AST SERPL-CCNC: 42 U/L (ref 0–39)
B.E.: -3.1 MMOL/L (ref -3–3)
BILIRUB SERPL-MCNC: 0.7 MG/DL (ref 0–1.2)
BILIRUB SERPL-MCNC: 0.7 MG/DL (ref 0–1.2)
BUN SERPL-MCNC: 34 MG/DL (ref 6–23)
BUN SERPL-MCNC: 35 MG/DL (ref 6–23)
CA-I BLD-SCNC: 1.16 MMOL/L (ref 1.15–1.33)
CALCIUM SERPL-MCNC: 7.9 MG/DL (ref 8.6–10.2)
CALCIUM SERPL-MCNC: 8.1 MG/DL (ref 8.6–10.2)
CHLORIDE SERPL-SCNC: 109 MMOL/L (ref 98–107)
CHLORIDE SERPL-SCNC: 111 MMOL/L (ref 98–107)
CO2 SERPL-SCNC: 21 MMOL/L (ref 22–29)
CO2 SERPL-SCNC: 21 MMOL/L (ref 22–29)
COHB: 0.1 % (ref 0–1.5)
CREAT SERPL-MCNC: 1.1 MG/DL (ref 0.7–1.2)
CREAT SERPL-MCNC: 1.1 MG/DL (ref 0.7–1.2)
CRITICAL: ABNORMAL
DATE ANALYZED: ABNORMAL
DATE OF COLLECTION: ABNORMAL
ERYTHROCYTE [DISTWIDTH] IN BLOOD BY AUTOMATED COUNT: 13.7 % (ref 11.5–15)
FIO2: 50 %
GFR, ESTIMATED: 65 ML/MIN/1.73M2
GFR, ESTIMATED: 66 ML/MIN/1.73M2
GLUCOSE BLD-MCNC: 103 MG/DL (ref 74–99)
GLUCOSE BLD-MCNC: 110 MG/DL (ref 74–99)
GLUCOSE BLD-MCNC: 115 MG/DL (ref 74–99)
GLUCOSE BLD-MCNC: 146 MG/DL (ref 74–99)
GLUCOSE BLD-MCNC: 171 MG/DL (ref 74–99)
GLUCOSE BLD-MCNC: 182 MG/DL (ref 74–99)
GLUCOSE BLD-MCNC: 196 MG/DL (ref 74–99)
GLUCOSE BLD-MCNC: 269 MG/DL (ref 74–99)
GLUCOSE BLD-MCNC: 275 MG/DL (ref 74–99)
GLUCOSE SERPL-MCNC: 117 MG/DL (ref 74–99)
GLUCOSE SERPL-MCNC: 142 MG/DL (ref 74–99)
HCO3: 19.5 MMOL/L (ref 22–26)
HCT VFR BLD AUTO: 23.9 % (ref 37–54)
HGB BLD-MCNC: 7.6 G/DL (ref 12.5–16.5)
HHB: 1.6 % (ref 0–5)
LAB: ABNORMAL
Lab: 437
MAGNESIUM SERPL-MCNC: 2.5 MG/DL (ref 1.6–2.6)
MCH RBC QN AUTO: 31.7 PG (ref 26–35)
MCHC RBC AUTO-ENTMCNC: 31.8 G/DL (ref 32–34.5)
MCV RBC AUTO: 99.6 FL (ref 80–99.9)
METHB: 3.5 % (ref 0–1.5)
MODE: ABNORMAL
O2 SATURATION: 98.3 % (ref 92–98.5)
O2HB: 94.8 % (ref 94–97)
OPERATOR ID: 7296
PATIENT TEMP: 37 C
PCO2: 26.5 MMHG (ref 35–45)
PEEP/CPAP: 6 CMH2O
PFO2: 3.83 MMHG/%
PH BLOOD GAS: 7.48 (ref 7.35–7.45)
PIP: 12 CMH2O
PLATELET, FLUORESCENCE: 101 K/UL (ref 130–450)
PMV BLD AUTO: 13.3 FL (ref 7–12)
PO2: 191.7 MMHG (ref 75–100)
POTASSIUM SERPL-SCNC: 4.2 MMOL/L (ref 3.5–5)
POTASSIUM SERPL-SCNC: 4.5 MMOL/L (ref 3.5–5)
PROT SERPL-MCNC: 5.2 G/DL (ref 6.4–8.3)
PROT SERPL-MCNC: 5.3 G/DL (ref 6.4–8.3)
RBC # BLD AUTO: 2.4 M/UL (ref 3.8–5.8)
RI(T): 0.7
SODIUM SERPL-SCNC: 141 MMOL/L (ref 132–146)
SODIUM SERPL-SCNC: 142 MMOL/L (ref 132–146)
SOURCE, BLOOD GAS: ABNORMAL
THB: 9.1 G/DL (ref 11.5–16.5)
TIME ANALYZED: 447
WBC OTHER # BLD: 14.5 K/UL (ref 4.5–11.5)

## 2024-10-12 PROCEDURE — 82805 BLOOD GASES W/O2 SATURATION: CPT

## 2024-10-12 PROCEDURE — 2580000003 HC RX 258

## 2024-10-12 PROCEDURE — 37799 UNLISTED PX VASCULAR SURGERY: CPT

## 2024-10-12 PROCEDURE — 94669 MECHANICAL CHEST WALL OSCILL: CPT

## 2024-10-12 PROCEDURE — 6370000000 HC RX 637 (ALT 250 FOR IP)

## 2024-10-12 PROCEDURE — 2700000000 HC OXYGEN THERAPY PER DAY

## 2024-10-12 PROCEDURE — 6360000002 HC RX W HCPCS: Performed by: NURSE PRACTITIONER

## 2024-10-12 PROCEDURE — 83735 ASSAY OF MAGNESIUM: CPT

## 2024-10-12 PROCEDURE — 2000000000 HC ICU R&B

## 2024-10-12 PROCEDURE — 82962 GLUCOSE BLOOD TEST: CPT

## 2024-10-12 PROCEDURE — 94660 CPAP INITIATION&MGMT: CPT

## 2024-10-12 PROCEDURE — 2580000003 HC RX 258: Performed by: NURSE PRACTITIONER

## 2024-10-12 PROCEDURE — 94640 AIRWAY INHALATION TREATMENT: CPT

## 2024-10-12 PROCEDURE — 71045 X-RAY EXAM CHEST 1 VIEW: CPT

## 2024-10-12 PROCEDURE — 80053 COMPREHEN METABOLIC PANEL: CPT

## 2024-10-12 PROCEDURE — 6360000002 HC RX W HCPCS

## 2024-10-12 PROCEDURE — 99233 SBSQ HOSP IP/OBS HIGH 50: CPT | Performed by: INTERNAL MEDICINE

## 2024-10-12 PROCEDURE — 82330 ASSAY OF CALCIUM: CPT

## 2024-10-12 PROCEDURE — 85027 COMPLETE CBC AUTOMATED: CPT

## 2024-10-12 RX ORDER — INSULIN LISPRO 100 [IU]/ML
0-16 INJECTION, SOLUTION INTRAVENOUS; SUBCUTANEOUS
Status: DISCONTINUED | OUTPATIENT
Start: 2024-10-12 | End: 2024-10-12

## 2024-10-12 RX ORDER — INSULIN LISPRO 100 [IU]/ML
0-16 INJECTION, SOLUTION INTRAVENOUS; SUBCUTANEOUS
Status: DISCONTINUED | OUTPATIENT
Start: 2024-10-12 | End: 2024-10-14

## 2024-10-12 RX ADMIN — EPINEPHRINE 2 MCG/MIN: 1 INJECTION INTRAMUSCULAR; INTRAVENOUS; SUBCUTANEOUS at 01:32

## 2024-10-12 RX ADMIN — IPRATROPIUM BROMIDE AND ALBUTEROL SULFATE 1 DOSE: 2.5; .5 SOLUTION RESPIRATORY (INHALATION) at 16:52

## 2024-10-12 RX ADMIN — MUPIROCIN: 20 OINTMENT TOPICAL at 20:58

## 2024-10-12 RX ADMIN — OXYCODONE HYDROCHLORIDE 5 MG: 5 TABLET ORAL at 10:08

## 2024-10-12 RX ADMIN — SODIUM CHLORIDE 2.7 UNITS/HR: 9 INJECTION, SOLUTION INTRAVENOUS at 10:01

## 2024-10-12 RX ADMIN — Medication 400 MG: at 10:24

## 2024-10-12 RX ADMIN — BISACODYL 10 MG: 10 SUPPOSITORY RECTAL at 10:16

## 2024-10-12 RX ADMIN — AMIODARONE HYDROCHLORIDE 150 MG: 50 INJECTION, SOLUTION INTRAVENOUS at 19:41

## 2024-10-12 RX ADMIN — Medication 400 MG: at 20:58

## 2024-10-12 RX ADMIN — PANTOPRAZOLE SODIUM 40 MG: 40 TABLET, DELAYED RELEASE ORAL at 06:15

## 2024-10-12 RX ADMIN — ASPIRIN 81 MG: 81 TABLET, COATED ORAL at 10:24

## 2024-10-12 RX ADMIN — POLYETHYLENE GLYCOL 3350 17 G: 17 POWDER, FOR SOLUTION ORAL at 10:24

## 2024-10-12 RX ADMIN — INSULIN LISPRO 8 UNITS: 100 INJECTION, SOLUTION INTRAVENOUS; SUBCUTANEOUS at 17:36

## 2024-10-12 RX ADMIN — ONDANSETRON 4 MG: 2 INJECTION INTRAMUSCULAR; INTRAVENOUS at 11:30

## 2024-10-12 RX ADMIN — SENNOSIDES AND DOCUSATE SODIUM 1 TABLET: 50; 8.6 TABLET ORAL at 10:24

## 2024-10-12 RX ADMIN — SODIUM CHLORIDE, PRESERVATIVE FREE 10 ML: 5 INJECTION INTRAVENOUS at 20:59

## 2024-10-12 RX ADMIN — INSULIN GLARGINE 12 UNITS: 100 INJECTION, SOLUTION SUBCUTANEOUS at 20:58

## 2024-10-12 RX ADMIN — AMIODARONE HYDROCHLORIDE 400 MG: 200 TABLET ORAL at 10:24

## 2024-10-12 RX ADMIN — VASOPRESSIN 0.03 UNITS/MIN: 20 INJECTION INTRAVENOUS at 19:59

## 2024-10-12 RX ADMIN — MAGNESIUM SULFATE HEPTAHYDRATE 2000 MG: 40 INJECTION, SOLUTION INTRAVENOUS at 18:57

## 2024-10-12 RX ADMIN — TAMSULOSIN HYDROCHLORIDE 0.4 MG: 0.4 CAPSULE ORAL at 10:24

## 2024-10-12 RX ADMIN — IPRATROPIUM BROMIDE AND ALBUTEROL SULFATE 1 DOSE: 2.5; .5 SOLUTION RESPIRATORY (INHALATION) at 11:40

## 2024-10-12 RX ADMIN — IPRATROPIUM BROMIDE AND ALBUTEROL SULFATE 1 DOSE: 2.5; .5 SOLUTION RESPIRATORY (INHALATION) at 20:34

## 2024-10-12 RX ADMIN — SODIUM CHLORIDE, PRESERVATIVE FREE 10 ML: 5 INJECTION INTRAVENOUS at 10:16

## 2024-10-12 RX ADMIN — SENNOSIDES AND DOCUSATE SODIUM 1 TABLET: 50; 8.6 TABLET ORAL at 20:58

## 2024-10-12 RX ADMIN — AMIODARONE HYDROCHLORIDE 400 MG: 200 TABLET ORAL at 20:57

## 2024-10-12 RX ADMIN — MUPIROCIN: 20 OINTMENT TOPICAL at 10:25

## 2024-10-12 RX ADMIN — INSULIN LISPRO 8 UNITS: 100 INJECTION, SOLUTION INTRAVENOUS; SUBCUTANEOUS at 20:58

## 2024-10-12 RX ADMIN — IPRATROPIUM BROMIDE AND ALBUTEROL SULFATE 1 DOSE: 2.5; .5 SOLUTION RESPIRATORY (INHALATION) at 08:21

## 2024-10-12 RX ADMIN — VASOPRESSIN 0.03 UNITS/MIN: 20 INJECTION INTRAVENOUS at 03:53

## 2024-10-12 RX ADMIN — OXYCODONE HYDROCHLORIDE 5 MG: 5 TABLET ORAL at 20:58

## 2024-10-12 RX ADMIN — CHLORHEXIDINE GLUCONATE, 0.12% ORAL RINSE 15 ML: 1.2 SOLUTION DENTAL at 20:58

## 2024-10-12 ASSESSMENT — PAIN - FUNCTIONAL ASSESSMENT
PAIN_FUNCTIONAL_ASSESSMENT: ACTIVITIES ARE NOT PREVENTED
PAIN_FUNCTIONAL_ASSESSMENT: ACTIVITIES ARE NOT PREVENTED

## 2024-10-12 ASSESSMENT — PAIN DESCRIPTION - FREQUENCY: FREQUENCY: INTERMITTENT

## 2024-10-12 ASSESSMENT — PAIN SCALES - GENERAL
PAINLEVEL_OUTOF10: 5
PAINLEVEL_OUTOF10: 0
PAINLEVEL_OUTOF10: 4
PAINLEVEL_OUTOF10: 0

## 2024-10-12 ASSESSMENT — PAIN DESCRIPTION - DESCRIPTORS
DESCRIPTORS: ACHING
DESCRIPTORS: ACHING;SORE

## 2024-10-12 ASSESSMENT — PAIN DESCRIPTION - LOCATION
LOCATION: STERNUM
LOCATION: CHEST

## 2024-10-12 ASSESSMENT — PAIN DESCRIPTION - PAIN TYPE: TYPE: ACUTE PAIN;SURGICAL PAIN

## 2024-10-12 ASSESSMENT — PAIN DESCRIPTION - ORIENTATION
ORIENTATION: MID
ORIENTATION: MID

## 2024-10-12 ASSESSMENT — PAIN DESCRIPTION - ONSET: ONSET: GRADUAL

## 2024-10-13 ENCOUNTER — APPOINTMENT (OUTPATIENT)
Dept: GENERAL RADIOLOGY | Age: 79
DRG: 233 | End: 2024-10-13
Payer: MEDICARE

## 2024-10-13 LAB
ABO + RH BLD: NORMAL
ABO + RH BLD: NORMAL
ALBUMIN SERPL-MCNC: 2.9 G/DL (ref 3.5–5.2)
ALP SERPL-CCNC: 71 U/L (ref 40–129)
ALT SERPL-CCNC: 49 U/L (ref 0–40)
ANION GAP SERPL CALCULATED.3IONS-SCNC: 10 MMOL/L (ref 7–16)
ARM BAND NUMBER: NORMAL
ARM BAND NUMBER: NORMAL
AST SERPL-CCNC: 67 U/L (ref 0–39)
BILIRUB SERPL-MCNC: 0.6 MG/DL (ref 0–1.2)
BLOOD BANK SAMPLE EXPIRATION: NORMAL
BLOOD BANK SAMPLE EXPIRATION: NORMAL
BLOOD GROUP ANTIBODIES SERPL: NEGATIVE
BLOOD GROUP ANTIBODIES SERPL: NEGATIVE
BUN SERPL-MCNC: 42 MG/DL (ref 6–23)
CA-I BLD-SCNC: 1.17 MMOL/L (ref 1.15–1.33)
CALCIUM SERPL-MCNC: 7.9 MG/DL (ref 8.6–10.2)
CHLORIDE SERPL-SCNC: 107 MMOL/L (ref 98–107)
CO2 SERPL-SCNC: 22 MMOL/L (ref 22–29)
CREAT SERPL-MCNC: 1.1 MG/DL (ref 0.7–1.2)
ERYTHROCYTE [DISTWIDTH] IN BLOOD BY AUTOMATED COUNT: 13.4 % (ref 11.5–15)
GFR, ESTIMATED: 71 ML/MIN/1.73M2
GLUCOSE BLD-MCNC: 172 MG/DL (ref 74–99)
GLUCOSE BLD-MCNC: 194 MG/DL (ref 74–99)
GLUCOSE BLD-MCNC: 282 MG/DL (ref 74–99)
GLUCOSE BLD-MCNC: 355 MG/DL (ref 74–99)
GLUCOSE SERPL-MCNC: 153 MG/DL (ref 74–99)
HCT VFR BLD AUTO: 24.9 % (ref 37–54)
HGB BLD-MCNC: 7.9 G/DL (ref 12.5–16.5)
MAGNESIUM SERPL-MCNC: 2.7 MG/DL (ref 1.6–2.6)
MCH RBC QN AUTO: 31.1 PG (ref 26–35)
MCHC RBC AUTO-ENTMCNC: 31.7 G/DL (ref 32–34.5)
MCV RBC AUTO: 98 FL (ref 80–99.9)
PLATELET, FLUORESCENCE: 123 K/UL (ref 130–450)
PMV BLD AUTO: 13.1 FL (ref 7–12)
POTASSIUM SERPL-SCNC: 4.6 MMOL/L (ref 3.5–5)
PROT SERPL-MCNC: 5.3 G/DL (ref 6.4–8.3)
RBC # BLD AUTO: 2.54 M/UL (ref 3.8–5.8)
SODIUM SERPL-SCNC: 139 MMOL/L (ref 132–146)
WBC OTHER # BLD: 10.3 K/UL (ref 4.5–11.5)

## 2024-10-13 PROCEDURE — 86901 BLOOD TYPING SEROLOGIC RH(D): CPT

## 2024-10-13 PROCEDURE — 2580000003 HC RX 258: Performed by: NURSE PRACTITIONER

## 2024-10-13 PROCEDURE — 74018 RADEX ABDOMEN 1 VIEW: CPT

## 2024-10-13 PROCEDURE — 6370000000 HC RX 637 (ALT 250 FOR IP)

## 2024-10-13 PROCEDURE — 94669 MECHANICAL CHEST WALL OSCILL: CPT

## 2024-10-13 PROCEDURE — 2580000003 HC RX 258

## 2024-10-13 PROCEDURE — 80053 COMPREHEN METABOLIC PANEL: CPT

## 2024-10-13 PROCEDURE — 99233 SBSQ HOSP IP/OBS HIGH 50: CPT | Performed by: INTERNAL MEDICINE

## 2024-10-13 PROCEDURE — 6360000002 HC RX W HCPCS: Performed by: NURSE PRACTITIONER

## 2024-10-13 PROCEDURE — 37799 UNLISTED PX VASCULAR SURGERY: CPT

## 2024-10-13 PROCEDURE — 82962 GLUCOSE BLOOD TEST: CPT

## 2024-10-13 PROCEDURE — 2000000000 HC ICU R&B

## 2024-10-13 PROCEDURE — 86900 BLOOD TYPING SEROLOGIC ABO: CPT

## 2024-10-13 PROCEDURE — 83735 ASSAY OF MAGNESIUM: CPT

## 2024-10-13 PROCEDURE — 6360000002 HC RX W HCPCS

## 2024-10-13 PROCEDURE — 82330 ASSAY OF CALCIUM: CPT

## 2024-10-13 PROCEDURE — 94640 AIRWAY INHALATION TREATMENT: CPT

## 2024-10-13 PROCEDURE — 2700000000 HC OXYGEN THERAPY PER DAY

## 2024-10-13 PROCEDURE — 85027 COMPLETE CBC AUTOMATED: CPT

## 2024-10-13 PROCEDURE — 86850 RBC ANTIBODY SCREEN: CPT

## 2024-10-13 PROCEDURE — 94660 CPAP INITIATION&MGMT: CPT

## 2024-10-13 RX ADMIN — VASOPRESSIN 0.03 UNITS/MIN: 20 INJECTION INTRAVENOUS at 07:03

## 2024-10-13 RX ADMIN — IPRATROPIUM BROMIDE AND ALBUTEROL SULFATE 1 DOSE: 2.5; .5 SOLUTION RESPIRATORY (INHALATION) at 16:13

## 2024-10-13 RX ADMIN — CHLORHEXIDINE GLUCONATE, 0.12% ORAL RINSE 15 ML: 1.2 SOLUTION DENTAL at 22:32

## 2024-10-13 RX ADMIN — POLYETHYLENE GLYCOL 3350 17 G: 17 POWDER, FOR SOLUTION ORAL at 10:11

## 2024-10-13 RX ADMIN — PANTOPRAZOLE SODIUM 40 MG: 40 TABLET, DELAYED RELEASE ORAL at 06:01

## 2024-10-13 RX ADMIN — SODIUM CHLORIDE, PRESERVATIVE FREE 10 ML: 5 INJECTION INTRAVENOUS at 22:32

## 2024-10-13 RX ADMIN — INSULIN LISPRO 16 UNITS: 100 INJECTION, SOLUTION INTRAVENOUS; SUBCUTANEOUS at 20:30

## 2024-10-13 RX ADMIN — ONDANSETRON 4 MG: 2 INJECTION INTRAMUSCULAR; INTRAVENOUS at 20:12

## 2024-10-13 RX ADMIN — HYDROMORPHONE HYDROCHLORIDE 0.25 MG: 1 INJECTION, SOLUTION INTRAMUSCULAR; INTRAVENOUS; SUBCUTANEOUS at 11:43

## 2024-10-13 RX ADMIN — ONDANSETRON 4 MG: 2 INJECTION INTRAMUSCULAR; INTRAVENOUS at 10:19

## 2024-10-13 RX ADMIN — ONDANSETRON 4 MG: 2 INJECTION INTRAMUSCULAR; INTRAVENOUS at 04:10

## 2024-10-13 RX ADMIN — IPRATROPIUM BROMIDE AND ALBUTEROL SULFATE 1 DOSE: 2.5; .5 SOLUTION RESPIRATORY (INHALATION) at 11:21

## 2024-10-13 RX ADMIN — INSULIN LISPRO 4 UNITS: 100 INJECTION, SOLUTION INTRAVENOUS; SUBCUTANEOUS at 08:46

## 2024-10-13 RX ADMIN — OXYCODONE HYDROCHLORIDE 10 MG: 10 TABLET ORAL at 22:00

## 2024-10-13 RX ADMIN — SODIUM CHLORIDE, PRESERVATIVE FREE 10 ML: 5 INJECTION INTRAVENOUS at 08:47

## 2024-10-13 RX ADMIN — BISACODYL 10 MG: 10 SUPPOSITORY RECTAL at 10:09

## 2024-10-13 RX ADMIN — INSULIN LISPRO 8 UNITS: 100 INJECTION, SOLUTION INTRAVENOUS; SUBCUTANEOUS at 17:06

## 2024-10-13 RX ADMIN — INSULIN GLARGINE 12 UNITS: 100 INJECTION, SOLUTION SUBCUTANEOUS at 20:30

## 2024-10-13 RX ADMIN — CHLORHEXIDINE GLUCONATE, 0.12% ORAL RINSE 15 ML: 1.2 SOLUTION DENTAL at 10:11

## 2024-10-13 ASSESSMENT — PAIN DESCRIPTION - DESCRIPTORS
DESCRIPTORS: ACHING;DISCOMFORT
DESCRIPTORS: ACHING

## 2024-10-13 ASSESSMENT — PAIN DESCRIPTION - LOCATION
LOCATION: BACK;CHEST
LOCATION: SHOULDER;INCISION

## 2024-10-13 ASSESSMENT — PAIN SCALES - GENERAL
PAINLEVEL_OUTOF10: 7
PAINLEVEL_OUTOF10: 7
PAINLEVEL_OUTOF10: 0
PAINLEVEL_OUTOF10: 0
PAINLEVEL_OUTOF10: 9
PAINLEVEL_OUTOF10: 0
PAINLEVEL_OUTOF10: 5

## 2024-10-13 ASSESSMENT — PAIN DESCRIPTION - ORIENTATION
ORIENTATION: LEFT
ORIENTATION: MID

## 2024-10-14 LAB
ALBUMIN SERPL-MCNC: 2.9 G/DL (ref 3.5–5.2)
ALP SERPL-CCNC: 70 U/L (ref 40–129)
ALT SERPL-CCNC: 89 U/L (ref 0–40)
ANION GAP SERPL CALCULATED.3IONS-SCNC: 10 MMOL/L (ref 7–16)
AST SERPL-CCNC: 106 U/L (ref 0–39)
BILIRUB SERPL-MCNC: 0.6 MG/DL (ref 0–1.2)
BUN SERPL-MCNC: 35 MG/DL (ref 6–23)
CA-I BLD-SCNC: 1.17 MMOL/L (ref 1.15–1.33)
CALCIUM SERPL-MCNC: 8.2 MG/DL (ref 8.6–10.2)
CHLORIDE SERPL-SCNC: 108 MMOL/L (ref 98–107)
CO2 SERPL-SCNC: 23 MMOL/L (ref 22–29)
CREAT SERPL-MCNC: 1 MG/DL (ref 0.7–1.2)
ERYTHROCYTE [DISTWIDTH] IN BLOOD BY AUTOMATED COUNT: 13.5 % (ref 11.5–15)
GFR, ESTIMATED: 74 ML/MIN/1.73M2
GLUCOSE BLD-MCNC: 141 MG/DL (ref 74–99)
GLUCOSE BLD-MCNC: 215 MG/DL (ref 74–99)
GLUCOSE BLD-MCNC: 319 MG/DL (ref 74–99)
GLUCOSE BLD-MCNC: 97 MG/DL (ref 74–99)
GLUCOSE SERPL-MCNC: 88 MG/DL (ref 74–99)
HCT VFR BLD AUTO: 25.7 % (ref 37–54)
HGB BLD-MCNC: 8.2 G/DL (ref 12.5–16.5)
MAGNESIUM SERPL-MCNC: 2.5 MG/DL (ref 1.6–2.6)
MCH RBC QN AUTO: 31.3 PG (ref 26–35)
MCHC RBC AUTO-ENTMCNC: 31.9 G/DL (ref 32–34.5)
MCV RBC AUTO: 98.1 FL (ref 80–99.9)
PHOSPHATE SERPL-MCNC: 2.6 MG/DL (ref 2.5–4.5)
PLATELET # BLD AUTO: 182 K/UL (ref 130–450)
PMV BLD AUTO: 12.8 FL (ref 7–12)
POTASSIUM SERPL-SCNC: 3.8 MMOL/L (ref 3.5–5)
POTASSIUM SERPL-SCNC: 4.2 MMOL/L (ref 3.5–5)
PROT SERPL-MCNC: 5.2 G/DL (ref 6.4–8.3)
RBC # BLD AUTO: 2.62 M/UL (ref 3.8–5.8)
SODIUM SERPL-SCNC: 141 MMOL/L (ref 132–146)
WBC OTHER # BLD: 11.3 K/UL (ref 4.5–11.5)

## 2024-10-14 PROCEDURE — 6370000000 HC RX 637 (ALT 250 FOR IP): Performed by: NURSE PRACTITIONER

## 2024-10-14 PROCEDURE — 6370000000 HC RX 637 (ALT 250 FOR IP)

## 2024-10-14 PROCEDURE — 82330 ASSAY OF CALCIUM: CPT

## 2024-10-14 PROCEDURE — 97166 OT EVAL MOD COMPLEX 45 MIN: CPT

## 2024-10-14 PROCEDURE — 80053 COMPREHEN METABOLIC PANEL: CPT

## 2024-10-14 PROCEDURE — 36415 COLL VENOUS BLD VENIPUNCTURE: CPT

## 2024-10-14 PROCEDURE — 85027 COMPLETE CBC AUTOMATED: CPT

## 2024-10-14 PROCEDURE — 94640 AIRWAY INHALATION TREATMENT: CPT

## 2024-10-14 PROCEDURE — 2580000003 HC RX 258: Performed by: NURSE PRACTITIONER

## 2024-10-14 PROCEDURE — 82962 GLUCOSE BLOOD TEST: CPT

## 2024-10-14 PROCEDURE — 6370000000 HC RX 637 (ALT 250 FOR IP): Performed by: INTERNAL MEDICINE

## 2024-10-14 PROCEDURE — 6370000000 HC RX 637 (ALT 250 FOR IP): Performed by: PHYSICIAN ASSISTANT

## 2024-10-14 PROCEDURE — 37799 UNLISTED PX VASCULAR SURGERY: CPT

## 2024-10-14 PROCEDURE — 94660 CPAP INITIATION&MGMT: CPT

## 2024-10-14 PROCEDURE — 2140000000 HC CCU INTERMEDIATE R&B

## 2024-10-14 PROCEDURE — 94669 MECHANICAL CHEST WALL OSCILL: CPT

## 2024-10-14 PROCEDURE — 99232 SBSQ HOSP IP/OBS MODERATE 35: CPT | Performed by: INTERNAL MEDICINE

## 2024-10-14 PROCEDURE — 99222 1ST HOSP IP/OBS MODERATE 55: CPT | Performed by: SURGERY

## 2024-10-14 PROCEDURE — 97530 THERAPEUTIC ACTIVITIES: CPT

## 2024-10-14 PROCEDURE — 6360000002 HC RX W HCPCS

## 2024-10-14 PROCEDURE — 83735 ASSAY OF MAGNESIUM: CPT

## 2024-10-14 PROCEDURE — 6360000002 HC RX W HCPCS: Performed by: NURSE PRACTITIONER

## 2024-10-14 PROCEDURE — 84132 ASSAY OF SERUM POTASSIUM: CPT

## 2024-10-14 PROCEDURE — 2580000003 HC RX 258

## 2024-10-14 PROCEDURE — 84100 ASSAY OF PHOSPHORUS: CPT

## 2024-10-14 PROCEDURE — 2700000000 HC OXYGEN THERAPY PER DAY

## 2024-10-14 PROCEDURE — 97162 PT EVAL MOD COMPLEX 30 MIN: CPT

## 2024-10-14 RX ORDER — INSULIN LISPRO 100 [IU]/ML
0-4 INJECTION, SOLUTION INTRAVENOUS; SUBCUTANEOUS NIGHTLY
Status: DISCONTINUED | OUTPATIENT
Start: 2024-10-14 | End: 2024-10-21 | Stop reason: HOSPADM

## 2024-10-14 RX ORDER — ONDANSETRON 2 MG/ML
4 INJECTION INTRAMUSCULAR; INTRAVENOUS EVERY 8 HOURS PRN
Status: DISCONTINUED | OUTPATIENT
Start: 2024-10-14 | End: 2024-10-14

## 2024-10-14 RX ORDER — AMIODARONE HYDROCHLORIDE 200 MG/1
400 TABLET ORAL PRN
Status: DISCONTINUED | OUTPATIENT
Start: 2024-10-14 | End: 2024-10-21 | Stop reason: HOSPADM

## 2024-10-14 RX ORDER — POTASSIUM CHLORIDE 1500 MG/1
20 TABLET, EXTENDED RELEASE ORAL PRN
Status: DISCONTINUED | OUTPATIENT
Start: 2024-10-14 | End: 2024-10-21 | Stop reason: HOSPADM

## 2024-10-14 RX ORDER — INSULIN GLARGINE 100 [IU]/ML
10 INJECTION, SOLUTION SUBCUTANEOUS NIGHTLY
Status: DISCONTINUED | OUTPATIENT
Start: 2024-10-14 | End: 2024-10-17

## 2024-10-14 RX ORDER — ENOXAPARIN SODIUM 100 MG/ML
40 INJECTION SUBCUTANEOUS DAILY
Status: DISCONTINUED | OUTPATIENT
Start: 2024-10-14 | End: 2024-10-21 | Stop reason: HOSPADM

## 2024-10-14 RX ORDER — LACTULOSE 10 G/15ML
20 SOLUTION ORAL ONCE
Status: COMPLETED | OUTPATIENT
Start: 2024-10-14 | End: 2024-10-14

## 2024-10-14 RX ORDER — DIPHENHYDRAMINE HCL 25 MG
25 TABLET ORAL EVERY 8 HOURS PRN
Status: DISCONTINUED | OUTPATIENT
Start: 2024-10-14 | End: 2024-10-21 | Stop reason: HOSPADM

## 2024-10-14 RX ORDER — INSULIN LISPRO 100 [IU]/ML
3 INJECTION, SOLUTION INTRAVENOUS; SUBCUTANEOUS
Status: DISCONTINUED | OUTPATIENT
Start: 2024-10-15 | End: 2024-10-17

## 2024-10-14 RX ORDER — INSULIN LISPRO 100 [IU]/ML
0-16 INJECTION, SOLUTION INTRAVENOUS; SUBCUTANEOUS
Status: DISCONTINUED | OUTPATIENT
Start: 2024-10-14 | End: 2024-10-14

## 2024-10-14 RX ORDER — ACETAMINOPHEN 325 MG/1
650 TABLET ORAL EVERY 4 HOURS PRN
Status: DISCONTINUED | OUTPATIENT
Start: 2024-10-14 | End: 2024-10-21 | Stop reason: HOSPADM

## 2024-10-14 RX ORDER — GLUCAGON 1 MG/ML
1 KIT INJECTION PRN
Status: DISCONTINUED | OUTPATIENT
Start: 2024-10-14 | End: 2024-10-21 | Stop reason: HOSPADM

## 2024-10-14 RX ORDER — BISACODYL 5 MG/1
5 TABLET, DELAYED RELEASE ORAL DAILY
Status: DISCONTINUED | OUTPATIENT
Start: 2024-10-14 | End: 2024-10-15

## 2024-10-14 RX ORDER — LACTULOSE 10 G/15ML
20 SOLUTION ORAL 3 TIMES DAILY
Status: DISCONTINUED | OUTPATIENT
Start: 2024-10-14 | End: 2024-10-15

## 2024-10-14 RX ORDER — FOLIC ACID 1 MG/1
1 TABLET ORAL DAILY
Status: DISCONTINUED | OUTPATIENT
Start: 2024-10-14 | End: 2024-10-21 | Stop reason: HOSPADM

## 2024-10-14 RX ORDER — FERROUS SULFATE 325(65) MG
325 TABLET ORAL 2 TIMES DAILY WITH MEALS
Status: DISCONTINUED | OUTPATIENT
Start: 2024-10-14 | End: 2024-10-21 | Stop reason: HOSPADM

## 2024-10-14 RX ORDER — GUAIFENESIN 400 MG/1
400 TABLET ORAL 3 TIMES DAILY
Status: DISCONTINUED | OUTPATIENT
Start: 2024-10-14 | End: 2024-10-21 | Stop reason: HOSPADM

## 2024-10-14 RX ORDER — INSULIN LISPRO 100 [IU]/ML
0-8 INJECTION, SOLUTION INTRAVENOUS; SUBCUTANEOUS
Status: DISCONTINUED | OUTPATIENT
Start: 2024-10-15 | End: 2024-10-18

## 2024-10-14 RX ORDER — MAGNESIUM SULFATE IN WATER 40 MG/ML
2000 INJECTION, SOLUTION INTRAVENOUS PRN
Status: DISCONTINUED | OUTPATIENT
Start: 2024-10-14 | End: 2024-10-21 | Stop reason: HOSPADM

## 2024-10-14 RX ORDER — ASCORBIC ACID 500 MG
500 TABLET ORAL 2 TIMES DAILY
Status: DISCONTINUED | OUTPATIENT
Start: 2024-10-14 | End: 2024-10-21 | Stop reason: HOSPADM

## 2024-10-14 RX ORDER — PROCHLORPERAZINE EDISYLATE 5 MG/ML
10 INJECTION INTRAMUSCULAR; INTRAVENOUS EVERY 6 HOURS PRN
Status: DISCONTINUED | OUTPATIENT
Start: 2024-10-14 | End: 2024-10-21 | Stop reason: HOSPADM

## 2024-10-14 RX ORDER — FUROSEMIDE 10 MG/ML
20 INJECTION INTRAMUSCULAR; INTRAVENOUS ONCE
Status: COMPLETED | OUTPATIENT
Start: 2024-10-14 | End: 2024-10-14

## 2024-10-14 RX ORDER — ASPIRIN 81 MG/1
81 TABLET ORAL DAILY
Status: DISCONTINUED | OUTPATIENT
Start: 2024-10-15 | End: 2024-10-21 | Stop reason: HOSPADM

## 2024-10-14 RX ORDER — DEXTROSE MONOHYDRATE 100 MG/ML
INJECTION, SOLUTION INTRAVENOUS CONTINUOUS PRN
Status: DISCONTINUED | OUTPATIENT
Start: 2024-10-14 | End: 2024-10-21 | Stop reason: HOSPADM

## 2024-10-14 RX ADMIN — ASPIRIN 81 MG: 81 TABLET, COATED ORAL at 09:14

## 2024-10-14 RX ADMIN — IPRATROPIUM BROMIDE AND ALBUTEROL SULFATE 1 DOSE: 2.5; .5 SOLUTION RESPIRATORY (INHALATION) at 19:48

## 2024-10-14 RX ADMIN — TAMSULOSIN HYDROCHLORIDE 0.4 MG: 0.4 CAPSULE ORAL at 09:15

## 2024-10-14 RX ADMIN — SODIUM CHLORIDE: 9 INJECTION, SOLUTION INTRAVENOUS at 01:10

## 2024-10-14 RX ADMIN — IPRATROPIUM BROMIDE AND ALBUTEROL SULFATE 1 DOSE: 2.5; .5 SOLUTION RESPIRATORY (INHALATION) at 14:43

## 2024-10-14 RX ADMIN — FUROSEMIDE 20 MG: 10 INJECTION, SOLUTION INTRAMUSCULAR; INTRAVENOUS at 18:13

## 2024-10-14 RX ADMIN — FERROUS SULFATE TAB 325 MG (65 MG ELEMENTAL FE) 325 MG: 325 (65 FE) TAB at 16:49

## 2024-10-14 RX ADMIN — INSULIN LISPRO 3 UNITS: 100 INJECTION, SOLUTION INTRAVENOUS; SUBCUTANEOUS at 22:26

## 2024-10-14 RX ADMIN — LACTULOSE 20 G: 20 SOLUTION ORAL at 06:52

## 2024-10-14 RX ADMIN — IPRATROPIUM BROMIDE AND ALBUTEROL SULFATE 1 DOSE: 2.5; .5 SOLUTION RESPIRATORY (INHALATION) at 09:08

## 2024-10-14 RX ADMIN — AMIODARONE HYDROCHLORIDE 400 MG: 200 TABLET ORAL at 09:14

## 2024-10-14 RX ADMIN — Medication 500 MG: at 22:25

## 2024-10-14 RX ADMIN — GUAIFENESIN 400 MG: 400 TABLET ORAL at 22:25

## 2024-10-14 RX ADMIN — DIPHENHYDRAMINE HYDROCHLORIDE 25 MG: 25 TABLET ORAL at 22:32

## 2024-10-14 RX ADMIN — Medication 400 MG: at 22:26

## 2024-10-14 RX ADMIN — AMIODARONE HYDROCHLORIDE 400 MG: 200 TABLET ORAL at 22:25

## 2024-10-14 RX ADMIN — BISACODYL 10 MG: 10 SUPPOSITORY RECTAL at 09:14

## 2024-10-14 RX ADMIN — POLYETHYLENE GLYCOL 3350 17 G: 17 POWDER, FOR SOLUTION ORAL at 09:13

## 2024-10-14 RX ADMIN — CHLORHEXIDINE GLUCONATE, 0.12% ORAL RINSE 15 ML: 1.2 SOLUTION DENTAL at 09:13

## 2024-10-14 RX ADMIN — INSULIN LISPRO 4 UNITS: 100 INJECTION, SOLUTION INTRAVENOUS; SUBCUTANEOUS at 18:13

## 2024-10-14 RX ADMIN — SODIUM CHLORIDE, PRESERVATIVE FREE 10 ML: 5 INJECTION INTRAVENOUS at 22:27

## 2024-10-14 RX ADMIN — SODIUM CHLORIDE, PRESERVATIVE FREE 10 ML: 5 INJECTION INTRAVENOUS at 09:15

## 2024-10-14 RX ADMIN — IPRATROPIUM BROMIDE AND ALBUTEROL SULFATE 1 DOSE: 2.5; .5 SOLUTION RESPIRATORY (INHALATION) at 11:19

## 2024-10-14 RX ADMIN — MAGNESIUM HYDROXIDE 30 ML: 400 SUSPENSION ORAL at 05:58

## 2024-10-14 RX ADMIN — SENNOSIDES AND DOCUSATE SODIUM 1 TABLET: 50; 8.6 TABLET ORAL at 09:15

## 2024-10-14 RX ADMIN — PANTOPRAZOLE SODIUM 40 MG: 40 TABLET, DELAYED RELEASE ORAL at 05:58

## 2024-10-14 RX ADMIN — ENOXAPARIN SODIUM 40 MG: 100 INJECTION SUBCUTANEOUS at 09:13

## 2024-10-14 RX ADMIN — INSULIN GLARGINE 10 UNITS: 100 INJECTION, SOLUTION SUBCUTANEOUS at 22:26

## 2024-10-14 RX ADMIN — Medication 400 MG: at 09:15

## 2024-10-14 RX ADMIN — ROSUVASTATIN 40 MG: 20 TABLET, FILM COATED ORAL at 09:14

## 2024-10-14 RX ADMIN — FOLIC ACID 1 MG: 1 TABLET ORAL at 16:49

## 2024-10-14 ASSESSMENT — PAIN SCALES - GENERAL
PAINLEVEL_OUTOF10: 0

## 2024-10-14 NOTE — CARE COORDINATION
10/14 Care Coordination: Pt remains in [facility]. POD# 6. CABG x 3.  On 2 liters O2. IABP removed on 10/10.NGT to DMITRY, ileus. Per previous CM discharge plan was Home, [facility] is following. BiPAP has been set up with [facility], need to call them when discharging. CM/SW will continue to follow for discharge planning.   Curtis KNAPP,RN--BC  [phone]

## 2024-10-15 ENCOUNTER — APPOINTMENT (OUTPATIENT)
Age: 79
DRG: 233 | End: 2024-10-15
Payer: MEDICARE

## 2024-10-15 ENCOUNTER — APPOINTMENT (OUTPATIENT)
Dept: GENERAL RADIOLOGY | Age: 79
DRG: 233 | End: 2024-10-15
Payer: MEDICARE

## 2024-10-15 PROBLEM — K56.7 ILEUS (HCC): Status: ACTIVE | Noted: 2024-10-15

## 2024-10-15 LAB
ALBUMIN SERPL-MCNC: 2.9 G/DL (ref 3.5–5.2)
ALP SERPL-CCNC: 67 U/L (ref 40–129)
ALT SERPL-CCNC: 71 U/L (ref 0–40)
ANION GAP SERPL CALCULATED.3IONS-SCNC: 12 MMOL/L (ref 7–16)
AST SERPL-CCNC: 64 U/L (ref 0–39)
BILIRUB SERPL-MCNC: 0.8 MG/DL (ref 0–1.2)
BUN SERPL-MCNC: 25 MG/DL (ref 6–23)
CALCIUM SERPL-MCNC: 8.2 MG/DL (ref 8.6–10.2)
CHLORIDE SERPL-SCNC: 111 MMOL/L (ref 98–107)
CO2 SERPL-SCNC: 24 MMOL/L (ref 22–29)
CREAT SERPL-MCNC: 1.1 MG/DL (ref 0.7–1.2)
ECHO BSA: 2.02 M2
ECHO LA DIAMETER INDEX: 2.59 CM/M2
ECHO LA DIAMETER: 5 CM
ECHO LA VOL A-L A2C: 154 ML (ref 18–58)
ECHO LA VOL A-L A4C: 163 ML (ref 18–58)
ECHO LA VOL BP: 159 ML (ref 18–58)
ECHO LA VOL MOD A2C: 148 ML (ref 18–58)
ECHO LA VOL MOD A4C: 152 ML (ref 18–58)
ECHO LA VOL/BSA BIPLANE: 82 ML/M2 (ref 16–34)
ECHO LA VOLUME AREA LENGTH: 169 ML
ECHO LA VOLUME INDEX A-L A2C: 80 ML/M2 (ref 16–34)
ECHO LA VOLUME INDEX A-L A4C: 84 ML/M2 (ref 16–34)
ECHO LA VOLUME INDEX AREA LENGTH: 88 ML/M2 (ref 16–34)
ECHO LA VOLUME INDEX MOD A2C: 77 ML/M2 (ref 16–34)
ECHO LA VOLUME INDEX MOD A4C: 79 ML/M2 (ref 16–34)
ECHO LV EF PHYSICIAN: 25 %
ECHO LV FRACTIONAL SHORTENING: 13 % (ref 28–44)
ECHO LV INTERNAL DIMENSION DIASTOLE INDEX: 2.9 CM/M2
ECHO LV INTERNAL DIMENSION DIASTOLIC: 5.6 CM (ref 4.2–5.9)
ECHO LV INTERNAL DIMENSION SYSTOLIC INDEX: 2.54 CM/M2
ECHO LV INTERNAL DIMENSION SYSTOLIC: 4.9 CM
ECHO LV IVSD: 1.1 CM (ref 0.6–1)
ECHO LV IVSS: 1.2 CM
ECHO LV MASS 2D: 219.7 G (ref 88–224)
ECHO LV MASS INDEX 2D: 113.8 G/M2 (ref 49–115)
ECHO LV POSTERIOR WALL DIASTOLIC: 0.9 CM (ref 0.6–1)
ECHO LV POSTERIOR WALL SYSTOLIC: 1.1 CM
ECHO LV RELATIVE WALL THICKNESS RATIO: 0.32
ECHO LVOT AREA: 3.1 CM2
ECHO LVOT DIAM: 2 CM
ECHO MV A VELOCITY: 0.34 M/S
ECHO MV AREA PHT: 3.1 CM2
ECHO MV E DECELERATION TIME (DT): 175.2 MS
ECHO MV E VELOCITY: 0.9 M/S
ECHO MV E/A RATIO: 2.65
ECHO MV MAX VELOCITY: 1.2 M/S
ECHO MV MEAN GRADIENT: 2 MMHG
ECHO MV MEAN VELOCITY: 0.6 M/S
ECHO MV PEAK GRADIENT: 6 MMHG
ECHO MV PRESSURE HALF TIME (PHT): 71.9 MS
ECHO MV VTI: 28.4 CM
ECHO RV INTERNAL DIMENSION: 3.7 CM
ECHO TV REGURGITANT MAX VELOCITY: 3.72 M/S
ECHO TV REGURGITANT PEAK GRADIENT: 55 MMHG
ERYTHROCYTE [DISTWIDTH] IN BLOOD BY AUTOMATED COUNT: 13.9 % (ref 11.5–15)
GFR, ESTIMATED: 70 ML/MIN/1.73M2
GLUCOSE BLD-MCNC: 157 MG/DL (ref 74–99)
GLUCOSE BLD-MCNC: 198 MG/DL (ref 74–99)
GLUCOSE BLD-MCNC: 253 MG/DL (ref 74–99)
GLUCOSE BLD-MCNC: 256 MG/DL (ref 74–99)
GLUCOSE SERPL-MCNC: 134 MG/DL (ref 74–99)
HCT VFR BLD AUTO: 26.3 % (ref 37–54)
HGB BLD-MCNC: 8.3 G/DL (ref 12.5–16.5)
LEFT VENTRICULAR EJECTION FRACTION HIGH VALUE: 25 %
LV EF: 20 %
MAGNESIUM SERPL-MCNC: 2.4 MG/DL (ref 1.6–2.6)
MCH RBC QN AUTO: 31.1 PG (ref 26–35)
MCHC RBC AUTO-ENTMCNC: 31.6 G/DL (ref 32–34.5)
MCV RBC AUTO: 98.5 FL (ref 80–99.9)
PLATELET # BLD AUTO: 231 K/UL (ref 130–450)
PMV BLD AUTO: 12.3 FL (ref 7–12)
POTASSIUM SERPL-SCNC: 4 MMOL/L (ref 3.5–5)
PROT SERPL-MCNC: 5.2 G/DL (ref 6.4–8.3)
RBC # BLD AUTO: 2.67 M/UL (ref 3.8–5.8)
SODIUM SERPL-SCNC: 147 MMOL/L (ref 132–146)
WBC OTHER # BLD: 11.2 K/UL (ref 4.5–11.5)

## 2024-10-15 PROCEDURE — 6370000000 HC RX 637 (ALT 250 FOR IP): Performed by: NURSE PRACTITIONER

## 2024-10-15 PROCEDURE — P9047 ALBUMIN (HUMAN), 25%, 50ML: HCPCS | Performed by: PHYSICIAN ASSISTANT

## 2024-10-15 PROCEDURE — 94640 AIRWAY INHALATION TREATMENT: CPT

## 2024-10-15 PROCEDURE — 36415 COLL VENOUS BLD VENIPUNCTURE: CPT

## 2024-10-15 PROCEDURE — 83735 ASSAY OF MAGNESIUM: CPT

## 2024-10-15 PROCEDURE — 2580000003 HC RX 258: Performed by: NURSE PRACTITIONER

## 2024-10-15 PROCEDURE — 99232 SBSQ HOSP IP/OBS MODERATE 35: CPT | Performed by: SURGERY

## 2024-10-15 PROCEDURE — 6360000002 HC RX W HCPCS: Performed by: PHYSICIAN ASSISTANT

## 2024-10-15 PROCEDURE — 6370000000 HC RX 637 (ALT 250 FOR IP): Performed by: PHYSICIAN ASSISTANT

## 2024-10-15 PROCEDURE — 85027 COMPLETE CBC AUTOMATED: CPT

## 2024-10-15 PROCEDURE — 2700000000 HC OXYGEN THERAPY PER DAY

## 2024-10-15 PROCEDURE — 80053 COMPREHEN METABOLIC PANEL: CPT

## 2024-10-15 PROCEDURE — 93325 DOPPLER ECHO COLOR FLOW MAPG: CPT | Performed by: INTERNAL MEDICINE

## 2024-10-15 PROCEDURE — 99232 SBSQ HOSP IP/OBS MODERATE 35: CPT | Performed by: INTERNAL MEDICINE

## 2024-10-15 PROCEDURE — 93308 TTE F-UP OR LMTD: CPT | Performed by: INTERNAL MEDICINE

## 2024-10-15 PROCEDURE — 2140000000 HC CCU INTERMEDIATE R&B

## 2024-10-15 PROCEDURE — 93308 TTE F-UP OR LMTD: CPT

## 2024-10-15 PROCEDURE — 94660 CPAP INITIATION&MGMT: CPT

## 2024-10-15 PROCEDURE — 6360000002 HC RX W HCPCS: Performed by: NURSE PRACTITIONER

## 2024-10-15 PROCEDURE — 71045 X-RAY EXAM CHEST 1 VIEW: CPT

## 2024-10-15 PROCEDURE — 82962 GLUCOSE BLOOD TEST: CPT

## 2024-10-15 PROCEDURE — 6370000000 HC RX 637 (ALT 250 FOR IP): Performed by: INTERNAL MEDICINE

## 2024-10-15 PROCEDURE — 6360000002 HC RX W HCPCS

## 2024-10-15 PROCEDURE — 93321 DOPPLER ECHO F-UP/LMTD STD: CPT | Performed by: INTERNAL MEDICINE

## 2024-10-15 RX ORDER — FUROSEMIDE 10 MG/ML
20 INJECTION INTRAMUSCULAR; INTRAVENOUS ONCE
Status: COMPLETED | OUTPATIENT
Start: 2024-10-15 | End: 2024-10-15

## 2024-10-15 RX ORDER — ALBUTEROL SULFATE 0.83 MG/ML
2.5 SOLUTION RESPIRATORY (INHALATION)
Status: DISCONTINUED | OUTPATIENT
Start: 2024-10-15 | End: 2024-10-21 | Stop reason: HOSPADM

## 2024-10-15 RX ORDER — ALBUMIN (HUMAN) 12.5 G/50ML
25 SOLUTION INTRAVENOUS ONCE
Status: COMPLETED | OUTPATIENT
Start: 2024-10-15 | End: 2024-10-15

## 2024-10-15 RX ORDER — BISACODYL 10 MG
10 SUPPOSITORY, RECTAL RECTAL DAILY PRN
Status: DISCONTINUED | OUTPATIENT
Start: 2024-10-15 | End: 2024-10-21 | Stop reason: HOSPADM

## 2024-10-15 RX ORDER — CARVEDILOL 3.12 MG/1
3.12 TABLET ORAL 2 TIMES DAILY WITH MEALS
Status: DISCONTINUED | OUTPATIENT
Start: 2024-10-15 | End: 2024-10-21 | Stop reason: HOSPADM

## 2024-10-15 RX ADMIN — INSULIN LISPRO 3 UNITS: 100 INJECTION, SOLUTION INTRAVENOUS; SUBCUTANEOUS at 09:03

## 2024-10-15 RX ADMIN — ALBUTEROL SULFATE 2.5 MG: 2.5 SOLUTION RESPIRATORY (INHALATION) at 17:00

## 2024-10-15 RX ADMIN — FOLIC ACID 1 MG: 1 TABLET ORAL at 09:03

## 2024-10-15 RX ADMIN — Medication 500 MG: at 20:56

## 2024-10-15 RX ADMIN — GUAIFENESIN 400 MG: 400 TABLET ORAL at 09:02

## 2024-10-15 RX ADMIN — DIPHENHYDRAMINE HYDROCHLORIDE 25 MG: 25 TABLET ORAL at 20:56

## 2024-10-15 RX ADMIN — SODIUM CHLORIDE, PRESERVATIVE FREE 10 ML: 5 INJECTION INTRAVENOUS at 20:55

## 2024-10-15 RX ADMIN — AMIODARONE HYDROCHLORIDE 400 MG: 200 TABLET ORAL at 09:02

## 2024-10-15 RX ADMIN — INSULIN LISPRO 3 UNITS: 100 INJECTION, SOLUTION INTRAVENOUS; SUBCUTANEOUS at 17:12

## 2024-10-15 RX ADMIN — Medication 400 MG: at 20:56

## 2024-10-15 RX ADMIN — INSULIN LISPRO 4 UNITS: 100 INJECTION, SOLUTION INTRAVENOUS; SUBCUTANEOUS at 17:12

## 2024-10-15 RX ADMIN — PANTOPRAZOLE SODIUM 40 MG: 40 INJECTION, POWDER, FOR SOLUTION INTRAVENOUS at 09:08

## 2024-10-15 RX ADMIN — ALBUMIN (HUMAN) 25 G: 0.25 INJECTION, SOLUTION INTRAVENOUS at 16:20

## 2024-10-15 RX ADMIN — GUAIFENESIN 400 MG: 400 TABLET ORAL at 16:01

## 2024-10-15 RX ADMIN — OXYCODONE HYDROCHLORIDE 5 MG: 5 TABLET ORAL at 14:28

## 2024-10-15 RX ADMIN — CARVEDILOL 3.12 MG: 3.12 TABLET, FILM COATED ORAL at 18:30

## 2024-10-15 RX ADMIN — CARVEDILOL 3.12 MG: 3.12 TABLET, FILM COATED ORAL at 11:23

## 2024-10-15 RX ADMIN — ALBUMIN (HUMAN) 25 G: 0.25 INJECTION, SOLUTION INTRAVENOUS at 09:22

## 2024-10-15 RX ADMIN — SODIUM CHLORIDE, PRESERVATIVE FREE 10 ML: 5 INJECTION INTRAVENOUS at 09:04

## 2024-10-15 RX ADMIN — INSULIN GLARGINE 10 UNITS: 100 INJECTION, SOLUTION SUBCUTANEOUS at 20:55

## 2024-10-15 RX ADMIN — INSULIN LISPRO 2 UNITS: 100 INJECTION, SOLUTION INTRAVENOUS; SUBCUTANEOUS at 11:25

## 2024-10-15 RX ADMIN — INSULIN LISPRO 3 UNITS: 100 INJECTION, SOLUTION INTRAVENOUS; SUBCUTANEOUS at 11:26

## 2024-10-15 RX ADMIN — Medication 500 MG: at 09:03

## 2024-10-15 RX ADMIN — ALBUTEROL SULFATE 2.5 MG: 2.5 SOLUTION RESPIRATORY (INHALATION) at 20:06

## 2024-10-15 RX ADMIN — INSULIN LISPRO 2 UNITS: 100 INJECTION, SOLUTION INTRAVENOUS; SUBCUTANEOUS at 20:55

## 2024-10-15 RX ADMIN — FUROSEMIDE 20 MG: 10 INJECTION, SOLUTION INTRAMUSCULAR; INTRAVENOUS at 10:28

## 2024-10-15 RX ADMIN — Medication 400 MG: at 09:02

## 2024-10-15 RX ADMIN — ENOXAPARIN SODIUM 40 MG: 100 INJECTION SUBCUTANEOUS at 09:21

## 2024-10-15 RX ADMIN — TAMSULOSIN HYDROCHLORIDE 0.4 MG: 0.4 CAPSULE ORAL at 09:03

## 2024-10-15 RX ADMIN — ASPIRIN 81 MG: 81 TABLET, COATED ORAL at 09:03

## 2024-10-15 RX ADMIN — AMIODARONE HYDROCHLORIDE 400 MG: 200 TABLET ORAL at 20:56

## 2024-10-15 RX ADMIN — FERROUS SULFATE TAB 325 MG (65 MG ELEMENTAL FE) 325 MG: 325 (65 FE) TAB at 09:02

## 2024-10-15 RX ADMIN — GUAIFENESIN 400 MG: 400 TABLET ORAL at 20:56

## 2024-10-15 RX ADMIN — FERROUS SULFATE TAB 325 MG (65 MG ELEMENTAL FE) 325 MG: 325 (65 FE) TAB at 17:12

## 2024-10-15 ASSESSMENT — PAIN SCALES - GENERAL
PAINLEVEL_OUTOF10: 5
PAINLEVEL_OUTOF10: 0

## 2024-10-15 ASSESSMENT — PAIN DESCRIPTION - LOCATION: LOCATION: CHEST

## 2024-10-15 ASSESSMENT — PAIN DESCRIPTION - DESCRIPTORS: DESCRIPTORS: DISCOMFORT

## 2024-10-15 NOTE — PATIENT CARE CONFERENCE
[facility] Quality Flow/Interdisciplinary Rounds Progress Note        Quality Flow Rounds held on October 15, 2024    Disciplines Attending:  Bedside Nurse, , , and Nursing Unit Leadership    Pantera Payne was admitted on 9/26/2024  5:48 PM    Anticipated Discharge Date:  Expected Discharge Date: 10/09/24    Disposition:    Isidro Score:  Isidro Scale Score: 20    Readmission Risk              Risk of Unplanned Readmission:  35           Discussed patient goal for the day, patient clinical progression, and barriers to discharge.  The following Goal(s) of the Day/Commitment(s) have been identified:  ambulate/discharge planning       Jojo Rodrigues RN  October 15, 2024

## 2024-10-15 NOTE — CARE COORDINATION
Transition of care update: Received pt in transfer from [facility]. LOS: day 19. POD#7 CABG x 3. Pleural CT and wires cont. Limited echo ordered. IV albumin 25g x 1, iv lasix 20mg x 1. Wearable defibrillator order on chart. Notified Miah with [facility] of life vest order. Met with pt and pt's brother in room. Discharge goal is to return home with his wife and [facility]. Pt said his daughter is taking 2 months off from work to help/assist with pt after discharge. We discussed ben. Pt is aware he will need to ambulate 400ft prior to dc. I provided him with ben list. Pt said he will look at ben list when his wife is here later today. Cm/sw will follow.

## 2024-10-15 NOTE — DISCHARGE INSTRUCTIONS
breaths every hour 10 times a day while awake   Reinforce proper coughing technique with sternal splinting and deep breathing technique   Leg, sternal, groin and chest tube sutures/staples to be removed (as applicable)    Remove ALL chest tube sutures on this date October 29, 2024 .   AFTER STAPLE REMOVAL:    Apply benzoin and ½ inch steri-strips to incision suture/staples removed    Apply dry sterile dressing to incisions as needed if draining. Leave open to air when no longer draining.    Reinforce/Review medications - actions/purposes, side effects, and schedule   If patient is on anticoagulation therapy:    Reinforce education and safety concerns.    Protime/INR to be drawn in the morning. Fax results to cardiologist or PCP office   Consult PCP for any other lab orders    Patient to shower DAILY using Dial or any other antibacterial soap. NO baths, swimming pools, lakes, and hot tubs for 6 WEEKS - DO NOT submerge in water. Cleanse incisions in shower with CLEAN washcloth. (DO NOT USE SAME WASHCLOTH USED TO WASH THE REST OF THE BODY). No lotions, ointments, or powders near sites.   Assess safety and home environment. Reinforce safety issues (i.e traveling as a passenger)   For VALVE patients: reinforce bacterial endocarditis prophylaxis: MUST have antibiotics prophylactically for dental procedures, any invasive procedures, or infections. For questions regarding prophylaxis, call PCP.    For decrease appetite: supplement with Glucerna (diabetics), [place] Instant Breakfast, or Ensure      Call the Cardiologist for the following:    Chest Pain   Rapid or Irregular Heart Rate   Anticoagulation Orders   Blood Pressure Management    Call the Surgeon’s Office for the following ([phone]):   Clarification of discharge orders    Signs and Symptoms of infection from incisions or puncture sites    Unstable sternum or dehiscence of incision line    Signs and Symptoms of CHF    Weight gain as noted above in 3b   Rales

## 2024-10-16 ENCOUNTER — APPOINTMENT (OUTPATIENT)
Dept: GENERAL RADIOLOGY | Age: 79
DRG: 233 | End: 2024-10-16
Payer: MEDICARE

## 2024-10-16 LAB
ALBUMIN SERPL-MCNC: 3.2 G/DL (ref 3.5–5.2)
ALP SERPL-CCNC: 63 U/L (ref 40–129)
ALT SERPL-CCNC: 61 U/L (ref 0–40)
ANION GAP SERPL CALCULATED.3IONS-SCNC: 10 MMOL/L (ref 7–16)
AST SERPL-CCNC: 47 U/L (ref 0–39)
BILIRUB SERPL-MCNC: 1 MG/DL (ref 0–1.2)
BUN SERPL-MCNC: 31 MG/DL (ref 6–23)
CALCIUM SERPL-MCNC: 8.6 MG/DL (ref 8.6–10.2)
CHLORIDE SERPL-SCNC: 105 MMOL/L (ref 98–107)
CO2 SERPL-SCNC: 26 MMOL/L (ref 22–29)
CREAT SERPL-MCNC: 1.1 MG/DL (ref 0.7–1.2)
ERYTHROCYTE [DISTWIDTH] IN BLOOD BY AUTOMATED COUNT: 14.3 % (ref 11.5–15)
GFR, ESTIMATED: 66 ML/MIN/1.73M2
GLUCOSE BLD-MCNC: 159 MG/DL (ref 74–99)
GLUCOSE BLD-MCNC: 161 MG/DL (ref 74–99)
GLUCOSE BLD-MCNC: 194 MG/DL (ref 74–99)
GLUCOSE BLD-MCNC: 210 MG/DL (ref 74–99)
GLUCOSE SERPL-MCNC: 142 MG/DL (ref 74–99)
HCT VFR BLD AUTO: 27.3 % (ref 37–54)
HGB BLD-MCNC: 8.5 G/DL (ref 12.5–16.5)
MAGNESIUM SERPL-MCNC: 2.4 MG/DL (ref 1.6–2.6)
MCH RBC QN AUTO: 31 PG (ref 26–35)
MCHC RBC AUTO-ENTMCNC: 31.1 G/DL (ref 32–34.5)
MCV RBC AUTO: 99.6 FL (ref 80–99.9)
PLATELET # BLD AUTO: 232 K/UL (ref 130–450)
PMV BLD AUTO: 12.5 FL (ref 7–12)
POTASSIUM SERPL-SCNC: 3.7 MMOL/L (ref 3.5–5)
PROT SERPL-MCNC: 5.4 G/DL (ref 6.4–8.3)
RBC # BLD AUTO: 2.74 M/UL (ref 3.8–5.8)
SODIUM SERPL-SCNC: 141 MMOL/L (ref 132–146)
WBC OTHER # BLD: 14.1 K/UL (ref 4.5–11.5)

## 2024-10-16 PROCEDURE — 36415 COLL VENOUS BLD VENIPUNCTURE: CPT

## 2024-10-16 PROCEDURE — 97530 THERAPEUTIC ACTIVITIES: CPT

## 2024-10-16 PROCEDURE — 83735 ASSAY OF MAGNESIUM: CPT

## 2024-10-16 PROCEDURE — 6360000002 HC RX W HCPCS

## 2024-10-16 PROCEDURE — 6370000000 HC RX 637 (ALT 250 FOR IP): Performed by: NURSE PRACTITIONER

## 2024-10-16 PROCEDURE — 2580000003 HC RX 258: Performed by: NURSE PRACTITIONER

## 2024-10-16 PROCEDURE — 80053 COMPREHEN METABOLIC PANEL: CPT

## 2024-10-16 PROCEDURE — 82962 GLUCOSE BLOOD TEST: CPT

## 2024-10-16 PROCEDURE — 6370000000 HC RX 637 (ALT 250 FOR IP): Performed by: PHYSICIAN ASSISTANT

## 2024-10-16 PROCEDURE — 85027 COMPLETE CBC AUTOMATED: CPT

## 2024-10-16 PROCEDURE — 6370000000 HC RX 637 (ALT 250 FOR IP): Performed by: INTERNAL MEDICINE

## 2024-10-16 PROCEDURE — 99232 SBSQ HOSP IP/OBS MODERATE 35: CPT | Performed by: INTERNAL MEDICINE

## 2024-10-16 PROCEDURE — 94660 CPAP INITIATION&MGMT: CPT

## 2024-10-16 PROCEDURE — 71045 X-RAY EXAM CHEST 1 VIEW: CPT

## 2024-10-16 PROCEDURE — 2580000003 HC RX 258

## 2024-10-16 PROCEDURE — 6360000002 HC RX W HCPCS: Performed by: NURSE PRACTITIONER

## 2024-10-16 PROCEDURE — P9047 ALBUMIN (HUMAN), 25%, 50ML: HCPCS

## 2024-10-16 PROCEDURE — 97535 SELF CARE MNGMENT TRAINING: CPT

## 2024-10-16 PROCEDURE — 2140000000 HC CCU INTERMEDIATE R&B

## 2024-10-16 PROCEDURE — 2700000000 HC OXYGEN THERAPY PER DAY

## 2024-10-16 PROCEDURE — 92610 EVALUATE SWALLOWING FUNCTION: CPT

## 2024-10-16 PROCEDURE — 94640 AIRWAY INHALATION TREATMENT: CPT

## 2024-10-16 RX ORDER — PANTOPRAZOLE SODIUM 40 MG/1
40 TABLET, DELAYED RELEASE ORAL
Status: DISCONTINUED | OUTPATIENT
Start: 2024-10-17 | End: 2024-10-21 | Stop reason: HOSPADM

## 2024-10-16 RX ORDER — ALBUMIN (HUMAN) 12.5 G/50ML
25 SOLUTION INTRAVENOUS ONCE
Status: COMPLETED | OUTPATIENT
Start: 2024-10-16 | End: 2024-10-16

## 2024-10-16 RX ORDER — BUMETANIDE 0.25 MG/ML
1 INJECTION, SOLUTION INTRAMUSCULAR; INTRAVENOUS ONCE
Status: COMPLETED | OUTPATIENT
Start: 2024-10-16 | End: 2024-10-16

## 2024-10-16 RX ADMIN — ALBUMIN (HUMAN) 25 G: 0.25 INJECTION, SOLUTION INTRAVENOUS at 09:14

## 2024-10-16 RX ADMIN — BUMETANIDE 1 MG: 0.25 INJECTION INTRAMUSCULAR; INTRAVENOUS at 09:17

## 2024-10-16 RX ADMIN — FOLIC ACID 1 MG: 1 TABLET ORAL at 08:28

## 2024-10-16 RX ADMIN — GUAIFENESIN 400 MG: 400 TABLET ORAL at 08:28

## 2024-10-16 RX ADMIN — CARVEDILOL 3.12 MG: 3.12 TABLET, FILM COATED ORAL at 18:01

## 2024-10-16 RX ADMIN — INSULIN LISPRO 3 UNITS: 100 INJECTION, SOLUTION INTRAVENOUS; SUBCUTANEOUS at 08:27

## 2024-10-16 RX ADMIN — ALBUTEROL SULFATE 2.5 MG: 2.5 SOLUTION RESPIRATORY (INHALATION) at 12:57

## 2024-10-16 RX ADMIN — INSULIN LISPRO 3 UNITS: 100 INJECTION, SOLUTION INTRAVENOUS; SUBCUTANEOUS at 18:01

## 2024-10-16 RX ADMIN — PIPERACILLIN AND TAZOBACTAM 4500 MG: 4; .5 INJECTION, POWDER, FOR SOLUTION INTRAVENOUS at 12:02

## 2024-10-16 RX ADMIN — FERROUS SULFATE TAB 325 MG (65 MG ELEMENTAL FE) 325 MG: 325 (65 FE) TAB at 08:28

## 2024-10-16 RX ADMIN — GUAIFENESIN 400 MG: 400 TABLET ORAL at 13:29

## 2024-10-16 RX ADMIN — PIPERACILLIN AND TAZOBACTAM 3375 MG: 3; .375 INJECTION, POWDER, LYOPHILIZED, FOR SOLUTION INTRAVENOUS at 18:08

## 2024-10-16 RX ADMIN — SODIUM CHLORIDE, PRESERVATIVE FREE 10 ML: 5 INJECTION INTRAVENOUS at 22:26

## 2024-10-16 RX ADMIN — AMIODARONE HYDROCHLORIDE 400 MG: 200 TABLET ORAL at 08:28

## 2024-10-16 RX ADMIN — INSULIN GLARGINE 10 UNITS: 100 INJECTION, SOLUTION SUBCUTANEOUS at 22:25

## 2024-10-16 RX ADMIN — TAMSULOSIN HYDROCHLORIDE 0.4 MG: 0.4 CAPSULE ORAL at 08:28

## 2024-10-16 RX ADMIN — ENOXAPARIN SODIUM 40 MG: 100 INJECTION SUBCUTANEOUS at 08:28

## 2024-10-16 RX ADMIN — CARVEDILOL 3.12 MG: 3.12 TABLET, FILM COATED ORAL at 08:28

## 2024-10-16 RX ADMIN — OXYCODONE HYDROCHLORIDE 5 MG: 5 TABLET ORAL at 15:49

## 2024-10-16 RX ADMIN — PROCHLORPERAZINE EDISYLATE 10 MG: 5 INJECTION INTRAMUSCULAR; INTRAVENOUS at 08:55

## 2024-10-16 RX ADMIN — AMIODARONE HYDROCHLORIDE 400 MG: 200 TABLET ORAL at 22:25

## 2024-10-16 RX ADMIN — INSULIN LISPRO 3 UNITS: 100 INJECTION, SOLUTION INTRAVENOUS; SUBCUTANEOUS at 12:02

## 2024-10-16 RX ADMIN — Medication 500 MG: at 08:29

## 2024-10-16 RX ADMIN — GUAIFENESIN 400 MG: 400 TABLET ORAL at 22:26

## 2024-10-16 RX ADMIN — ALBUTEROL SULFATE 2.5 MG: 2.5 SOLUTION RESPIRATORY (INHALATION) at 19:54

## 2024-10-16 RX ADMIN — INSULIN LISPRO 1 UNITS: 100 INJECTION, SOLUTION INTRAVENOUS; SUBCUTANEOUS at 22:25

## 2024-10-16 RX ADMIN — Medication 400 MG: at 22:26

## 2024-10-16 RX ADMIN — FERROUS SULFATE TAB 325 MG (65 MG ELEMENTAL FE) 325 MG: 325 (65 FE) TAB at 18:01

## 2024-10-16 RX ADMIN — ROSUVASTATIN 40 MG: 20 TABLET, FILM COATED ORAL at 08:27

## 2024-10-16 RX ADMIN — INSULIN LISPRO 2 UNITS: 100 INJECTION, SOLUTION INTRAVENOUS; SUBCUTANEOUS at 18:01

## 2024-10-16 RX ADMIN — Medication 400 MG: at 08:28

## 2024-10-16 RX ADMIN — SODIUM CHLORIDE, PRESERVATIVE FREE 10 ML: 5 INJECTION INTRAVENOUS at 08:28

## 2024-10-16 RX ADMIN — SENNOSIDES AND DOCUSATE SODIUM 1 TABLET: 50; 8.6 TABLET ORAL at 22:26

## 2024-10-16 RX ADMIN — ASPIRIN 81 MG: 81 TABLET, COATED ORAL at 08:27

## 2024-10-16 RX ADMIN — OXYCODONE HYDROCHLORIDE 5 MG: 5 TABLET ORAL at 06:44

## 2024-10-16 RX ADMIN — ALBUTEROL SULFATE 2.5 MG: 2.5 SOLUTION RESPIRATORY (INHALATION) at 16:52

## 2024-10-16 RX ADMIN — Medication 500 MG: at 22:25

## 2024-10-16 ASSESSMENT — PAIN DESCRIPTION - LOCATION
LOCATION: SHOULDER
LOCATION: INCISION

## 2024-10-16 ASSESSMENT — PAIN - FUNCTIONAL ASSESSMENT
PAIN_FUNCTIONAL_ASSESSMENT: ACTIVITIES ARE NOT PREVENTED
PAIN_FUNCTIONAL_ASSESSMENT: PREVENTS OR INTERFERES SOME ACTIVE ACTIVITIES AND ADLS

## 2024-10-16 ASSESSMENT — PAIN DESCRIPTION - DESCRIPTORS
DESCRIPTORS: ACHING;DISCOMFORT;SORE
DESCRIPTORS: ACHING;SPASM;THROBBING

## 2024-10-16 ASSESSMENT — PAIN SCALES - GENERAL
PAINLEVEL_OUTOF10: 5
PAINLEVEL_OUTOF10: 0
PAINLEVEL_OUTOF10: 6
PAINLEVEL_OUTOF10: 4
PAINLEVEL_OUTOF10: 0

## 2024-10-16 ASSESSMENT — PAIN DESCRIPTION - ORIENTATION
ORIENTATION: LEFT
ORIENTATION: LEFT

## 2024-10-16 NOTE — PATIENT CARE CONFERENCE
[facility] Quality Flow/Interdisciplinary Rounds Progress Note        Quality Flow Rounds held on October 16, 2024    Disciplines Attending:  Bedside Nurse, , , and Nursing Unit Leadership    Pantera Payne was admitted on 9/26/2024  5:48 PM    Anticipated Discharge Date:  Expected Discharge Date: 10/09/24    Disposition:    Isidro Score:  Isidro Scale Score: 19    Readmission Risk              Risk of Unplanned Readmission:  35           Discussed patient goal for the day, patient clinical progression, and barriers to discharge.  The following Goal(s) of the Day/Commitment(s) have been identified:  ambulate/discharge planning       Jojo Rodrigues RN  October 16, 2024

## 2024-10-16 NOTE — CARE COORDINATION
Transition of care update: POD#8 CABG x 3. CT and wires cont. EF 20-25%- life vest ordered 10/15. [facility] notified of life vest order on 10/15. Wbc 14.1 and other labs noted. IV zosyn 3,375mg q 8 hrs. Iv albumin 25g x 1, iv bumex 1mg x 1. SLP eval and treat. O2 at 2l/nc. Met with pt and pt's daughter, Heather, in room. Heather asked about therapies working with pt. PT/OT saw pt on 10/14. Will ask therapies to work with pt today. Discharge goal is to return is to return home when medically ready. Pt will have family with him 24/7. [facility] is setup. Will continue to work on ben just in case pt is unable to ambulate 400ft. Heather said pt's wife has ben list at home. Cm/sw will follow.

## 2024-10-17 ENCOUNTER — APPOINTMENT (OUTPATIENT)
Dept: GENERAL RADIOLOGY | Age: 79
DRG: 233 | End: 2024-10-17
Payer: MEDICARE

## 2024-10-17 LAB
ALBUMIN SERPL-MCNC: 3.3 G/DL (ref 3.5–5.2)
ALP SERPL-CCNC: 59 U/L (ref 40–129)
ALT SERPL-CCNC: 48 U/L (ref 0–40)
ANION GAP SERPL CALCULATED.3IONS-SCNC: 11 MMOL/L (ref 7–16)
AST SERPL-CCNC: 37 U/L (ref 0–39)
BILIRUB SERPL-MCNC: 1.2 MG/DL (ref 0–1.2)
BUN SERPL-MCNC: 29 MG/DL (ref 6–23)
CALCIUM SERPL-MCNC: 8.2 MG/DL (ref 8.6–10.2)
CHLORIDE SERPL-SCNC: 102 MMOL/L (ref 98–107)
CO2 SERPL-SCNC: 23 MMOL/L (ref 22–29)
CREAT SERPL-MCNC: 1.1 MG/DL (ref 0.7–1.2)
ERYTHROCYTE [DISTWIDTH] IN BLOOD BY AUTOMATED COUNT: 14.3 % (ref 11.5–15)
GFR, ESTIMATED: 67 ML/MIN/1.73M2
GLUCOSE BLD-MCNC: 184 MG/DL (ref 74–99)
GLUCOSE BLD-MCNC: 202 MG/DL (ref 74–99)
GLUCOSE BLD-MCNC: 218 MG/DL (ref 74–99)
GLUCOSE BLD-MCNC: 252 MG/DL (ref 74–99)
GLUCOSE SERPL-MCNC: 175 MG/DL (ref 74–99)
HCT VFR BLD AUTO: 25.8 % (ref 37–54)
HGB BLD-MCNC: 8.2 G/DL (ref 12.5–16.5)
MAGNESIUM SERPL-MCNC: 2.2 MG/DL (ref 1.6–2.6)
MCH RBC QN AUTO: 31.3 PG (ref 26–35)
MCHC RBC AUTO-ENTMCNC: 31.8 G/DL (ref 32–34.5)
MCV RBC AUTO: 98.5 FL (ref 80–99.9)
PLATELET # BLD AUTO: 242 K/UL (ref 130–450)
PMV BLD AUTO: 12.4 FL (ref 7–12)
POTASSIUM SERPL-SCNC: 3.8 MMOL/L (ref 3.5–5)
PROT SERPL-MCNC: 5.5 G/DL (ref 6.4–8.3)
RBC # BLD AUTO: 2.62 M/UL (ref 3.8–5.8)
SODIUM SERPL-SCNC: 136 MMOL/L (ref 132–146)
WBC OTHER # BLD: 13.2 K/UL (ref 4.5–11.5)

## 2024-10-17 PROCEDURE — 6360000002 HC RX W HCPCS: Performed by: NURSE PRACTITIONER

## 2024-10-17 PROCEDURE — 2140000000 HC CCU INTERMEDIATE R&B

## 2024-10-17 PROCEDURE — 82962 GLUCOSE BLOOD TEST: CPT

## 2024-10-17 PROCEDURE — 6370000000 HC RX 637 (ALT 250 FOR IP): Performed by: PHYSICIAN ASSISTANT

## 2024-10-17 PROCEDURE — 6360000002 HC RX W HCPCS

## 2024-10-17 PROCEDURE — 2580000003 HC RX 258

## 2024-10-17 PROCEDURE — 2580000003 HC RX 258: Performed by: NURSE PRACTITIONER

## 2024-10-17 PROCEDURE — 74018 RADEX ABDOMEN 1 VIEW: CPT

## 2024-10-17 PROCEDURE — 99232 SBSQ HOSP IP/OBS MODERATE 35: CPT | Performed by: INTERNAL MEDICINE

## 2024-10-17 PROCEDURE — 36415 COLL VENOUS BLD VENIPUNCTURE: CPT

## 2024-10-17 PROCEDURE — 2700000000 HC OXYGEN THERAPY PER DAY

## 2024-10-17 PROCEDURE — 83735 ASSAY OF MAGNESIUM: CPT

## 2024-10-17 PROCEDURE — 80053 COMPREHEN METABOLIC PANEL: CPT

## 2024-10-17 PROCEDURE — 85027 COMPLETE CBC AUTOMATED: CPT

## 2024-10-17 PROCEDURE — 93798 PHYS/QHP OP CAR RHAB W/ECG: CPT

## 2024-10-17 PROCEDURE — 6370000000 HC RX 637 (ALT 250 FOR IP): Performed by: INTERNAL MEDICINE

## 2024-10-17 PROCEDURE — 6370000000 HC RX 637 (ALT 250 FOR IP): Performed by: NURSE PRACTITIONER

## 2024-10-17 PROCEDURE — 6370000000 HC RX 637 (ALT 250 FOR IP)

## 2024-10-17 PROCEDURE — 94640 AIRWAY INHALATION TREATMENT: CPT

## 2024-10-17 PROCEDURE — 94660 CPAP INITIATION&MGMT: CPT

## 2024-10-17 RX ORDER — BUMETANIDE 0.25 MG/ML
1 INJECTION, SOLUTION INTRAMUSCULAR; INTRAVENOUS ONCE
Status: COMPLETED | OUTPATIENT
Start: 2024-10-17 | End: 2024-10-17

## 2024-10-17 RX ORDER — LOSARTAN POTASSIUM 25 MG/1
25 TABLET ORAL DAILY
Status: DISCONTINUED | OUTPATIENT
Start: 2024-10-17 | End: 2024-10-17

## 2024-10-17 RX ORDER — LISINOPRIL 2.5 MG/1
2.5 TABLET ORAL DAILY
Status: DISCONTINUED | OUTPATIENT
Start: 2024-10-18 | End: 2024-10-21 | Stop reason: HOSPADM

## 2024-10-17 RX ORDER — INSULIN GLARGINE 100 [IU]/ML
12 INJECTION, SOLUTION SUBCUTANEOUS NIGHTLY
Status: DISCONTINUED | OUTPATIENT
Start: 2024-10-17 | End: 2024-10-18

## 2024-10-17 RX ORDER — INSULIN LISPRO 100 [IU]/ML
4 INJECTION, SOLUTION INTRAVENOUS; SUBCUTANEOUS
Status: DISCONTINUED | OUTPATIENT
Start: 2024-10-17 | End: 2024-10-18

## 2024-10-17 RX ADMIN — SENNOSIDES AND DOCUSATE SODIUM 1 TABLET: 50; 8.6 TABLET ORAL at 07:50

## 2024-10-17 RX ADMIN — FOLIC ACID 1 MG: 1 TABLET ORAL at 07:50

## 2024-10-17 RX ADMIN — ALBUTEROL SULFATE 2.5 MG: 2.5 SOLUTION RESPIRATORY (INHALATION) at 20:51

## 2024-10-17 RX ADMIN — ALBUTEROL SULFATE 2.5 MG: 2.5 SOLUTION RESPIRATORY (INHALATION) at 16:28

## 2024-10-17 RX ADMIN — FERROUS SULFATE TAB 325 MG (65 MG ELEMENTAL FE) 325 MG: 325 (65 FE) TAB at 16:32

## 2024-10-17 RX ADMIN — PIPERACILLIN AND TAZOBACTAM 3375 MG: 3; .375 INJECTION, POWDER, LYOPHILIZED, FOR SOLUTION INTRAVENOUS at 00:44

## 2024-10-17 RX ADMIN — ALBUTEROL SULFATE 2.5 MG: 2.5 SOLUTION RESPIRATORY (INHALATION) at 12:10

## 2024-10-17 RX ADMIN — SODIUM CHLORIDE, PRESERVATIVE FREE 10 ML: 5 INJECTION INTRAVENOUS at 07:54

## 2024-10-17 RX ADMIN — LOSARTAN POTASSIUM 25 MG: 25 TABLET, FILM COATED ORAL at 09:24

## 2024-10-17 RX ADMIN — CARVEDILOL 3.12 MG: 3.12 TABLET, FILM COATED ORAL at 16:32

## 2024-10-17 RX ADMIN — GUAIFENESIN 400 MG: 400 TABLET ORAL at 14:15

## 2024-10-17 RX ADMIN — ALBUTEROL SULFATE 2.5 MG: 2.5 SOLUTION RESPIRATORY (INHALATION) at 08:28

## 2024-10-17 RX ADMIN — PROCHLORPERAZINE EDISYLATE 10 MG: 5 INJECTION INTRAMUSCULAR; INTRAVENOUS at 20:49

## 2024-10-17 RX ADMIN — AMIODARONE HYDROCHLORIDE 400 MG: 200 TABLET ORAL at 07:50

## 2024-10-17 RX ADMIN — CARVEDILOL 3.12 MG: 3.12 TABLET, FILM COATED ORAL at 07:51

## 2024-10-17 RX ADMIN — INSULIN LISPRO 3 UNITS: 100 INJECTION, SOLUTION INTRAVENOUS; SUBCUTANEOUS at 12:21

## 2024-10-17 RX ADMIN — PANTOPRAZOLE SODIUM 40 MG: 40 TABLET, DELAYED RELEASE ORAL at 05:28

## 2024-10-17 RX ADMIN — INSULIN LISPRO 3 UNITS: 100 INJECTION, SOLUTION INTRAVENOUS; SUBCUTANEOUS at 07:51

## 2024-10-17 RX ADMIN — SODIUM CHLORIDE, PRESERVATIVE FREE 10 ML: 5 INJECTION INTRAVENOUS at 20:51

## 2024-10-17 RX ADMIN — INSULIN LISPRO 4 UNITS: 100 INJECTION, SOLUTION INTRAVENOUS; SUBCUTANEOUS at 16:32

## 2024-10-17 RX ADMIN — TAMSULOSIN HYDROCHLORIDE 0.4 MG: 0.4 CAPSULE ORAL at 07:51

## 2024-10-17 RX ADMIN — INSULIN GLARGINE 12 UNITS: 100 INJECTION, SOLUTION SUBCUTANEOUS at 20:50

## 2024-10-17 RX ADMIN — GUAIFENESIN 400 MG: 400 TABLET ORAL at 07:50

## 2024-10-17 RX ADMIN — PIPERACILLIN AND TAZOBACTAM 3375 MG: 3; .375 INJECTION, POWDER, LYOPHILIZED, FOR SOLUTION INTRAVENOUS at 07:55

## 2024-10-17 RX ADMIN — SODIUM CHLORIDE, PRESERVATIVE FREE 10 ML: 5 INJECTION INTRAVENOUS at 12:21

## 2024-10-17 RX ADMIN — Medication 500 MG: at 07:50

## 2024-10-17 RX ADMIN — INSULIN LISPRO 2 UNITS: 100 INJECTION, SOLUTION INTRAVENOUS; SUBCUTANEOUS at 07:51

## 2024-10-17 RX ADMIN — BUMETANIDE 1 MG: 0.25 INJECTION INTRAMUSCULAR; INTRAVENOUS at 09:24

## 2024-10-17 RX ADMIN — INSULIN LISPRO 2 UNITS: 100 INJECTION, SOLUTION INTRAVENOUS; SUBCUTANEOUS at 12:22

## 2024-10-17 RX ADMIN — FERROUS SULFATE TAB 325 MG (65 MG ELEMENTAL FE) 325 MG: 325 (65 FE) TAB at 07:51

## 2024-10-17 RX ADMIN — INSULIN LISPRO 1 UNITS: 100 INJECTION, SOLUTION INTRAVENOUS; SUBCUTANEOUS at 20:50

## 2024-10-17 RX ADMIN — POTASSIUM CHLORIDE 40 MEQ: 1500 TABLET, EXTENDED RELEASE ORAL at 07:53

## 2024-10-17 RX ADMIN — ENOXAPARIN SODIUM 40 MG: 100 INJECTION SUBCUTANEOUS at 07:51

## 2024-10-17 RX ADMIN — PIPERACILLIN AND TAZOBACTAM 3375 MG: 3; .375 INJECTION, POWDER, LYOPHILIZED, FOR SOLUTION INTRAVENOUS at 16:32

## 2024-10-17 RX ADMIN — ASPIRIN 81 MG: 81 TABLET, COATED ORAL at 07:50

## 2024-10-17 RX ADMIN — Medication 400 MG: at 07:50

## 2024-10-17 NOTE — CARE COORDINATION
Transition of care update: POD#9 CABG x 3. CT and wires cont. Hgb 8.2 and other labs noted. IV Zosyn 3,375mg q 8 hrs, iv bumex 1mg x 1. Prinivil 2.5mg daily. Life vest ordered- [facility] aware. Met with pt and pt's wife in room. [facility] choices are #! [facility], #2 [facility] #3 [facility] and #4 [facility]. Referral was given to Ratna with [facility]. Will have [facility] continue to follow pt. Cm/sw will follow.

## 2024-10-17 NOTE — PATIENT CARE CONFERENCE
[facility] Quality Flow/Interdisciplinary Rounds Progress Note        Quality Flow Rounds held on October 17, 2024    Disciplines Attending:  Bedside Nurse, , , and Nursing Unit Leadership    Pantera Payne was admitted on 9/26/2024  5:48 PM    Anticipated Discharge Date:  Expected Discharge Date: 10/09/24    Disposition:    Isidro Score:  Isidro Scale Score: 19    Readmission Risk              Risk of Unplanned Readmission:  30           Discussed patient goal for the day, patient clinical progression, and barriers to discharge.  The following Goal(s) of the Day/Commitment(s) have been identified:  ambulate/discharge planning       Jojo Rodrigues RN  October 17, 2024

## 2024-10-18 LAB
ALBUMIN SERPL-MCNC: 3.1 G/DL (ref 3.5–5.2)
ALP SERPL-CCNC: 57 U/L (ref 40–129)
ALT SERPL-CCNC: 43 U/L (ref 0–40)
ANION GAP SERPL CALCULATED.3IONS-SCNC: 12 MMOL/L (ref 7–16)
AST SERPL-CCNC: 35 U/L (ref 0–39)
BILIRUB SERPL-MCNC: 1.1 MG/DL (ref 0–1.2)
BUN SERPL-MCNC: 30 MG/DL (ref 6–23)
CALCIUM SERPL-MCNC: 8.4 MG/DL (ref 8.6–10.2)
CHLORIDE SERPL-SCNC: 103 MMOL/L (ref 98–107)
CO2 SERPL-SCNC: 24 MMOL/L (ref 22–29)
CREAT SERPL-MCNC: 1.1 MG/DL (ref 0.7–1.2)
ERYTHROCYTE [DISTWIDTH] IN BLOOD BY AUTOMATED COUNT: 14.5 % (ref 11.5–15)
GFR, ESTIMATED: 66 ML/MIN/1.73M2
GLUCOSE BLD-MCNC: 193 MG/DL (ref 74–99)
GLUCOSE BLD-MCNC: 257 MG/DL (ref 74–99)
GLUCOSE BLD-MCNC: 269 MG/DL (ref 74–99)
GLUCOSE BLD-MCNC: 365 MG/DL (ref 74–99)
GLUCOSE SERPL-MCNC: 196 MG/DL (ref 74–99)
HCT VFR BLD AUTO: 25.3 % (ref 37–54)
HGB BLD-MCNC: 8.1 G/DL (ref 12.5–16.5)
MAGNESIUM SERPL-MCNC: 2 MG/DL (ref 1.6–2.6)
MCH RBC QN AUTO: 31.3 PG (ref 26–35)
MCHC RBC AUTO-ENTMCNC: 32 G/DL (ref 32–34.5)
MCV RBC AUTO: 97.7 FL (ref 80–99.9)
PLATELET # BLD AUTO: 241 K/UL (ref 130–450)
PMV BLD AUTO: 12.2 FL (ref 7–12)
POTASSIUM SERPL-SCNC: 3.7 MMOL/L (ref 3.5–5)
PROT SERPL-MCNC: 5.2 G/DL (ref 6.4–8.3)
RBC # BLD AUTO: 2.59 M/UL (ref 3.8–5.8)
SODIUM SERPL-SCNC: 139 MMOL/L (ref 132–146)
WBC OTHER # BLD: 10.1 K/UL (ref 4.5–11.5)

## 2024-10-18 PROCEDURE — 83735 ASSAY OF MAGNESIUM: CPT

## 2024-10-18 PROCEDURE — 99232 SBSQ HOSP IP/OBS MODERATE 35: CPT | Performed by: INTERNAL MEDICINE

## 2024-10-18 PROCEDURE — 6370000000 HC RX 637 (ALT 250 FOR IP): Performed by: NURSE PRACTITIONER

## 2024-10-18 PROCEDURE — 6370000000 HC RX 637 (ALT 250 FOR IP): Performed by: PHYSICIAN ASSISTANT

## 2024-10-18 PROCEDURE — 6360000002 HC RX W HCPCS

## 2024-10-18 PROCEDURE — 82962 GLUCOSE BLOOD TEST: CPT

## 2024-10-18 PROCEDURE — 97530 THERAPEUTIC ACTIVITIES: CPT

## 2024-10-18 PROCEDURE — 94660 CPAP INITIATION&MGMT: CPT

## 2024-10-18 PROCEDURE — 6360000002 HC RX W HCPCS: Performed by: PHYSICIAN ASSISTANT

## 2024-10-18 PROCEDURE — 6370000000 HC RX 637 (ALT 250 FOR IP): Performed by: INTERNAL MEDICINE

## 2024-10-18 PROCEDURE — 6370000000 HC RX 637 (ALT 250 FOR IP)

## 2024-10-18 PROCEDURE — 6360000002 HC RX W HCPCS: Performed by: NURSE PRACTITIONER

## 2024-10-18 PROCEDURE — 94640 AIRWAY INHALATION TREATMENT: CPT

## 2024-10-18 PROCEDURE — 36415 COLL VENOUS BLD VENIPUNCTURE: CPT

## 2024-10-18 PROCEDURE — 85027 COMPLETE CBC AUTOMATED: CPT

## 2024-10-18 PROCEDURE — 2580000003 HC RX 258: Performed by: NURSE PRACTITIONER

## 2024-10-18 PROCEDURE — 80053 COMPREHEN METABOLIC PANEL: CPT

## 2024-10-18 PROCEDURE — 2700000000 HC OXYGEN THERAPY PER DAY

## 2024-10-18 PROCEDURE — 94669 MECHANICAL CHEST WALL OSCILL: CPT

## 2024-10-18 PROCEDURE — 2140000000 HC CCU INTERMEDIATE R&B

## 2024-10-18 PROCEDURE — 2580000003 HC RX 258

## 2024-10-18 PROCEDURE — 97535 SELF CARE MNGMENT TRAINING: CPT

## 2024-10-18 PROCEDURE — 93798 PHYS/QHP OP CAR RHAB W/ECG: CPT

## 2024-10-18 RX ORDER — INSULIN LISPRO 100 [IU]/ML
0-12 INJECTION, SOLUTION INTRAVENOUS; SUBCUTANEOUS
Status: DISCONTINUED | OUTPATIENT
Start: 2024-10-19 | End: 2024-10-21 | Stop reason: HOSPADM

## 2024-10-18 RX ORDER — INSULIN LISPRO 100 [IU]/ML
4 INJECTION, SOLUTION INTRAVENOUS; SUBCUTANEOUS ONCE
Status: COMPLETED | OUTPATIENT
Start: 2024-10-18 | End: 2024-10-18

## 2024-10-18 RX ORDER — INSULIN GLARGINE 100 [IU]/ML
16 INJECTION, SOLUTION SUBCUTANEOUS NIGHTLY
Status: DISCONTINUED | OUTPATIENT
Start: 2024-10-18 | End: 2024-10-20

## 2024-10-18 RX ORDER — METOCLOPRAMIDE HYDROCHLORIDE 5 MG/ML
5 INJECTION INTRAMUSCULAR; INTRAVENOUS
Status: DISCONTINUED | OUTPATIENT
Start: 2024-10-18 | End: 2024-10-21

## 2024-10-18 RX ORDER — BUMETANIDE 0.25 MG/ML
1 INJECTION, SOLUTION INTRAMUSCULAR; INTRAVENOUS ONCE
Status: COMPLETED | OUTPATIENT
Start: 2024-10-18 | End: 2024-10-18

## 2024-10-18 RX ORDER — INSULIN LISPRO 100 [IU]/ML
5 INJECTION, SOLUTION INTRAVENOUS; SUBCUTANEOUS
Status: DISCONTINUED | OUTPATIENT
Start: 2024-10-19 | End: 2024-10-19

## 2024-10-18 RX ADMIN — TAMSULOSIN HYDROCHLORIDE 0.4 MG: 0.4 CAPSULE ORAL at 09:03

## 2024-10-18 RX ADMIN — INSULIN LISPRO 4 UNITS: 100 INJECTION, SOLUTION INTRAVENOUS; SUBCUTANEOUS at 09:06

## 2024-10-18 RX ADMIN — SODIUM CHLORIDE, PRESERVATIVE FREE 10 ML: 5 INJECTION INTRAVENOUS at 12:27

## 2024-10-18 RX ADMIN — ALBUTEROL SULFATE 2.5 MG: 2.5 SOLUTION RESPIRATORY (INHALATION) at 19:12

## 2024-10-18 RX ADMIN — PIPERACILLIN AND TAZOBACTAM 3375 MG: 3; .375 INJECTION, POWDER, LYOPHILIZED, FOR SOLUTION INTRAVENOUS at 18:11

## 2024-10-18 RX ADMIN — SENNOSIDES AND DOCUSATE SODIUM 1 TABLET: 50; 8.6 TABLET ORAL at 09:03

## 2024-10-18 RX ADMIN — AMIODARONE HYDROCHLORIDE 400 MG: 200 TABLET ORAL at 09:04

## 2024-10-18 RX ADMIN — AMIODARONE HYDROCHLORIDE 400 MG: 200 TABLET ORAL at 20:14

## 2024-10-18 RX ADMIN — METOCLOPRAMIDE HYDROCHLORIDE 5 MG: 5 INJECTION, SOLUTION INTRAMUSCULAR; INTRAVENOUS at 12:25

## 2024-10-18 RX ADMIN — CARVEDILOL 3.12 MG: 3.12 TABLET, FILM COATED ORAL at 09:03

## 2024-10-18 RX ADMIN — INSULIN LISPRO 2 UNITS: 100 INJECTION, SOLUTION INTRAVENOUS; SUBCUTANEOUS at 09:06

## 2024-10-18 RX ADMIN — ROSUVASTATIN 40 MG: 20 TABLET, FILM COATED ORAL at 09:04

## 2024-10-18 RX ADMIN — ALBUTEROL SULFATE 2.5 MG: 2.5 SOLUTION RESPIRATORY (INHALATION) at 11:59

## 2024-10-18 RX ADMIN — PANTOPRAZOLE SODIUM 40 MG: 40 TABLET, DELAYED RELEASE ORAL at 05:42

## 2024-10-18 RX ADMIN — INSULIN LISPRO 4 UNITS: 100 INJECTION, SOLUTION INTRAVENOUS; SUBCUTANEOUS at 20:15

## 2024-10-18 RX ADMIN — POTASSIUM CHLORIDE 40 MEQ: 1500 TABLET, EXTENDED RELEASE ORAL at 09:03

## 2024-10-18 RX ADMIN — Medication 400 MG: at 09:04

## 2024-10-18 RX ADMIN — FERROUS SULFATE TAB 325 MG (65 MG ELEMENTAL FE) 325 MG: 325 (65 FE) TAB at 09:04

## 2024-10-18 RX ADMIN — Medication 500 MG: at 09:03

## 2024-10-18 RX ADMIN — ASPIRIN 81 MG: 81 TABLET, COATED ORAL at 09:04

## 2024-10-18 RX ADMIN — GUAIFENESIN 400 MG: 400 TABLET ORAL at 15:41

## 2024-10-18 RX ADMIN — ENOXAPARIN SODIUM 40 MG: 100 INJECTION SUBCUTANEOUS at 09:03

## 2024-10-18 RX ADMIN — FERROUS SULFATE TAB 325 MG (65 MG ELEMENTAL FE) 325 MG: 325 (65 FE) TAB at 18:05

## 2024-10-18 RX ADMIN — INSULIN LISPRO 4 UNITS: 100 INJECTION, SOLUTION INTRAVENOUS; SUBCUTANEOUS at 18:05

## 2024-10-18 RX ADMIN — INSULIN GLARGINE 16 UNITS: 100 INJECTION, SOLUTION SUBCUTANEOUS at 20:14

## 2024-10-18 RX ADMIN — ALBUTEROL SULFATE 2.5 MG: 2.5 SOLUTION RESPIRATORY (INHALATION) at 08:10

## 2024-10-18 RX ADMIN — FOLIC ACID 1 MG: 1 TABLET ORAL at 09:04

## 2024-10-18 RX ADMIN — INSULIN LISPRO 4 UNITS: 100 INJECTION, SOLUTION INTRAVENOUS; SUBCUTANEOUS at 12:27

## 2024-10-18 RX ADMIN — PIPERACILLIN AND TAZOBACTAM 3375 MG: 3; .375 INJECTION, POWDER, LYOPHILIZED, FOR SOLUTION INTRAVENOUS at 09:14

## 2024-10-18 RX ADMIN — SODIUM CHLORIDE, PRESERVATIVE FREE 10 ML: 5 INJECTION INTRAVENOUS at 20:13

## 2024-10-18 RX ADMIN — CARVEDILOL 3.12 MG: 3.12 TABLET, FILM COATED ORAL at 18:05

## 2024-10-18 RX ADMIN — BUMETANIDE 1 MG: 0.25 INJECTION INTRAMUSCULAR; INTRAVENOUS at 12:25

## 2024-10-18 RX ADMIN — INSULIN LISPRO 4 UNITS: 100 INJECTION, SOLUTION INTRAVENOUS; SUBCUTANEOUS at 18:07

## 2024-10-18 RX ADMIN — INSULIN LISPRO 4 UNITS: 100 INJECTION, SOLUTION INTRAVENOUS; SUBCUTANEOUS at 12:26

## 2024-10-18 RX ADMIN — METOCLOPRAMIDE HYDROCHLORIDE 5 MG: 5 INJECTION, SOLUTION INTRAMUSCULAR; INTRAVENOUS at 18:05

## 2024-10-18 RX ADMIN — DIPHENHYDRAMINE HYDROCHLORIDE 25 MG: 25 TABLET ORAL at 01:27

## 2024-10-18 RX ADMIN — Medication 400 MG: at 20:14

## 2024-10-18 RX ADMIN — GUAIFENESIN 400 MG: 400 TABLET ORAL at 09:04

## 2024-10-18 RX ADMIN — OXYCODONE HYDROCHLORIDE 10 MG: 10 TABLET ORAL at 20:13

## 2024-10-18 RX ADMIN — PIPERACILLIN AND TAZOBACTAM 3375 MG: 3; .375 INJECTION, POWDER, LYOPHILIZED, FOR SOLUTION INTRAVENOUS at 01:31

## 2024-10-18 RX ADMIN — GUAIFENESIN 400 MG: 400 TABLET ORAL at 20:14

## 2024-10-18 RX ADMIN — LISINOPRIL 2.5 MG: 2.5 TABLET ORAL at 09:05

## 2024-10-18 RX ADMIN — Medication 500 MG: at 20:15

## 2024-10-18 RX ADMIN — ALBUTEROL SULFATE 2.5 MG: 2.5 SOLUTION RESPIRATORY (INHALATION) at 15:50

## 2024-10-18 ASSESSMENT — PAIN DESCRIPTION - ONSET: ONSET: GRADUAL

## 2024-10-18 ASSESSMENT — PAIN SCALES - GENERAL
PAINLEVEL_OUTOF10: 0
PAINLEVEL_OUTOF10: 7

## 2024-10-18 ASSESSMENT — PAIN DESCRIPTION - DIRECTION: RADIATING_TOWARDS: SHOULDER

## 2024-10-18 ASSESSMENT — PAIN DESCRIPTION - FREQUENCY: FREQUENCY: INTERMITTENT

## 2024-10-18 ASSESSMENT — PAIN DESCRIPTION - LOCATION: LOCATION: STERNUM

## 2024-10-18 ASSESSMENT — PAIN DESCRIPTION - DESCRIPTORS: DESCRIPTORS: SORE;ACHING;DISCOMFORT

## 2024-10-18 ASSESSMENT — PAIN DESCRIPTION - PAIN TYPE: TYPE: ACUTE PAIN;SURGICAL PAIN

## 2024-10-18 ASSESSMENT — PAIN DESCRIPTION - ORIENTATION: ORIENTATION: MID

## 2024-10-18 ASSESSMENT — PAIN - FUNCTIONAL ASSESSMENT: PAIN_FUNCTIONAL_ASSESSMENT: ACTIVITIES ARE NOT PREVENTED

## 2024-10-18 NOTE — PATIENT CARE CONFERENCE
[facility] Quality Flow/Interdisciplinary Rounds Progress Note        Quality Flow Rounds held on October 18, 2024    Disciplines Attending:  Bedside Nurse, , , and Nursing Unit Leadership    Pantera Payne was admitted on 9/26/2024  5:48 PM    Anticipated Discharge Date:  Expected Discharge Date: 10/09/24    Disposition:    Isidro Score:  Isidro Scale Score: 19    Readmission Risk              Risk of Unplanned Readmission:  34           Discussed patient goal for the day, patient clinical progression, and barriers to discharge.  The following Goal(s) of the Day/Commitment(s) have been identified:  Labs - Report Results and to increase activity walk in [place].      Marry Gilman RN  October 18, 2024

## 2024-10-18 NOTE — CARE COORDINATION
Transition of care update: POD#10 CABG x 3. CT x 1 output 400cc/24 hrs and wires cont. EF 20-25%- life vest ordered and will be fitted prior to dc. IV zosyn 3,375 mg q 8 hrs, iv bumex 1mg x 1. Iv reglan 5mg four times daily before meals and nightly. On RA. Labs noted. Plan is ben. [facility] accepted pt. [facility] aware to follow post cardiac surgery rehab protocol, pt will have life vest and to have bipap setup for pt to use at their facility. [facility] can accept pt over the weekend per Alka. No pre cert is needed. Met with pt and 2 family members in room and informed them of acceptance at [facility]. Ambulette form and PASRR completed. Cardiac surgery reahb protocol was placed in transport envelope. Transport envelope was placed with pt's soft chart.     1155  Met with pt and pt's daughter in room. Informed them if pt goes by way of ambulette to [facility] the cost will be a private pay. Pt does not have the criteria for an ambulance. Also, I left a vm with Alka with [facility] to inform her  BiPAP has been set up with [facility], they will need to call them when pt is discharging from [facility].

## 2024-10-18 NOTE — DISCHARGE INSTR - COC
Amount None (dry) 10/18/24 0400   Odor None 10/16/24 1500   Vilma-incision Assessment Intact 10/18/24 0400   Number of days: 10       Incision 10/08/24 Thigh Distal;Left;Medial (Active)   Dressing Status Other (Comment) 10/17/24 0841   Dressing Change Due 10/09/24 10/08/24 2000   Incision Cleansed Cleansed with saline 10/18/24 0400   Dressing/Treatment Open to air 10/18/24 0400   Closure Surgical glue;Sutures 10/18/24 0400   Margins Approximated 10/18/24 0400   Incision Assessment Dry 10/18/24 0400   Drainage Amount None (dry) 10/18/24 0400   Drainage Description Serous 10/13/24 2000   Odor None 10/18/24 0400   Vilma-incision Assessment Intact;Ecchymosis 10/18/24 0400   Number of days: 10        Elimination:  Continence:   Bowel: Yes  Bladder: Yes  Urinary Catheter: None   Colostomy/Ileostomy/Ileal Conduit: No       Date of Last BM: 10/21/24    Intake/Output Summary (Last 24 hours) at 10/18/2024 1141  Last data filed at 10/18/2024 1031  Gross per 24 hour   Intake 300 ml   Output 920 ml   Net -620 ml     I/O last 3 completed shifts:  In: 300 [P.O.:300]  Out: 1240 [Urine:620; Chest Tube:620]    Safety Concerns:     At Risk for Falls    Impairments/Disabilities:      None    Nutrition Therapy:  Current Nutrition Therapy:   - Oral Diet:  General    Routes of Feeding: Oral  Liquids: Thin Liquids  Daily Fluid Restriction: no  Last Modified Barium Swallow with Video (Video Swallowing Test): not done    Treatments at the Time of Hospital Discharge:   Respiratory Treatments: ***  Oxygen Therapy:  is not on home oxygen therapy.  Ventilator:    - No ventilator support    Rehab Therapies: Physical Therapy and Occupational Therapy  Weight Bearing Status/Restrictions: No weight bearing restrictions  Other Medical Equipment (for information only, NOT a DME order):  ***  Other Treatments: PLEASE FOLLOW POST CARDIAC SURGERY REHAB PROTOCOL     Patient's personal belongings (please select all that are sent with patient):  None    RN

## 2024-10-19 LAB
ALBUMIN SERPL-MCNC: 3.2 G/DL (ref 3.5–5.2)
ALP SERPL-CCNC: 65 U/L (ref 40–129)
ALT SERPL-CCNC: 42 U/L (ref 0–40)
ANION GAP SERPL CALCULATED.3IONS-SCNC: 13 MMOL/L (ref 7–16)
AST SERPL-CCNC: 33 U/L (ref 0–39)
BILIRUB SERPL-MCNC: 1.1 MG/DL (ref 0–1.2)
BUN SERPL-MCNC: 32 MG/DL (ref 6–23)
CALCIUM SERPL-MCNC: 8.6 MG/DL (ref 8.6–10.2)
CHLORIDE SERPL-SCNC: 102 MMOL/L (ref 98–107)
CO2 SERPL-SCNC: 25 MMOL/L (ref 22–29)
CREAT SERPL-MCNC: 1.2 MG/DL (ref 0.7–1.2)
ERYTHROCYTE [DISTWIDTH] IN BLOOD BY AUTOMATED COUNT: 15.1 % (ref 11.5–15)
GFR, ESTIMATED: 64 ML/MIN/1.73M2
GLUCOSE BLD-MCNC: 158 MG/DL (ref 74–99)
GLUCOSE BLD-MCNC: 184 MG/DL (ref 74–99)
GLUCOSE BLD-MCNC: 221 MG/DL (ref 74–99)
GLUCOSE BLD-MCNC: 286 MG/DL (ref 74–99)
GLUCOSE SERPL-MCNC: 144 MG/DL (ref 74–99)
HCT VFR BLD AUTO: 29.8 % (ref 37–54)
HGB BLD-MCNC: 9.2 G/DL (ref 12.5–16.5)
MAGNESIUM SERPL-MCNC: 2.2 MG/DL (ref 1.6–2.6)
MCH RBC QN AUTO: 31.2 PG (ref 26–35)
MCHC RBC AUTO-ENTMCNC: 30.9 G/DL (ref 32–34.5)
MCV RBC AUTO: 101 FL (ref 80–99.9)
PLATELET # BLD AUTO: 272 K/UL (ref 130–450)
PMV BLD AUTO: 12.4 FL (ref 7–12)
POTASSIUM SERPL-SCNC: 3.8 MMOL/L (ref 3.5–5)
PROT SERPL-MCNC: 5.5 G/DL (ref 6.4–8.3)
RBC # BLD AUTO: 2.95 M/UL (ref 3.8–5.8)
SODIUM SERPL-SCNC: 140 MMOL/L (ref 132–146)
WBC OTHER # BLD: 10.6 K/UL (ref 4.5–11.5)

## 2024-10-19 PROCEDURE — 6370000000 HC RX 637 (ALT 250 FOR IP): Performed by: INTERNAL MEDICINE

## 2024-10-19 PROCEDURE — 2140000000 HC CCU INTERMEDIATE R&B

## 2024-10-19 PROCEDURE — 6370000000 HC RX 637 (ALT 250 FOR IP): Performed by: PHYSICIAN ASSISTANT

## 2024-10-19 PROCEDURE — 82962 GLUCOSE BLOOD TEST: CPT

## 2024-10-19 PROCEDURE — 6360000002 HC RX W HCPCS: Performed by: NURSE PRACTITIONER

## 2024-10-19 PROCEDURE — 6360000002 HC RX W HCPCS: Performed by: PHYSICIAN ASSISTANT

## 2024-10-19 PROCEDURE — 83735 ASSAY OF MAGNESIUM: CPT

## 2024-10-19 PROCEDURE — 85027 COMPLETE CBC AUTOMATED: CPT

## 2024-10-19 PROCEDURE — 6360000002 HC RX W HCPCS

## 2024-10-19 PROCEDURE — 94660 CPAP INITIATION&MGMT: CPT

## 2024-10-19 PROCEDURE — 6370000000 HC RX 637 (ALT 250 FOR IP): Performed by: NURSE PRACTITIONER

## 2024-10-19 PROCEDURE — 80053 COMPREHEN METABOLIC PANEL: CPT

## 2024-10-19 PROCEDURE — P9047 ALBUMIN (HUMAN), 25%, 50ML: HCPCS | Performed by: PHYSICIAN ASSISTANT

## 2024-10-19 PROCEDURE — 94640 AIRWAY INHALATION TREATMENT: CPT

## 2024-10-19 PROCEDURE — 2580000003 HC RX 258

## 2024-10-19 PROCEDURE — 93798 PHYS/QHP OP CAR RHAB W/ECG: CPT

## 2024-10-19 PROCEDURE — 36415 COLL VENOUS BLD VENIPUNCTURE: CPT

## 2024-10-19 PROCEDURE — 99232 SBSQ HOSP IP/OBS MODERATE 35: CPT | Performed by: INTERNAL MEDICINE

## 2024-10-19 PROCEDURE — 6370000000 HC RX 637 (ALT 250 FOR IP)

## 2024-10-19 PROCEDURE — 2580000003 HC RX 258: Performed by: NURSE PRACTITIONER

## 2024-10-19 RX ORDER — BUMETANIDE 0.25 MG/ML
1 INJECTION, SOLUTION INTRAMUSCULAR; INTRAVENOUS ONCE
Status: DISCONTINUED | OUTPATIENT
Start: 2024-10-19 | End: 2024-10-19

## 2024-10-19 RX ORDER — BUMETANIDE 0.25 MG/ML
1 INJECTION, SOLUTION INTRAMUSCULAR; INTRAVENOUS 2 TIMES DAILY
Status: DISCONTINUED | OUTPATIENT
Start: 2024-10-19 | End: 2024-10-20

## 2024-10-19 RX ORDER — ALBUMIN (HUMAN) 12.5 G/50ML
25 SOLUTION INTRAVENOUS ONCE
Status: COMPLETED | OUTPATIENT
Start: 2024-10-19 | End: 2024-10-19

## 2024-10-19 RX ORDER — INSULIN LISPRO 100 [IU]/ML
6 INJECTION, SOLUTION INTRAVENOUS; SUBCUTANEOUS
Status: DISCONTINUED | OUTPATIENT
Start: 2024-10-19 | End: 2024-10-21 | Stop reason: HOSPADM

## 2024-10-19 RX ADMIN — ALBUTEROL SULFATE 2.5 MG: 2.5 SOLUTION RESPIRATORY (INHALATION) at 15:39

## 2024-10-19 RX ADMIN — INSULIN LISPRO 5 UNITS: 100 INJECTION, SOLUTION INTRAVENOUS; SUBCUTANEOUS at 08:50

## 2024-10-19 RX ADMIN — SENNOSIDES AND DOCUSATE SODIUM 1 TABLET: 50; 8.6 TABLET ORAL at 21:12

## 2024-10-19 RX ADMIN — FERROUS SULFATE TAB 325 MG (65 MG ELEMENTAL FE) 325 MG: 325 (65 FE) TAB at 08:49

## 2024-10-19 RX ADMIN — INSULIN LISPRO 2 UNITS: 100 INJECTION, SOLUTION INTRAVENOUS; SUBCUTANEOUS at 21:22

## 2024-10-19 RX ADMIN — POTASSIUM CHLORIDE 40 MEQ: 1500 TABLET, EXTENDED RELEASE ORAL at 08:47

## 2024-10-19 RX ADMIN — ASPIRIN 81 MG: 81 TABLET, COATED ORAL at 08:48

## 2024-10-19 RX ADMIN — TAMSULOSIN HYDROCHLORIDE 0.4 MG: 0.4 CAPSULE ORAL at 08:48

## 2024-10-19 RX ADMIN — FOLIC ACID 1 MG: 1 TABLET ORAL at 08:49

## 2024-10-19 RX ADMIN — OXYCODONE HYDROCHLORIDE 10 MG: 10 TABLET ORAL at 21:12

## 2024-10-19 RX ADMIN — PANTOPRAZOLE SODIUM 40 MG: 40 TABLET, DELAYED RELEASE ORAL at 06:49

## 2024-10-19 RX ADMIN — AMIODARONE HYDROCHLORIDE 400 MG: 200 TABLET ORAL at 08:48

## 2024-10-19 RX ADMIN — ALBUTEROL SULFATE 2.5 MG: 2.5 SOLUTION RESPIRATORY (INHALATION) at 08:32

## 2024-10-19 RX ADMIN — PIPERACILLIN AND TAZOBACTAM 3375 MG: 3; .375 INJECTION, POWDER, LYOPHILIZED, FOR SOLUTION INTRAVENOUS at 08:59

## 2024-10-19 RX ADMIN — INSULIN GLARGINE 16 UNITS: 100 INJECTION, SOLUTION SUBCUTANEOUS at 21:23

## 2024-10-19 RX ADMIN — INSULIN LISPRO 4 UNITS: 100 INJECTION, SOLUTION INTRAVENOUS; SUBCUTANEOUS at 12:31

## 2024-10-19 RX ADMIN — SODIUM CHLORIDE, PRESERVATIVE FREE 10 ML: 5 INJECTION INTRAVENOUS at 08:49

## 2024-10-19 RX ADMIN — INSULIN LISPRO 2 UNITS: 100 INJECTION, SOLUTION INTRAVENOUS; SUBCUTANEOUS at 08:52

## 2024-10-19 RX ADMIN — FERROUS SULFATE TAB 325 MG (65 MG ELEMENTAL FE) 325 MG: 325 (65 FE) TAB at 17:05

## 2024-10-19 RX ADMIN — Medication 400 MG: at 08:48

## 2024-10-19 RX ADMIN — INSULIN LISPRO 6 UNITS: 100 INJECTION, SOLUTION INTRAVENOUS; SUBCUTANEOUS at 17:05

## 2024-10-19 RX ADMIN — Medication 500 MG: at 08:48

## 2024-10-19 RX ADMIN — INSULIN LISPRO 6 UNITS: 100 INJECTION, SOLUTION INTRAVENOUS; SUBCUTANEOUS at 12:31

## 2024-10-19 RX ADMIN — GUAIFENESIN 400 MG: 400 TABLET ORAL at 14:57

## 2024-10-19 RX ADMIN — GUAIFENESIN 400 MG: 400 TABLET ORAL at 08:48

## 2024-10-19 RX ADMIN — PIPERACILLIN AND TAZOBACTAM 3375 MG: 3; .375 INJECTION, POWDER, LYOPHILIZED, FOR SOLUTION INTRAVENOUS at 01:27

## 2024-10-19 RX ADMIN — AMIODARONE HYDROCHLORIDE 400 MG: 200 TABLET ORAL at 21:11

## 2024-10-19 RX ADMIN — LISINOPRIL 2.5 MG: 2.5 TABLET ORAL at 08:50

## 2024-10-19 RX ADMIN — ALBUMIN (HUMAN) 25 G: 0.25 INJECTION, SOLUTION INTRAVENOUS at 12:35

## 2024-10-19 RX ADMIN — CARVEDILOL 3.12 MG: 3.12 TABLET, FILM COATED ORAL at 17:05

## 2024-10-19 RX ADMIN — PIPERACILLIN AND TAZOBACTAM 3375 MG: 3; .375 INJECTION, POWDER, LYOPHILIZED, FOR SOLUTION INTRAVENOUS at 17:11

## 2024-10-19 RX ADMIN — SENNOSIDES AND DOCUSATE SODIUM 1 TABLET: 50; 8.6 TABLET ORAL at 08:48

## 2024-10-19 RX ADMIN — Medication 500 MG: at 21:18

## 2024-10-19 RX ADMIN — GUAIFENESIN 400 MG: 400 TABLET ORAL at 21:11

## 2024-10-19 RX ADMIN — Medication 400 MG: at 21:12

## 2024-10-19 RX ADMIN — CARVEDILOL 3.12 MG: 3.12 TABLET, FILM COATED ORAL at 08:49

## 2024-10-19 RX ADMIN — BUMETANIDE 1 MG: 0.25 INJECTION INTRAMUSCULAR; INTRAVENOUS at 18:51

## 2024-10-19 RX ADMIN — INSULIN LISPRO 2 UNITS: 100 INJECTION, SOLUTION INTRAVENOUS; SUBCUTANEOUS at 17:05

## 2024-10-19 RX ADMIN — SODIUM CHLORIDE, PRESERVATIVE FREE 10 ML: 5 INJECTION INTRAVENOUS at 21:12

## 2024-10-19 RX ADMIN — ENOXAPARIN SODIUM 40 MG: 100 INJECTION SUBCUTANEOUS at 08:49

## 2024-10-19 ASSESSMENT — PAIN SCALES - GENERAL
PAINLEVEL_OUTOF10: 0
PAINLEVEL_OUTOF10: 7
PAINLEVEL_OUTOF10: 0

## 2024-10-19 ASSESSMENT — PAIN DESCRIPTION - PAIN TYPE: TYPE: ACUTE PAIN;SURGICAL PAIN

## 2024-10-19 ASSESSMENT — PAIN DESCRIPTION - FREQUENCY: FREQUENCY: INTERMITTENT

## 2024-10-19 ASSESSMENT — PAIN DESCRIPTION - DESCRIPTORS: DESCRIPTORS: SORE;ACHING;DISCOMFORT

## 2024-10-19 ASSESSMENT — PAIN DESCRIPTION - ONSET: ONSET: GRADUAL

## 2024-10-19 ASSESSMENT — PAIN DESCRIPTION - LOCATION: LOCATION: STERNUM;SHOULDER

## 2024-10-19 ASSESSMENT — PAIN - FUNCTIONAL ASSESSMENT: PAIN_FUNCTIONAL_ASSESSMENT: ACTIVITIES ARE NOT PREVENTED

## 2024-10-19 ASSESSMENT — PAIN DESCRIPTION - ORIENTATION: ORIENTATION: LEFT

## 2024-10-19 NOTE — PATIENT CARE CONFERENCE
[facility] Quality Flow/Interdisciplinary Rounds Progress Note        Quality Flow Rounds held on October 19, 2024    Disciplines Attending:  Bedside Nurse and Nursing Unit Leadership    Pantera Payne was admitted on 9/26/2024  5:48 PM    Anticipated Discharge Date:  Expected Discharge Date: 10/09/24    Disposition:    Isidro Score:  Isidro Scale Score: 20    Readmission Risk              Risk of Unplanned Readmission:  31           Discussed patient goal for the day, patient clinical progression, and barriers to discharge.  The following Goal(s) of the Day/Commitment(s) have been identified:  Labs - Report Results      Marry Gilman RN  October 19, 2024

## 2024-10-20 LAB
ALBUMIN SERPL-MCNC: 3.1 G/DL (ref 3.5–5.2)
ALP SERPL-CCNC: 67 U/L (ref 40–129)
ALT SERPL-CCNC: 35 U/L (ref 0–40)
ANION GAP SERPL CALCULATED.3IONS-SCNC: 10 MMOL/L (ref 7–16)
AST SERPL-CCNC: 28 U/L (ref 0–39)
BILIRUB SERPL-MCNC: 0.9 MG/DL (ref 0–1.2)
BUN SERPL-MCNC: 27 MG/DL (ref 6–23)
CALCIUM SERPL-MCNC: 8.1 MG/DL (ref 8.6–10.2)
CHLORIDE SERPL-SCNC: 103 MMOL/L (ref 98–107)
CO2 SERPL-SCNC: 23 MMOL/L (ref 22–29)
CREAT SERPL-MCNC: 1.1 MG/DL (ref 0.7–1.2)
EKG ATRIAL RATE: 62 BPM
EKG P AXIS: -40 DEGREES
EKG P-R INTERVAL: 196 MS
EKG Q-T INTERVAL: 524 MS
EKG QRS DURATION: 96 MS
EKG QTC CALCULATION (BAZETT): 531 MS
EKG R AXIS: -27 DEGREES
EKG T AXIS: 19 DEGREES
EKG VENTRICULAR RATE: 62 BPM
ERYTHROCYTE [DISTWIDTH] IN BLOOD BY AUTOMATED COUNT: 15.5 % (ref 11.5–15)
GFR, ESTIMATED: 69 ML/MIN/1.73M2
GLUCOSE BLD-MCNC: 177 MG/DL (ref 74–99)
GLUCOSE BLD-MCNC: 207 MG/DL (ref 74–99)
GLUCOSE BLD-MCNC: 230 MG/DL (ref 74–99)
GLUCOSE BLD-MCNC: 261 MG/DL (ref 74–99)
GLUCOSE SERPL-MCNC: 193 MG/DL (ref 74–99)
HCT VFR BLD AUTO: 27.6 % (ref 37–54)
HGB BLD-MCNC: 8.6 G/DL (ref 12.5–16.5)
MAGNESIUM SERPL-MCNC: 2 MG/DL (ref 1.6–2.6)
MCH RBC QN AUTO: 31.3 PG (ref 26–35)
MCHC RBC AUTO-ENTMCNC: 31.2 G/DL (ref 32–34.5)
MCV RBC AUTO: 100.4 FL (ref 80–99.9)
PLATELET # BLD AUTO: 246 K/UL (ref 130–450)
PMV BLD AUTO: 12.4 FL (ref 7–12)
POTASSIUM SERPL-SCNC: 4.1 MMOL/L (ref 3.5–5)
PROT SERPL-MCNC: 5.3 G/DL (ref 6.4–8.3)
RBC # BLD AUTO: 2.75 M/UL (ref 3.8–5.8)
SODIUM SERPL-SCNC: 136 MMOL/L (ref 132–146)
WBC OTHER # BLD: 9.1 K/UL (ref 4.5–11.5)

## 2024-10-20 PROCEDURE — 6360000002 HC RX W HCPCS

## 2024-10-20 PROCEDURE — 2580000003 HC RX 258: Performed by: NURSE PRACTITIONER

## 2024-10-20 PROCEDURE — 6370000000 HC RX 637 (ALT 250 FOR IP): Performed by: PHYSICIAN ASSISTANT

## 2024-10-20 PROCEDURE — 99232 SBSQ HOSP IP/OBS MODERATE 35: CPT | Performed by: INTERNAL MEDICINE

## 2024-10-20 PROCEDURE — 94640 AIRWAY INHALATION TREATMENT: CPT

## 2024-10-20 PROCEDURE — 36415 COLL VENOUS BLD VENIPUNCTURE: CPT

## 2024-10-20 PROCEDURE — 6360000002 HC RX W HCPCS: Performed by: PHYSICIAN ASSISTANT

## 2024-10-20 PROCEDURE — 94660 CPAP INITIATION&MGMT: CPT

## 2024-10-20 PROCEDURE — 2580000003 HC RX 258

## 2024-10-20 PROCEDURE — 2140000000 HC CCU INTERMEDIATE R&B

## 2024-10-20 PROCEDURE — 6370000000 HC RX 637 (ALT 250 FOR IP)

## 2024-10-20 PROCEDURE — 6370000000 HC RX 637 (ALT 250 FOR IP): Performed by: NURSE PRACTITIONER

## 2024-10-20 PROCEDURE — 94669 MECHANICAL CHEST WALL OSCILL: CPT

## 2024-10-20 PROCEDURE — 82962 GLUCOSE BLOOD TEST: CPT

## 2024-10-20 PROCEDURE — 80053 COMPREHEN METABOLIC PANEL: CPT

## 2024-10-20 PROCEDURE — 6370000000 HC RX 637 (ALT 250 FOR IP): Performed by: INTERNAL MEDICINE

## 2024-10-20 PROCEDURE — 85027 COMPLETE CBC AUTOMATED: CPT

## 2024-10-20 PROCEDURE — 83735 ASSAY OF MAGNESIUM: CPT

## 2024-10-20 PROCEDURE — 93798 PHYS/QHP OP CAR RHAB W/ECG: CPT

## 2024-10-20 RX ORDER — BUMETANIDE 0.25 MG/ML
0.5 INJECTION, SOLUTION INTRAMUSCULAR; INTRAVENOUS ONCE
Status: DISCONTINUED | OUTPATIENT
Start: 2024-10-20 | End: 2024-10-21

## 2024-10-20 RX ORDER — INSULIN GLARGINE 100 [IU]/ML
18 INJECTION, SOLUTION SUBCUTANEOUS NIGHTLY
Status: DISCONTINUED | OUTPATIENT
Start: 2024-10-20 | End: 2024-10-21 | Stop reason: HOSPADM

## 2024-10-20 RX ADMIN — INSULIN GLARGINE 18 UNITS: 100 INJECTION, SOLUTION SUBCUTANEOUS at 20:26

## 2024-10-20 RX ADMIN — SODIUM CHLORIDE, PRESERVATIVE FREE 10 ML: 5 INJECTION INTRAVENOUS at 09:01

## 2024-10-20 RX ADMIN — OXYCODONE HYDROCHLORIDE 5 MG: 5 TABLET ORAL at 20:24

## 2024-10-20 RX ADMIN — ASPIRIN 81 MG: 81 TABLET, COATED ORAL at 09:02

## 2024-10-20 RX ADMIN — SENNOSIDES AND DOCUSATE SODIUM 1 TABLET: 50; 8.6 TABLET ORAL at 20:26

## 2024-10-20 RX ADMIN — PANTOPRAZOLE SODIUM 40 MG: 40 TABLET, DELAYED RELEASE ORAL at 05:34

## 2024-10-20 RX ADMIN — SENNOSIDES AND DOCUSATE SODIUM 1 TABLET: 50; 8.6 TABLET ORAL at 09:02

## 2024-10-20 RX ADMIN — GUAIFENESIN 400 MG: 400 TABLET ORAL at 09:01

## 2024-10-20 RX ADMIN — INSULIN LISPRO 1 UNITS: 100 INJECTION, SOLUTION INTRAVENOUS; SUBCUTANEOUS at 20:45

## 2024-10-20 RX ADMIN — LISINOPRIL 2.5 MG: 2.5 TABLET ORAL at 09:07

## 2024-10-20 RX ADMIN — Medication 400 MG: at 09:02

## 2024-10-20 RX ADMIN — INSULIN LISPRO 6 UNITS: 100 INJECTION, SOLUTION INTRAVENOUS; SUBCUTANEOUS at 16:51

## 2024-10-20 RX ADMIN — INSULIN LISPRO 4 UNITS: 100 INJECTION, SOLUTION INTRAVENOUS; SUBCUTANEOUS at 09:09

## 2024-10-20 RX ADMIN — PIPERACILLIN AND TAZOBACTAM 3375 MG: 3; .375 INJECTION, POWDER, LYOPHILIZED, FOR SOLUTION INTRAVENOUS at 01:12

## 2024-10-20 RX ADMIN — FOLIC ACID 1 MG: 1 TABLET ORAL at 09:02

## 2024-10-20 RX ADMIN — DIPHENHYDRAMINE HYDROCHLORIDE 25 MG: 25 TABLET ORAL at 20:24

## 2024-10-20 RX ADMIN — INSULIN LISPRO 6 UNITS: 100 INJECTION, SOLUTION INTRAVENOUS; SUBCUTANEOUS at 12:07

## 2024-10-20 RX ADMIN — AMIODARONE HYDROCHLORIDE 400 MG: 200 TABLET ORAL at 20:25

## 2024-10-20 RX ADMIN — Medication 500 MG: at 09:01

## 2024-10-20 RX ADMIN — Medication 400 MG: at 20:26

## 2024-10-20 RX ADMIN — PIPERACILLIN AND TAZOBACTAM 3375 MG: 3; .375 INJECTION, POWDER, LYOPHILIZED, FOR SOLUTION INTRAVENOUS at 17:11

## 2024-10-20 RX ADMIN — INSULIN LISPRO 6 UNITS: 100 INJECTION, SOLUTION INTRAVENOUS; SUBCUTANEOUS at 09:07

## 2024-10-20 RX ADMIN — TAMSULOSIN HYDROCHLORIDE 0.4 MG: 0.4 CAPSULE ORAL at 09:02

## 2024-10-20 RX ADMIN — SODIUM CHLORIDE, PRESERVATIVE FREE 10 ML: 5 INJECTION INTRAVENOUS at 20:26

## 2024-10-20 RX ADMIN — METOCLOPRAMIDE HYDROCHLORIDE 5 MG: 5 INJECTION, SOLUTION INTRAMUSCULAR; INTRAVENOUS at 20:25

## 2024-10-20 RX ADMIN — FERROUS SULFATE TAB 325 MG (65 MG ELEMENTAL FE) 325 MG: 325 (65 FE) TAB at 09:01

## 2024-10-20 RX ADMIN — AMIODARONE HYDROCHLORIDE 400 MG: 200 TABLET ORAL at 09:01

## 2024-10-20 RX ADMIN — METOCLOPRAMIDE HYDROCHLORIDE 5 MG: 5 INJECTION, SOLUTION INTRAMUSCULAR; INTRAVENOUS at 16:52

## 2024-10-20 RX ADMIN — ALBUTEROL SULFATE 2.5 MG: 2.5 SOLUTION RESPIRATORY (INHALATION) at 16:35

## 2024-10-20 RX ADMIN — Medication 500 MG: at 20:25

## 2024-10-20 RX ADMIN — FERROUS SULFATE TAB 325 MG (65 MG ELEMENTAL FE) 325 MG: 325 (65 FE) TAB at 16:51

## 2024-10-20 RX ADMIN — GUAIFENESIN 400 MG: 400 TABLET ORAL at 16:51

## 2024-10-20 RX ADMIN — INSULIN LISPRO 6 UNITS: 100 INJECTION, SOLUTION INTRAVENOUS; SUBCUTANEOUS at 16:55

## 2024-10-20 RX ADMIN — INSULIN LISPRO 2 UNITS: 100 INJECTION, SOLUTION INTRAVENOUS; SUBCUTANEOUS at 12:07

## 2024-10-20 RX ADMIN — GUAIFENESIN 400 MG: 400 TABLET ORAL at 20:26

## 2024-10-20 RX ADMIN — ALBUTEROL SULFATE 2.5 MG: 2.5 SOLUTION RESPIRATORY (INHALATION) at 20:46

## 2024-10-20 RX ADMIN — CARVEDILOL 3.12 MG: 3.12 TABLET, FILM COATED ORAL at 16:51

## 2024-10-20 RX ADMIN — CARVEDILOL 3.12 MG: 3.12 TABLET, FILM COATED ORAL at 09:01

## 2024-10-20 RX ADMIN — PIPERACILLIN AND TAZOBACTAM 3375 MG: 3; .375 INJECTION, POWDER, LYOPHILIZED, FOR SOLUTION INTRAVENOUS at 09:15

## 2024-10-20 RX ADMIN — METOCLOPRAMIDE HYDROCHLORIDE 5 MG: 5 INJECTION, SOLUTION INTRAMUSCULAR; INTRAVENOUS at 12:07

## 2024-10-20 ASSESSMENT — PAIN SCALES - GENERAL
PAINLEVEL_OUTOF10: 0
PAINLEVEL_OUTOF10: 0
PAINLEVEL_OUTOF10: 5
PAINLEVEL_OUTOF10: 0

## 2024-10-20 ASSESSMENT — PAIN DESCRIPTION - DESCRIPTORS: DESCRIPTORS: ACHING;DISCOMFORT;SORE;SPASM

## 2024-10-20 ASSESSMENT — PAIN DESCRIPTION - ORIENTATION: ORIENTATION: LEFT

## 2024-10-20 ASSESSMENT — PAIN DESCRIPTION - LOCATION: LOCATION: SHOULDER

## 2024-10-20 NOTE — PATIENT CARE CONFERENCE
P Quality Flow/Interdisciplinary Rounds Progress Note        Quality Flow Rounds held on October 20, 2024    Disciplines Attending:  Bedside Nurse and Nursing Unit Leadership    Pantera Payne was admitted on 9/26/2024  5:48 PM    Anticipated Discharge Date:  Expected Discharge Date: 10/09/24    Disposition:    Isidro Score:  Isidro Scale Score: 20    Readmission Risk              Risk of Unplanned Readmission:  35           Discussed patient goal for the day, patient clinical progression, and barriers to discharge.  The following Goal(s) of the Day/Commitment(s) have been identified:  Labs - Report Results and walk in [place] at least two times today      Marry Gilman RN  October 20, 2024

## 2024-10-21 VITALS
OXYGEN SATURATION: 97 % | TEMPERATURE: 98.2 F | SYSTOLIC BLOOD PRESSURE: 108 MMHG | HEART RATE: 62 BPM | WEIGHT: 198.2 LBS | HEIGHT: 70 IN | DIASTOLIC BLOOD PRESSURE: 57 MMHG | BODY MASS INDEX: 28.37 KG/M2 | RESPIRATION RATE: 18 BRPM

## 2024-10-21 LAB
ALBUMIN SERPL-MCNC: 3.3 G/DL (ref 3.5–5.2)
ALP SERPL-CCNC: 74 U/L (ref 40–129)
ALT SERPL-CCNC: 33 U/L (ref 0–40)
ANION GAP SERPL CALCULATED.3IONS-SCNC: 8 MMOL/L (ref 7–16)
AST SERPL-CCNC: 25 U/L (ref 0–39)
BACTERIA URNS QL MICRO: ABNORMAL
BILIRUB SERPL-MCNC: 0.8 MG/DL (ref 0–1.2)
BILIRUB UR QL STRIP: NEGATIVE
BUN SERPL-MCNC: 24 MG/DL (ref 6–23)
CALCIUM SERPL-MCNC: 8.2 MG/DL (ref 8.6–10.2)
CHLORIDE SERPL-SCNC: 102 MMOL/L (ref 98–107)
CLARITY UR: CLEAR
CO2 SERPL-SCNC: 25 MMOL/L (ref 22–29)
COLOR UR: YELLOW
CREAT SERPL-MCNC: 1 MG/DL (ref 0.7–1.2)
CRYSTALS URNS MICRO: ABNORMAL /HPF
ERYTHROCYTE [DISTWIDTH] IN BLOOD BY AUTOMATED COUNT: 15.4 % (ref 11.5–15)
GFR, ESTIMATED: 75 ML/MIN/1.73M2
GLUCOSE BLD-MCNC: 181 MG/DL (ref 74–99)
GLUCOSE BLD-MCNC: 369 MG/DL (ref 74–99)
GLUCOSE SERPL-MCNC: 185 MG/DL (ref 74–99)
GLUCOSE UR STRIP-MCNC: NEGATIVE MG/DL
HCT VFR BLD AUTO: 28.2 % (ref 37–54)
HGB BLD-MCNC: 8.7 G/DL (ref 12.5–16.5)
HGB UR QL STRIP.AUTO: NEGATIVE
KETONES UR STRIP-MCNC: NEGATIVE MG/DL
LEUKOCYTE ESTERASE UR QL STRIP: NEGATIVE
MAGNESIUM SERPL-MCNC: 2.1 MG/DL (ref 1.6–2.6)
MCH RBC QN AUTO: 30.9 PG (ref 26–35)
MCHC RBC AUTO-ENTMCNC: 30.9 G/DL (ref 32–34.5)
MCV RBC AUTO: 100 FL (ref 80–99.9)
NITRITE UR QL STRIP: NEGATIVE
PH UR STRIP: 5 [PH] (ref 5–9)
PLATELET # BLD AUTO: 247 K/UL (ref 130–450)
PMV BLD AUTO: 12.5 FL (ref 7–12)
POTASSIUM SERPL-SCNC: 4.1 MMOL/L (ref 3.5–5)
PROT SERPL-MCNC: 5.4 G/DL (ref 6.4–8.3)
PROT UR STRIP-MCNC: NEGATIVE MG/DL
RBC # BLD AUTO: 2.82 M/UL (ref 3.8–5.8)
RBC #/AREA URNS HPF: ABNORMAL /HPF
SODIUM SERPL-SCNC: 135 MMOL/L (ref 132–146)
SP GR UR STRIP: 1.02 (ref 1–1.03)
UROBILINOGEN UR STRIP-ACNC: 0.2 EU/DL (ref 0–1)
WBC #/AREA URNS HPF: ABNORMAL /HPF
WBC OTHER # BLD: 10.4 K/UL (ref 4.5–11.5)

## 2024-10-21 PROCEDURE — 6370000000 HC RX 637 (ALT 250 FOR IP): Performed by: NURSE PRACTITIONER

## 2024-10-21 PROCEDURE — 94660 CPAP INITIATION&MGMT: CPT

## 2024-10-21 PROCEDURE — 6360000002 HC RX W HCPCS

## 2024-10-21 PROCEDURE — 82962 GLUCOSE BLOOD TEST: CPT

## 2024-10-21 PROCEDURE — 81001 URINALYSIS AUTO W/SCOPE: CPT

## 2024-10-21 PROCEDURE — 6360000002 HC RX W HCPCS: Performed by: PHYSICIAN ASSISTANT

## 2024-10-21 PROCEDURE — 6360000002 HC RX W HCPCS: Performed by: NURSE PRACTITIONER

## 2024-10-21 PROCEDURE — 6370000000 HC RX 637 (ALT 250 FOR IP): Performed by: PHYSICIAN ASSISTANT

## 2024-10-21 PROCEDURE — 85027 COMPLETE CBC AUTOMATED: CPT

## 2024-10-21 PROCEDURE — 93798 PHYS/QHP OP CAR RHAB W/ECG: CPT

## 2024-10-21 PROCEDURE — 2580000003 HC RX 258: Performed by: NURSE PRACTITIONER

## 2024-10-21 PROCEDURE — 87086 URINE CULTURE/COLONY COUNT: CPT

## 2024-10-21 PROCEDURE — 80053 COMPREHEN METABOLIC PANEL: CPT

## 2024-10-21 PROCEDURE — 2580000003 HC RX 258

## 2024-10-21 PROCEDURE — 36415 COLL VENOUS BLD VENIPUNCTURE: CPT

## 2024-10-21 PROCEDURE — 99232 SBSQ HOSP IP/OBS MODERATE 35: CPT | Performed by: INTERNAL MEDICINE

## 2024-10-21 PROCEDURE — 83735 ASSAY OF MAGNESIUM: CPT

## 2024-10-21 PROCEDURE — 6370000000 HC RX 637 (ALT 250 FOR IP)

## 2024-10-21 PROCEDURE — 94640 AIRWAY INHALATION TREATMENT: CPT

## 2024-10-21 PROCEDURE — 6370000000 HC RX 637 (ALT 250 FOR IP): Performed by: INTERNAL MEDICINE

## 2024-10-21 RX ORDER — LISINOPRIL 2.5 MG/1
2.5 TABLET ORAL DAILY
Qty: 30 TABLET | Refills: 3
Start: 2024-10-21

## 2024-10-21 RX ORDER — PANTOPRAZOLE SODIUM 40 MG/1
40 TABLET, DELAYED RELEASE ORAL
Qty: 30 TABLET | Refills: 0
Start: 2024-10-22

## 2024-10-21 RX ORDER — OXYCODONE AND ACETAMINOPHEN 5; 325 MG/1; MG/1
1 TABLET ORAL EVERY 6 HOURS PRN
Qty: 28 TABLET | Refills: 0 | Status: SHIPPED | OUTPATIENT
Start: 2024-10-21 | End: 2024-10-28

## 2024-10-21 RX ORDER — LANOLIN ALCOHOL/MO/W.PET/CERES
400 CREAM (GRAM) TOPICAL DAILY
Qty: 30 TABLET | Refills: 0
Start: 2024-10-21

## 2024-10-21 RX ORDER — INSULIN ASPART 100 [IU]/ML
INJECTION, SOLUTION INTRAVENOUS; SUBCUTANEOUS
Qty: 5 ADJUSTABLE DOSE PRE-FILLED PEN SYRINGE | Refills: 3 | Status: SHIPPED | OUTPATIENT
Start: 2024-10-21

## 2024-10-21 RX ORDER — CARVEDILOL 3.12 MG/1
3.12 TABLET ORAL 2 TIMES DAILY WITH MEALS
Qty: 60 TABLET | Refills: 3
Start: 2024-10-21

## 2024-10-21 RX ORDER — SENNA AND DOCUSATE SODIUM 50; 8.6 MG/1; MG/1
1 TABLET, FILM COATED ORAL 2 TIMES DAILY
Qty: 60 TABLET | Refills: 0
Start: 2024-10-21

## 2024-10-21 RX ORDER — BUMETANIDE 1 MG/1
1 TABLET ORAL DAILY
Status: DISCONTINUED | OUTPATIENT
Start: 2024-10-21 | End: 2024-10-21 | Stop reason: HOSPADM

## 2024-10-21 RX ORDER — PEN NEEDLE, DIABETIC 32 GX 1/4"
NEEDLE, DISPOSABLE MISCELLANEOUS
Qty: 250 EACH | Refills: 5 | Status: SHIPPED | OUTPATIENT
Start: 2024-10-21

## 2024-10-21 RX ORDER — CEPHALEXIN 500 MG/1
500 CAPSULE ORAL 3 TIMES DAILY
Qty: 15 CAPSULE | Refills: 0
Start: 2024-10-21 | End: 2024-10-26

## 2024-10-21 RX ORDER — AMIODARONE HYDROCHLORIDE 200 MG/1
TABLET ORAL
Qty: 48 TABLET | Refills: 0
Start: 2024-10-21 | End: 2024-11-16

## 2024-10-21 RX ORDER — FOLIC ACID 1 MG/1
1 TABLET ORAL DAILY
Qty: 30 TABLET | Refills: 0
Start: 2024-10-21

## 2024-10-21 RX ORDER — BUMETANIDE 1 MG/1
1 TABLET ORAL DAILY
Qty: 14 TABLET | Refills: 0
Start: 2024-10-21

## 2024-10-21 RX ORDER — TAMSULOSIN HYDROCHLORIDE 0.4 MG/1
0.4 CAPSULE ORAL DAILY
Qty: 30 CAPSULE | Refills: 0
Start: 2024-10-21

## 2024-10-21 RX ORDER — FERROUS SULFATE 325(65) MG
325 TABLET ORAL 2 TIMES DAILY WITH MEALS
Qty: 60 TABLET | Refills: 0
Start: 2024-10-21

## 2024-10-21 RX ADMIN — FERROUS SULFATE TAB 325 MG (65 MG ELEMENTAL FE) 325 MG: 325 (65 FE) TAB at 09:55

## 2024-10-21 RX ADMIN — INSULIN LISPRO 6 UNITS: 100 INJECTION, SOLUTION INTRAVENOUS; SUBCUTANEOUS at 09:56

## 2024-10-21 RX ADMIN — Medication 400 MG: at 09:54

## 2024-10-21 RX ADMIN — INSULIN LISPRO 10 UNITS: 100 INJECTION, SOLUTION INTRAVENOUS; SUBCUTANEOUS at 12:19

## 2024-10-21 RX ADMIN — FOLIC ACID 1 MG: 1 TABLET ORAL at 09:54

## 2024-10-21 RX ADMIN — ROSUVASTATIN 40 MG: 20 TABLET, FILM COATED ORAL at 09:53

## 2024-10-21 RX ADMIN — AMIODARONE HYDROCHLORIDE 400 MG: 200 TABLET ORAL at 09:54

## 2024-10-21 RX ADMIN — PIPERACILLIN AND TAZOBACTAM 3375 MG: 3; .375 INJECTION, POWDER, LYOPHILIZED, FOR SOLUTION INTRAVENOUS at 01:44

## 2024-10-21 RX ADMIN — PANTOPRAZOLE SODIUM 40 MG: 40 TABLET, DELAYED RELEASE ORAL at 06:08

## 2024-10-21 RX ADMIN — SODIUM CHLORIDE, PRESERVATIVE FREE 10 ML: 5 INJECTION INTRAVENOUS at 09:57

## 2024-10-21 RX ADMIN — CARVEDILOL 3.12 MG: 3.12 TABLET, FILM COATED ORAL at 09:55

## 2024-10-21 RX ADMIN — Medication 500 MG: at 09:54

## 2024-10-21 RX ADMIN — ALBUTEROL SULFATE 2.5 MG: 2.5 SOLUTION RESPIRATORY (INHALATION) at 12:38

## 2024-10-21 RX ADMIN — INSULIN LISPRO 6 UNITS: 100 INJECTION, SOLUTION INTRAVENOUS; SUBCUTANEOUS at 12:19

## 2024-10-21 RX ADMIN — ALBUTEROL SULFATE 2.5 MG: 2.5 SOLUTION RESPIRATORY (INHALATION) at 09:13

## 2024-10-21 RX ADMIN — PIPERACILLIN AND TAZOBACTAM 3375 MG: 3; .375 INJECTION, POWDER, LYOPHILIZED, FOR SOLUTION INTRAVENOUS at 10:16

## 2024-10-21 RX ADMIN — TAMSULOSIN HYDROCHLORIDE 0.4 MG: 0.4 CAPSULE ORAL at 09:54

## 2024-10-21 RX ADMIN — LISINOPRIL 2.5 MG: 2.5 TABLET ORAL at 09:55

## 2024-10-21 RX ADMIN — SENNOSIDES AND DOCUSATE SODIUM 1 TABLET: 50; 8.6 TABLET ORAL at 09:54

## 2024-10-21 RX ADMIN — ENOXAPARIN SODIUM 40 MG: 100 INJECTION SUBCUTANEOUS at 12:18

## 2024-10-21 RX ADMIN — GUAIFENESIN 400 MG: 400 TABLET ORAL at 09:55

## 2024-10-21 RX ADMIN — ASPIRIN 81 MG: 81 TABLET, COATED ORAL at 09:54

## 2024-10-21 RX ADMIN — BUMETANIDE 1 MG: 1 TABLET ORAL at 09:55

## 2024-10-21 RX ADMIN — INSULIN LISPRO 2 UNITS: 100 INJECTION, SOLUTION INTRAVENOUS; SUBCUTANEOUS at 09:56

## 2024-10-21 RX ADMIN — MAGNESIUM SULFATE HEPTAHYDRATE 2000 MG: 40 INJECTION, SOLUTION INTRAVENOUS at 10:18

## 2024-10-21 RX ADMIN — METOCLOPRAMIDE HYDROCHLORIDE 5 MG: 5 INJECTION, SOLUTION INTRAMUSCULAR; INTRAVENOUS at 06:09

## 2024-10-21 NOTE — PROGRESS NOTES
Kam Donovan M.D.,[facility]  Ángel Toledo D.O., F.SOL.VINH., [facility]  Jules Mondragon M.D.  Agnieszka Saini M.D.   Milton Nova D.O.  Alcides Damon M.D.         Daily Pulmonary Progress Note    Patient:  Pantera Payne 79 y.o. male MRN: [id number]            Synopsis     We are following patient for follow up from sleep study completed as OP at [facility]     \"CC\" shortness of breath    Code status: Full      Subjective      Patient was seen and examined.  Wore BiPAP settings 21/15 overnight.  Cough improving.   PFTs completed.  FVC 49, FEV1 53%.  DLCO corrected for alveolar volume 67.  Await cardiac MRI viability study.  Repeat echo EF  35-40%.  Results of outpatient sleep study obtained from [facility].  Recommending bilevel 20/15 with backup rate of 12.  Patient family interested in [employer].         Review of Systems:  Constitutional: Denies fever, weight loss, night sweats, and fatigue  Skin: Denies pigmentation, dark lesions, and rashes   HEENT: Denies hearing loss, tinnitus, ear drainage, epistaxis, sore throat, and hoarseness.  Cardiovascular: Denies palpitations, chest pain, and chest pressure.  Respiratory: Denies cough, dyspnea at rest, hemoptysis, apnea, and choking.  Gastrointestinal: Denies nausea, vomiting, poor appetite, diarrhea, heartburn or reflux  Genitourinary: Denies dysuria, frequency, urgency or hematuria  Musculoskeletal: Denies myalgias, muscle weakness, and bone pain  Neurological: Denies dizziness, vertigo, headache, and focal weakness  Psychological: Denies anxiety and depression  Endocrine: Denies heat intolerance and cold intolerance  Hematopoietic/Lymphatic: Denies bleeding problems and blood transfusions    24-hour events:  None    Objective   OBJECTIVE:   /82   Pulse 63   Temp 97.6 °F (36.4 °C) (Oral)   Resp 18   Ht 1.778 m (5' 10\")   Wt 84.8 kg (187 lb)   SpO2 98%   BMI 26.83 kg/m²   SpO2 Readings from Last 1 Encounters:   10/03/24 98%      
           Kam Donovan M.D.,[facility]  Ángel Toledo D.O., F.A.C.OAnneI., [facility]  Jules Mondragon M.D.  Agnieszka Saini M.D.   Milton Nova D.O.  Alcides Damon M.D.         Daily Pulmonary Progress Note    Patient:  Pantera Payne 79 y.o. male MRN: [id number]            Synopsis     We are following patient for CANDICE    \"CC\" shortness of breath    Code status: FULL CODE      Subjective      Patient was seen and examined sitting up in the recliner on room air.  Wife present at the bedside. Speech evaluation complete recommending regular texture diet.    Review of Systems:  Constitutional: Denies fever, weight loss, night sweats, and fatigue  Skin: Denies pigmentation, dark lesions, and rashes   HEENT: Denies hearing loss, tinnitus, ear drainage, epistaxis, sore throat, and hoarseness.  Cardiovascular: Denies palpitations, chest pain, and chest pressure.  Respiratory: Denies cough, dyspnea at rest, hemoptysis, apnea, and choking.  Gastrointestinal: Denies nausea, vomiting, poor appetite, diarrhea, heartburn or reflux  Genitourinary: Denies dysuria, frequency, urgency or hematuria  Musculoskeletal: Denies myalgias, muscle weakness, and bone pain  Neurological: Denies dizziness, vertigo, headache, and focal weakness  Psychological: Denies anxiety and depression  Endocrine: Denies heat intolerance and cold intolerance  Hematopoietic/Lymphatic: Denies bleeding problems and blood transfusions    24-hour events:  No new events    Objective   OBJECTIVE:   /62   Pulse 65   Temp 97.8 °F (36.6 °C) (Oral)   Resp 20   Ht 1.778 m (5' 10\")   Wt 90.3 kg (199 lb)   SpO2 92%   BMI 28.55 kg/m²   SpO2 Readings from Last 1 Encounters:   10/17/24 92%        I/O:    Intake/Output Summary (Last 24 hours) at 10/17/2024 1326  Last data filed at 10/17/2024 0550  Gross per 24 hour   Intake 120 ml   Output 320 ml   Net -200 ml     Vent Information  Ventilator Day(s): 3  Ventilator ID: [id number]  Equipment Changed: NIV 
           Kam Donovan M.D.,[facility]  Ángel Toledo D.O., F.SOL.LELANDOAUSTYN., [facility]  Jules Mondragon M.D.  Agnieszka Saini M.D.   Milton Nova D.O.  Alcides Damon M.D.         Daily Pulmonary Progress Note    Patient:  Pantera Payne 79 y.o. male MRN: [id number]            Synopsis     We are following patient for CANDICE    \"CC\" shortness of breath    Code status: FULL CODE      Subjective      Patient was seen and examined sitting up in the recliner on room air, SpO2 97%.  Doing well. Continues to work with incentive spirometer.    Review of Systems:  Constitutional: Denies fever, weight loss, night sweats, and fatigue  Skin: Denies pigmentation, dark lesions, and rashes   HEENT: Denies hearing loss, tinnitus, ear drainage, epistaxis, sore throat, and hoarseness.  Cardiovascular: Denies palpitations, chest pain, and chest pressure.  Respiratory: Denies cough, dyspnea at rest, hemoptysis, apnea, and choking.  Gastrointestinal: Denies nausea, vomiting, poor appetite, diarrhea, heartburn or reflux  Genitourinary: Denies dysuria, frequency, urgency or hematuria  Musculoskeletal: Denies myalgias, muscle weakness, and bone pain  Neurological: Denies dizziness, vertigo, headache, and focal weakness  Psychological: Denies anxiety and depression  Endocrine: Denies heat intolerance and cold intolerance  Hematopoietic/Lymphatic: Denies bleeding problems and blood transfusions    24-hour events:  No new events    Objective   OBJECTIVE:   BP (!) 105/90   Pulse 62   Temp 98.2 °F (36.8 °C) (Temporal)   Resp 21   Ht 1.778 m (5' 10\")   Wt 89.7 kg (197 lb 12.8 oz)   SpO2 94% Comment: walking  BMI 28.38 kg/m²   SpO2 Readings from Last 1 Encounters:   10/18/24 94%        I/O:    Intake/Output Summary (Last 24 hours) at 10/18/2024 1441  Last data filed at 10/18/2024 1418  Gross per 24 hour   Intake 420 ml   Output 955 ml   Net -535 ml     Vent Information  Ventilator Day(s): 3  Ventilator ID: [id number]  Equipment Changed: NIV 
           Kam Donovan M.D.,[facility]  Ángel Toledo D.O., MICHAEL., [facility]  Jules Mondragon M.D.  Agnieszka Saini M.D.   Milton Nova D.O.  Alcides Damon M.D.         Daily Pulmonary Progress Note    Patient:  Pantera Payne 79 y.o. male MRN: [id number]            Synopsis     We are following patient for follow up from sleep study completed as OP at [facility]     \"CC\" shortness of breath    Code status: Full      Subjective      Patient was seen and examined.  Sitting up in the chair.  PFTs completed.  FVC 49, FEV1 53%.  DLCO corrected for alveolar volume 67.  Carotid ultrasound completed.  Workup for possible cardiac surgery ongoing.  Await cardiac viability study.  Repeat echo.  Results of outpatient sleep study obtained from [facility].  Recommending bilevel 20/15 with backup rate of 12.  Patient family interested in [employer].  Still with cough but slightly improved.  Repeat chest x-ray no acute pulm      Review of Systems:  Constitutional: Denies fever, weight loss, night sweats, and fatigue  Skin: Denies pigmentation, dark lesions, and rashes   HEENT: Denies hearing loss, tinnitus, ear drainage, epistaxis, sore throat, and hoarseness.  Cardiovascular: Denies palpitations, chest pain, and chest pressure.  Respiratory: Denies cough, dyspnea at rest, hemoptysis, apnea, and choking.  Gastrointestinal: Denies nausea, vomiting, poor appetite, diarrhea, heartburn or reflux  Genitourinary: Denies dysuria, frequency, urgency or hematuria  Musculoskeletal: Denies myalgias, muscle weakness, and bone pain  Neurological: Denies dizziness, vertigo, headache, and focal weakness  Psychological: Denies anxiety and depression  Endocrine: Denies heat intolerance and cold intolerance  Hematopoietic/Lymphatic: Denies bleeding problems and blood transfusions    24-hour events:  None    Objective   OBJECTIVE:   /68   Pulse 69   Temp 98.3 °F (36.8 °C) (Oral)   Resp 16   Ht 1.778 m (5' 10\")   Wt 85.5 kg 
           Kam Donovan M.D.,[facility]  Ángel Toledo D.O., F.JIEOAUSTYN., [facility]  Jules Mondragon M.D.  Agnieszka Saini M.D.   Milton Nova D.O.  Alcides Damon M.D.         Daily Pulmonary Progress Note    Patient:  Pantera Payne 79 y.o. male MRN: [id number]            Synopsis     We are following patient for CANDICE    \"CC\" shortness of breath    Code status: FULL CODE      Subjective      Patient was seen and examined sitting up in the recliner on 2 liters.  Family members present at the bedside inquiring about CXR results.  Echocardiogram completed showing EF of 20 to 25% and RVSP 60 indicating moderate pulmonary hypertension.    Review of Systems:  Constitutional: Denies fever, weight loss, night sweats, and fatigue  Skin: Denies pigmentation, dark lesions, and rashes   HEENT: Denies hearing loss, tinnitus, ear drainage, epistaxis, sore throat, and hoarseness.  Cardiovascular: Denies palpitations, chest pain, and chest pressure.  Respiratory: Denies cough, dyspnea at rest, hemoptysis, apnea, and choking.  Gastrointestinal: Denies nausea, vomiting, poor appetite, diarrhea, heartburn or reflux  Genitourinary: Denies dysuria, frequency, urgency or hematuria  Musculoskeletal: Denies myalgias, muscle weakness, and bone pain  Neurological: Denies dizziness, vertigo, headache, and focal weakness  Psychological: Denies anxiety and depression  Endocrine: Denies heat intolerance and cold intolerance  Hematopoietic/Lymphatic: Denies bleeding problems and blood transfusions    24-hour events:  No new events    Objective   OBJECTIVE:   /62   Pulse 66   Temp 97.8 °F (36.6 °C) (Temporal)   Resp 20   Ht 1.778 m (5' 10\")   Wt 80.4 kg (177 lb 3.2 oz)   SpO2 98%   BMI 25.43 kg/m²   SpO2 Readings from Last 1 Encounters:   10/16/24 98%        I/O:    Intake/Output Summary (Last 24 hours) at 10/16/2024 1248  Last data filed at 10/16/2024 1118  Gross per 24 hour   Intake --   Output 675 ml   Net -675 ml     Vent 
           Kam Donovan M.D.,[facility]  Ángel Toledo D.O., F.EZRA., [facility]  Jules Mondragon M.D.  Agnieszka Saini M.D.   Milton Nova D.O.  Alcides Damon M.D.         Daily Pulmonary Progress Note    Patient:  Pantera Payne 79 y.o. male MRN: [id number]            Synopsis     We are following patient for CANDICE    \"CC\" shortness of breath    Code status: FULL CODE      Subjective      Patient was seen and examined sitting up in the recliner on 2 liters. He has been wearing the BiPap and tolerating. Family present at bedside.      Review of Systems:  Constitutional: Denies fever, weight loss, night sweats, and fatigue  Skin: Denies pigmentation, dark lesions, and rashes   HEENT: Denies hearing loss, tinnitus, ear drainage, epistaxis, sore throat, and hoarseness.  Cardiovascular: Denies palpitations, chest pain, and chest pressure.  Respiratory: Denies cough, dyspnea at rest, hemoptysis, apnea, and choking.  Gastrointestinal: Denies nausea, vomiting, poor appetite, diarrhea, heartburn or reflux  Genitourinary: Denies dysuria, frequency, urgency or hematuria  Musculoskeletal: Denies myalgias, muscle weakness, and bone pain  Neurological: Denies dizziness, vertigo, headache, and focal weakness  Psychological: Denies anxiety and depression  Endocrine: Denies heat intolerance and cold intolerance  Hematopoietic/Lymphatic: Denies bleeding problems and blood transfusions    24-hour events:  Transfer out of ICU    Objective   OBJECTIVE:   /80   Pulse 90   Temp 97.5 °F (36.4 °C) (Temporal)   Resp 20   Ht 1.778 m (5' 10\")   Wt 75.1 kg (165 lb 8 oz)   SpO2 100%   BMI 23.75 kg/m²   SpO2 Readings from Last 1 Encounters:   10/15/24 100%        I/O:    Intake/Output Summary (Last 24 hours) at 10/15/2024 1228  Last data filed at 10/15/2024 1130  Gross per 24 hour   Intake 840 ml   Output 2325 ml   Net -1485 ml     Vent Information  Ventilator Day(s): 3  Ventilator ID: [id number]  Equipment Changed: NIV 
           Kam Donovan M.D.,[facility]  Ángel Toledo D.O., F.SOL.LELANDOAUSTYN., [facility]  Jules Mondragon M.D.  Agnieszka Saini M.D.   Milton Nova D.O.  Alcides Damon M.D.         Daily Pulmonary Progress Note    Patient:  Pantera Payne 79 y.o. male MRN: [id number]            Synopsis     We are following patient for CANDICE    \"CC\" shortness of breath    Code status: FULL CODE      Subjective      Patient was seen and examined in [facility] intubated on full vent support.  He had a CABG x 3 with a moderate MAZE procedure yesterday.  He is currently on epinephrine, vasopressin and propofol.    Review of Systems:  Unable to obtain-intubated and sedated    24-hour events:  CABG    Objective   OBJECTIVE:   BP (!) 123/51   Pulse 68   Temp 98.8 °F (37.1 °C) (Core)   Resp 18   Ht 1.778 m (5' 10\")   Wt 90.7 kg (199 lb 15.3 oz)   SpO2 98%   BMI 28.69 kg/m²   SpO2 Readings from Last 1 Encounters:   10/09/24 98%        I/O:    Intake/Output Summary (Last 24 hours) at 10/9/2024 1414  Last data filed at 10/9/2024 1000  Gross per 24 hour   Intake 2382.78 ml   Output 2105 ml   Net 277.78 ml     Vent Information  Ventilator Day(s): 2  Ventilator ID: [id number]  Equipment Changed: NIV mask/interface  Ventilator Initiate: Yes  Vent Mode: AC/VC       IPAP: 20 cmH20  CPAP/EPAP: 15 cmH2O     CURRENT MEDS :  Scheduled Meds:   calcium chloride 1,000 mg in sodium chloride 0.9 % 100 mL IVPB  1,000 mg IntraVENous Once    sodium chloride flush  5-40 mL IntraVENous 2 times per day    aspirin  81 mg Oral Daily    acetaminophen  1,000 mg Oral Q6H    chlorhexidine  15 mL Mouth/Throat BID    magnesium oxide  400 mg Oral BID    mupirocin   Each Nostril BID    sennosides-docusate sodium  1 tablet Oral BID    ceFAZolin (ANCEF) IVPB  2,000 mg IntraVENous Q8H    ipratropium 0.5 mg-albuterol 2.5 mg  1 Dose Inhalation Q4H WA RT    insulin glargine  0.15 Units/kg SubCUTAneous Nightly    lidocaine  1 patch TransDERmal Daily    [START ON 10/11/2024] bisacodyl  10 mg Rectal 
      Kam Donovan M.D.  Ángel Toledo D.O.  Jules Mondragon M.D.  Agnieszka Saini M.D.   Milton Nova D.O.  Alcides Damon M.D.           Daily Pulmonary Progress Note    Patient:  Pantera Payne 79 y.o. male MRN: [id number]     Date of Service: 10/20/2024        Subjective      Patient was seen and examined.    Reports feeling better.  Has been ambulating more.  Less shortness of breath.  Has been off oxygen.    Objective   Vitals: BP (!) 105/50   Pulse 62   Temp 97.8 °F (36.6 °C) (Temporal)   Resp 18   Ht 1.778 m (5' 10\")   Wt 89.2 kg (196 lb 9.6 oz)   SpO2 95%   BMI 28.21 kg/m²     I/O:    Intake/Output Summary (Last 24 hours) at 10/20/2024 1355  Last data filed at 10/20/2024 1330  Gross per 24 hour   Intake 420 ml   Output 2180 ml   Net -1760 ml       CURRENT MEDS :  Scheduled Meds:   insulin glargine  18 Units SubCUTAneous Nightly    bumetanide  0.5 mg IntraVENous Once    insulin lispro  6 Units SubCUTAneous TID WC    metoclopramide  5 mg IntraVENous 4x Daily AC & HS    insulin lispro  0-12 Units SubCUTAneous TID WC    lisinopril  2.5 mg Oral Daily    pantoprazole  40 mg Oral QAM AC    piperacillin-tazobactam  3,375 mg IntraVENous Q8H    carvedilol  3.125 mg Oral BID WC    albuterol  2.5 mg Nebulization 4x Daily RT    enoxaparin  40 mg SubCUTAneous Daily    aspirin  81 mg Oral Daily    ferrous sulfate  325 mg Oral BID WC    folic acid  1 mg Oral Daily    vitamin C  500 mg Oral BID    insulin lispro  0-4 Units SubCUTAneous Nightly    guaiFENesin  400 mg Oral TID    sodium chloride flush  5-40 mL IntraVENous 2 times per day    magnesium oxide  400 mg Oral BID    sennosides-docusate sodium  1 tablet Oral BID    lidocaine  1 patch TransDERmal Daily    tamsulosin  0.4 mg Oral Daily    amiodarone  400 mg Oral BID    rosuvastatin  40 mg Oral Once per day on Monday Wednesday Friday       Continuous Infusions:   dextrose         PRN Meds:  bisacodyl, magnesium hydroxide, acetaminophen, amiodarone, 
      Kam Donovan M.D.  Ángel Toledo D.O.  Jules Mondragon M.D.  Agnieszka Saini M.D.   Milton Nova D.O.  Alcides Damon M.D.           Daily Pulmonary Progress Note    Patient:  Pantera Payne 79 y.o. male MRN: [id number]     Date of Service: 10/19/2024        Subjective      Patient was seen and examined.    Reports feeling better.  Has been ambulating more.  Less shortness of breath.  Has been off oxygen.    Objective   Vitals: BP (!) 96/52   Pulse 63   Temp 98 °F (36.7 °C) (Temporal)   Resp 19   Ht 1.778 m (5' 10\")   Wt 89.3 kg (196 lb 12.8 oz)   SpO2 97%   BMI 28.24 kg/m²     I/O:    Intake/Output Summary (Last 24 hours) at 10/19/2024 1252  Last data filed at 10/19/2024 1020  Gross per 24 hour   Intake 780 ml   Output 487 ml   Net 293 ml       CURRENT MEDS :  Scheduled Meds:   bumetanide  1 mg IntraVENous BID    insulin lispro  6 Units SubCUTAneous TID WC    metoclopramide  5 mg IntraVENous 4x Daily AC & HS    insulin glargine  16 Units SubCUTAneous Nightly    insulin lispro  0-12 Units SubCUTAneous TID WC    lisinopril  2.5 mg Oral Daily    pantoprazole  40 mg Oral QAM AC    piperacillin-tazobactam  3,375 mg IntraVENous Q8H    carvedilol  3.125 mg Oral BID WC    albuterol  2.5 mg Nebulization 4x Daily RT    enoxaparin  40 mg SubCUTAneous Daily    aspirin  81 mg Oral Daily    ferrous sulfate  325 mg Oral BID WC    folic acid  1 mg Oral Daily    vitamin C  500 mg Oral BID    insulin lispro  0-4 Units SubCUTAneous Nightly    guaiFENesin  400 mg Oral TID    sodium chloride flush  5-40 mL IntraVENous 2 times per day    magnesium oxide  400 mg Oral BID    sennosides-docusate sodium  1 tablet Oral BID    lidocaine  1 patch TransDERmal Daily    tamsulosin  0.4 mg Oral Daily    amiodarone  400 mg Oral BID    rosuvastatin  40 mg Oral Once per day on Monday Wednesday Friday       Continuous Infusions:   dextrose         PRN Meds:  bisacodyl, magnesium hydroxide, acetaminophen, amiodarone, amiodarone 
     OCCUPATIONAL THERAPY INITIAL EVALUATION    [facility]  [address] [place], [place]     Date:10/14/2024                                                               Patient Name: Pantera Payne  MRN: [id number]  : 1945  Room: [address] [place] [address]A    Evaluating OT: Chelsey Collins, OTR/L [id number]    Referring Provider:   Kenzie Ochoa APRN - CNP      Specific Provider Orders/Date: OT eval and treat (10/14/24)       Diagnosis: Syncope and collapse [R55]  V tach (HCC) [I47.20]  V-tach (HCC) [I47.20]        Surgery/Procedures: 10/8 urgent sternotomy, urgent CABG x3 (LIMA0-LAD, SVG-OM, SVG-R PDA), modified MAZE procedure, left atrial appendage ligation with 35 mm Atriclip, EVH, IABP placement         Pertinent Medical History:    Past Medical History:   Diagnosis Date    Atrial fibrillation (HCC) , brief episode.    CAD (coronary artery disease)     Cancer (HCC)     Basal cell carcinoma, excisionx 2 (left temporal area).    Detached retina 2007    Left eye.  Laser repair.  2009: Residual scar tissue detected on exam in 2009.  Patient reported some deterioration in visual acuity.    Focal dystonia 2002    Right hand.    Hearing problem     Hyperlipidemia     Hypertension     Kidney stone .    Mild tricuspid regurgitation 13    Mitral valve prolapse 13    mild    Pulmonary hypertension (HCC) 13    Type II or unspecified type diabetes mellitus without mention of complication, not stated as uncontrolled Diagnosed in .      *Precautions:  Fall Risk, sternal, O2    Assessment of current deficits   [x]Functional mobility  [x]ADLs [x]Strength  []Cognition  [x]Functional transfers  [x]IADLs [x]Safety Awareness  [x]Endurance  []Fine Motor Coordination  [x]Balance       []Vision/perception  []Sensation    []Gross Motor Coordination [x]ROM  []Delirium                  [] Motor Control     []Communication     OT 
    INPATIENT CARDIOLOGY FOLLOW-UP    Name: Pantera Payne    Age: 79 y.o.    Date of Admission: 9/26/2024  5:48 PM    Date of Service: 10/12/2024    Chief Complaint: Follow-up for severe triple-vessel disease and ischemic cardiomyopathy syncope possibly from ventricular arrhythmias.    Interim History:  Underwent Urgent coronary artery bypass grafting x 3 with left internal mammary artery to the LAD, saphenous vein graft to the obtuse marginal artery, saphenous vein graft to the right posterior descending artery, Modified MAZE procedure with Encompass clamp and Left atrial appendage occlusion with a 35 mm AtriClip on October 8, 2024.  Now oxygen requirement down to 2 L nasal  Epi and levo being weaned      Review of Systems:   Cardiac: As per HPI  General: No fever, chills  Pulmonary: As per HPI  HEENT: No visual disturbances, difficult swallowing  GI: No nausea, vomiting  Endocrine: No thyroid disease or DM  Musculoskeletal: WILLIAMSON x 4, no focal motor deficits  Skin: Intact, no rashes  Neuro/Psych: No headache or seizures    Problem List:  Patient Active Problem List   Diagnosis    CAD (coronary artery disease)    Poorly controlled type 2 diabetes mellitus (HCC)    Hyperlipidemia    HTN (hypertension)    PVC's (premature ventricular contractions)    Focal dystonia    H/O detached retina repair    Myocardiopathy (HCC)    LAFB (left anterior fascicular block)    IVCD (intraventricular conduction defect)    V-tach (HCC)    Persistent atrial fibrillation (HCC)    Acute on chronic combined systolic and diastolic congestive heart failure (HCC)    V tach (HCC)    Paroxysmal atrial fibrillation (HCC)    Dietary noncompliance    Acute postoperative respiratory insufficiency    Cardiogenic shock    Stage 2 chronic kidney disease    Acute post-operative pain    Type 2 diabetes mellitus with hyperglycemia, without long-term current use of insulin (HCC)       Allergies:  Allergies   Allergen Reactions    Atorvastatin     
    INPATIENT CARDIOLOGY FOLLOW-UP    Name: Pantera Payne    Age: 79 y.o.    Date of Admission: 9/26/2024  5:48 PM    Date of Service: 10/13/2024    Chief Complaint: Follow-up for severe triple-vessel disease and ischemic cardiomyopathy syncope possibly from ventricular arrhythmias.    Interim History:  Underwent Urgent coronary artery bypass grafting x 3 with left internal mammary artery to the LAD, saphenous vein graft to the obtuse marginal artery, saphenous vein graft to the right posterior descending artery, Modified MAZE procedure with Encompass clamp and Left atrial appendage occlusion with a 35 mm AtriClip on October 8, 2024.  Now oxygen requirement down to 2 L nasal  Levo is being weaned  Patient is sitting in bed and report able to tolerate some diet today      Review of Systems:   Cardiac: As per HPI  General: No fever, chills  Pulmonary: As per HPI  HEENT: No visual disturbances, difficult swallowing  GI: No nausea, vomiting  Endocrine: No thyroid disease or DM  Musculoskeletal: WILLIAMSON x 4, no focal motor deficits  Skin: Intact, no rashes  Neuro/Psych: No headache or seizures    Problem List:  Patient Active Problem List   Diagnosis    CAD (coronary artery disease)    Poorly controlled type 2 diabetes mellitus (HCC)    Hyperlipidemia    HTN (hypertension)    PVC's (premature ventricular contractions)    Focal dystonia    H/O detached retina repair    Myocardiopathy ([facility])    LAFB (left anterior fascicular block)    IVCD (intraventricular conduction defect)    V-tach (HCC)    Persistent atrial fibrillation (HCC)    Acute on chronic combined systolic and diastolic congestive heart failure (HCC)    V tach (HCC)    Paroxysmal atrial fibrillation (HCC)    Dietary noncompliance    Acute postoperative respiratory insufficiency    Cardiogenic shock    Stage 2 chronic kidney disease    Acute post-operative pain    Type 2 diabetes mellitus with hyperglycemia, without long-term current use of insulin (HCC) 
    INPATIENT CARDIOLOGY FOLLOW-UP    Name: Pantera Payne    Age: 79 y.o.    Date of Admission: 9/26/2024  5:48 PM    Date of Service: 10/5/2024    Chief Complaint: Follow-up for severe triple-vessel disease and ischemic cardiomyopathy syncope possibly from ventricular arrhythmias.    Interim History:  No new overnight cardiac complaints. Currently with no complaints of CP, SOB, palpitations, dizziness, or lightheadedness. SR on telemetry.    Review of Systems:   Cardiac: As per HPI  General: No fever, chills  Pulmonary: As per HPI  HEENT: No visual disturbances, difficult swallowing  GI: No nausea, vomiting  Endocrine: No thyroid disease or DM  Musculoskeletal: WILLIAMSON x 4, no focal motor deficits  Skin: Intact, no rashes  Neuro/Psych: No headache or seizures    Problem List:  Patient Active Problem List   Diagnosis    CAD (coronary artery disease)    Poorly controlled type 2 diabetes mellitus (HCC)    Hyperlipidemia    HTN (hypertension)    PVC's (premature ventricular contractions)    Focal dystonia    H/O detached retina repair    Myocardiopathy (HCC)    LAFB (left anterior fascicular block)    IVCD (intraventricular conduction defect)    V-tach (HCC)    Persistent atrial fibrillation (HCC)    Acute on chronic combined systolic and diastolic congestive heart failure (HCC)    V tach (HCC)    Paroxysmal atrial fibrillation (HCC)    Dietary noncompliance       Allergies:  Allergies   Allergen Reactions    Atorvastatin     Bethaprim [Sulfamethoxazole-Trimethoprim]     Erythromycin     Lipitor      Muscle aches.       Current Medications:  Current Facility-Administered Medications   Medication Dose Route Frequency Provider Last Rate Last Admin    insulin lispro (HUMALOG,ADMELOG) injection vial 7 Units  7 Units SubCUTAneous TID  Perez Li MD   7 Units at 10/05/24 0909    insulin glargine (LANTUS) injection vial 18 Units  18 Units SubCUTAneous Nightly Perez Li MD   18 Units at 10/04/24 6297    
    INPATIENT CARDIOLOGY FOLLOW-UP    Name: Pantera Payne    Age: 79 y.o.    Date of Admission: 9/26/2024  5:48 PM    Date of Service: 10/7/2024    Chief Complaint: Follow-up for severe triple-vessel disease and ischemic cardiomyopathy syncope possibly from ventricular arrhythmias.    Interim History:  Denies chest pain or shortness of breath.  Awaiting CABG    Review of Systems:   Cardiac: As per HPI  General: No fever, chills  Pulmonary: As per HPI  HEENT: No visual disturbances, difficult swallowing  GI: No nausea, vomiting  Endocrine: No thyroid disease or DM  Musculoskeletal: WILLIAMSON x 4, no focal motor deficits  Skin: Intact, no rashes  Neuro/Psych: No headache or seizures    Problem List:  Patient Active Problem List   Diagnosis    CAD (coronary artery disease)    Poorly controlled type 2 diabetes mellitus (HCC)    Hyperlipidemia    HTN (hypertension)    PVC's (premature ventricular contractions)    Focal dystonia    H/O detached retina repair    Myocardiopathy (HCC)    LAFB (left anterior fascicular block)    IVCD (intraventricular conduction defect)    V-tach (HCC)    Persistent atrial fibrillation (HCC)    Acute on chronic combined systolic and diastolic congestive heart failure (HCC)    V tach (HCC)    Paroxysmal atrial fibrillation (HCC)    Dietary noncompliance       Allergies:  Allergies   Allergen Reactions    Atorvastatin     Bethaprim [Sulfamethoxazole-Trimethoprim]     Erythromycin     Lipitor      Muscle aches.       Current Medications:  Current Facility-Administered Medications   Medication Dose Route Frequency Provider Last Rate Last Admin    promethazine (PHENERGAN) tablet 6.25 mg  6.25 mg Oral Q6H PRN Sirisha Mendoza MD        [START ON 10/8/2024] ceFAZolin (ANCEF) 2,000 mg in sterile water 20 mL IV syringe  2,000 mg IntraVENous On Call to OR Hafsa Leo PA        mupirocin (BACTROBAN) 2 % ointment   Each Nostril BID Hafsa Leo PA        [START ON 10/8/2024] chlorhexidine (PERIDEX) 
    INPATIENT CARDIOLOGY FOLLOW-UP    Name: Pantera Payne    Age: 79 y.o.    Date of Admission: 9/26/2024  5:48 PM    Date of Service: 10/8/2024    Chief Complaint: Follow-up for severe triple-vessel disease and ischemic cardiomyopathy syncope possibly from ventricular arrhythmias.    Interim History:  Underwent Urgent coronary artery bypass grafting x 3 with left internal mammary artery to the LAD, saphenous vein graft to the obtuse marginal artery, saphenous vein graft to the right posterior descending artery, Modified MAZE procedure with Encompass clamp and Left atrial appendage occlusion with a 35 mm AtriClip on October 8, 2024.    Review of Systems:   Cardiac: As per HPI  General: No fever, chills  Pulmonary: As per HPI  HEENT: No visual disturbances, difficult swallowing  GI: No nausea, vomiting  Endocrine: No thyroid disease or DM  Musculoskeletal: WILLIAMSON x 4, no focal motor deficits  Skin: Intact, no rashes  Neuro/Psych: No headache or seizures    Problem List:  Patient Active Problem List   Diagnosis    CAD (coronary artery disease)    Poorly controlled type 2 diabetes mellitus (HCC)    Hyperlipidemia    HTN (hypertension)    PVC's (premature ventricular contractions)    Focal dystonia    H/O detached retina repair    Myocardiopathy (HCC)    LAFB (left anterior fascicular block)    IVCD (intraventricular conduction defect)    V-tach (HCC)    Persistent atrial fibrillation (HCC)    Acute on chronic combined systolic and diastolic congestive heart failure (HCC)    V tach (HCC)    Paroxysmal atrial fibrillation (HCC)    Dietary noncompliance    Acute postoperative respiratory insufficiency    Cardiogenic shock    Stage 2 chronic kidney disease    Acute post-operative pain    Type 2 diabetes mellitus with hyperglycemia, without long-term current use of insulin (HCC)       Allergies:  Allergies   Allergen Reactions    Atorvastatin     Bethaprim [Sulfamethoxazole-Trimethoprim]     Erythromycin     Lipitor      
    INPATIENT CARDIOLOGY FOLLOW-UP    Name: Pantera Payne    Age: 79 y.o.    Date of Admission: 9/26/2024  5:48 PM    Date of Service: 10/9/2024    Chief Complaint: Follow-up for severe triple-vessel disease and ischemic cardiomyopathy syncope possibly from ventricular arrhythmias.    Interim History:  Underwent Urgent coronary artery bypass grafting x 3 with left internal mammary artery to the LAD, saphenous vein graft to the obtuse marginal artery, saphenous vein graft to the right posterior descending artery, Modified MAZE procedure with Encompass clamp and Left atrial appendage occlusion with a 35 mm AtriClip on October 8, 2024.  Remains intubated,on pressors and intra-aortic balloon pump for cardiogenic shock      Review of Systems:   Cardiac: As per HPI  General: No fever, chills  Pulmonary: As per HPI  HEENT: No visual disturbances, difficult swallowing  GI: No nausea, vomiting  Endocrine: No thyroid disease or DM  Musculoskeletal: WILLIAMSON x 4, no focal motor deficits  Skin: Intact, no rashes  Neuro/Psych: No headache or seizures    Problem List:  Patient Active Problem List   Diagnosis    CAD (coronary artery disease)    Poorly controlled type 2 diabetes mellitus (HCC)    Hyperlipidemia    HTN (hypertension)    PVC's (premature ventricular contractions)    Focal dystonia    H/O detached retina repair    Myocardiopathy (HCC)    LAFB (left anterior fascicular block)    IVCD (intraventricular conduction defect)    V-tach (HCC)    Persistent atrial fibrillation (HCC)    Acute on chronic combined systolic and diastolic congestive heart failure (HCC)    V tach (HCC)    Paroxysmal atrial fibrillation (HCC)    Dietary noncompliance    Acute postoperative respiratory insufficiency    Cardiogenic shock    Stage 2 chronic kidney disease    Acute post-operative pain    Type 2 diabetes mellitus with hyperglycemia, without long-term current use of insulin (HCC)       Allergies:  Allergies   Allergen Reactions    
    Inpatient Cardiology Progress note     PATIENT IS BEING FOLLOWED FOR: Cardiomyopathy with severe LV systolic dysfunction and acute HFrEF.?  Need for ischemic workup/coronary angiography per electrophysiology request    Pantera Payne is a 79 y.o. male known to me ( Dr. Mendoza )    He was last seen by me in the office in 1/2024 at which time he was well compensated from cardiac standpoint and denied any cardiac symptoms.  He was admitted 9/26/2024 after a [place]opal [place].  He was seen by electrophysiology (Dr. Mariano).  He was noted to be in atrial fibrillation and underwent successful ANGELINA directed DCCV 9/27/2024 to sinus rhythm.  Of note, the ANGELINA showed severe LV systolic dysfunction with an estimated ejection fraction of 20 to 25% ( the prior echo in 3/2023 showing moderate LV systolic dysfunction with an ejection fraction of 40%)  In June of this year, Santi was noted to have problems with shortness of breath.  He had a sleep study done in August at home and a formal sleep study done at [facility] earlier this month which showed severe obstructive sleep apnea.  Pantera's wife Shoshana who is a nurse and we used to work at our office had contacted our office earlier via messaging informing me about Pantera's problem with worsening shortness of breath.  A proBNP requested by me and done on 9/20/2024 was elevated at 2,220.  Santi was on Maxide prior to that together with his other cardiac medications which included losartan and Toprol-XL.  I instructed him at the time to discontinue Maxide and start Lasix 40 mg daily together with spironolactone 25 mg daily.  Prior to the above current episode, and apparently, recommended noticed improvement in his breathing with the above medication adjustment.  He denies chest pain.  He apparently did have problems with orthopnea and PND's.  He denies any lower extremity swelling.  Also, and prior to the current event, he denies any palpitations, dizziness, 
    Inpatient Cardiology Progress note     PATIENT IS BEING FOLLOWED FOR: Cardiomyopathy with severe LV systolic dysfunction and acute HFrEF.?  Need for ischemic workup/coronary angiography per electrophysiology request    Pantera Payne is a 79 y.o. male known to me ( Dr. Mendoza )    He was last seen by me in the office in 1/2024 at which time he was well compensated from cardiac standpoint and denied any cardiac symptoms.  He was admitted 9/26/2024 after a syncopal episode [place] the golf [place].  He was seen by electrophysiology (Dr. Mariano).  He was noted to be in atrial fibrillation and underwent successful ANGELINA directed DCCV 9/27/2024 to sinus rhythm.  Of note, the ANGELINA showed severe LV systolic dysfunction with an estimated ejection fraction of 20 to 25% ( the prior echo in 3/2023 showing moderate LV systolic dysfunction with an ejection fraction of 40%)  In June of this year, Santi was noted to have problems with shortness of breath.  He had a sleep study done in August at home and a formal sleep study done at [facility] earlier this month which showed severe obstructive sleep apnea.  Pantera's wife Shoshana who is a nurse and we used to work at our office had contacted our office earlier via messaging informing me about Pantera's problem with worsening shortness of breath.  A proBNP requested by me and done on 9/20/2024 was elevated at 2,220.  Santi was on Maxide prior to that together with his other cardiac medications which included losartan and Toprol-XL.  I instructed him at the time to discontinue Maxide and start Lasix 40 mg daily together with spironolactone 25 mg daily.  Prior to the above current episode, and apparently, recommended noticed improvement in his breathing with the above medication adjustment.  He denies chest pain.  He apparently did have problems with orthopnea and PND's.  He denies any lower extremity swelling.  Also, and prior to the current event, he denies any palpitations, dizziness, 
    Inpatient Cardiology Progress note     PATIENT IS BEING FOLLOWED FOR: Cardiomyopathy with severe LV systolic dysfunction and acute HFrEF.?  Need for ischemic workup/coronary angiography per electrophysiology request    Pantera Payne is a 79 y.o. male known to me ( Dr. Mendoza )    He was last seen by me in the office in 1/2024 at which time he was well compensated from cardiac standpoint and denied any cardiac symptoms.  He was admitted 9/26/2024 after a syncopal episode [place] the golf [place].  He was seen by electrophysiology (Dr. Mariano).  He was noted to be in atrial fibrillation and underwent successful ANGELINA directed DCCV 9/27/2024 to sinus rhythm.  Of note, the ANGELINA showed severe LV systolic dysfunction with an estimated ejection fraction of 20 to 25% ( the prior echo in 3/2023 showing moderate LV systolic dysfunction with an ejection fraction of 40%)  In June of this year, Santi was noted to have problems with shortness of breath.  He had a sleep study done in August at home and a formal sleep study done at [facility] earlier this month which showed severe obstructive sleep apnea.  Pantera's wife Shoshana who is a nurse and we used to work at our office had contacted our office earlier via messaging informing me about Pantera's problem with worsening shortness of breath.  A proBNP requested by me and done on 9/20/2024 was elevated at 2,220.  Santi was on Maxide prior to that together with his other cardiac medications which included losartan and Toprol-XL.  I instructed him at the time to discontinue Maxide and start Lasix 40 mg daily together with spironolactone 25 mg daily.  Prior to the above current episode, and apparently, recommended noticed improvement in his breathing with the above medication adjustment.  He denies chest pain.  He apparently did have problems with orthopnea and PND's.  He denies any lower extremity swelling.  Also, and prior to the current event, he denies any palpitations, dizziness, 
   10/02/24 2100   NIV Type   Ventilator ID [id number]   NIV Started/Stopped On   Equipment Type v60   Mode Bilevel   Mask Type Full face mask   Mask Size Medium   Assessment   Pulse 68   Respirations 19   SpO2 96 %   Level of Consciousness 0   Comfort Level Good   Using Accessory Muscles No   Mask Compliance Good   Skin Assessment Clean, dry, & intact   Skin Protection for O2 Device Yes   Orientation Middle   Location Nose   Intervention(s) Skin Barrier   Breath Sounds   Breath Sounds Bilateral Diminished   Right Upper Lobe Diminished   Right Middle Lobe Diminished   Right Lower Lobe Diminished   Left Upper Lobe Diminished   Left Lower Lobe Diminished   Settings/Measurements   PIP Observed 20 cm H20   IPAP (S)  20 cmH20  (this was settings on bipap changed by np today)   CPAP/EPAP (S)  15 cmH2O   Vt (Measured) 672 mL   Rate Ordered 12   Insp Rise Time (%) 3 %   FiO2  40 %   Minute Volume (L/min) 13 Liters   Mask Leak (lpm) 65 lpm   Patient's Home Machine No   Alarm Settings   Alarms On Y   Apnea (secs) 20 secs     Date: 10/3/2024    Time: 2:40 AM    Patient Placed On BIPAP/CPAP/ Non-Invasive Ventilation?  Yes    If no must comment.  Facial area red/color change? No           If YES are Blister/Lesion present?No   If yes must notify nursing staff  BIPAP/CPAP skin barrier?  Yes    Skin barrier type:mepilexlite       Comments:        Mirta Chapman RCP  
   10/05/24 2205   NIV Type   NIV Started/Stopped On   Equipment Type v60   Mode Bilevel   Mask Type Full face mask   Mask Size Medium   Assessment   SpO2 98 %   Level of Consciousness 0   Comfort Level Good   Using Accessory Muscles No   Mask Compliance Good   Skin Assessment Clean, dry, & intact   Skin Protection for O2 Device Yes   Orientation Middle   Location Nose   Intervention(s) Skin Barrier   Settings/Measurements   IPAP 20 cmH20   CPAP/EPAP 15 cmH2O   Vt (Measured) 950 mL   Rate Ordered 12   FiO2  30 %   Minute Volume (L/min) 23.2 Liters   Mask Leak (lpm) 98 lpm   Patient's Home Machine No   Alarm Settings   Alarms On Y   Low Pressure (cmH2O) 8 cmH2O   High Pressure (cmH2O) 26 cmH2O   RR Low (bpm) 12   RR High (bpm) 35 br/min     Date: 10/5/2024    Time: 10:06 PM    Patient Placed On BIPAP/CPAP/ Non-Invasive Ventilation?  Yes    If no must comment.  Facial area red/color change? No           If YES are Blister/Lesion present?No   If yes must notify nursing staff  BIPAP/CPAP skin barrier?  Yes    Skin barrier type:mepilexlite       Comments:        Kalee Becker RCP  
   10/06/24 0042   NIV Type   NIV Started/Stopped On   Equipment Type v60   Mode Bilevel   Mask Type Full face mask   Mask Size Medium   Assessment   SpO2 100 %   Level of Consciousness 0   Comfort Level Good   Using Accessory Muscles No   Mask Compliance Good   Skin Assessment Clean, dry, & intact   Skin Protection for O2 Device Yes   Orientation Middle   Location Nose   Intervention(s) Skin Barrier   Settings/Measurements   IPAP 20 cmH20   CPAP/EPAP 15 cmH2O   Vt (Measured) 514 mL   Rate Ordered 12   FiO2  30 %   Minute Volume (L/min) 18.5 Liters   Mask Leak (lpm) 92 lpm   Patient's Home Machine No   Alarm Settings   Alarms On Y   Low Pressure (cmH2O) 8 cmH2O   High Pressure (cmH2O) 26 cmH2O   RR Low (bpm) 12   RR High (bpm) 35 br/min       
   10/06/24 0425   NIV Type   NIV Started/Stopped On   Equipment Type v60   Mode Bilevel   Mask Type Full face mask   Mask Size Small   Assessment   SpO2 100 %   Level of Consciousness 0   Comfort Level Good   Using Accessory Muscles No   Mask Compliance Good   Skin Assessment Clean, dry, & intact   Skin Protection for O2 Device Yes   Orientation Middle   Location Nose   Intervention(s) Skin Barrier   Settings/Measurements   IPAP 20 cmH20   CPAP/EPAP 15 cmH2O   Vt (Measured) 348 mL   Rate Ordered 12   FiO2  30 %   Minute Volume (L/min) 5.2 Liters   Mask Leak (lpm) 100 lpm   Patient's Home Machine No   Alarm Settings   Alarms On Y   Low Pressure (cmH2O) 8 cmH2O   High Pressure (cmH2O) 26 cmH2O   RR Low (bpm) 12   RR High (bpm) 35 br/min       
   10/09/24 0107   Patient Observation   Pulse 61   Respirations 20   SpO2 98 %   Vent Information   Vent Mode AC/VC   Ventilator Settings   FiO2  (S)  40 %   Vt (Set, mL) 500 mL   Resp Rate (Set) 15 bpm   PEEP/CPAP (cmH2O) 8   Vent Patient Data (Readings)   Vt (Measured) 510 mL   Peak Inspiratory Pressure (cmH2O) 22 cmH2O   Rate Measured 20 br/min   Minute Volume (L/min) 10.4 Liters   Mean Airway Pressure (cmH2O) 11 cmH20   Plateau Pressure (cm H2O) 18 cm H2O   Driving Pressure 10   I:E Ratio 1:2.50   Vent Alarm Settings   High Pressure (cmH2O) 45 cmH2O     Weaned the Patient to 40%  
  SPEECH/LANGUAGE PATHOLOGY  CLINICAL ASSESSMENT OF SWALLOWING FUNCTION   and PLAN OF CARE      PATIENT NAME:  Pantera Payne  (male)     MRN:  [id number]    :  1945  (79 y.o.)  STATUS:  Inpatient: Room 6514/6514-A    TODAY'S DATE:  10/16/2024  ORDER DATE, DESCRIPTION AND REFERRING PROVIDER: 10/16/24 0900    SLP eval and treat  Start:  10/16/24 0900,   End:  10/16/24 0900,   ONE TIME,   Standing Count:  1 Occurrences,   R       Zeeshan, Emerald HORTON, APRN - CNP  REASON FOR REFERRAL: cough with pill administration post CABG   EVALUATING THERAPIST: Donita Dasilva, SLP                 RESULTS:    DYSPHAGIA DIAGNOSIS:   normal swallow function      DIET RECOMMENDATIONS:  Regular consistency solids (IDDSI level 7) with  thin liquids (IDDSI level 0)     FEEDING RECOMMENDATIONS:     Assistance level:  Set-up is required for all oral intake  Encourage self-feeding as function allows      Compensatory strategies recommended: Thorough oral care to prevent colonization of oral bacteria   Upright in bed/ chair as tolerated  Fully alert for all PO   Meds in applesauce/pudding, may crush at RN discretion       Discussed recommendations with:  via note left at charge nurse desk and with  D/T pt's RN unavailable    SPEECH THERAPY  PLAN OF CARE   The dysphagia POC is established based on physician order, dysphagia diagnosis and results of clinical assessment     Dysphagia therapy is not recommended following s session due to independent with implementation of strategies/modifications.     Conditions Requiring Skilled Therapeutic Intervention for dysphagia:    Swallow within functional limits per assessment     Specific dysphagia interventions to include:     Not applicable no therapy warranted     Specific instructions for next treatment:  not applicable   Patient Treatment Goals:    Short Term Goals:  Not applicable no therapy warranted     Long Term Goals:   Not applicable no therapy warranted      Patient/family Goal: 
2024 Pts blood sugar was 251 at bedtime, administered 20 units of Lantus.    0055, was called to bedside with pt claiming their home monitor was alarming for sugar in the 60s, POC glucose came back at 62. Provided a snack and juice to pt.     0134 POC glucose is 141.  
4 Eyes Skin Assessment     NAME:  Pantera Payne  YOB: 1945  MEDICAL RECORD NUMBER:  [id number]    The patient is being assessed for  Admission    I agree that at least one RN has performed a thorough Head to Toe Skin Assessment on the patient. ALL assessment sites listed below have been assessed.      Areas assessed by both nurses:    Head, Face, Ears, Shoulders, Back, Chest, Arms, Elbows, Hands, Sacrum. Buttock, Coccyx, Ischium, and Legs. Feet and Heels        Does the Patient have a Wound? No noted wound(s)       Isidro Prevention initiated by RN: No  Wound Care Orders initiated by RN: No    Pressure Injury (Stage 3,4, Unstageable, DTI, NWPT, and Complex wounds) if present, place Wound referral order by RN under : No    New Ostomies, if present place, Ostomy referral order under : No     Nurse 1 eSignature: Electronically signed by Evan Vrigen RN on 9/27/24 at 12:51 AM EDT    **SHARE this note so that the co-signing nurse can place an eSignature**    Nurse 2 eSignature: {Esignature:[id number]}    
4 Eyes Skin Assessment     NAME:  Pantera Payne  YOB: 1945  MEDICAL RECORD NUMBER:  [id number]    The patient is being assessed for  Transfer to New Unit    I agree that at least one RN has performed a thorough Head to Toe Skin Assessment on the patient. ALL assessment sites listed below have been assessed.      Areas assessed by both nurses:    Head, Face, Ears, Shoulders, Back, Chest, Arms, Elbows, Hands, Sacrum. Buttock, Coccyx, Ischium, Legs. Feet and Heels, and Under Medical Devices         Does the Patient have a Wound? No noted wound(s)       Isidro Prevention initiated by RN: No  Wound Care Orders initiated by RN: No    Pressure Injury (Stage 3,4, Unstageable, DTI, NWPT, and Complex wounds) if present, place Wound referral order by RN under : No    New Ostomies, if present place, Ostomy referral order under : No     Nurse 1 eSignature: Electronically signed by Alessandra Anderson RN on 10/14/24 at 4:14 PM EDT    **SHARE this note so that the co-signing nurse can place an eSignature**    Nurse 2 eSignature: Electronically signed by Nader Agosto RN on 10/14/24 at 5:26 PM EDT  
8677N telemetry monitor sent to 64 via tube system per okay from 64 floor RN  
CC: CP, VT, syncope     Brief HPI:  Patient seen with Dr. Osuna. Awake, alert. No complaints.      Past Medical History:   Diagnosis Date    Atrial fibrillation (HCC) 1984, brief episode.    CAD (coronary artery disease)     Cancer (HCC)     Basal cell carcinoma, excisionx 2 (left temporal area).    Detached retina 8/20/2007    Left eye.  Laser repair.  8/2009: Residual scar tissue detected on exam in 8/2009.  Patient reported some deterioration in visual acuity.    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    Hearing problem     Hyperlipidemia     Hypertension     Kidney stone 1997.    Mild tricuspid regurgitation 11/18/13    Mitral valve prolapse 11/18/13    mild    Pulmonary hypertension (HCC) 11/18/13    Type II or unspecified type diabetes mellitus without mention of complication, not stated as uncontrolled Diagnosed in 1995.     Past Surgical History:   Procedure Laterality Date    CARDIAC PROCEDURE N/A 9/30/2024    Left heart cath / coronary angiography performed [place] Sirisha [place]endoza [place] [facility] [place] [facility]    CARDIOVASCULAR STRESS TEST  9/25/2002 Treadmill nuclear stress test:  Samy protocol.  11 minutes, 30 seconds.  98% of maximum predicted heart rate.     No chest pain. No ischemic EKG changes.  Physiologic BP response. Nuclear images showed significant attenuation artifacts, but non transmural MI with mild additional stress-induced ischemia involving the anterolateral wall could not be totally excluded.  The computer calculated EF was 44%.      CARDIOVASCULAR STRESS TEST  11/11/2009 Samy protocol. 9 minutes. Physiologic BP response.    No chest pain.  No ischemia EKG changes. Nuclear images showed soft tissue attenuation artifacts with no convincing evidence of any scars or any stress-induced ischemia with the gated views suggesting paradoxical septal motion and hypokinesis of the inferior wall and the interventricular septum with low normal LV systolic function and a computer-calculated  EF of 50%.   
CC: CP, VT, syncope    Brief HPI:  Patient seen with Dr. Pimentel. Awake, alert. No complaints.      Past Medical History:   Diagnosis Date    Atrial fibrillation (HCC) 1984, brief episode.    CAD (coronary artery disease)     Cancer (HCC)     Basal cell carcinoma, excisionx 2 (left temporal area).    Detached retina 8/20/2007    Left eye.  Laser repair.  8/2009: Residual scar tissue detected on exam in 8/2009.  Patient reported some deterioration in visual acuity.    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    Hearing problem     Hyperlipidemia     Hypertension     Kidney stone 1997.    Mild tricuspid regurgitation 11/18/13    Mitral valve prolapse 11/18/13    mild    Pulmonary hypertension (HCC) 11/18/13    Type II or unspecified type diabetes mellitus without mention of complication, not stated as uncontrolled Diagnosed in 1995.     Past Surgical History:   Procedure Laterality Date    CARDIAC PROCEDURE N/A 9/30/2024    Left heart cath / coronary angiography performed by Sirisha Mendoza MD at [facility] [place]AC [facility]    CARDIOVASCULAR STRESS TEST  9/25/2002 Treadmill nuclear stress test:  Samy protocol.  11 minutes, 30 seconds.  98% of maximum predicted heart rate.     No chest pain. No ischemic EKG changes.  Physiologic BP response. Nuclear images showed significant attenuation artifacts, but non transmural MI with mild additional stress-induced ischemia involving the anterolateral wall could not be totally excluded.  The computer calculated EF was 44%.      CARDIOVASCULAR STRESS TEST  11/11/2009 Samy protocol. 9 minutes. Physiologic BP response.    No chest pain.  No ischemia EKG changes. Nuclear images showed soft tissue attenuation artifacts with no convincing evidence of any scars or any stress-induced ischemia with the gated views suggesting paradoxical septal motion and hypokinesis of the inferior wall and the interventricular septum with low normal LV systolic function and a computer-calculated  EF of 50%.      
CC: CP, VT, syncope    Brief HPI:  Patient seen with Dr. Pimentel. Awake, alert. No complaints.      Past Medical History:   Diagnosis Date    Atrial fibrillation (HCC) 1984, brief episode.    CAD (coronary artery disease)     Cancer (HCC)     Basal cell carcinoma, excisionx 2 (left temporal area).    Detached retina 8/20/2007    Left eye.  Laser repair.  8/2009: Residual scar tissue detected on exam in 8/2009.  Patient reported some deterioration in visual acuity.    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    Hearing problem     Hyperlipidemia     Hypertension     Kidney stone 1997.    Mild tricuspid regurgitation 11/18/13    Mitral valve prolapse 11/18/13    mild    Pulmonary hypertension (HCC) 11/18/13    Type II or unspecified type diabetes mellitus without mention of complication, not stated as uncontrolled Diagnosed in 1995.     Past Surgical History:   Procedure Laterality Date    CARDIAC PROCEDURE N/A 9/30/2024    Left heart cath / coronary angiography performed by Sirisha Mendoza MD at [facility] [place] [facility]ARDIAC CATH LAB    CARDIOVASCULAR STRESS TEST  9/25/2002 Treadmill nuclear stress test:  Samy protocol.  11 minutes, 30 seconds.  98% of maximum predicted heart rate.     No chest pain. No ischemic EKG changes.  Physiologic BP response. Nuclear images showed significant attenuation artifacts, but non transmural MI with mild additional stress-induced ischemia involving the anterolateral wall could not be totally excluded.  The computer calculated EF was 44%.      CARDIOVASCULAR STRESS TEST  11/11/2009 Samy protocol. 9 minutes. Physiologic BP response.    No chest pain.  No ischemia EKG changes. Nuclear images showed soft tissue attenuation artifacts with no convincing evidence of any scars or any stress-induced ischemia with the gated views suggesting paradoxical septal motion and hypokinesis of the inferior wall and the interventricular septum with low normal LV systolic function and a computer-calculated  EF of 50%.      
CVICU Progress Note    Name: Pantera Payne  MRN: [id number]    CC: Postoperative Critical Care Management      Indication for Surgery/Procedure: syncope, ventricular tachycardia, DM, HTN, HLD, atrial fibrillation   LVEF:  25%    RVF:  Normal      Important/Relevant PMH/PSH: HTN, HPL, HFrEF, pulmonary HTN, DM type 2, CANDICE on CPAP, CKD, GERD,  Focal dystonia right hand, Visual  impairment d/t previous eye surgery in 2009, Basal cell carcinoma excision, History of dysphagia and esophageal spasm      Procedure/Surgeries: 10/8/2024 urgent sternotomy, urgent CABG x3 (LIMA0-LAD, SVG-OM, SVG-R PDA), modified MAZE procedure, left atrial appendage ligation with 35 mm Atriclip, EVH, IABP placement      Pacing wires: Ventricular       Intake/Output Summary (Last 24 hours) at 10/10/2024 1111  Last data filed at 10/10/2024 1000  Gross per 24 hour   Intake 2364.97 ml   Output 1240 ml   Net 1124.97 ml       Recent Labs     10/08/24  1316 10/09/24  0509 10/10/24  0424   WBC 30.1* 16.4* 19.9*   HGB 11.5* 9.7* 8.2*   HCT 34.6* 29.2* 25.7*      Lab Results   Component Value Date/Time     10/10/2024 04:24 AM    K 4.7 10/10/2024 04:24 AM     10/10/2024 04:24 AM    CO2 21 10/10/2024 04:24 AM    BUN 23 10/10/2024 04:24 AM    CREATININE 1.2 10/10/2024 04:24 AM    GLUCOSE 147 10/10/2024 04:24 AM    GLUCOSE 195 04/20/2012 09:44 AM    CALCIUM 8.2 10/10/2024 04:24 AM    MG 2.4 10/10/2024 04:24 AM         Physical Exam:    BP (!) 123/51   Pulse 62   Temp 99.1 °F (37.3 °C) (Core)   Resp 20   Ht 1.778 m (5' 10\")   Wt 90.7 kg (199 lb 15.3 oz)   SpO2 97%   BMI 28.69 kg/m²       CXR Findings:     FINDINGS:  Heart is moderately enlarged.  There are no infiltrates or effusions. Support tubes are appropriate.  There is sternotomy.  There is no significant change.    -  CXR personally viewed and interpreted by ICU Nurse Practitioner, agree with above findings     General: Intubated, sedated. IABP placed 1:3, Epinephrine, vasopressin, 
CVICU Progress Note    Name: Pantera Payne  MRN: [id number]    CC: Postoperative Critical Care Management      Indication for Surgery/Procedure: syncope, ventricular tachycardia, DM, HTN, HLD, atrial fibrillation   LVEF:  25%    RVF:  Normal      Important/Relevant PMH/PSH: HTN, HPL, HFrEF, pulmonary HTN, DM type 2, CANDICE on CPAP, CKD, GERD,  Focal dystonia right hand, Visual  impairment d/t previous eye surgery in 2009, Basal cell carcinoma excision, History of dysphagia and esophageal spasm      Procedure/Surgeries: 10/8/2024 urgent sternotomy, urgent CABG x3 (LIMA0-LAD, SVG-OM, SVG-R PDA), modified MAZE procedure, left atrial appendage ligation with 35 mm Atriclip, EVH, IABP placement      Pacing wires: Ventricular       Intake/Output Summary (Last 24 hours) at 10/9/2024 0951  Last data filed at 10/9/2024 0735  Gross per 24 hour   Intake 5116.09 ml   Output 4520 ml   Net 596.09 ml       Recent Labs     10/07/24  1515 10/08/24  1316 10/09/24  0509   WBC 9.2 30.1* 16.4*   HGB 14.4 11.5* 9.7*   HCT 43.3 34.6* 29.2*      Lab Results   Component Value Date/Time     10/09/2024 05:09 AM    K 4.6 10/09/2024 05:09 AM     10/09/2024 05:09 AM    CO2 22 10/09/2024 05:09 AM    BUN 18 10/09/2024 05:09 AM    CREATININE 1.4 10/09/2024 05:09 AM    GLUCOSE 86 10/09/2024 05:09 AM    GLUCOSE 195 04/20/2012 09:44 AM    CALCIUM 7.9 10/09/2024 05:09 AM    MG 2.2 10/09/2024 05:09 AM         Physical Exam:    BP (!) 123/51   Pulse 67   Temp 99.7 °F (37.6 °C) (Core)   Resp 19   Ht 1.778 m (5' 10\")   Wt 90.7 kg (199 lb 15.3 oz)   SpO2 96%   BMI 28.69 kg/m²       CXR Findings:     IMPRESSION:  1. No interval change.  2. Support tubes are appropriate.  -  CXR personally viewed and interpreted by ICU Nurse Practitioner, agree with above findings     General: Intubated, sedated. IABP 1:2, Epinephrine and vasopressin infusions for hemodynamic support.   Eyes: PERRL, anicteric   Pulmonary: Diminished bibasilar. No wheezes, no 
CVICU Progress Note    Name: Pantera Payne  MRN: [id number]    CC: Postoperative Critical Care Management      Indication for Surgery/Procedure: syncope, ventricular tachycardia, DM, HTN, HLD, atrial fibrillation   LVEF:  25%    RVF:  Normal      Important/Relevant PMH/PSH: HTN, HPL, HFrEF, pulmonary HTN, DM type 2, CANDICE on CPAP, CKD, GERD,  Focal dystonia right hand, Visual  impairment d/t previous eye surgery in 2009, Basal cell carcinoma excision, History of dysphagia and esophageal spasm      Procedure/Surgeries: 10/8/2024 urgent sternotomy, urgent CABG x3 (LIMA0-LAD, SVG-OM, SVG-R PDA), modified MAZE procedure, left atrial appendage ligation with 35 mm Atriclip, EVH, IABP placement      Pacing wires: Ventricular       Intake/Output Summary (Last 24 hours) at 10/11/2024 0830  Last data filed at 10/11/2024 0600  Gross per 24 hour   Intake 2714.88 ml   Output 1325 ml   Net 1389.88 ml       Recent Labs     10/09/24  0509 10/10/24  0424 10/11/24  0404   WBC 16.4* 19.9* 18.8*   HGB 9.7* 8.2* 7.9*   HCT 29.2* 25.7* 24.5*      Lab Results   Component Value Date/Time     10/11/2024 04:04 AM    K 4.4 10/11/2024 04:04 AM     10/11/2024 04:04 AM    CO2 21 10/11/2024 04:04 AM    BUN 31 10/11/2024 04:04 AM    CREATININE 1.1 10/11/2024 04:04 AM    GLUCOSE 96 10/11/2024 04:04 AM    GLUCOSE 195 04/20/2012 09:44 AM    CALCIUM 7.8 10/11/2024 04:04 AM    MG 2.5 10/11/2024 04:04 AM         Physical Exam:    BP (!) 125/52   Pulse 69   Temp 99.5 °F (37.5 °C) (Core)   Resp 24   Ht 1.778 m (5' 10\")   Wt 91.1 kg (200 lb 13.4 oz)   SpO2 100%   BMI 28.82 kg/m²       CXR Findings:     IMPRESSION:  1. The position and alignment of the tubes and catheters are within the normal range.  2. No signs of pneumothorax.  3. No signs of an acute cardiopulmonary process.    -  CXR personally viewed and interpreted by ICU Nurse Practitioner, agree with above findings       General: Intubated, on PSV. IABP removed yesterday, remains 
Called over to [facility] pulmonology to get the sleep study results.  Will fax to the unit.  
Chart reviewed.     Patient with     Patient Active Problem List   Diagnosis    CAD (coronary artery disease)    DM (diabetes mellitus) with complications (HCC)    Hyperlipidemia    HTN (hypertension)    PVC's (premature ventricular contractions)    Focal dystonia    H/O detached retina repair    Myocardiopathy (HCC)    LAFB (left anterior fascicular block)    IVCD (intraventricular conduction defect)    V-tach (HCC)    Persistent atrial fibrillation (HCC)    Acute on chronic combined systolic and diastolic congestive heart failure (HCC)        CAD  NSTEMI  EF 20-25%     Further testing and recommendations to follow.   My attending, Dr. Osuna, will see tomorrow for formal consult.      Emerald Byers, APRN - CNP    
Comprehensive Nutrition Assessment    Type and Reason for Visit:  Initial (LOS)    Nutrition Recommendations/Plan:   Recommend and start ERAS supplement protocol per physician orders when appropriate.         Malnutrition Assessment:  Malnutrition Status:  At risk for malnutrition (Comment) (10/03/24 1057)    Context:  Acute Illness     Findings of the 6 clinical characteristics of malnutrition:  Energy Intake:  75% or less of estimated energy requirements for 7 or more days (overall since admission)  Weight Loss:  Unable to assess (d/t limited recent actual weight history)     Body Fat Loss:  Unable to assess     Muscle Mass Loss:  Unable to assess    Fluid Accumulation:  No significant fluid accumulation     Strength:  Not Performed    Nutrition Assessment:    Patient adm [place] ; s/p cardiac cath on 9/30 ; noted severe multivessel coronary artery disease ; possible CABG ; noted newly diagnosed A-fib s/p DCCV on 9/27 ; hx of DM/COPD/CKD ; hx of CHF/CAD/cardiomyopathy  ; remote history of dysphagia and esophageal spasm ; will provide recommendations    Nutrition Related Findings:    I&Os WNL, no edema, puncture site, active BS ; Wound Type: None       Current Nutrition Intake & Therapies:    Average Meal Intake: 51-75% (overall since admission ; no meals recorded in flowsheets)     ADULT DIET; Regular; 4 carb choices (60 gm/meal); Low Fat/Low Chol/High Fiber/2 gm Na    Anthropometric Measures:  Height: 177.8 cm (5' 10\")  Ideal Body Weight (IBW): 166 lbs (75 kg)    Admission Body Weight: 86.6 kg (191 lb) (9/28, standing scale)  Current Body Weight: 84.8 kg (187 lb) (10/3, standing scale), 112.7 % IBW. Weight Source: Standing Scale  Current BMI (kg/m2): 26.8  Usual Body Weight:  (UTO ; EMR shows past weight of 191# actual on 1/17/24)                       BMI Categories: Overweight (BMI 25.0-29.9)    Estimated Daily Nutrient Needs:  Energy Requirements Based On: 
Comprehensive Nutrition Assessment    Type and Reason for Visit:  Reassess    Nutrition Recommendations/Plan:   Continue NPO; ADAT/Re-Start ONS once weaned from vent.  Recommend to consider EN support if pt unable to wean from vent soon; recommend trickle feeds as tolerated if EN to be started w/ current pressor support.    If Needed;    (Goal) TF Recommendations:  Peptide Based (Vital AF 1.2) @ 55ml/hr (Goal) Continuous x 24hr/d + 1 Protein Modular Once Daily= 1320ml TV, 1584kcal, 99gm Pro, (1698kcal, 125gm Pro w/ Pro Mod), 1071ml freewater.     Flush per critical care mgmt; please consult for updated rec's PRN.    TF Recommendations w/ current rate of Propofol will meet ~100% of current estimated Kcal/Protein needs at this time.     Malnutrition Assessment:  Malnutrition Status:  At risk for malnutrition (Comment) (10/03/24 1057)    Context:  Acute Illness     Findings of the 6 clinical characteristics of malnutrition:  Energy Intake:  75% or less of estimated energy requirements for 7 or more days (overall since admission)  Weight Loss:  Unable to assess (d/t limited recent actual weight history)     Body Fat Loss:  Unable to assess     Muscle Mass Loss:  Unable to assess    Fluid Accumulation:  No significant fluid accumulation     Strength:  Not Performed    Nutrition Assessment:    Pt adm s/p syncope and collapse [place] the [place] ; s/p LHC 9/30 ; noted severe MVCAD and cardiogenic shock ; noted newly diagnosed A-fib s/p DCCV on 9/27 ; PMHx DM, COPD, CKD, CHF, CAD, cardiomyopathy, remote h/o dysphagia/esophageal spasm, BCC, HTN. HLD, VHD ; now s/p CABG x 3 on 10/8 ; remains intubated/sedated on pressor support ; At risk d/t ongoing NPO status w/ need for vent/possible EN support w/ increased needs for post-op healing ; Will provide goal EN recommendations if pt to remain on vent and monitor.    Nutrition Related Findings:    intubated/sedated, MAP 75, high dose pressors x 2, Abd WDL, Hypo BS, NG to LIS w/ 
Comprehensive Nutrition Assessment    Type and Reason for Visit:  Reassess    Nutrition Recommendations/Plan:   Recommend and start Glucerna supplement TID and Aston wound healing supplement BID to help meet increased nutritional needs from surgical wound healing.             Malnutrition Assessment:  Malnutrition Status:  At risk for malnutrition (Comment) (10/15/24 1101)    Context:  Acute Illness     Findings of the 6 clinical characteristics of malnutrition:  Energy Intake:  50% or less of estimated energy requirements for 5 or more days  Weight Loss:  Unable to assess (d/t possible fluid shifts)     Body Fat Loss:  Unable to assess     Muscle Mass Loss:  Unable to assess    Fluid Accumulation:  No significant fluid accumulation     Strength:  Not Performed    Nutrition Assessment:    Patients po intake has been advanced, averaging 1-25% of meals served since his surgery ; post-op ileus resolved ;  s/p CABG x 3 on 10/8 ; [place] ; s/p cardiac cath on 9/30 ; noted severe multivessel coronary artery disease and cardiogenic shock (resolved) ; noted newly diagnosed A-fib s/p DCCV on 9/27 ; hx of DM/COPD/CKD ; hx of CHF/CAD/cardiomyopathy  ; remote history of dysphagia and esophageal spasm ; will provide updated recommendations    Nutrition Related Findings:    +I&Os (+1.6 L), 1+ edema, chest tube x 1, A&O x 4, active BS,diarrhea, rounded/distended abd, hyperglycemia, elevated LFT's ; Wound Type: Multiple, Surgical Incision (Incisions x 2)       Current Nutrition Intake & Therapies:    Average Meal Intake: 1-25%  Average Supplements Intake: NPO  ADULT ORAL NUTRITION SUPPLEMENT; Breakfast, Lunch, Dinner; Clear Liquid Oral Supplement  ADULT DIET; Regular; Low Fat/Low Chol/High Fiber/2 gm Na    Anthropometric Measures:  Height: 177.8 cm (5' 10\")  Ideal Body Weight (IBW): 166 lbs (75 kg)    Admission Body Weight: 86.6 kg (191 lb) (9/28, standing scale)  Current Body Weight: 
Date: 10/1/2024    Time: 10:40 PM    Patient Placed On BIPAP/CPAP/ Non-Invasive Ventilation?  Yes    If no must comment.  Facial area red/color change? No           If YES are Blister/Lesion present?No   If yes must notify nursing staff  BIPAP/CPAP skin barrier?  Yes    Skin barrier type:mepilexlite       Comments: pt could not tolerate new settings (20/14) titrated down to 14/8   10/01/24 2204   NIV Type   NIV Started/Stopped On   Equipment Type V60   Mode Bilevel   Mask Type Full face mask   Mask Size Medium   Assessment   Respirations 19   SpO2 97 %   Level of Consciousness 0   Comfort Level Good   Using Accessory Muscles No   Mask Compliance Good   Skin Assessment Clean, dry, & intact   Skin Protection for O2 Device Yes   Orientation Middle   Location Nose   Intervention(s) Skin Barrier   Settings/Measurements   PIP Observed 14 cm H20   IPAP 14 cmH20   CPAP/EPAP 8 cmH2O   Vt (Measured) 770 mL   Rate Ordered 12   Insp Rise Time (%) 3 %   FiO2  40 %   Minute Volume (L/min) 11.9 Liters   Mask Leak (lpm) 39 lpm   Patient's Home Machine No   Alarm Settings   Alarms On Y         Brett Mendez RCP  
Date: 10/12/2024    Time: 10:20 PM    Patient Placed On BIPAP/CPAP/ Non-Invasive Ventilation?  Yes    If no must comment.  Facial area red/color change? No           If YES are Blister/Lesion present?No   If yes must notify nursing staff  BIPAP/CPAP skin barrier?  Yes    Skin barrier type:mepilexlite       Comments:        Jag Kincaid RCP  
Date: 10/2/2024    Time: 4:03 AM    Patient Placed On BIPAP/CPAP/ Non-Invasive Ventilation?  Yes    If no must comment.  Facial area red/color change? No           If YES are Blister/Lesion present?No   If yes must notify nursing staff  BIPAP/CPAP skin barrier?  Yes    Skin barrier type:mepilexlite       Comments:        Ebony Leon RCP  
Date: 10/4/2024    Time: 1115pm    Patient Placed On BIPAP/CPAP/ Non-Invasive Ventilation?  Yes    If no must comment.  Facial area red/color change? No           If YES are Blister/Lesion present?No   If yes must notify nursing staff  BIPAP/CPAP skin barrier?  Yes    Skin barrier type:mepilexlite       Comments:        Lu Triplett RCP  
Date: 10/8/2024    Time: 12:23 AM    Patient Placed On BIPAP/CPAP/ Non-Invasive Ventilation?  Yes    If no must comment.  Facial area red/color change? No           If YES are Blister/Lesion present?No   If yes must notify nursing staff  BIPAP/CPAP skin barrier?  Yes    Skin barrier type:mepilexlite       Comments:        Zena Welsh RCP  
Date: 9/30/2024    Time: 10:25 PM    Patient Placed On BIPAP/CPAP/ Non-Invasive Ventilation?  Yes    If no must comment.  Facial area red/color change? No           If YES are Blister/Lesion present?No   If yes must notify nursing staff  BIPAP/CPAP skin barrier?  Yes    Skin barrier type:mepilexlclaude Mendez RCP   09/30/24 4052   NIV Type   NIV Started/Stopped On   Equipment Type V60   Mode CPAP   Mask Type Full face mask   Mask Size Medium   Assessment   Respirations 19   SpO2 96 %   Level of Consciousness 0   Comfort Level Good   Using Accessory Muscles No   Mask Compliance Good   Skin Assessment Clean, dry, & intact   Skin Protection for O2 Device Yes   Orientation Middle   Location Nose   Intervention(s) Skin Barrier   Settings/Measurements   PIP Observed 9 cm H20   CPAP/EPAP 8 cmH2O   Vt (Measured) 607 mL   FiO2  40 %   Minute Volume (L/min) 11.8 Liters   Mask Leak (lpm) 40 lpm   Patient's Home Machine No   Alarm Settings   Alarms On Y       
Dr Li msg re: . Order to give additional 4u Humalog.Luz Jerry RN    
Dr. Nova on the floor, made aware of consult.   
ENDOCRINOLOGY PROGRESS NOTE      Date of admission: 9/26/2024  Date of service: 10/14/2024  Admitting physician: Marcela Osuna DO   Primary Care Physician: Terry Giraldo MD  Consultant physician: Perez Li MD     Reason for the consultation:  Uncontrolled DM    History of Present Illness:  Pantera Payne is a very pleasant 79 y.o. old male with PMH of poorly controlled Type 2 DM; Afib; Mitral valve prolapse; Pulmonary HTN, CAD; Hyperlipidemia; Hypertension  and other listed below admitted to [facility] on 9/26/2024 because of Syncope a/w vomiting and high blood glucose 200 during syncope; Frequent PVC and runs of V-Tach as per EMS, endocrine service was consulted for diabetes management.    Subjective:  I saw examined this patient this afternoon.  Transferred out of CVICU.  Glucose level in range most of the time.  No hypoglycemia.    Inpatient diet:   Carb Restricted diet     Point of care glucose monitoring   (Independently reviewed)   Recent Labs     10/13/24  0843 10/13/24  1147 10/13/24  1657 10/13/24  1955 10/14/24  0906 10/14/24  1326 10/14/24  1648 10/14/24  2045   POCGLU 194* 172* 282* 355* 97 141* 215* 319*   \  Scheduled Meds:   enoxaparin  40 mg SubCUTAneous Daily    [START ON 10/15/2024] pantoprazole (PROTONIX) 40 mg in sodium chloride (PF) 0.9 % 10 mL injection  40 mg IntraVENous Daily    [START ON 10/15/2024] aspirin  81 mg Oral Daily    bisacodyl  5 mg Oral Daily    magnesium hydroxide  30 mL Oral Daily    ferrous sulfate  325 mg Oral BID WC    folic acid  1 mg Oral Daily    insulin lispro  0-16 Units SubCUTAneous 4x Daily AC & HS    vitamin C  500 mg Oral BID    lactulose  20 g Oral TID    insulin glargine  10 Units SubCUTAneous Nightly    sodium chloride flush  5-40 mL IntraVENous 2 times per day    magnesium oxide  400 mg Oral BID    sennosides-docusate sodium  1 tablet Oral BID    ipratropium 0.5 mg-albuterol 2.5 mg  1 Dose Inhalation Q4H WA RT    lidocaine  1 patch TransDERmal Daily    
ENDOCRINOLOGY PROGRESS NOTE      Date of admission: 9/26/2024  Date of service: 10/16/2024  Admitting physician: Marcela Osuna DO   Primary Care Physician: Terry Giraldo MD  Consultant physician: Perez Li MD     Reason for the consultation:  Uncontrolled DM    History of Present Illness:  Pantera Payne is a very pleasant 79 y.o. old male with PMH of poorly controlled Type 2 DM; Afib; Mitral valve prolapse; Pulmonary HTN, CAD; Hyperlipidemia; Hypertension  and other listed below admitted to [facility] on 9/26/2024 because of Syncope a/w vomiting and high blood glucose 200 during syncope; Frequent PVC and runs of V-Tach as per EMS, endocrine service was consulted for diabetes management.    Subjective:  I saw examined this patient this afternoon. BS better controlled today. No hypoglycemia. I spoke with daughter at bedside. She reports Pantera has been only eating half of his food portions.     Inpatient diet:   Carb Restricted diet     Point of care glucose monitoring   (Independently reviewed)   Recent Labs     10/14/24  1326 10/14/24  1648 10/14/24  2045 10/15/24  0700 10/15/24  1119 10/15/24  1641 10/15/24  1946 10/16/24  0635   POCGLU 141* 215* 319* 157* 198* 256* 253* 159*   \  Scheduled Meds:   [START ON 10/17/2024] pantoprazole  40 mg Oral QAM AC    piperacillin-tazobactam  3,375 mg IntraVENous Q8H    piperacillin-tazobactam  4,500 mg IntraVENous Once    carvedilol  3.125 mg Oral BID WC    albuterol  2.5 mg Nebulization 4x Daily RT    enoxaparin  40 mg SubCUTAneous Daily    aspirin  81 mg Oral Daily    ferrous sulfate  325 mg Oral BID WC    folic acid  1 mg Oral Daily    vitamin C  500 mg Oral BID    insulin glargine  10 Units SubCUTAneous Nightly    insulin lispro  0-4 Units SubCUTAneous Nightly    insulin lispro  0-8 Units SubCUTAneous TID WC    insulin lispro  3 Units SubCUTAneous TID WC    guaiFENesin  400 mg Oral TID    sodium chloride flush  5-40 mL IntraVENous 2 times per day    magnesium 
ENDOCRINOLOGY PROGRESS NOTE      Date of admission: 9/26/2024  Date of service: 10/17/2024  Admitting physician: Marcela Osuna DO   Primary Care Physician: Terry Giraldo MD  Consultant physician: Perez Li MD     Reason for the consultation:  Uncontrolled DM    History of Present Illness:  Pantera Payne is a very pleasant 79 y.o. old male with PMH of poorly controlled Type 2 DM; Afib; Mitral valve prolapse; Pulmonary HTN, CAD; Hyperlipidemia; Hypertension  and other listed below admitted to [facility] on 9/26/2024 because of Syncope a/w vomiting and high blood glucose 200 during syncope; Frequent PVC and runs of V-Tach as per EMS, endocrine service was consulted for diabetes management.    Subjective:  I saw and examined this patient this afternoon. He was awake today with daughter at bedside. BS improving. No hypoglycemia.    Inpatient diet:   Carb Restricted diet     Point of care glucose monitoring   (Independently reviewed)   Recent Labs     10/15/24  1641 10/15/24  1946 10/16/24  0635 10/16/24  1137 10/16/24  1757 10/16/24  2154 10/17/24  0530 10/17/24  1117   POCGLU 256* 253* 159* 161* 194* 210* 202* 218*   \  Scheduled Meds:   [START ON 10/18/2024] lisinopril  2.5 mg Oral Daily    pantoprazole  40 mg Oral QAM AC    piperacillin-tazobactam  3,375 mg IntraVENous Q8H    carvedilol  3.125 mg Oral BID WC    albuterol  2.5 mg Nebulization 4x Daily RT    enoxaparin  40 mg SubCUTAneous Daily    aspirin  81 mg Oral Daily    ferrous sulfate  325 mg Oral BID WC    folic acid  1 mg Oral Daily    vitamin C  500 mg Oral BID    insulin glargine  10 Units SubCUTAneous Nightly    insulin lispro  0-4 Units SubCUTAneous Nightly    insulin lispro  0-8 Units SubCUTAneous TID WC    insulin lispro  3 Units SubCUTAneous TID WC    guaiFENesin  400 mg Oral TID    sodium chloride flush  5-40 mL IntraVENous 2 times per day    magnesium oxide  400 mg Oral BID    sennosides-docusate sodium  1 tablet Oral BID    lidocaine  1 patch 
ENDOCRINOLOGY PROGRESS NOTE      Date of admission: 9/26/2024  Date of service: 10/18/2024  Admitting physician: Marcela Osuna DO   Primary Care Physician: Terry Giraldo MD  Consultant physician: Perez Li MD     Reason for the consultation:  Uncontrolled DM    History of Present Illness:  Pantera Payne is a very pleasant 79 y.o. old male with PMH of poorly controlled Type 2 DM; Afib; Mitral valve prolapse; Pulmonary HTN, CAD; Hyperlipidemia; Hypertension  and other listed below admitted to [facility] on 9/26/2024 because of Syncope a/w vomiting and high blood glucose 200 during syncope; Frequent PVC and runs of V-Tach as per EMS, endocrine service was consulted for diabetes management.    Subjective:  The patient was seen and examined at bedside this morning, no acute events overnight, glucose level is variable.  Inpatient diet:   Carb Restricted diet     Point of care glucose monitoring   (Independently reviewed)   Recent Labs     10/16/24  2154 10/17/24  0530 10/17/24  1117 10/17/24  1603 10/17/24  1945/24  0625 10/18/24  1138 10/18/24  1651   POCGLU 210* 202* 218* 252* 184* 193* 257* 269*   \  Scheduled Meds:   metoclopramide  5 mg IntraVENous 4x Daily AC & HS    insulin glargine  16 Units SubCUTAneous Nightly    [START ON 10/19/2024] insulin lispro  0-12 Units SubCUTAneous TID WC    [START ON 10/19/2024] insulin lispro  5 Units SubCUTAneous TID WC    lisinopril  2.5 mg Oral Daily    pantoprazole  40 mg Oral QAM AC    piperacillin-tazobactam  3,375 mg IntraVENous Q8H    carvedilol  3.125 mg Oral BID WC    albuterol  2.5 mg Nebulization 4x Daily RT    enoxaparin  40 mg SubCUTAneous Daily    aspirin  81 mg Oral Daily    ferrous sulfate  325 mg Oral BID WC    folic acid  1 mg Oral Daily    vitamin C  500 mg Oral BID    insulin lispro  0-4 Units SubCUTAneous Nightly    guaiFENesin  400 mg Oral TID    sodium chloride flush  5-40 mL IntraVENous 2 times per day    magnesium oxide  400 mg Oral BID    
ENDOCRINOLOGY PROGRESS NOTE      Date of admission: 9/26/2024  Date of service: 10/19/2024  Admitting physician: Marcela Osuna DO   Primary Care Physician: Terry Giraldo MD  Consultant physician: Perez Li MD     Reason for the consultation:  Uncontrolled DM    History of Present Illness:  Pantera Payne is a very pleasant 79 y.o. old male with PMH of poorly controlled Type 2 DM; Afib; Mitral valve prolapse; Pulmonary HTN, CAD; Hyperlipidemia; Hypertension  and other listed below admitted to [facility] on 9/26/2024 because of Syncope a/w vomiting and high blood glucose 200 during syncope; Frequent PVC and runs of V-Tach as per EMS, endocrine service was consulted for diabetes management.    Subjective:  Seen and examined, no acute events   .  Inpatient diet:   Carb Restricted diet     Point of care glucose monitoring   (Independently reviewed)   Recent Labs     10/17/24  1117 10/17/24  1603 10/17/24  1944/24  0625 10/18/24  1138 10/18/24  1651 10/18/24  2001 10/19/24  0552   POCGLU 218* 252* 184* 193* 257* 269* 365* 158*   \  Scheduled Meds:   bumetanide  1 mg IntraVENous BID    metoclopramide  5 mg IntraVENous 4x Daily AC & HS    insulin glargine  16 Units SubCUTAneous Nightly    insulin lispro  0-12 Units SubCUTAneous TID WC    insulin lispro  5 Units SubCUTAneous TID WC    lisinopril  2.5 mg Oral Daily    pantoprazole  40 mg Oral QAM AC    piperacillin-tazobactam  3,375 mg IntraVENous Q8H    carvedilol  3.125 mg Oral BID WC    albuterol  2.5 mg Nebulization 4x Daily RT    enoxaparin  40 mg SubCUTAneous Daily    aspirin  81 mg Oral Daily    ferrous sulfate  325 mg Oral BID WC    folic acid  1 mg Oral Daily    vitamin C  500 mg Oral BID    insulin lispro  0-4 Units SubCUTAneous Nightly    guaiFENesin  400 mg Oral TID    sodium chloride flush  5-40 mL IntraVENous 2 times per day    magnesium oxide  400 mg Oral BID    sennosides-docusate sodium  1 tablet Oral BID    lidocaine  1 patch TransDERmal Daily 
ENDOCRINOLOGY PROGRESS NOTE      Date of admission: 9/26/2024  Date of service: 10/20/2024  Admitting physician: Marcela Osuna DO   Primary Care Physician: Terry Giraldo MD  Consultant physician: Perez Li MD     Reason for the consultation:  Uncontrolled DM    History of Present Illness:  Pantera Payne is a very pleasant 79 y.o. old male with PMH of poorly controlled Type 2 DM; Afib; Mitral valve prolapse; Pulmonary HTN, CAD; Hyperlipidemia; Hypertension  and other listed below admitted to [facility] on 9/26/2024 because of Syncope a/w vomiting and high blood glucose 200 during syncope; Frequent PVC and runs of V-Tach as per EMS, endocrine service was consulted for diabetes management.    Subjective:  I saw and examined the patient at bedside this morning, no acute events overnight, glucose level improving.  No hypoglycemia  .  Inpatient diet:   Carb Restricted diet     Point of care glucose monitoring   (Independently reviewed)   Recent Labs     10/18/24  1138 10/18/24  1651 10/18/24  2001 10/19/24  0552 10/19/24  1117 10/19/24  1630 10/19/24  2114 10/20/24  0534   POCGLU 257* 269* 365* 158* 221* 184* 286* 207*   \  Scheduled Meds:   [Held by provider] bumetanide  1 mg IntraVENous BID    insulin lispro  6 Units SubCUTAneous TID WC    metoclopramide  5 mg IntraVENous 4x Daily AC & HS    insulin glargine  16 Units SubCUTAneous Nightly    insulin lispro  0-12 Units SubCUTAneous TID WC    lisinopril  2.5 mg Oral Daily    pantoprazole  40 mg Oral QAM AC    piperacillin-tazobactam  3,375 mg IntraVENous Q8H    carvedilol  3.125 mg Oral BID WC    albuterol  2.5 mg Nebulization 4x Daily RT    enoxaparin  40 mg SubCUTAneous Daily    aspirin  81 mg Oral Daily    ferrous sulfate  325 mg Oral BID WC    folic acid  1 mg Oral Daily    vitamin C  500 mg Oral BID    insulin lispro  0-4 Units SubCUTAneous Nightly    guaiFENesin  400 mg Oral TID    sodium chloride flush  5-40 mL IntraVENous 2 times per day    magnesium 
ENDOCRINOLOGY PROGRESS NOTE      Date of admission: 9/26/2024  Date of service: 10/3/2024  Admitting physician: Darion Story MD   Primary Care Physician: Terry Giraldo MD  Consultant physician: Perez Li MD     Reason for the consultation:  Uncontrolled DM    History of Present Illness:  Pantera Payne is a very pleasant 79 y.o. old male with PMH of poorly controlled Type 2 DM; Afib; Mitral valve prolapse; Pulmonary HTN, CAD; Hyperlipidemia; Hypertension  and other listed below admitted to [facility] on 9/26/2024 because of Syncope a/w vomiting and high blood glucose 200 during syncope; Frequent PVC and runs of V-Tach as per EMS, endocrine service was consulted for diabetes management.    Subjective:  Patient was seen and examined this afternoon at the bedside in no acute distress. He is doing well and feeling better than yesterday. He has good appetite and is eating well with normal bowel and bladder habit. His blood glucose is trending down with scheduled Lispro. He did not received the Lantus overnight as he denied due to his Blood glucose at level of 143. He denies any hypoglycemic episodes. Vitals are stable.       Lab Results   Component Value Date/Time    LABA1C 8.0 09/27/2024 06:34 AM       Inpatient diet:   Carb Restricted diet     Point of care glucose monitoring   (Independently reviewed)   Recent Labs     10/01/24  1113 10/01/24  2037 10/02/24  0600 10/02/24  1116 10/02/24  1732 10/02/24  1959 10/03/24  0541 10/03/24  0823   POCGLU 272* 347* 246* 303* 226* 143* 199* 192*   Scheduled Meds:   insulin glargine  22 Units SubCUTAneous Nightly    insulin lispro  6 Units SubCUTAneous TID WC    insulin lispro  0-12 Units SubCUTAneous TID WC    insulin lispro  0-4 Units SubCUTAneous Nightly    enoxaparin  1 mg/kg SubCUTAneous BID    amiodarone  400 mg Oral BID    [Held by provider] sacubitril-valsartan  1 tablet Oral BID    [Held by provider] furosemide  40 mg Oral Daily    aspirin  81 mg Oral Every Other 
ENDOCRINOLOGY PROGRESS NOTE      Date of admission: 9/26/2024  Date of service: 10/4/2024  Admitting physician: Darion Story MD   Primary Care Physician: Terry Giraldo MD  Consultant physician: Perez Li MD     Reason for the consultation:  Uncontrolled DM    History of Present Illness:  Pantera Payne is a very pleasant 79 y.o. old male with PMH of poorly controlled Type 2 DM; Afib; Mitral valve prolapse; Pulmonary HTN, CAD; Hyperlipidemia; Hypertension  and other listed below admitted to [facility] on 9/26/2024 because of Syncope a/w vomiting and high blood glucose 200 during syncope; Frequent PVC and runs of V-Tach as per EMS, endocrine service was consulted for diabetes management.    Subjective:  No acute events overnight, glucose level fluctuating but improving.  No hypoglycemia.    Lab Results   Component Value Date/Time    LABA1C 8.0 09/27/2024 06:34 AM       Inpatient diet:   Carb Restricted diet     Point of care glucose monitoring   (Independently reviewed)   Recent Labs     10/03/24  0823 10/03/24  1227 10/03/24  1744 10/03/24  2008 10/04/24  0517 10/04/24  0739 10/04/24  1221 10/04/24  1730   POCGLU 192* 291* 165* 299* 233* 271* 185* 151*   Scheduled Meds:   insulin lispro  7 Units SubCUTAneous TID WC    insulin glargine  18 Units SubCUTAneous Nightly    insulin lispro  0-12 Units SubCUTAneous TID WC    insulin lispro  0-4 Units SubCUTAneous Nightly    enoxaparin  1 mg/kg SubCUTAneous BID    amiodarone  400 mg Oral BID    [Held by provider] sacubitril-valsartan  1 tablet Oral BID    [Held by provider] furosemide  40 mg Oral Daily    aspirin  81 mg Oral Every Other Day    metoprolol succinate  50 mg Oral Daily    rosuvastatin  40 mg Oral Once per day on Monday Wednesday Friday    spironolactone  25 mg Oral Daily    cetirizine  10 mg Oral Daily    sodium chloride flush  10 mL IntraVENous 2 times per day       PRN Meds:   guaiFENesin-dextromethorphan, 10 mL, Q4H PRN  HYDROcodone homatropine, 5 mL, Q6H 
ENDOCRINOLOGY PROGRESS NOTE      Date of admission: 9/26/2024  Date of service: 10/6/2024  Admitting physician: Darion Story MD   Primary Care Physician: Terry Giraldo MD  Consultant physician: Perez Li MD     Reason for the consultation:  Uncontrolled DM    History of Present Illness:  Pantera Payne is a very pleasant 79 y.o. old male with PMH of poorly controlled Type 2 DM; Afib; Mitral valve prolapse; Pulmonary HTN, CAD; Hyperlipidemia; Hypertension  and other listed below admitted to [facility] on 9/26/2024 because of Syncope a/w vomiting and high blood glucose 200 during syncope; Frequent PVC and runs of V-Tach as per EMS, endocrine service was consulted for diabetes management.    Subjective:  I saw and examined the patient at bedside this morning, no acute events overnight, glucose level improving.  No hypoglycemia    Inpatient diet:   Carb Restricted diet     Point of care glucose monitoring   (Independently reviewed)   Recent Labs     10/04/24  0739 10/04/24  1221 10/04/24  1730 10/04/24  2102 10/05/24  0547 10/05/24  1259 10/05/24  1743 10/05/24  2144   POCGLU 271* 185* 151* 261* 183* 277* 161* 174*   \  Scheduled Meds:   insulin glargine  21 Units SubCUTAneous Nightly    insulin lispro  7 Units SubCUTAneous TID WC    insulin lispro  0-12 Units SubCUTAneous TID WC    insulin lispro  0-4 Units SubCUTAneous Nightly    enoxaparin  1 mg/kg SubCUTAneous BID    amiodarone  400 mg Oral BID    [Held by provider] sacubitril-valsartan  1 tablet Oral BID    [Held by provider] furosemide  40 mg Oral Daily    aspirin  81 mg Oral Every Other Day    metoprolol succinate  50 mg Oral Daily    rosuvastatin  40 mg Oral Once per day on Monday Wednesday Friday    spironolactone  25 mg Oral Daily    cetirizine  10 mg Oral Daily    sodium chloride flush  10 mL IntraVENous 2 times per day       PRN Meds:   guaiFENesin-dextromethorphan, 10 mL, Q4H PRN  HYDROcodone homatropine, 5 mL, Q6H PRN  promethazine, 6.25 mg, Q6H 
ENDOCRINOLOGY PROGRESS NOTE    Date of Service: 10/21/2024  Date of Admission: 9/26/2024  Admitting Physician: Marcela Osuna DO   Primary Care Physician: Terry Giraldo MD  Consultant physician: Perez Li MD     Reason for the consultation:  Uncontrolled DM    History of Present Illness:  Pantera Payne is a very pleasant 79 y.o. old male with PMH of poorly controlled Type 2 DM; Afib; Mitral valve prolapse; Pulmonary HTN, CAD; Hyperlipidemia; Hypertension, and other listed below admitted to [facility] on 9/26/2024 because of Syncope a/w vomiting and high blood glucose 200 during syncope; Frequent PVC and runs of V-Tach as per EMS, endocrine service was consulted for diabetes management.     Subjective:  Patient seen and examined, no overnight major events. Appetite is good. He has been ambulating with assistance. Denies nausea, vomiting, abdominal pain.     Inpatient diet:   Carb Restricted diet     Point of care glucose monitoring:   Independently reviewed     Recent Labs     10/19/24  1630 10/19/24  2114 10/20/24  0534 10/20/24  1156 10/20/24  1637 10/20/24  2022 10/21/24  0607 10/21/24  1124   POCGLU 184* 286* 207* 177* 261* 230* 181* 369*       Scheduled Meds:   bumetanide  1 mg Oral Daily    insulin glargine  18 Units SubCUTAneous Nightly    insulin lispro  6 Units SubCUTAneous TID WC    insulin lispro  0-12 Units SubCUTAneous TID WC    lisinopril  2.5 mg Oral Daily    pantoprazole  40 mg Oral QAM AC    piperacillin-tazobactam  3,375 mg IntraVENous Q8H    carvedilol  3.125 mg Oral BID WC    albuterol  2.5 mg Nebulization 4x Daily RT    enoxaparin  40 mg SubCUTAneous Daily    aspirin  81 mg Oral Daily    ferrous sulfate  325 mg Oral BID WC    folic acid  1 mg Oral Daily    vitamin C  500 mg Oral BID    insulin lispro  0-4 Units SubCUTAneous Nightly    guaiFENesin  400 mg Oral TID    sodium chloride flush  5-40 mL IntraVENous 2 times per day    magnesium oxide  400 mg Oral BID    sennosides-docusate sodium 
Haydee Joyner PA via [employer] regarding patient requesting something for his dry cough.     2136: muccinex ordered     Krystyna Tate RN    
IABP removal    In collaboration with Dr. Osuna, Platelets and FFP given prior to removal. IABP removed without difficulty, tip intact. Pressure held for 30mins, hemostasis achieved. Pressure dressing applied. Bedside RN notified. Check site and pulses frequently. HOB to remain <30 degrees and 6 hours bedrest.     
IV to PO Conversion Policy     Notification of IV to PO conversion:    This patient's order for Promethazine 6.25 mg IV has been changed to Promethazine 6.25 mg PO as approved by the [facility] ([facility] INTRAVENOUS TO ORAL Policy.    If the patient should become strict NPO while on this therapy, contact the prescriber for further orders.    Krista Morrissey RPH  10/7/2024  10:31 AM  =  
Internal Medicine Progress Note    Patient's name: Pantera Payne  : 1945  Chief complaints (on day of admission): Loss of Consciousness ([place]. Pt states  and decided to eat an energy bar. Pt passed out on golf [place] and started to vomit. EMS reported frequent PVC's and runs of V-tach)  Admission date: 2024  Date of service: 10/1/2024   Room: [address]  Primary care physician: Terry Giraldo MD  Reason for visit: Follow-up for LOC     Subjective  Pantera was seen and examined at bedside     Undergoing pre operative testing today   Family at bedside  Still having a nagging dry cough   No other issues at this time     Review of Systems  There are no new complaints of chest pain, shortness of breath, abdominal pain, nausea, vomiting, diarrhea, constipation unless otherwise mentioned above.     Hospital Medications  Current Facility-Administered Medications   Medication Dose Route Frequency Provider Last Rate Last Admin    enoxaparin (LOVENOX) injection 90 mg  1 mg/kg SubCUTAneous BID Sirisha Mendoza MD   90 mg at 10/01/24 1119    benzonatate (TESSALON) capsule 200 mg  200 mg Oral TID Milton Nova, DO   200 mg at 10/01/24 1116    guaiFENesin-dextromethorphan (ROBITUSSIN DM) 100-10 MG/5ML syrup 10 mL  10 mL Oral Q4H PRN Milton Nova, DO   10 mL at 10/01/24 1408    HYDROcodone homatropine (HYCODAN) 5-1.5 MG/5ML solution 5 mL  5 mL Oral Q6H PRN Milton Nova DO   5 mL at 10/01/24 0844    amiodarone (CORDARONE) tablet 400 mg  400 mg Oral BID Sirisha Mendoza MD   400 mg at 10/01/24 1115    promethazine (PHENERGAN) 6.25 mg in sodium chloride 0.9% 50 mL IVPB  6.25 mg IntraVENous Q6H PRN Sirisha Mendoza MD   Stopped at 24 1243    [Held by provider] sacubitril-valsartan (ENTRESTO) 24-26 MG per tablet 1 tablet  1 tablet Oral BID Sirisha Mendoza MD   1 tablet at 24 0741    [Held by provider] furosemide (LASIX) tablet 40 mg  40 mg Oral Daily 
Internal Medicine Progress Note    Patient's name: Pantera Payne  : 1945  Chief complaints (on day of admission): Loss of Consciousness (Pt [place] feeling [place]. Pt states  and decided to eat an energy bar. Pt [place] on [place] and started to vomit. EMS reported frequent PVC's and runs of V-tach)  Admission date: 2024  Date of service: 10/4/2024   Room: [address]  Primary care physician: Terry Giraldo MD  Reason for visit: Follow-up for LOC     Subjective  Pantera was not seen he was rod[place] for cardiac MRI. Spoke with his wife and daughter at bedside though. Please call me with any issues     Review of Systems not obtained today   There are no new complaints of chest pain, shortness of breath, abdominal pain, nausea, vomiting, diarrhea, constipation unless otherwise mentioned above.     Hospital Medications  Current Facility-Administered Medications   Medication Dose Route Frequency Provider Last Rate Last Admin    insulin glargine (LANTUS) injection vial 22 Units  22 Units SubCUTAneous Nightly Perez Li MD        insulin lispro (HUMALOG,ADMELOG) injection vial 7 Units  7 Units SubCUTAneous TID Perez Devlin MD        insulin lispro (HUMALOG,ADMELOG) injection vial 0-12 Units  0-12 Units SubCUTAneous TID Perez Devlin MD   6 Units at 10/04/24 0911    insulin lispro (HUMALOG,ADMELOG) injection vial 0-4 Units  0-4 Units SubCUTAneous Nightly Perez Li MD        enoxaparin (LOVENOX) injection 90 mg  1 mg/kg SubCUTAneous BID Sirisha Mendoza MD   90 mg at 10/04/24 0745    guaiFENesin-dextromethorphan (ROBITUSSIN DM) 100-10 MG/5ML syrup 10 mL  10 mL Oral Q4H PRN Milton Nova DO   10 mL at 10/01/24 1817    HYDROcodone homatropine (HYCODAN) 5-1.5 MG/5ML solution 5 mL  5 mL Oral Q6H PRN Milton Nova DO   5 mL at 10/02/24 0608    amiodarone (CORDARONE) tablet 400 mg  400 mg Oral BID Sirisha Mendoza MD   400 mg at 10/04/24 
Internal Medicine Progress Note    Patient's name: Pantera Payne  : 1945  Chief complaints (on day of admission): Loss of Consciousness (Pt [place]. Pt states  and decided to eat an energy bar. Pt passed out on [place] and started to vomit. EMS reported frequent PVC's and runs of V-tach)  Admission date: 2024  Date of service: 10/6/2024   Room: [address]  Primary care physician: Terry Giraldo MD  Reason for visit: Follow-up for LOC     Subjective  Pantera was seen at bedside, he is resting in nad.    Review of Systems not obtained today   There are no new complaints of chest pain, shortness of breath, abdominal pain, nausea, vomiting, diarrhea, constipation unless otherwise mentioned above.     Hospital Medications  Current Facility-Administered Medications   Medication Dose Route Frequency Provider Last Rate Last Admin    insulin glargine (LANTUS) injection vial 21 Units  21 Units SubCUTAneous Nightly Perez Li MD   21 Units at 10/05/24 2141    insulin lispro (HUMALOG,ADMELOG) injection vial 7 Units  7 Units SubCUTAneous TID Perez Devlin MD   7 Units at 10/05/24 175    insulin lispro (HUMALOG,ADMELOG) injection vial 0-12 Units  0-12 Units SubCUTAneous TID Perez Devlin MD   2 Units at 10/05/24 175    insulin lispro (HUMALOG,ADMELOG) injection vial 0-4 Units  0-4 Units SubCUTAneous Nightly Perez Li MD        enoxaparin (LOVENOX) injection 90 mg  1 mg/kg SubCUTAneous BID Sirisha Mendoza MD   90 mg at 10/05/24 213    guaiFENesin-dextromethorphan (ROBITUSSIN DM) 100-10 MG/5ML syrup 10 mL  10 mL Oral Q4H PRN Milton Nova DO   10 mL at 10/01/24 181    HYDROcodone homatropine (HYCODAN) 5-1.5 MG/5ML solution 5 mL  5 mL Oral Q6H PRN Milton Nova DO   5 mL at 10/02/24 0608    amiodarone (CORDARONE) tablet 400 mg  400 mg Oral BID Sirisha Mendoza MD   400 mg at 10/05/24 2139    promethazine (PHENERGAN) 6.25 
Left Arm 135/74 (91)  Right arm 115/75 (87)  
Med rec completed with ptAnne   
Messaged Dr. Story re: pt NV, pt EKG shows wide QTC on EKG, pt only rx zofran  
Messaged Dr. Story to update on timeline of pt receiving CPAP through [facility].  They stated that if pertinent enough to consult pulm on this admission to expedite process faster  
Messaged Jyotsna GUSMAN w/Cardiology re: consult   
Messaged Perez Li MD via [employer] regarding night time insulin sliding scale.   Awaiting new orders or return call.     Krystyna Tate RN      2121: 0-16u sliding scale has been discontinued    Krystyna Tate RN    
Messaged Val GUSMAN w/EP re: if to continue amio gtt or not.  Stated to keep gtt in place until Dr. Mariano sees pt  
OCCUPATIONAL THERAPY    Date:10/9/2024  Patient Name: Pantera Payne  MRN: [id number]  : 1945  [facility]: [facility]              Chart reviewed. Pt not medically appropriate for therapy at this time.  Will discontinue OT order. Please re-order when pt able to participate. Thank you.    Chelsey Collins, OTR/L [id number]     
OCCUPATIONAL THERAPY INITIAL EVALUATION    [facility]  [address] [place], [place]      Date:2024                                                  Patient Name: Pantera Payne  MRN: [id number]  : 1945  Room: [address] [place] [address]    Evaluating OT: ANAID Isaac, OTR/L  # [id number]    Referring Provider:  Darion Story MD   Specific Provider Orders:  \"OT Eval and Treat\"  24    Diagnosis: Syncope and collapse [R55]  V tach (HCC) [I47.20]  V-tach (HCC) [I47.20]    Pt was admitted after experiencing lightheadedness and syncopal episode [place], (+) Vomiting    Pertinent Medical History:  Pt has a past medical history of Atrial fibrillation (HCC), CAD (coronary artery disease), Cancer (HCC), Detached retina, Focal dystonia, Hearing problem, Hyperlipidemia, Hypertension, Kidney stone, Mild tricuspid regurgitation, Mitral valve prolapse, Pulmonary hypertension (HCC), and Type II or unspecified type diabetes mellitus without mention of complication, not stated as uncontrolled.,  has a past surgical history that includes hernia repair; cyst removal; Kidney stone surgery; Retinal detachment surgery; Colonoscopy; Upper gastrointestinal endoscopy; eye surgery; transthoracic echocardiogram (2009); cardiovascular stress test (2002 Treadmill nuclear stress test:  Samy protocol.  11 minutes, 30 seconds.  98% of maximum predicted heart rate. ); Diagnostic Cardiac Cath Lab Procedure (10/04/2003); cardiovascular stress test (2009 Samy protocol. 9 minutes. Physiologic BP response.); Inguinal hernia repair (); malignant skin lesion excision; Skin cancer excision (2020); and Cataract removal with implant (Left, 2023).    Surgeries this admission: None     Precautions:  Fall Risk  Monitor Vitals    Assessment of current deficits   [x] Functional mobility  [x]ADLs  [x] Strength               []Cognition   [x] 
Occupational Therapy  OT BEDSIDE TREATMENT NOTE   [facility]  [address] [place], [place]       Date:10/18/2024  Patient Name: Pantera Payne  MRN: [id number]  : 1945  Room: [address] [place] [address]A     Per OT Eval:    Evaluating OT: Chelsey Collins, OTR/L [id number]     Referring Provider:   Kenzie Ochoa APRN - CNP       Specific Provider Orders/Date: OT eval and treat (10/14/24)        Diagnosis: Syncope and collapse [R55]  V tach (HCC) [I47.20]  V-tach (HCC) [I47.20]         Surgery/Procedures: 10/8 urgent sternotomy, urgent CABG x3 (LIMA0-LAD, SVG-OM, SVG-R PDA), modified MAZE procedure, left atrial appendage ligation with 35 mm Atriclip, EVH, IABP placement          Pertinent Medical History:    Past Medical History        Past Medical History:   Diagnosis Date    Atrial fibrillation (HCC) , brief episode.    CAD (coronary artery disease)      Cancer (HCC)       Basal cell carcinoma, excisionx 2 (left temporal area).    Detached retina 2007     Left eye.  Laser repair.  2009: Residual scar tissue detected on exam in 2009.  Patient reported some deterioration in visual acuity.    Focal dystonia 2002     Right hand.    Hearing problem      Hyperlipidemia      Hypertension      Kidney stone .    Mild tricuspid regurgitation 13    Mitral valve prolapse 13     mild    Pulmonary hypertension (HCC) 13    Type II or unspecified type diabetes mellitus without mention of complication, not stated as uncontrolled Diagnosed in .         *Precautions:  Fall Risk, sternal,    Assessment of current deficits   [x]Functional mobility            [x]ADLs           [x]Strength                  []Cognition  [x]Functional transfers          [x]IADLs          [x]Safety Awareness   [x]Endurance  []Fine Motor Coordination   [x]Balance       []Vision/perception    []Sensation      []Gross Motor Coordination [x]ROM           
Occupational Therapy  OT BEDSIDE TREATMENT NOTE   [facility]  [address] [place], [place]       Date:10/16/2024  Patient Name: Pantera Payne  MRN: [id number]  : 1945  Room: [address] [place] [address]A     Per OT Eval:    Evaluating OT: Chelsey Collins, OTR/L [id number]     Referring Provider:   Kenzie Ochoa APRN - CNP       Specific Provider Orders/Date: OT eval and treat (10/14/24)        Diagnosis: Syncope and collapse [R55]  V tach (HCC) [I47.20]  V-tach (HCC) [I47.20]         Surgery/Procedures: 10/8 urgent sternotomy, urgent CABG x3 (LIMA0-LAD, SVG-OM, SVG-R PDA), modified MAZE procedure, left atrial appendage ligation with 35 mm Atriclip, EVH, IABP placement          Pertinent Medical History:    Past Medical History        Past Medical History:   Diagnosis Date    Atrial fibrillation (HCC) , brief episode.    CAD (coronary artery disease)      Cancer (HCC)       Basal cell carcinoma, excisionx 2 (left temporal area).    Detached retina 2007     Left eye.  Laser repair.  2009: Residual scar tissue detected on exam in 2009.  Patient reported some deterioration in visual acuity.    Focal dystonia 2002     Right hand.    Hearing problem      Hyperlipidemia      Hypertension      Kidney stone .    Mild tricuspid regurgitation 13    Mitral valve prolapse 13     mild    Pulmonary hypertension (HCC) 13    Type II or unspecified type diabetes mellitus without mention of complication, not stated as uncontrolled Diagnosed in .         *Precautions:  Fall Risk, sternal, O2     Assessment of current deficits   [x]Functional mobility            [x]ADLs           [x]Strength                  []Cognition  [x]Functional transfers          [x]IADLs          [x]Safety Awareness   [x]Endurance  []Fine Motor Coordination   [x]Balance       []Vision/perception    []Sensation      []Gross Motor Coordination [x]ROM           
Occupational Therapy  OT consult received to eval/treat and chart review complete. Attempted OT evaluation, patient off floor at cath lab. OT to re-attempt at a later date/time as able and appropriate.     Susanna Ramesh, OTR/L  License Number: [id number]    
Okay to give lovenox today per CTS NP Emerald Byers.    
POD#10 Awake, alert. No complaints. Denies CP, palpitations, SOB at rest, dizziness/lightheadedness.    Vitals:    10/18/24 1912 10/18/24 1938 10/18/24 2000 10/18/24 2045   BP:  (!) 113/56     Pulse:  68     Resp:  18 16 16   Temp:  98.2 °F (36.8 °C)     TempSrc:  Temporal     SpO2: 95% 100%     Weight:       Height:         O2: RA      Intake/Output Summary (Last 24 hours) at 10/18/2024 2224  Last data filed at 10/18/2024 1912  Gross per 24 hour   Intake 420 ml   Output 425 ml   Net -5 ml       +BM on 10/18    UO: voids without difficulty   CT output:  Pleural: 165mL/8hr (345mL/24hrs)      Recent Labs     10/16/24  0657 10/17/24  0459 10/18/24  0524   WBC 14.1* 13.2* 10.1   HGB 8.5* 8.2* 8.1*   HCT 27.3* 25.8* 25.3*    242 241      Recent Labs     10/16/24  0657 10/17/24  0459 10/18/24  0524   BUN 31* 29* 30*   CREATININE 1.1 1.1 1.1         Telemetry: SR      PE  Cardiac: RRR  Lungs: decreased bases  Chest incision C/D/I, approximated, no erythema. Sternum stable. Prior chest tube site incisions C/D/I, no erythema with intact sutures. Chest tubes x 1 and Epicardial pacing wires present and secure.   Abd: Soft, nontender, +BS  Ext: Incisions C/D/I, approximated, no erythema, + edema       A/P: POD#10      1. CAD, VT  --Stable s/p urgent CABG x3 (LIMA0-LAD, SVG-OM, SVG-R PDA), modified MAZE procedure, left atrial appendage ligation with 35 mm Atriclip, EVH, IABP placement   --Post op ANGELINA reveals 25% EF  --repeat TTE EF 20-25% -- lifevest ordered and in patients room  --Scr stable  --ASA/statin/BB -- will need eliquis prior to discharge once chest tubes removed for MAZE   --continue epicardial pacing wires  --Chest tube with continued output (345cc/24 hours), and without airleaks - continue chest tube today.         2. Expected acute blood loss anemia secondary to open heart surgery  --stable- hgb 9.2 today         3. Afib s/p Modified MAZE, Left atrial appendage ligation with 35mm AtriClip   --s/p DCCV 
POD#12 Awake, alert. No complaints. Denies CP, palpitations, SOB at rest, dizziness/lightheadedness.    Vitals:    10/20/24 2150 10/21/24 0003 10/21/24 0240 10/21/24 0530   BP:  110/61 132/64    Pulse: 65 57 65    Resp: 27  18    Temp:  98.2 °F (36.8 °C) 98.4 °F (36.9 °C)    TempSrc:  Temporal Temporal    SpO2: 99% 100% 97%    Weight:    89.9 kg (198 lb 3.2 oz)   Height:         O2: RA 97%      Intake/Output Summary (Last 24 hours) at 10/21/2024 0717  Last data filed at 10/21/2024 0620  Gross per 24 hour   Intake 240 ml   Output 1950 ml   Net -1710 ml         +BM on 10/20    UO: 1000mL/8hr   CT output: Pleural: 130mL/8hr (200mL/24hrs)      Recent Labs     10/19/24  0600 10/20/24  0441 10/21/24  0450   WBC 10.6 9.1 10.4   HGB 9.2* 8.6* 8.7*   HCT 29.8* 27.6* 28.2*    246 247      Recent Labs     10/19/24  0600 10/20/24  0441 10/21/24  0450   BUN 32* 27* 24*   CREATININE 1.2 1.1 1.0         Telemetry: SR      PE  Cardiac: RRR  Lungs: decreased bases  Chest incision C/D/I, approximated, slight erythema at distal portion of incision, dry scabbing. Sternum stable. Prior chest tube site incisions C/D/I, no erythema with intact sutures. Chest tubes x 1 and Epicardial pacing wires present and secure.   Abd: Soft, nontender, +BS  Ext: Incisions C/D/I, approximated, no erythema, + edema      A/P: POD#12      1. CAD, VT  --Stable s/p urgent CABG x3 (LIMA0-LAD, SVG-OM, SVG-R PDA), modified MAZE procedure, left atrial appendage ligation with 35 mm Atriclip, EVH, IABP placement   --Post op ANGELINA reveals 25% EF  --repeat TTE EF 20-25% -- lifevest ordered and in patients room  --Scr stable  --ASA/statin/BB -- will need eliquis prior to discharge once chest tubes removed for MAZE   --epicardial pacing wires cut without difficulty, patient tolerated well  --Chest tube with improved output and without airleaks. Chest tube removed without difficulty, patient tolerated well.   --betadine sternal incision BID         2. Expected 
POD#7  Awake, alert. No complaints. Denies CP, palpitations, SOB at rest, dizziness/lightheadedness.    Vitals:    10/14/24 2322 10/15/24 0306 10/15/24 0645 10/15/24 0722   BP: (!) 120/53 114/63  (!) 112/58   Pulse: 72 75  73   Resp: 18 24  18   Temp: 97.4 °F (36.3 °C) 98.2 °F (36.8 °C)     TempSrc: Temporal Temporal     SpO2: 93% 100%  100%   Weight:   75.1 kg (165 lb 8 oz)    Height:         O2: 2L/NC      Intake/Output Summary (Last 24 hours) at 10/15/2024 0845  Last data filed at 10/15/2024 0645  Gross per 24 hour   Intake 940 ml   Output 2235 ml   Net -1295 ml         +BM on 10/14, 15        Pleural:  (540mL/24hrs)      Recent Labs     10/13/24  0412 10/14/24  0427 10/15/24  0510   WBC 10.3 11.3 11.2   HGB 7.9* 8.2* 8.3*   HCT 24.9* 25.7* 26.3*   PLT  --  182 231      Recent Labs     10/13/24  0412 10/14/24  0427 10/15/24  0510   BUN 42* 35* 25*   CREATININE 1.1 1.0 1.1         Telemetry: SR      PE  Cardiac: RRR  Lungs: decreased bases  Chest incision with intact PRISCILLA DSD. Sternum stable. Prior chest tube site incisions C/D/I, no erythema with intact sutures. Chest tubes x 3  and Epicardial pacing wires present and secure.   Abd: Soft, nontender, mildly distended  Ext: Incisions C/D/I, approximated, no erythema, + edema, groin soft s/p IABP removal           A/P: POD#7    1. CAD, VT  --Stable s/p urgent CABG x3 (LIMA0-LAD, SVG-OM, SVG-R PDA), modified MAZE procedure, left atrial appendage ligation with 35 mm Atriclip, EVH, IABP placement   --Post op ANGELINA reveals 25% EF  --will order TTE today discharge to establish baseline. Will order WCD on discharge due to OSH VT  --Scr stable at 1.1  --ASA/crestor/not on BB yet, will add low dose coreg today  --continue epicardial pacing wires  --mediastinal CTs removed, pleural cont to sxn due to drainage         2. Expected acute blood loss anemia secondary to open heart surgery  --stable      3. Afib s/p Modified MAZE, Left atrial appendage ligation with 35mm AtriClip 
POD#8  Awake, alert. No complaints. Denies CP, palpitations, SOB at rest, dizziness/lightheadedness.    Vitals:    10/16/24 0328 10/16/24 0647 10/16/24 0714 10/16/24 0740   BP: (!) 103/55   (!) 113/55   Pulse: 73   63   Resp: 22  20 20   Temp: 98.3 °F (36.8 °C)   97.6 °F (36.4 °C)   TempSrc: Temporal   Temporal   SpO2: 97%   99%   Weight:  80.4 kg (177 lb 3.2 oz)     Height:         O2: 2L/NC 97%      Intake/Output Summary (Last 24 hours) at 10/16/2024 0836  Last data filed at 10/16/2024 0647  Gross per 24 hour   Intake --   Output 995 ml   Net -995 ml         +BM on 10/15     Pleural:  (365mL/24hrs)      Recent Labs     10/14/24  0427 10/15/24  0510 10/16/24  0657   WBC 11.3 11.2 14.1*   HGB 8.2* 8.3* 8.5*   HCT 25.7* 26.3* 27.3*    231 232      Recent Labs     10/14/24  0427 10/15/24  0510 10/16/24  0657   BUN 35* 25* 31*   CREATININE 1.0 1.1 1.1         Telemetry: SR      PE  Cardiac: RRR  Lungs: decreased bases  Chest incision C/D/I, well approximated, no signs of infection. Sternum stable. Prior chest tube site incisions C/D/I, no erythema with intact sutures. Chest tubes x 1  and Epicardial pacing wires present and secure.   Abd: Soft, nontender, mildly distended  Ext: Incisions C/D/I, approximated, no erythema, + edema, groin soft s/p IABP removal           A/P: POD#8    1. CAD, VT  --Stable s/p urgent CABG x3 (LIMA0-LAD, SVG-OM, SVG-R PDA), modified MAZE procedure, left atrial appendage ligation with 35 mm Atriclip, EVH, IABP placement   --Post op ANGELINA reveals 25% EF  --repeat TTE EF 20-25% -- lifevest ordered   --Scr stable at 1.1  --ASA/statin/BB -- will need eliquis prior to discharge once chest tubes removed for MAZE   --continue epicardial pacing wires  --Chest tube with continued output, and without airleaks - continue chest tube today.          2. Expected acute blood loss anemia secondary to open heart surgery  --stable- hgb 8.2 today       3. Afib s/p Modified MAZE, Left atrial appendage 
POD#9  Awake, alert. No complaints. Denies CP, palpitations, SOB at rest, dizziness/lightheadedness.    Vitals:    10/17/24 0004 10/17/24 0310 10/17/24 0823 10/17/24 0828   BP: 114/63 128/68 133/61    Pulse: 62 68 68    Resp: 20 26 20 16   Temp: 97.6 °F (36.4 °C) 97.9 °F (36.6 °C) 97.7 °F (36.5 °C)    TempSrc: Temporal Temporal Temporal    SpO2: 100% 100% 97% 98%   Weight:  90.3 kg (199 lb)     Height:         O2: 2L/%      Intake/Output Summary (Last 24 hours) at 10/17/2024 0840  Last data filed at 10/17/2024 0550  Gross per 24 hour   Intake 120 ml   Output 360 ml   Net -240 ml         +BM on 10/15     Pleural:  (270mL/24hrs)      Recent Labs     10/15/24  0510 10/16/24  0657 10/17/24  0459   WBC 11.2 14.1* 13.2*   HGB 8.3* 8.5* 8.2*   HCT 26.3* 27.3* 25.8*    232 242      Recent Labs     10/15/24  0510 10/16/24  0657 10/17/24  0459   BUN 25* 31* 29*   CREATININE 1.1 1.1 1.1         Telemetry: SR      PE  Cardiac: RRR  Lungs: decreased bases  Chest incision C/D/I, well approximated, no signs of infection. Sternum stable. Prior chest tube site incisions C/D/I, no erythema with intact sutures. Chest tubes x 1  and Epicardial pacing wires present and secure.   Abd: Soft, nontender, mildly distended  Ext: Incisions C/D/I, approximated, no erythema, + edema, groin soft s/p IABP removal           A/P: POD#8    1. CAD, VT  --Stable s/p urgent CABG x3 (LIMA0-LAD, SVG-OM, SVG-R PDA), modified MAZE procedure, left atrial appendage ligation with 35 mm Atriclip, EVH, IABP placement   --Post op ANGELINA reveals 25% EF  --repeat TTE EF 20-25% -- lifevest ordered   --Scr stable at 1.1  --ASA/statin/BB -- will need eliquis prior to discharge once chest tubes removed for MAZE   --continue epicardial pacing wires  --Chest tube with continued output, and without airleaks - continue chest tube today.          2. Expected acute blood loss anemia secondary to open heart surgery  --stable- hgb 8.2 today       3. Afib s/p Modified 
POD#9 In chair, dozing. States he didn't sleep well overnight. Emesis x 1 last evening after protein shake (he did have a coughing spell prior to emesis)    Vitals:    10/17/24 2055 10/18/24 0000 10/18/24 0339 10/18/24 0410   BP:  127/67 (!) 143/68    Pulse:  64 68    Resp:  19 19    Temp:  98.2 °F (36.8 °C) 97.4 °F (36.3 °C)    TempSrc:  Temporal Temporal    SpO2: 96% 100% 100%    Weight:    89.7 kg (197 lb 12.8 oz)   Height:         O2: 2L/NC      Intake/Output Summary (Last 24 hours) at 10/18/2024 0716  Last data filed at 10/18/2024 0541  Gross per 24 hour   Intake 180 ml   Output 920 ml   Net -740 ml         +BM on 10/17, 18        Recent Labs     10/16/24  0657 10/17/24  0459 10/18/24  0524   WBC 14.1* 13.2* 10.1   HGB 8.5* 8.2* 8.1*   HCT 27.3* 25.8* 25.3*    242 241      Recent Labs     10/16/24  0657 10/17/24  0459 10/18/24  0524   BUN 31* 29* 30*   CREATININE 1.1 1.1 1.1         Telemetry: SR, rate 63      PE  Cardiac: RRR  Lungs: decreased bases  Chest incision  C/D/I, approximated, no erythema. Sternum stable. Prior chest tube site incisions C/D/I, no erythema with intact sutures. Chest tubes x 1 and Epicardial pacing wires present and secure.   Abd: Soft, nontender, +BS  Ext: Incisions C/D/I, approximated, no erythema           A/P: POD#9    1. CAD, VT  --Stable s/p urgent CABG x3 (LIMA0-LAD, SVG-OM, SVG-R PDA), modified MAZE procedure, left atrial appendage ligation with 35 mm Atriclip, EVH, IABP placement   --Post op ANGELINA reveals 25% EF  --repeat TTE EF 20-25% -- lifevest ordered   --Scr stable at 1.1  --ASA/statin/BB -- will need eliquis prior to discharge once chest tubes removed for MAZE   --continue epicardial pacing wires  --Chest tube with continued output (400cc/24 hours), and without airleaks - continue chest tube today.            2. Expected acute blood loss anemia secondary to open heart surgery  --stable- hgb 8.1 today         3. Afib s/p Modified MAZE, Left atrial appendage ligation 
PT states he puked earlier, so Bipap will be held for the time being. Maybe apply later on if he feels better.   
Patient admitted to [facility] with the following belongings:  None. The following belongings admitted with the patient, ALL, were sent home with the patient's family.   
Patient agitated/ restless, pulling on support lines, tubes, ETT when restraints are loosened. Bilateral soft wrist restraints continued at this time for patient safety.   
Patient arrived to the CVICU from OR with perez catheter intact and patent. Securing device applied. Perez bag is hanging below the level of the bladder, safety clip attached to the bed sheet, tamper seal is intact, drainage bag is not on the floor, lack of dependent loop in tubing, and the drainage bag is less than half full.   
Patient given preoperative supplement as per doctors orders.   
Patient intermittently aggitated/ restless, pulling on support lines, tubes, ETT when restraints are loosened. Bilateral soft wrist restraints continued at this time for patient safety.   
Patient off floor for cardiac MRI viability study.  No new pulmonary recommendations.  Continue to use BiPAP nocturnal and as needed daytime with naps.  Orders placed for home device.  Cardiac workup ongoing.  Electronically signed by MARIAN Nunez CNP on 10/4/2024 at 12:25 PM    
Patient states he began coughing and vomited up a chocolate protein shake he was drinking. Patient states he is still feeling a little nauseated. Message sent to CTS and order was obtained for KUB XR.    
Patient was extubated to 4 liters/min via nasal cannula. Breath Sounds post extubation were clear diminished. Stridor was not present post extubation. SPO2 was 98%.      Performed by  Charmaine Trejo RCP  
Perez catheter occluded and was unable to obtain urine return. Catheter pulled and new perez was placed.    Perez catheter inserted using sterile technique per physician's order. Perez Catheter Indication: Critical Care Fluid Volume Management Upon insertion, 40 mL of urine returned. Securing device applied. Perez bag is hanging below the level of the bladder, safety clip attached to the bed sheet, tamper seal is intact, drainage bag is not on the floor, lack of dependent loop in tubing, and the drainage bag is less than half full.   
Physical Therapy    PT order received and medical chart reviewed 9/27. Pt being transferred off unit for cath lab. Will re-attempt as able. Thank you.    Dennys Cruz, PT, DPT  [id number]       
Physical Therapy  Physical Therapy Attempt    Name: Pantera Payne  : 1945  MRN: [id number]      Date of Service: 10/9/2024  Chart reviewed.  Pt is not medically appropriate for skilled PT at this time.  Will discontinue order.  Please re-consult when pt is stable and can actively participate in skilled interventions.    Miguelito Abdul, PT, DPT  [id number]      
Physical Therapy  Physical Therapy Initial Assessment     Name: Pantera Payne  : 1945  MRN: [id number]      Date of Service: 10/16/2024    Evaluating PT:  Miguelito Abdul PT, DPT [id number]    Room #:  6514/6514-A  Diagnosis:  Syncope and collapse [R55]  V tach (HCC) [I47.20]  V-tach (HCC) [I47.20]  PMHx/PSHx:    Past Medical History:   Diagnosis Date    Atrial fibrillation (HCC) , brief episode.    CAD (coronary artery disease)     Cancer (HCC)     Basal cell carcinoma, excisionx 2 (left temporal area).    Detached retina 2007    Left eye.  Laser repair.  2009: Residual scar tissue detected on exam in 2009.  Patient reported some deterioration in visual acuity.    Focal dystonia 2002    Right hand.    Hearing problem     Hyperlipidemia     Hypertension     Kidney stone .    Mild tricuspid regurgitation 13    Mitral valve prolapse 13    mild    Pulmonary hypertension (HCC) 13    Type II or unspecified type diabetes mellitus without mention of complication, not stated as uncontrolled Diagnosed in .     Procedure/Surgery:  10/8 CABG x 3  Precautions:  Falls, Sternal, O2, Corpak  Equipment Needs:  TBD    SUBJECTIVE:    Pt lives with wife in a 1 story home with 2 step(s) to enter and no rail(s).     Pt ambulated without device and was independent PTA.  Pt was very active per report.    OBJECTIVE:   Initial Evaluation  Date: 10/14/24 Treatment   Date: 10/16/24 Short Term/ Long Term   Goals   AM-PAC 6 Clicks 10/24 11/24    Was pt agreeable to Eval/treatment? Yes yes    Does pt have pain? 2/10 surgical pain No pain    Bed Mobility  Rolling: NT  Supine to sit: ModA with HOB elevated  Sit to supine: NT  Scooting: ModA Rolling: NT  Supine to sit: NT  Sit to supine: NT  Scooting: NT Mod Independent   Transfers Sit to stand: ModA  Stand to sit: ModA  Stand pivot: ModA to MaxA no device Sit to stand: ModA  Stand to sit: ModA  Stand pivot: NT Independent   Ambulation   A few steps to 
Placed pt on 12/6 due to high tidal volumes on 14/8    10/11/24 3155   NIV Type   $NIV $Daily Charge   NIV Started/Stopped On   Equipment Type V60   Mode Bilevel   Mask Type Full face mask   Mask Size Small   Assessment   Respirations 30   Comfort Level Good   Using Accessory Muscles No   Mask Compliance Good   Skin Assessment Clean, dry, & intact   Settings/Measurements   PIP Observed 12 cm H20   IPAP 12 cmH20   CPAP/EPAP 6 cmH2O   Vt (Measured) 531 mL   Rate Ordered 14   Insp Rise Time (%) 3 %   FiO2  50 %   I Time/ I Time % 0.85 s   Minute Volume (L/min) 15.9 Liters   Mask Leak (lpm) 62 lpm   Patient's Home Machine No   Alarm Settings   Alarms On Y       
[facility] PHYSICIANS- [facility] [place] [place] [facility]  Consultation Report  PATIENT: Pantera Payne  MEDICAL RECORD NUMBER: [id number]  DATE OF SERVICE:  9/30/2024  ATTENDING ELECTROPHYSIOLOGIST: Marycruz Mariano MD  PRIMARY ELECTROPHYSIOLOGIST: Marycruz Mariano MD  REFERRING PHYSICIAN: No ref. provider found and Terry Giraldo MD  CHIEF COMPLAINT: syncope and NSVT    HPI: This is a 79 y.o. male with a history of cardiomyopathy, coronary disease, diabetes mellitus, HLD, HTN, PVC's who presented to ER after an episode of syncope while walking [place] the golf [place].  The patient is followed by Dr. Mendoza from [facility] cardiology and has been maintained on  Maxzide, metoprolol 50 mg daily, losartan 50 mg daily, rosuvastatin 40 mg daily, aspirin, insulin.  He has been extremely active and has had no cardiac symptoms despite LV dysfunction until about June of this year.  He started noticing increasing symptoms of shortness of breath with physical activity and was initially seen by pulmonology.  He has also undergone a sleep study to rule out the presence of sleep apnea but most recently after laboratory workup which suggested the presence of decompensated heart failure he was prescribed Lasix.  He was on the golf course and after about 3 holes he became extremely lightheaded and checked his blood sugar.  He attempted to eat an energy bar but then passed out completely.  EMS was called and frequent ventricular ectopy was noted on monitoring by them as well as in the emergency room.  Emergency room notes also mentioned episodes of nonsustained VT and the patient was placed on IV amiodarone.  Per patient's wife,while in ED patient was having bigeminy, couplets, and NSVT .He was given a Lidocaine bolus and things settled down after that. He was also initiated on Amiodarone drip in ER.  On telemetry presently having frequent PVC's and in atrial fibrillation.  No prior history of sudden syncope.  However, he has 
Pt admitted to CVICU from OR s/p CABGx3, MAZE. Placed on ventilator. Hemodynamic lines leveled and zeroed. Chest tubes to wall suction, perez to gravity. VSS on Epi - 6.5mcg and Vaso - 0.6 units with IABP support at 1:1.  
Pt complaining that settings Dr. Nova used to set BIPAP was not comfortable and \"felt like too much air.\" Message to Dr. Saini to change to what patient felt like was comfortable overnight last night.  
Pt continues to reach for ETT and other essential lines/tubes when unrestrained. Unable to verbally redirect. Bilateral soft wrist restrained applied at this time.  
Pt nauseated/vomitting bilious fluid throughout day. After receiving report pt became nauseated and vomitted bilious fluid and some chyme. KUB ordered and NG placed 55cm and bridled and connected to LIWS per protocol, repeat KUB verified NG in correct position. Pt BG was 355 so insulin given and Dr. Kenneth morales served. Verbal order to consult general surgery, Dr. Butler received consult and wants NG to stay to LIWS.   
Pt refused to get washed up today.         
Pt signed out of anesthesia. VSS on epi, vaso and levo added. 750 of 5% albumin given. Long output adequate. Pt to remain intubated overnight.   
Pulse oximetry assessment   97% at rest on room air (if 88% or less, skip next steps)  93% while ambulating on room air    
Right arm 131/83 (99)  Left arm 134/88 (95)  
S/p CABG/MAZE 10/8  Awake, alert  CXR mild congestion  Down to 2L NC   Wean epi and levo  CVICU until off pressors     
S/p CABG/MAZE 10/8  Awake, alert  CXR mild congestion  Down to 2L NC   Wean levo  CVICU until off pressors     
[facility]- [facility] [place] Electrophysiology  Consultation Report  PATIENT: Pantera Payne  MEDICAL RECORD NUMBER: [id number]  DATE OF SERVICE:  9/29/2024  ATTENDING ELECTROPHYSIOLOGIST: Marycruz Mariano MD  PRIMARY ELECTROPHYSIOLOGIST: Marycruz Mariano MD  REFERRING PHYSICIAN: No ref. provider found and Terry Giraldo MD  CHIEF COMPLAINT: syncope and NSVT    HPI: This is a 79 y.o. male with a history of cardiomyopathy, coronary disease, diabetes mellitus, HLD, HTN, PVC's who presented to ER after an episode of syncope while walking [place] the golf [place].  The patient is followed by Dr. Mendoza from [facility] cardiology and has been maintained on  Maxzide, metoprolol 50 mg daily, losartan 50 mg daily, rosuvastatin 40 mg daily, aspirin, insulin.  He has been extremely active and has had no cardiac symptoms despite LV dysfunction until about June of this year.  He started noticing increasing symptoms of shortness of breath with physical activity and was initially seen by pulmonology.  He has also undergone a sleep study to rule out the presence of sleep apnea but most recently after laboratory workup which suggested the presence of decompensated heart failure he was prescribed Lasix.  He was on the golf course and after about 3 holes he became extremely lightheaded and checked his blood sugar.  He attempted to eat an energy bar but then passed out completely.  EMS was called and frequent ventricular ectopy was noted on monitoring by them as well as in the emergency room.  Emergency room notes also mentioned episodes of nonsustained VT and the patient was placed on IV amiodarone.  Per patient's wife,while in ED patient was having bigeminy, couplets, and NSVT .He was given a Lidocaine bolus and things settled down after that. He was also initiated on Amiodarone drip in ER.  On telemetry presently having frequent PVC's and in atrial fibrillation.  No prior history of sudden syncope.  However, he has 
Spontaneous Parameters performed on PS 5    VT = 548 ml  f = 29  B/M  Ve = 15.9 L/M  NIF = -21  cmH2O  VC = N/A   RSBI = 52      Pt has audible cuff leak.       Performed by Charmaine Trejo RCP  
Subjective:    Chief complaint:    Patient is feeling okay today  Breathing is stable  Multiple family members by the bedside  No problems overnight.  Denies chest pain, angina, and dyspnea.  Denies abdominal pain.  Tolerating diet.  No nausea or vomiting.    Objective:    /82   Pulse 80   Temp 97.5 °F (36.4 °C) (Oral)   Resp 20   Ht 1.778 m (5' 10\")   Wt 86.9 kg (191 lb 9.6 oz)   SpO2 94%   BMI 27.49 kg/m²   General : Awake ,alert,no distress.  Heart:  RRR, no murmurs, gallops, or rubs.  Lungs:  CTA bilaterally, no wheeze, rales or rhonchi  Abd: bowel sounds present, nontender, nondistended, no masses  Extrem:  No clubbing, cyanosis, or edema    CBC:   Lab Results   Component Value Date/Time    WBC 10.6 09/27/2024 06:34 AM    RBC 4.89 09/27/2024 06:34 AM    HGB 15.0 09/27/2024 06:34 AM    HCT 46.1 09/27/2024 06:34 AM    MCV 94.3 09/27/2024 06:34 AM    MCH 30.7 09/27/2024 06:34 AM    MCHC 32.5 09/27/2024 06:34 AM    RDW 13.2 09/27/2024 06:34 AM     02/26/2019 08:15 AM    MPV 14.4 09/27/2024 06:34 AM     BMP:    Lab Results   Component Value Date/Time     09/28/2024 04:28 AM    K 4.5 09/28/2024 04:28 AM     09/28/2024 04:28 AM    CO2 22 09/28/2024 04:28 AM    BUN 34 09/28/2024 04:28 AM    CREATININE 1.2 09/28/2024 04:28 AM    CALCIUM 8.9 09/28/2024 04:28 AM    GFRAA >60 02/26/2019 08:15 AM    LABGLOM 62 09/28/2024 04:28 AM    GLUCOSE 209 09/28/2024 04:28 AM    GLUCOSE 195 04/20/2012 09:44 AM     PT/INR:  No results found for: \"PROTIME\", \"INR\"  Troponin:  No results found for: \"TROPONINI\"    No results for input(s): \"LABURIN\" in the last 72 hours.  No results for input(s): \"BC\" in the last 72 hours.  No results for input(s): \"BLOODCULT2\" in the last 72 hours.      Current Facility-Administered Medications:     enoxaparin (LOVENOX) injection 90 mg, 1 mg/kg, SubCUTAneous, BID, Marycruz Mariano MD    amiodarone (CORDARONE) tablet 400 mg, 400 mg, Oral, 3 times per day, Marycruz Mariano MD, 400 mg 
error      
   CATARACT REMOVAL WITH IMPLANT Left 01/23/2023    COLONOSCOPY      CYST REMOVAL      DIAGNOSTIC CARDIAC CATH LAB PROCEDURE  10/04/2003    Left main non existant, separate ostia to the LAD and the dominant vessel with no disease.  RCA, trivial nondominant vessel with no disease.  Normal left ventricular size with mildly impaired left ventricular systolic function with an EF estimated at 45%.      EYE SURGERY      HERNIA REPAIR      INGUINAL HERNIA REPAIR  1984    KIDNEY STONE SURGERY      MALIGNANT SKIN LESION EXCISION      times 3 on nose    RETINAL DETACHMENT SURGERY      SKIN CANCER EXCISION  03/2020    nose    TRANSTHORACIC ECHOCARDIOGRAM  11/11/2009    Normal LV size, wall thickness, wall motion and systolic function with Stage 1 left ventricular diastolic dysfunction, normal right ventricular size and function, borderline dilated left atrium, aneurysmal interatrial septum, mild prolapse of the posterior mitral leaflet with only tracer mitral regurgitation.    UPPER GASTROINTESTINAL ENDOSCOPY       Social History     Socioeconomic History    Marital status:      Spouse name: Not on file    Number of children: Not on file    Years of education: Not on file    Highest education level: Not on file   Occupational History    Not on file   Tobacco Use    Smoking status: Never    Smokeless tobacco: Never   Vaping Use    Vaping status: Never Used   Substance and Sexual Activity    Alcohol use: Yes     Alcohol/week: 7.0 - 14.0 standard drinks of alcohol     Types: 7 - 14 Cans of beer per week     Comment: 1-2 beers night    Drug use: Never    Sexual activity: Not on file   Other Topics Concern    Not on file   Social History Narrative    Drinks 2 cups of coffee daily.      Social Determinants of Health     Financial Resource Strain: Not on file   Food Insecurity: No Food Insecurity (9/26/2024)    Hunger Vital Sign     Worried About Running Out of Food in the Last Year: Never true     Ran Out of Food in the Last 
  --s/p DCCV 9/27/2024 into SR with PVCs and PACs  --SR currently  --continue PO Amio for afib prophylaxis with plans to taper on discharge -- on 400mg BID   --OAC when all CTs out (pleural remains)- per Dr Osuna recommend lifelong eliquis due to LA smoke on [place]   --continue BB with hold parameters -- on coreg 3.125mg BID         4. Expected acute pulmonary insufficiency in the setting following surgery  --wean oxygen to keep SpO2 greater than or equal to 92%  --continue duonebs with ezpap  --encourage C&DB, SMI, pep/flutter  --currently on RA  --unable to diurese yesterday due to soft BP, attempt today        5.  Acute Post Operative Pain   --Scheduled tylenol, lidocaine patch; PRN IV Dilaudid, encourage use of PO PRN oxycodone for pain management; ibuprofen as appropriate        6. Post op ileus --resolved  --multiple BMs  --seen by gen surg in CVIC; NGT removed 10/14, tolerated clears  --GS signed off  --tolerating diet          7. Expected deconditioning in the setting following surgery  --Increase activity as tolerated  --PT/OT  --only ambulating 60ft        8. Hx DM Type 2/Stress induced hyperglycemia  --HgbA1C  8.0%, Endocrinology following  --continue nightly lantus, prandial 3u TID with meals and medium dose SSI    --add reglan        9. HFrEF, cardiogenic shock (resolved)  --pre-op EF 25%  --IABP removed 10/10  --add GDMT as able: coreg 3.125 BID, IV bumex dosing   --add low dose lisinopril 10/18  --repeat TTE with EF 20-25% -- lifevest obtained  --CHF nurse following         10. Hypernatremia - resolved   -- today         11. CKD stage II  --Baseline Scr 1.1-1.3   --Scr 1.1 today        12. Leukocytosis - resolved   --WBC 10.6 today, afebrile -- likely atelectasis -- CXR 10/16 relatively benign   --continue empiric zosyn (day 5)        13. Difficulty swallowing pills   --SLP evaluation --passed --pills in applesauce/pudding         14. Emesis   --occurred evening 10/17 after protein 
 --    ALT 49* 89*  --          Assessment/Plan:    Patient Active Problem List   Diagnosis    CAD (coronary artery disease)    Poorly controlled type 2 diabetes mellitus (HCC)    Hyperlipidemia    HTN (hypertension)    PVC's (premature ventricular contractions)    Focal dystonia    H/O detached retina repair    Myocardiopathy (HCC)    LAFB (left anterior fascicular block)    IVCD (intraventricular conduction defect)    V-tach (HCC)    Persistent atrial fibrillation (HCC)    Acute on chronic combined systolic and diastolic congestive heart failure (HCC)    V tach (HCC)    Paroxysmal atrial fibrillation (HCC)    Dietary noncompliance    Acute postoperative respiratory insufficiency    Cardiogenic shock    Stage 2 chronic kidney disease    Acute post-operative pain    Type 2 diabetes mellitus with hyperglycemia, without long-term current use of insulin (HCC)       79 y.o. male with postoperative ileus    Plan  Continue aggressive bowel regimen including lactulose, Dulcolax tablets and suppositories, milk of magnesia, GlycoLax and Senokot S  Monitor bowel function  Limit use of narcotics  No further interventions at this time.  Surgery will be available please reach out for any further questions or concerns.    Discussed plan with Dr. Ángela Lopez MD  General Surgery Resident, PGY1    Electronically signed on 10/15/2024 at 6:00 AM   
40 mg  40 mg Oral Once per day on Monday Wednesday Friday Rachna Pham APRN - CNP        spironolactone (ALDACTONE) tablet 25 mg  25 mg Oral Daily Rachna Pham APRN - CNP   25 mg at 09/29/24 0739    cetirizine (ZYRTEC) tablet 10 mg  10 mg Oral Daily Rachna Pham APRN - CNP   10 mg at 09/29/24 0739    sodium chloride flush 0.9 % injection 10 mL  10 mL IntraVENous 2 times per day Rachna Pham APRN - CNP   10 mL at 09/29/24 2147    sodium chloride flush 0.9 % injection 10 mL  10 mL IntraVENous PRN Rachna Pham APRN - CNP        0.9 % sodium chloride infusion   IntraVENous PRN Rachna Pham APRN - CNP        potassium chloride (KLOR-CON M) extended release tablet 40 mEq  40 mEq Oral PRN Rachna Pham APRN - RINA        Or    potassium bicarb-citric acid (EFFER-K) effervescent tablet 40 mEq  40 mEq Oral PRN Rachna Pham APRN - CNP        Or    potassium chloride 10 mEq/100 mL IVPB (Peripheral Line)  10 mEq IntraVENous PRN Rachna Pham APRN - CNP        magnesium sulfate 2000 mg in 50 mL IVPB premix  2,000 mg IntraVENous PRN Rachna Pham APRN - CNP        ondansetron (ZOFRAN-ODT) disintegrating tablet 4 mg  4 mg Oral Q8H PRN Rachna Pham APRN - CNP        Or    ondansetron (ZOFRAN) injection 4 mg  4 mg IntraVENous Q6H PRN Rachna Pham APRN - CNP        senna (SENOKOT) tablet 8.6 mg  1 tablet Oral Daily PRN Rachna Pham APRN - CNP        acetaminophen (TYLENOL) tablet 650 mg  650 mg Oral Q6H PRN Rachna Pham APRN - CNP        Or    acetaminophen (TYLENOL) suppository 650 mg  650 mg Rectal Q6H PRN Rachna Pham APRN - CNP        insulin lispro (HUMALOG,ADMELOG) injection vial 0-8 Units  0-8 Units SubCUTAneous TID WC Rachna Pham APRN - CNP   2 Units at 09/29/24 1734    insulin lispro (HUMALOG,ADMELOG) injection vial 0-4 Units  0-4 Units SubCUTAneous Nightly Rachna Pham APRN - CNP        glucose chewable tablet 16 g  4 tablet Oral PRN Ankit, 
Bethaprim [Sulfamethoxazole-Trimethoprim]     Erythromycin     Lipitor      Muscle aches.       Current Medications:  Current Facility-Administered Medications   Medication Dose Route Frequency Provider Last Rate Last Admin    0.9 % sodium chloride infusion   IntraVENous PRN Macy Gomez APRN - CNP        0.9 % sodium chloride infusion   IntraVENous Continuous Emerald Byers APRN - CNP 50 mL/hr at 10/10/24 1417 Rate Verify at 10/10/24 1417    sodium chloride flush 0.9 % injection 5-40 mL  5-40 mL IntraVENous 2 times per day Emerald Byers APRN - CNP   10 mL at 10/10/24 0845    sodium chloride flush 0.9 % injection 5-40 mL  5-40 mL IntraVENous PRN Emerald Byers APRN - CNP        0.9 % sodium chloride infusion   IntraVENous PRN Emerald Byers APRN - CNP        ondansetron (ZOFRAN-ODT) disintegrating tablet 4 mg  4 mg Oral Q8H PRN Emerald Byers APRN - CNP        Or    ondansetron (ZOFRAN) injection 4 mg  4 mg IntraVENous Q6H PRN Emerald Byers APRN - CNP        aspirin EC tablet 81 mg  81 mg Oral Daily Emerald Byers APRN - CNP   81 mg at 10/10/24 0844    acetaminophen (TYLENOL) tablet 1,000 mg  1,000 mg Oral Q6H Emerald Byers APRN - CNP   1,000 mg at 10/10/24 0844    oxyCODONE (ROXICODONE) immediate release tablet 5 mg  5 mg Oral Q4H PRN Emerald Byers APRN - CNP   5 mg at 10/10/24 1641    Or    oxyCODONE HCl (OXY-IR) immediate release tablet 10 mg  10 mg Oral Q4H PRN Emerald Byers APRN - CNP   10 mg at 10/09/24 1442    HYDROmorphone (DILAUDID) injection 0.25 mg  0.25 mg IntraVENous Q3H PRN Emerald Byers APRN - CNP   0.25 mg at 10/09/24 0055    Or    HYDROmorphone (DILAUDID) injection 0.5 mg  0.5 mg IntraVENous Q3H PRN Emerald Byers APRN - CNP   0.5 mg at 10/09/24 1200    chlorhexidine (PERIDEX) 0.12 % solution 15 mL  15 mL Mouth/Throat BID Emerald Byers APRN - CNP   15 mL at 10/10/24 0844    magnesium oxide (MAG-OX) tablet 400 mg  400 mg Oral BID Emerald Byers APRN - CNP   400 mg at 10/10/24 0843    
aspirin EC tablet 81 mg  81 mg Oral Every Other Day Rachna Pham APRN - CNP   81 mg at 09/28/24 0755    metoprolol succinate (TOPROL XL) extended release tablet 50 mg  50 mg Oral Daily Rachna Pham APRN - CNP   50 mg at 09/29/24 0739    rosuvastatin (CRESTOR) tablet 40 mg  40 mg Oral Once per day on Monday Wednesday Friday Rachna Pham APRN - CNP        spironolactone (ALDACTONE) tablet 25 mg  25 mg Oral Daily Rachna Pham APRN - CNP   25 mg at 09/29/24 0739    cetirizine (ZYRTEC) tablet 10 mg  10 mg Oral Daily Rachna Pham APRN - CNP   10 mg at 09/29/24 0739    sodium chloride flush 0.9 % injection 10 mL  10 mL IntraVENous 2 times per day Rachna Pham APRN - CNP   10 mL at 09/29/24 0742    sodium chloride flush 0.9 % injection 10 mL  10 mL IntraVENous PRN Rachna Pham APRN - CNP        0.9 % sodium chloride infusion   IntraVENous PRN Rachna Pham APRN - CNP        potassium chloride (KLOR-CON M) extended release tablet 40 mEq  40 mEq Oral PRN Rachna Pham APRN - CNP        Or    potassium bicarb-citric acid (EFFER-K) effervescent tablet 40 mEq  40 mEq Oral PRN Rachna Pham APRN - CNP        Or    potassium chloride 10 mEq/100 mL IVPB (Peripheral Line)  10 mEq IntraVENous PRN Rachna Pham APRN - CNP        magnesium sulfate 2000 mg in 50 mL IVPB premix  2,000 mg IntraVENous PRN Rachna Pham APRN - CNP        ondansetron (ZOFRAN-ODT) disintegrating tablet 4 mg  4 mg Oral Q8H PRN Rachna Pham APRN - CNP        Or    ondansetron (ZOFRAN) injection 4 mg  4 mg IntraVENous Q6H PRN Rachna Pham APRN - CNP        senna (SENOKOT) tablet 8.6 mg  1 tablet Oral Daily PRN Rachna Pham APRN - CNP        acetaminophen (TYLENOL) tablet 650 mg  650 mg Oral Q6H PRN Rachna Pham APRN - CNP        Or    acetaminophen (TYLENOL) suppository 650 mg  650 mg Rectal Q6H PRN Rachna Pham APRN - CNP        insulin lispro (HUMALOG,ADMELOG) injection vial 0-8 
insulin lispro (HUMALOG,ADMELOG) injection vial 0-12 Units  0-12 Units SubCUTAneous TID WC Perez Li MD   2 Units at 10/05/24 1756    insulin lispro (HUMALOG,ADMELOG) injection vial 0-4 Units  0-4 Units SubCUTAneous Nightly Perez Li MD        enoxaparin (LOVENOX) injection 90 mg  1 mg/kg SubCUTAneous BID Sirisha Mendoza MD   90 mg at 10/06/24 0905    guaiFENesin-dextromethorphan (ROBITUSSIN DM) 100-10 MG/5ML syrup 10 mL  10 mL Oral Q4H PRN Milton Nova, DO   10 mL at 10/01/24 1817    HYDROcodone homatropine (HYCODAN) 5-1.5 MG/5ML solution 5 mL  5 mL Oral Q6H PRN Milton Nova, DO   5 mL at 10/02/24 0608    amiodarone (CORDARONE) tablet 400 mg  400 mg Oral BID Sirisha Mendoza MD   400 mg at 10/06/24 0907    promethazine (PHENERGAN) 6.25 mg in sodium chloride 0.9% 50 mL IVPB  6.25 mg IntraVENous Q6H PRN Sirisha Mendoza MD   Stopped at 09/27/24 1243    [Held by provider] sacubitril-valsartan (ENTRESTO) 24-26 MG per tablet 1 tablet  1 tablet Oral BID Sirisha Mendoza MD   1 tablet at 09/30/24 0741    [Held by provider] furosemide (LASIX) tablet 40 mg  40 mg Oral Daily Sirisha Mendoza MD   40 mg at 09/28/24 0755    phenol 1.4 % mouth spray 1 spray  1 spray Mouth/Throat Q2H PRN Sirisha Mendoza MD        aspirin EC tablet 81 mg  81 mg Oral Every Other Day Sirisha Mendoza MD   81 mg at 10/02/24 1118    metoprolol succinate (TOPROL XL) extended release tablet 50 mg  50 mg Oral Daily Sirisha Mendoza MD   50 mg at 10/06/24 0907    rosuvastatin (CRESTOR) tablet 40 mg  40 mg Oral Once per day on Monday Wednesday Friday Sirisha Mendoza MD   40 mg at 10/04/24 0745    spironolactone (ALDACTONE) tablet 25 mg  25 mg Oral Daily Sirisha Mendoza MD   25 mg at 10/06/24 0907    cetirizine (ZYRTEC) tablet 10 mg  10 mg Oral Daily Sirisha Mendoza MD   10 mg at 10/05/24 0911    sodium chloride flush 0.9 % injection 10 mL  10 mL IntraVENous 2 times per day Sirisha Mendoza MD   10 mL at 10/06/24 0904    sodium 
transfers           [x] IADLs         [x] Safety Awareness   [x]Endurance   [] Fine Coordination              [x] Balance     [] Vision/perception    []Sensation     []Gross Motor Coordination  [] ROM           [] Delirium                  [] Motor Control         OT PLAN OF CARE   OT POC based on physician orders, patient diagnosis and results of clinical assessment     Frequency/Duration 1-3 days/wk for 2 weeks PRN   Specific OT Treatment to include:   * Instruction/training on adapted ADL techniques and AE recommendations to increase functional independence within precautions       * Training on energy conservation strategies, correct breathing pattern and techniques to improve independence/tolerance for self-care routine  * Functional transfer/mobility training/DME recommendations for increased independence, safety, and fall prevention  * Patient/Family education to increase follow through with safety techniques and functional independence  * Recommendation of environmental modifications for increased safety with functional transfers/mobility and ADLs  * Cognitive retraining/development of therapeutic activities to improve problem solving, judgement, memory, and attention for increased safety/participation in ADL/IADL tasks  * Therapeutic exercise to improve motor endurance, ROM, and functional strength for ADLs/functional transfers  * Therapeutic activities to facilitate/challenge dynamic balance, stand tolerance for increased safety and independence with ADLs  * Therapeutic activities to facilitate gross/fine motor skills for increased independence with ADLs  * Neuro-muscular re-education: facilitation of righting/equilibrium reactions  * Positioning to improve skin integrity, interaction with environment and functional independence  * Delirium prevention/treatment  * Manual techniques for edema management  Other:        Recommended Adaptive Equipment: TBD as pt progresses - [place]acher        Home Living:  Pt 
8:01 AM   
IntraVENous Q6H PRN Sirisha Mendoza MD        senna (SENOKOT) tablet 8.6 mg  1 tablet Oral Daily PRN Sirisha Mendoza MD        acetaminophen (TYLENOL) tablet 650 mg  650 mg Oral Q6H PRN Sirisha Mendoza MD        Or    acetaminophen (TYLENOL) suppository 650 mg  650 mg Rectal Q6H PRN Sirisha Mendoza MD        glucose chewable tablet 16 g  4 tablet Oral PRN Sirisha Mendoza MD        dextrose bolus 10% 125 mL  125 mL IntraVENous PRN Sirisha Mendoza MD        Or    dextrose bolus 10% 250 mL  250 mL IntraVENous PRN Sirisha Mendoza MD        glucagon injection 1 mg  1 mg SubCUTAneous PRN Sirisha Mendoza MD        dextrose 10 % infusion   IntraVENous Continuous PRN Sirisha Mendoza MD           PRN Medications  guaiFENesin-dextromethorphan, HYDROcodone homatropine, promethazine, phenol, sodium chloride flush, sodium chloride, potassium chloride **OR** potassium alternative oral replacement **OR** potassium chloride, magnesium sulfate, ondansetron **OR** ondansetron, senna, acetaminophen **OR** acetaminophen, glucose, dextrose bolus **OR** dextrose bolus, glucagon (rDNA), dextrose    Objective  Most Recent Recorded Vitals  /74   Pulse 71   Temp 98.1 °F (36.7 °C) (Oral)   Resp 19   Ht 1.778 m (5' 10\")   Wt 85.5 kg (188 lb 9.6 oz)   SpO2 95%   BMI 27.06 kg/m²   No intake/output data recorded.  No intake/output data recorded.    Physical Exam:  General: AAO to person/place/time/purpose, NAD, no labored breathing  Eyes: conjunctivae/corneas clear, sclera non icteric  Skin: color/texture/turgor normal, no rashes or lesions  Lungs: CTAB, no retractions/use of accessory muscles, no vocal fremitus, no rhonchi, no crackle, no rales  Heart: regular rate, regular rhythm, no murmur  Abdomen: soft, NT, bowel sounds normal  Extremities: atraumatic, no edema  Neurologic: cranial nerves 2-12 grossly intact, no slurred speech    Most Recent Labs  Lab Results   Component Value Date    WBC 15.2 (H) 09/29/2024    HGB 15.6 
Pleasant affect     No intake or output data in the 24 hours ending 10/02/24 1142    Weight:   Wt Readings from Last 3 Encounters:   10/01/24 85.5 kg (188 lb 9.6 oz)   09/27/24 82.6 kg (182 lb)   09/12/24 83.9 kg (185 lb)     Current Inpatient Medications:   enoxaparin  1 mg/kg SubCUTAneous BID    amiodarone  400 mg Oral BID    [Held by provider] sacubitril-valsartan  1 tablet Oral BID    [Held by provider] furosemide  40 mg Oral Daily    aspirin  81 mg Oral Every Other Day    metoprolol succinate  50 mg Oral Daily    rosuvastatin  40 mg Oral Once per day on Monday Wednesday Friday    spironolactone  25 mg Oral Daily    cetirizine  10 mg Oral Daily    sodium chloride flush  10 mL IntraVENous 2 times per day    insulin lispro  0-8 Units SubCUTAneous TID WC    insulin lispro  0-4 Units SubCUTAneous Nightly    [Held by provider] insulin glargine  20 Units SubCUTAneous BID       IV Infusions (if any):   sodium chloride      dextrose         DIAGNOSTIC/ LABORATORY DATA:  Labs:   CBC:   No results for input(s): \"WBC\", \"HGB\", \"HCT\", \"PLT\" in the last 72 hours.    BMP:   Recent Labs     10/01/24  0423 10/02/24  0406    133   K 3.9 4.2   CO2 25 25   BUN 24* 22   CREATININE 1.3* 1.1   LABGLOM 56* 69   CALCIUM 8.7 8.7     Mag:   No results for input(s): \"MG\" in the last 72 hours.    Phos: No results for input(s): \"PHOS\" in the last 72 hours.  TFT:   Lab Results   Component Value Date    TSH 1.63 09/27/2024    FT3 3.6 10/28/2013    T4FREE 1.15 02/26/2019      HgA1c:   Lab Results   Component Value Date    LABA1C 8.0 (H) 09/27/2024     No results found for: \"EAG\"    NT pro BNP 9/26/2024: 1649     PT/INR: No results for input(s): \"PROTIME\", \"INR\" in the last 72 hours.  APTT:No results for input(s): \"APTT\" in the last 72 hours.  CARDIAC ENZYMES:  No results for input(s): \"CKTOTAL\", \"CKMB\", \"CKMBINDEX\", \"TROPHS\" in the last 72 hours.    FASTING LIPID PANEL:  Lab Results   Component Value Date/Time    CHOL 153 11/07/2022 12:00 
assessment and treatment.     I personally spent 37 minutes of critical care time treating the patient.         Electronically signed by MARIAN HERNANDEZ CNP on 10/8/2024 at 12:59 PM    
pt given verbal and tactile cues to facilitate proper sequencing and safety during supine>sit as well as provided with physical assistance.   Sitting EOB for >5 minutes for upright tolerance, postural awareness and BLE ROM  Transfer training - pt was given verbal and tactile cues to facilitate proper hand placement, technique and safety during sit to stand and stand to sit as well as provided with physical assistance.  Gait training- pt was given verbal and tactile cues to facilitate safety and balance during ambulation as well as provided with physical assistance.    Pt's/ family goals   1. Return home    Prognosis is good for reaching above PT goals.    Patient and or family understand(s) diagnosis, prognosis, and plan of care.  [x] Yes [] No      PHYSICAL THERAPY PLAN OF CARE:    PT POC is established based on physician order and patient diagnosis     Referring provider/PT Order:  Kenzie Almazan APRN - CNP /10/14/24 0815  PT eval and treat  Diagnosis:  Syncope and collapse [R55]  V tach (HCC) [I47.20]  V-tach (HCC) [I47.20]  Specific instructions for next treatment:  Progress activity    Current Treatment Recommendations:     [x] Strengthening to improve independence with functional mobility   [] ROM to improve independence with functional mobility   [x] Balance Training to improve static/dynamic balance and to reduce fall risk  [x] Endurance Training to improve activity tolerance during functional mobility   [x] Transfer Training to improve safety and independence with all functional transfers   [x] Gait Training to improve gait mechanics, endurance and asses need for appropriate assistive device  [x] Stair Training in preparation for safe discharge home and/or into the community   [] Positioning to prevent skin breakdown and contractures  [x] Safety and Education Training   [x] Patient/Caregiver Education   [] HEP  [] Other     PT long term treatment goals are located in above grid    Frequency of 
09/29/2024    HCT 47.0 09/29/2024     02/26/2019     10/04/2024    K 4.4 10/04/2024    CL 99 10/04/2024    CREATININE 1.1 10/04/2024    BUN 21 10/04/2024    CO2 23 10/04/2024    GLUCOSE 255 (H) 10/04/2024    ALT 35 10/04/2024    AST 27 10/04/2024    TSH 1.63 09/27/2024    LABA1C 8.0 (H) 09/27/2024       Vascular duplex carotid bilateral   Final Result   1. The right internal carotid artery demonstrates 0-50% stenosis.   2. The left internal carotid artery demonstrates 0-50% stenosis.   3. Bilateral vertebral arteries are patent with flow in the normal direction.   0-50% stenosis.      The left internal carotid artery demonstrates 0-50% stenosis.      Bilateral vertebral arteries are patent with flow in the normal direction.         Vascular duplex vein mapping lower bilateral   Final Result   Greater saphenous vein mapping as above.         Vascular ankle brachial index (BARBARA)   Final Result      CT CHEST WO CONTRAST   Final Result   1. Mild CHF   2. Prominent atherosclerotic calcifications as noted above, including at the   origin regions of coronary arteries         XR CHEST PORTABLE   Final Result   No acute process.      Cardiomegaly.         XR CHEST PORTABLE   Final Result   No acute process.      Cardiomegaly.         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 
PROFILE:  Recent Labs     09/29/24  0433 09/30/24  0516   AST 29 26   ALT 39 34     CT scan of the chest 9/19/2024:   1. Moderate interlobular septal thickening throughout the lungs.  Although nonspecific, this can be seen in the setting of interstitial pulmonary edema.  Interstitial inflammation could also be considered.    There are small mediastinal lymph nodes which are increased in size when  compared to a remote comparison chest CT from 05/07/2010.  It is difficult to exclude hilar lymphadenopathy given the lack of intravenous contrast on today's exam.  Given the presence of the new lymph nodes and nonspecific interstitial changes, would advise a short-term follow-up   chest CT with intravenous contrast in 3 months to reevaluate the interstitial changes in the lung and the small lymph nodes.   2.  Cardiomegaly.  Trace bilateral pleural effusions.     CXR 9/26/2024:   IMPRESSION:  No acute process.   Cardiomegaly.    12 lead EKG 9/27/2024: Sinus rhythm.  First-degree AV block.  Left bundle branch block.    Transesophageal echo 9/27/2024:    Left Ventricle: Severely reduced left ventricular systolic function with a visually estimated EF of 20 - 25%. Left ventricle size is normal. Normal wall thickness. Severe global hypokinesis present.    Aortic Valve: No stenosis.  MALIKA by direct planimetry 2.9 cm².    Mitral Valve: Mild to moderate regurgitation with a centrally directed jet.    Tricuspid Valve: Mild to moderate regurgitation. Mildly elevated RVSP, consistent with mild pulmonary hypertension. The estimated RVSP is 40 mmHg.    Left Atrium: Left atrium is dilated. Normal sized appendage. Decreased appendage flow velocity. No left atrial appendage thrombus noted. No left atrial appendage mass noted. Light spontaneous echo contrast visualized in the left atrial cavity. Normal sized pulmonary veins.    Interatrial Septum: No interatrial shunt visualized with color Doppler. Agitated saline study was negative with 
The estimated RVSP is 40 mmHg.    Left Atrium: Left atrium is dilated. Normal sized appendage. Decreased appendage flow velocity. No left atrial appendage thrombus noted. No left atrial appendage mass noted. Light spontaneous echo contrast visualized in the left atrial cavity. Normal sized pulmonary veins.    Interatrial Septum: No interatrial shunt visualized with color Doppler. Agitated saline study was negative with and without provocation.    Aorta: Normal sized aortic root and ascending aorta. Mildly dilated sinus of Valsalva. Ao sinus diameter is 3.6 cm.    Pericardium: No pericardial effusion.    Image quality is adequate.        left heart catheterization      1.  Severe multivessel coronary artery disease in a dominant left circulation  Left main: Nonexistent with separate ostia to the LAD and the left circumflex  LAD: 40 to 50% eccentric ostial vessel stenosis followed by long diffusely diseased segment in the proximal vessel with sequential 80 and 90% stenoses before a moderate-sized diagonal branch.  LCx: Dominant vessel.  80% irregular proximal stenosis of the first obtuse marginal branch.  Long up to 95% stenosis in the proximal third of the second obtuse marginal branch.  90% proximal stenosis of the left posterior ascending artery branch.  RCA: Small nondominant vessel with 90% near ostial stenosis  2.  LVEDP = 27 mmHg  3.  There was no gradient across aortic valve on pullback        echo:  3/1/23 ECHO  The left ventricle is dilated.   Normal left ventricle wall thickness.   There is moderate generalized hypokinesis.   Ejection fraction is visually estimated at 40%.   There is doppler evidence of stage I diastolic dysfunction.   Normal right ventricular size and function.   The left atrium is severely dilated.   Interatrial septum is largely aneurysmal bulging towards the right atrium.   Lipomatous hypertrophy of the interatrial septum.   Mild thickening of the mitral valve leaflets.   There is 
type 2   Patient's diabetes is uncontrolled with HbA1c of 8.0 %  We recommend the following DM regimen  Increase Lantus to 21 units nightly  Increase the Humalog to 7 units 3 times daily with meals  Medium dose sliding scale ACHS  Continue glucose check with meals and at bedtime  Will titrate insulin dose based on the blood glucose trend & insulin requirement  Upon discharge, I will arrange for the patient to be seen in the endocrinology clinic for routine diabetes maintenance and prevention    Dietary noncompliance  Discussed with patient the importance of eating consistent carbohydrate meals, avoiding high glycemic index food. Also, discussed with patient the risk and negative consequences of dietary noncompliance on blood glucose control, blood pressure and weight    Hyperlipidemia  Continue with the Crestor 40 MG daily      Interdisciplinary plan for communication with healthcare providers:   Consult recommendations were discussed with the Primary Service/Nursing staff      The above issues were reviewed with the patient who understood and agreed with the plan.    Thank you for allowing us to participate in the care of this patient. Please do not hesitate to contact us with any additional questions.     Perez Li MD  Endocrinologist, [facility]   [facility] [address] [facility]  [address] [address]  Dept: [phone]  Loc: [phone]   Phone: [phone]  Fax: [phone]  --------------------------------------------  An electronic signature was used to authenticate this note. Perez Li MD on 10/5/2024 at 1:58 PM     
noncompliance  Discussed with patient the importance of eating consistent carbohydrate meals, avoiding high glycemic index food. Also, discussed with patient the risk and negative consequences of dietary noncompliance on blood glucose control, blood pressure and weight    Hyperlipidemia  Continue with the Crestor 40 MG daily      Interdisciplinary plan for communication with healthcare providers:   Consult recommendations were discussed with the Primary Service/Nursing staff      The above issues were reviewed with the patient who understood and agreed with the plan.    Thank you for allowing us to participate in the care of this patient. Please do not hesitate to contact us with any additional questions.     Perez Li MD  Endocrinologist, [facility]   [facility]  [place] [address] [facility]  [address] [address]  Dept: [phone]  Loc: [phone]   Phone: [phone]  Fax: [phone]  --------------------------------------------  An electronic signature was used to authenticate this note. Perez Li MD on 10/7/2024 at 7:57 PM     
  GDMT as able   Watch renal function   +/- CRT-D await findings of cath     CTS consultation      Discharge plan: TBD     Electronically signed by Darion Story MD on 10/3/2024 at 2:21 PM    I can be reached through [employer].   
Coronavirus HKU1 PCR Not Detected     Coronavirus NL63 PCR Not Detected     Coronavirus OC43 PCR Not Detected     SARS-CoV-2, PCR Not Detected     Human Metapneumovirus PCR Not Detected     Rhino/Enterovirus PCR Not Detected     Influenza A by PCR Not Detected     Influenza B by PCR Not Detected     Parainfluenza 1 PCR Not Detected     Parainfluenza 2 PCR Not Detected     Parainfluenza 3 PCR Not Detected     Parainfluenza 4 PCR Not Detected     Resp Syncytial Virus PCR Not Detected     Bordetella parapertussis by PCR Not Detected     B Pertussis by PCR Not Detected     Chlamydia pneumoniae By PCR Not Detected     Mycoplasma pneumo by PCR Not Detected     Comment: Performed by multiplexed nucleic acid assay.       Culture, MRSA, Screening [[id number]] Collected: 09/30/24 1400    Order Status: Sent Specimen: Fernando Updated: 09/30/24 1614              [id number] Score for Atrial Fibrillation Stroke Risk   Risk   Factors  Component Value   C CHF  1   H HTN  1   A2 Age >= 75  2   D DM  1   S2 Prior Stroke/TIA  0   V Vascular Disease  1   A Age 65-74  0   Sc Sex  0    [id number]  Score  6       Disclaimer:     Risk Score calculation is dependent on accuracy of patient problem list and past encounter diagnosis.         Preoperative NYHA Class: III       I have discussed with the patient the rationale for blood component transfusion; its benefits in treating or preventing fatigue, organ damage, or death; and its risk which includes mild transfusion reactions, rare risk of blood borne infection, or more serious but rare reactions. I have discussed the alternatives to transfusion, including the risk and consequences of not receiving transfusion. The patient had an opportunity to ask questions and had agreed to proceed with transfusion of blood components      Note: Greater than or equal to 35 minutes was spent providing face-to-face patient care, including:  and coordinating care, reviewing the chart, labs, and 
Diabetes Care and Endocrinology   [facility] [place]  [place] [address]  [address] [address] [facility] [address]  Dept: [phone]  Loc: [phone]   Phone: [phone]  Fax: [phone]  --------------------------------------------  An electronic signature was used to authenticate this note. Perez Li MD on 10/15/2024 at 8:08 PM     
normal.  Aortic Valve Trileaflet valve. Mild sclerosis of the aortic valve cusps. No regurgitation. No stenosis.  MALIKA by direct planimetry 2.9 cm².  Mitral Valve Valve structure is normal. Mild to moderate regurgitation with a centrally directed jet. No stenosis noted.  Tricuspid Valve Valve structure is normal. Mild to moderate regurgitation. No stenosis noted. Mildly elevated RVSP, consistent with mild pulmonary hypertension. The estimated RVSP is 40 mmHg.  Pulmonic Valve The pulmonic valve visualization is suboptimal but appears to be functioning normally. Trace regurgitation.  Pulmonary Artery Normal pulmonary arteries.  Aorta Normal sized aortic root and ascending aorta. Mildly dilated sinus of Valsalva. Ao sinus diameter is 3.6 cm.  IVC/Hepatic Veins IVC was not assessed.  Pericardium No pericardial effusion.        Plan  Syncope   V tach   HFrEF EF 20-25%  MV CAD   History of frequent PVCs   CAD  DM uncontrolled A1C 8.0, insulin dependent   Hypoxia - questionable effusions ? CT from 9/19 reviewed with non specific lymphadenopathy following with EOPC as an op    TSH noted   EP consult   Cardiology consult      SP ANGELINA + CV   SP Cath 9/30/24   GDMT as able   Watch renal function     CTS consultation   Cardiac MRI    CABG timing per CTS     Discharge plan: TBD     Electronically signed by Darion Story MD on 10/7/2024 at 2:02 PM    I can be reached through [employer].   
severe obstructive sleep apnea  14.  Lisinopril associated cough.  Was on losartan prior to admission, changed to Entresto   15.  Covid-19 infection in 12/2020.  16.  Focal dystonia right hand, receives Botox injections  17.  History of dysphagia and esophageal spasm           PLAN:  1.  Appreciate CT surgery input  2.  Will hold on diuresis for today   3.  Rest of cardiac medications same.  4.  Pre-CABG workup in progress  5.  Will review echo ordered by CT surgery  6.  Arrhythmia management as per EP  7.  Rest as per the primary service and other consultants  8.  Will continue to follow closely        I spent 35 minutes completing this encounter. Total time included the following:  Independently interviewing the patient (HPI, ROS, PMH, PSH, FMH, SH, allergies and medications).  Independently performing a medical appropriate examination  Ordering medications, tests and/or procedures  Formulating the assessment/plan and reviewing the rationale for the above recommendations  Reviewing available records, results of all previously ordered testing/procedures and current problem list  Counseling/educating the patient  Coordinating care with other healthcare professionals  Communicating results to the patient's family/caregiver  Documenting clinical information in the patient's electronic health records    Electronically signed by Sirisha Mendoza MD on 10/1/2024 at 3:42 PM    
as well as medical decision making. Greater than 50% of this time was spent instructing and counseling the patient face to face regarding findings and recommendations.      Patient seen. No complaints.  As above  Pulmonary recommendations noted, ?high risk for pulmonary complications  Cardiac MRI pending  STS risk, 22% risk of mortality, 38% risk of morbidity (see yesterdays progress note for details), discussed with patient and wife at bedside, also discussed with cardiology  Patient and wife would like to discuss with their family as well as discuss with cardiology regarding staged PCI, EP regarding defibrillator vs LifeVest  Following closely    Marcela Osuna DO  Cardiothoracic Surgery         
  Musculoskeletal: no digital clubbing, no edema   Skin: warm, no rashes     I have personally reviewed the laboratory, cardiac diagnostic and radiographic testing as outlined below:    Data:    Recent Labs     24  1808 24  0634   WBC 7.6 10.6   HGB 15.8 15.0   HCT 48.5 46.1     Recent Labs     24  1808 24  0634 24  0428    140 138   K 4.7 4.0 4.5    104 104   CO2 27 21* 22   BUN 35* 29* 34*   CREATININE 1.4* 1.1 1.2   CALCIUM 9.5 8.7 8.9      Lab Results   Component Value Date/Time    MG 2.0 2024 06:08 PM     Recent Labs     24  0634   TSH 1.63     No results for input(s): \"INR\" in the last 72 hours.    CXR: 24  No acute process.   Cardiomegaly.    Telemetry: Sinus rhythm    EK24: Atrial fibrillation with IVCD, frequent PVCs  2024: Sinus rhythm, LBBB  Echocardiogram:     3/1/23   Summary   The left ventricle is dilated.   Normal left ventricle wall thickness.   There is moderate generalized hypokinesis.   Ejection fraction is visually estimated at 40%.   There is doppler evidence of stage I diastolic dysfunction.   Normal right ventricular size and function.   The left atrium is severely dilated.   Interatrial septum is largely aneurysmal bulging towards the right atrium.   Lipomatous hypertrophy of the interatrial septum.   Mild thickening of the mitral valve leaflets.   There is subtle prolapse of the posterior mitral leaflet.   Mild mitral regurgitation.   The aortic valve appears mildly sclerotic.   Trivial aortic regurgitation.   Mild tricuspid regurgitation.    17  Summary   The left ventricle is mildly dilated   Normal left ventricle wall thickness.   No regional wall motion abnormalities seen.   Ejection fraction is visually estimated at 40-45%.   There is doppler evidence of stage I diastolic dysfunction.   Mildly dilated right ventricle.   Right ventricle global systolic function is normal .   The left atrium is moderately 
regimen    Recommendations:    IV Lasix as needed  Guideline directed medical therapy for heart failure--Entresto instead of losartan.  Jardiance and Aldactone as tolerated  Coronary angiography showed severe multivessel CAD. CTS consulted  Await CTS input  Continue with amiodarone loading and Toprol XL  Consideration for placement of CRT-D based on results of above., likely will wear a WCD at discharge      I have spent a total of 10 minutes with the patient and the family reviewing the above stated recommendations.  And a total of >50% of that time involved face-to-face time providing counseling and or coordination of care with the other providers, reviewing records/tests, counseling/education of the patient, ordering medications/tests/procedures, coordinating care, and documenting clinical information in the EHR.      Thank you for allowing me to participate in your patient's care.  Please call me if there are any questions or concerns.      Discussed with Dr Steven Almaguer, APRN - CNP  Cardiac Electrophysiology  [facility]  10:39 AM  10/1/2024    Attending Physician's Statement    Patient seen with the ANP. Agree with the findings, assessment and plan. Management plan was discussed. I have personally interviewed the patient, independently performed a focused cardiac examination, reviewed the pertinent laboratory and diagnostic testing with the patient and directly participated in the medical decision-making as noted above with the following additions:     No recurrent NSV or AF. LV EF 35-40% on TTE 10/1/24. Seen by CTS and plan for CABG in the furture timing TBD. Continue Amiodarone loading dose, continue Toprol XL and Lovenox. WCD LifeVest at discharge only if LV EF < or = to 35% post CABG before discharge home. No indication for CRT-D at the moment. EP will follow peripherally. Please call for any questions or concerns.    I have spent a total of 50 minutes with the patient and the 
potassium at 4 magnesium at 2  Postop management per cardiothoracic surgery  Resume guideline directed medical therapy once off pressors and hemodynamically  Follow cardiothoracic surgery postoperative recommendations.  Continue amiodarone   Arrhythmia management per EP  Anticoagulation management per cardiothoracic surgery and EP   Diabetes mellitus management as per endocrinology   Rest as per the primary service and other consultants        More  than 50 minutes  was spent counseling the patient, reviewing the medications, results, assessment and the rationale for the above recommendations.       NOTE:  This report was transcribed using voice recognition software.  Every effort was made to ensure accuracy; however, inadvertent computerized transcription errors may be present.     Omar Wei MD  [facility]

## 2024-10-21 NOTE — FLOWSHEET NOTE
Discharge instructions given to patient and family. All questions answered to satisfaction. IV's removed without complication. Heart monitor returned to nurses station. Patient discharged with the following belongings:   10/08/24 0515   Belongings   Dental Appliances None   Vision - Corrective Lenses None   Hearing Aid None   Clothing At bedside   Jewelry Ring  (given to spouse)   Body Piercings Removed N/A   Electronic Devices None   Weapons (Notify Protective Services/Security) None

## 2024-10-21 NOTE — PATIENT CARE CONFERENCE
[facility] Quality Flow/Interdisciplinary Rounds Progress Note        Quality Flow Rounds held on October 21, 2024    Disciplines Attending:  Bedside Nurse, , , and Nursing Unit Leadership    Pantera Payne was admitted on 9/26/2024  5:48 PM    Anticipated Discharge Date:  Expected Discharge Date: 10/09/24    Disposition:    Isidro Score:  Isidro Scale Score: 20    Readmission Risk              Risk of Unplanned Readmission:  35           Discussed patient goal for the day, patient clinical progression, and barriers to discharge.  The following Goal(s) of the Day/Commitment(s) have been identified:  ambulate/discharge planning       Jojo Rodrigues RN  October 21, 2024

## 2024-10-21 NOTE — PLAN OF CARE
Problem: Chronic Conditions and Co-morbidities  Goal: Patient's chronic conditions and co-morbidity symptoms are monitored and maintained or improved  10/1/2024 1255 by Sydnie Lakhani RN  Outcome: Progressing  9/30/2024 2345 by Sydnie Anderson RN  Outcome: Progressing  Flowsheets (Taken 9/30/2024 2000)  Care Plan - Patient's Chronic Conditions and Co-Morbidity Symptoms are Monitored and Maintained or Improved: Monitor and assess patient's chronic conditions and comorbid symptoms for stability, deterioration, or improvement     Problem: Discharge Planning  Goal: Discharge to home or other facility with appropriate resources  10/1/2024 1255 by Sydnie Lakhani RN  Outcome: Progressing  9/30/2024 2345 by Sydnie Anderson RN  Outcome: Progressing  Flowsheets (Taken 9/30/2024 2000)  Discharge to home or other facility with appropriate resources: Identify barriers to discharge with patient and caregiver     Problem: Safety - Adult  Goal: Free from fall injury  10/1/2024 1255 by Sydnie Lakhani RN  Outcome: Progressing  9/30/2024 2345 by Sydnie Anderson RN  Outcome: Progressing     Problem: ABCDS Injury Assessment  Goal: Absence of physical injury  10/1/2024 1255 by Sydnie Lakhani RN  Outcome: Progressing  9/30/2024 2345 by Sydnie Anderson RN  Outcome: Progressing     
  Problem: Chronic Conditions and Co-morbidities  Goal: Patient's chronic conditions and co-morbidity symptoms are monitored and maintained or improved  10/14/2024 1734 by Alessandra Anderson, RN  Outcome: Progressing     Problem: Discharge Planning  Goal: Discharge to home or other facility with appropriate resources  10/14/2024 1734 by Alessandra Anderson, RN  Outcome: Progressing     Problem: Safety - Adult  Goal: Free from fall injury  10/14/2024 1734 by Alessandra Anderson, RN  Outcome: Progressing     
  Problem: Chronic Conditions and Co-morbidities  Goal: Patient's chronic conditions and co-morbidity symptoms are monitored and maintained or improved  10/15/2024 0124 by Krystyna Tate RN  Outcome: Progressing     Problem: Safety - Adult  Goal: Free from fall injury  10/15/2024 0124 by Krystyna Tate, RN  Outcome: Progressing     Problem: Pain  Goal: Verbalizes/displays adequate comfort level or baseline comfort level  Outcome: Progressing     Problem: Skin/Tissue Integrity - Adult  Goal: Skin integrity remains intact  Outcome: Progressing     
  Problem: Chronic Conditions and Co-morbidities  Goal: Patient's chronic conditions and co-morbidity symptoms are monitored and maintained or improved  10/18/2024 1403 by Kimberly Ray RN  Outcome: Progressing     Problem: Discharge Planning  Goal: Discharge to home or other facility with appropriate resources  10/18/2024 1403 by Kimberly Ray RN  Outcome: Progressing     Problem: Safety - Adult  Goal: Free from fall injury  10/18/2024 1403 by Kimberly Ray RN  Outcome: Progressing     Problem: ABCDS Injury Assessment  Goal: Absence of physical injury  10/18/2024 1403 by Kimberly Ray RN  Outcome: Progressing     Problem: Nutrition Deficit:  Goal: Optimize nutritional status  10/18/2024 1403 by Kimberly Ray RN  Outcome: Progressing     Problem: Pain  Goal: Verbalizes/displays adequate comfort level or baseline comfort level  10/18/2024 1403 by Kimberly Ray RN  Outcome: Progressing     Problem: Respiratory - Adult  Goal: Achieves optimal ventilation and oxygenation  10/18/2024 1403 by Kimberly Ray RN  Outcome: Progressing     Problem: Cardiovascular - Adult  Goal: Maintains optimal cardiac output and hemodynamic stability  10/18/2024 1403 by Kimberly Ray RN  Outcome: Progressing       Problem: Cardiovascular - Adult  Goal: Absence of cardiac dysrhythmias or at baseline  10/18/2024 1403 by Kimberly Ray RN  Outcome: Progressing     Problem: Skin/Tissue Integrity - Adult  Goal: Skin integrity remains intact  10/18/2024 1403 by Kimberly Ray RN  Outcome: Progressing     Problem: Genitourinary - Adult  Goal: Urinary catheter remains patent  10/18/2024 1403 by Kimberly Ray RN  Outcome: Progressing     Problem: Metabolic/Fluid and Electrolytes - Adult  Goal: Electrolytes maintained within normal limits  10/18/2024 1403 by Kimberly Ray RN  Outcome: Progressing     Problem: Metabolic/Fluid and Electrolytes - Adult  Goal: Hemodynamic stability and optimal renal function maintained  10/18/2024 1403 by Cory 
  Problem: Chronic Conditions and Co-morbidities  Goal: Patient's chronic conditions and co-morbidity symptoms are monitored and maintained or improved  10/18/2024 2150 by Luz Jerry RN  Outcome: Progressing  10/18/2024 1403 by Kimberly Ray RN  Outcome: Progressing     Problem: Discharge Planning  Goal: Discharge to home or other facility with appropriate resources  10/18/2024 2150 by Luz Jerry RN  Outcome: Progressing  10/18/2024 1403 by Kimberly Ray RN  Outcome: Progressing     Problem: Safety - Adult  Goal: Free from fall injury  10/18/2024 2150 by Luz Jerry RN  Outcome: Progressing  10/18/2024 1403 by Kimberly Ray RN  Outcome: Progressing     Problem: Pain  Goal: Verbalizes/displays adequate comfort level or baseline comfort level  10/18/2024 2150 by Luz Jerry RN  Outcome: Progressing  10/18/2024 1403 by Kimberly Ray RN  Outcome: Progressing     Problem: Nutrition Deficit:  Goal: Optimize nutritional status  10/18/2024 2150 by Luz Jerry RN  Outcome: Progressing  10/18/2024 1403 by Kimberly Ray RN  Outcome: Progressing     Problem: Respiratory - Adult  Goal: Achieves optimal ventilation and oxygenation  10/18/2024 1403 by Kimberly Ray RN  Outcome: Progressing     Problem: Cardiovascular - Adult  Goal: Maintains optimal cardiac output and hemodynamic stability  10/18/2024 1403 by Kimberly Ray RN  Outcome: Progressing  Goal: Absence of cardiac dysrhythmias or at baseline  10/18/2024 1403 by Kimberly Ray RN  Outcome: Progressing     Problem: Metabolic/Fluid and Electrolytes - Adult  Goal: Electrolytes maintained within normal limits  10/18/2024 1403 by Kimberly Ray RN  Outcome: Progressing  Goal: Hemodynamic stability and optimal renal function maintained  10/18/2024 1403 by Kimberly Ray RN  Outcome: Progressing  Goal: Glucose maintained within prescribed range  10/18/2024 1403 by Kimberly Ray RN  Outcome: Progressing     Problem: Hematologic - 
  Problem: Chronic Conditions and Co-morbidities  Goal: Patient's chronic conditions and co-morbidity symptoms are monitored and maintained or improved  10/19/2024 0919 by Nader Agosto RN  Outcome: Progressing     Problem: Safety - Adult  Goal: Free from fall injury  10/19/2024 0919 by Nader Agosto RN  Outcome: Progressing     Problem: ABCDS Injury Assessment  Goal: Absence of physical injury  10/19/2024 0919 by Nader Agosto RN  Outcome: Progressing     Problem: Nutrition Deficit:  Goal: Optimize nutritional status  10/19/2024 0919 by Nader Agosto RN  Outcome: Progressing     Problem: Pain  Goal: Verbalizes/displays adequate comfort level or baseline comfort level  10/19/2024 0919 by Nader Agosto RN  Outcome: Progressing  Flowsheets  Taken 10/19/2024 0315 by Luz Jerry RN  Verbalizes/displays adequate comfort level or baseline comfort level: Encourage patient to monitor pain and request assistance  Taken 10/18/2024 2305 by Luz Jerry RN  Verbalizes/displays adequate comfort level or baseline comfort level: Encourage patient to monitor pain and request assistance     Problem: Cardiovascular - Adult  Goal: Maintains optimal cardiac output and hemodynamic stability  Outcome: Progressing     Problem: Cardiovascular - Adult  Goal: Absence of cardiac dysrhythmias or at baseline  Outcome: Progressing     Problem: Skin/Tissue Integrity - Adult  Goal: Skin integrity remains intact  Outcome: Progressing  Flowsheets (Taken 10/18/2024 2000 by Luz Jerry RN)  Skin Integrity Remains Intact: Monitor for areas of redness and/or skin breakdown     Problem: Metabolic/Fluid and Electrolytes - Adult  Goal: Electrolytes maintained within normal limits  Outcome: Progressing     Problem: Metabolic/Fluid and Electrolytes - Adult  Goal: Hemodynamic stability and optimal renal function maintained  Outcome: Progressing     Problem: Metabolic/Fluid and Electrolytes - Adult  Goal: 
  Problem: Chronic Conditions and Co-morbidities  Goal: Patient's chronic conditions and co-morbidity symptoms are monitored and maintained or improved  10/20/2024 1314 by Alessandra Anderson, RN  Outcome: Progressing     Problem: Discharge Planning  Goal: Discharge to home or other facility with appropriate resources  10/20/2024 1314 by Alessandra Anderson, RN  Outcome: Progressing     Problem: Safety - Adult  Goal: Free from fall injury  10/20/2024 1314 by Alessandra Anderson, RN  Outcome: Progressing     
  Problem: Chronic Conditions and Co-morbidities  Goal: Patient's chronic conditions and co-morbidity symptoms are monitored and maintained or improved  10/20/2024 1937 by Nannette Blackmon, RN  Outcome: Progressing     Problem: Safety - Adult  Goal: Free from fall injury  10/20/2024 1937 by Nannette Blackmon, RN  Outcome: Progressing     Problem: Nutrition Deficit:  Goal: Optimize nutritional status  Outcome: Progressing     Problem: Pain  Goal: Verbalizes/displays adequate comfort level or baseline comfort level  Outcome: Progressing     Problem: Respiratory - Adult  Goal: Achieves optimal ventilation and oxygenation  Outcome: Progressing     Problem: Cardiovascular - Adult  Goal: Maintains optimal cardiac output and hemodynamic stability  Outcome: Progressing     Problem: Cardiovascular - Adult  Goal: Absence of cardiac dysrhythmias or at baseline  Outcome: Progressing     Problem: Metabolic/Fluid and Electrolytes - Adult  Goal: Glucose maintained within prescribed range  Outcome: Progressing     Problem: Hematologic - Adult  Goal: Maintains hematologic stability  Outcome: Progressing     
  Problem: Chronic Conditions and Co-morbidities  Goal: Patient's chronic conditions and co-morbidity symptoms are monitored and maintained or improved  10/3/2024 2317 by Ros Haywood RN  Outcome: Progressing  Flowsheets (Taken 10/3/2024 2000)  Care Plan - Patient's Chronic Conditions and Co-Morbidity Symptoms are Monitored and Maintained or Improved: Monitor and assess patient's chronic conditions and comorbid symptoms for stability, deterioration, or improvement     Problem: Discharge Planning  Goal: Discharge to home or other facility with appropriate resources  10/3/2024 2317 by Ros Haywood RN  Outcome: Progressing  Flowsheets (Taken 10/3/2024 2000)  Discharge to home or other facility with appropriate resources:   Identify barriers to discharge with patient and caregiver   Arrange for needed discharge resources and transportation as appropriate   Identify discharge learning needs (meds, wound care, etc)   Refer to discharge planning if patient needs post-hospital services based on physician order or complex needs related to functional status, cognitive ability or social support system     Problem: Safety - Adult  Goal: Free from fall injury  10/3/2024 2317 by Ros Haywood, RN  Outcome: Progressing  Flowsheets (Taken 10/3/2024 2315)  Free From Fall Injury: Instruct family/caregiver on patient safety     Problem: ABCDS Injury Assessment  Goal: Absence of physical injury  10/3/2024 2317 by Ros Haywood RN  Outcome: Progressing  Flowsheets (Taken 10/3/2024 2315)  Absence of Physical Injury: Implement safety measures based on patient assessment     Problem: Nutrition Deficit:  Goal: Optimize nutritional status  10/3/2024 2317 by Ros Haywood, RN  Outcome: Progressing     
  Problem: Chronic Conditions and Co-morbidities  Goal: Patient's chronic conditions and co-morbidity symptoms are monitored and maintained or improved  10/4/2024 2245 by Gregory Carrera RN  Outcome: Progressing  10/4/2024 1749 by Missy Herrera RN  Outcome: Progressing     Problem: Discharge Planning  Goal: Discharge to home or other facility with appropriate resources  10/4/2024 2245 by Gregory Carrera RN  Outcome: Progressing  10/4/2024 1749 by Missy Herrera RN  Outcome: Progressing     Problem: Safety - Adult  Goal: Free from fall injury  10/4/2024 2245 by Gregory Carrera RN  Outcome: Progressing  10/4/2024 1749 by Missy Herrera RN  Outcome: Progressing     Problem: ABCDS Injury Assessment  Goal: Absence of physical injury  10/4/2024 2245 by Gregory Carrera RN  Outcome: Progressing  10/4/2024 1749 by Missy Herrera RN  Outcome: Progressing     Problem: Nutrition Deficit:  Goal: Optimize nutritional status  10/4/2024 2245 by Gregory Carrera RN  Outcome: Progressing  10/4/2024 1749 by Missy Herrera RN  Outcome: Progressing     
  Problem: Chronic Conditions and Co-morbidities  Goal: Patient's chronic conditions and co-morbidity symptoms are monitored and maintained or improved  10/5/2024 2325 by Sydnie Anderson RN  Outcome: Progressing  Flowsheets (Taken 10/5/2024 2000)  Care Plan - Patient's Chronic Conditions and Co-Morbidity Symptoms are Monitored and Maintained or Improved: Monitor and assess patient's chronic conditions and comorbid symptoms for stability, deterioration, or improvement  10/5/2024 1906 by Susana Payne, RN  Outcome: Progressing     Problem: Discharge Planning  Goal: Discharge to home or other facility with appropriate resources  10/5/2024 2325 by Sydnie Anderson, RN  Outcome: Progressing  Flowsheets (Taken 10/5/2024 2000)  Discharge to home or other facility with appropriate resources: Identify barriers to discharge with patient and caregiver  10/5/2024 1906 by Susana Payne, RN  Outcome: Progressing     Problem: Safety - Adult  Goal: Free from fall injury  10/5/2024 2325 by Sydnie Anderson, RN  Outcome: Progressing  10/5/2024 1906 by Susana Payne, RN  Outcome: Progressing     Problem: ABCDS Injury Assessment  Goal: Absence of physical injury  10/5/2024 2325 by Sydnie Anderson, RN  Outcome: Progressing  10/5/2024 1906 by Susana Payne, RN  Outcome: Progressing     Problem: Nutrition Deficit:  Goal: Optimize nutritional status  10/5/2024 2325 by Sydnie Anderson, RN  Outcome: Progressing  10/5/2024 1906 by Susana Payne, RN  Outcome: Progressing     
  Problem: Chronic Conditions and Co-morbidities  Goal: Patient's chronic conditions and co-morbidity symptoms are monitored and maintained or improved  10/7/2024 0511 by Sydnie Anderson RN  Outcome: Progressing  10/6/2024 2033 by Susana Payne RN  Outcome: Progressing     Problem: Discharge Planning  Goal: Discharge to home or other facility with appropriate resources  10/7/2024 0511 by Sydnie Anderson RN  Outcome: Progressing  10/6/2024 2033 by Susana Payne RN  Outcome: Progressing     Problem: Safety - Adult  Goal: Free from fall injury  10/7/2024 0511 by Sydnie Anderson RN  Outcome: Progressing  10/6/2024 2033 by Susana Pyane RN  Outcome: Progressing     Problem: ABCDS Injury Assessment  Goal: Absence of physical injury  10/7/2024 0511 by Sydnie Anderson RN  Outcome: Progressing  10/6/2024 2033 by Susana Payne, RN  Outcome: Progressing     Problem: Nutrition Deficit:  Goal: Optimize nutritional status  10/7/2024 0511 by Sydnie Anderson RN  Outcome: Progressing  10/6/2024 2033 by Susana Payne RN  Outcome: Progressing     
  Problem: Chronic Conditions and Co-morbidities  Goal: Patient's chronic conditions and co-morbidity symptoms are monitored and maintained or improved  10/7/2024 1107 by Sydnie Lakhani RN  Outcome: Progressing  10/7/2024 0511 by Sydnie Anderson RN  Outcome: Progressing     Problem: Discharge Planning  Goal: Discharge to home or other facility with appropriate resources  10/7/2024 1107 by Sydnie Lakhnai RN  Outcome: Progressing  10/7/2024 0511 by Sydnie Anderson RN  Outcome: Progressing     Problem: Safety - Adult  Goal: Free from fall injury  10/7/2024 1107 by Sydnie Lakhani RN  Outcome: Progressing  10/7/2024 0511 by Sydnie Anderson RN  Outcome: Progressing     Problem: ABCDS Injury Assessment  Goal: Absence of physical injury  10/7/2024 1107 by Sydnie Lakhani RN  Outcome: Progressing  10/7/2024 0511 by Sydnie Anderson RN  Outcome: Progressing     Problem: Nutrition Deficit:  Goal: Optimize nutritional status  10/7/2024 1107 by Sydnie Lakhani RN  Outcome: Progressing  10/7/2024 0511 by Sydnie Anderson RN  Outcome: Progressing     
  Problem: Chronic Conditions and Co-morbidities  Goal: Patient's chronic conditions and co-morbidity symptoms are monitored and maintained or improved  10/8/2024 0056 by Sydnie Anderson RN  Outcome: Progressing  Flowsheets (Taken 10/7/2024 2000)  Care Plan - Patient's Chronic Conditions and Co-Morbidity Symptoms are Monitored and Maintained or Improved: Monitor and assess patient's chronic conditions and comorbid symptoms for stability, deterioration, or improvement  10/7/2024 1107 by Sydnie Lakhani RN  Outcome: Progressing     Problem: Discharge Planning  Goal: Discharge to home or other facility with appropriate resources  10/8/2024 0056 by Sydnie Anderson RN  Outcome: Progressing  Flowsheets (Taken 10/7/2024 2000)  Discharge to home or other facility with appropriate resources: Identify barriers to discharge with patient and caregiver  10/7/2024 1107 by Sydnie Lakhani RN  Outcome: Progressing     Problem: Safety - Adult  Goal: Free from fall injury  10/8/2024 0056 by Sydnie Anderson RN  Outcome: Progressing  10/7/2024 1107 by Sydnie Lakhani RN  Outcome: Progressing     Problem: ABCDS Injury Assessment  Goal: Absence of physical injury  10/8/2024 0056 by Sydnie Anderson RN  Outcome: Progressing  10/7/2024 1107 by Sydnie Lakhani RN  Outcome: Progressing     Problem: Nutrition Deficit:  Goal: Optimize nutritional status  10/8/2024 0056 by Sydnie Anderson RN  Outcome: Progressing  10/7/2024 1107 by Sydnie Lakhani, RN  Outcome: Progressing     
  Problem: Chronic Conditions and Co-morbidities  Goal: Patient's chronic conditions and co-morbidity symptoms are monitored and maintained or improved  10/9/2024 0758 by Norma Amado RN  Outcome: Progressing  Flowsheets (Taken 10/8/2024 2000)  Care Plan - Patient's Chronic Conditions and Co-Morbidity Symptoms are Monitored and Maintained or Improved:   Monitor and assess patient's chronic conditions and comorbid symptoms for stability, deterioration, or improvement   Collaborate with multidisciplinary team to address chronic and comorbid conditions and prevent exacerbation or deterioration   Update acute care plan with appropriate goals if chronic or comorbid symptoms are exacerbated and prevent overall improvement and discharge  10/8/2024 1815 by Terry Blas RN  Outcome: Progressing  Flowsheets (Taken 10/8/2024 1300)  Care Plan - Patient's Chronic Conditions and Co-Morbidity Symptoms are Monitored and Maintained or Improved: Monitor and assess patient's chronic conditions and comorbid symptoms for stability, deterioration, or improvement     Problem: Discharge Planning  Goal: Discharge to home or other facility with appropriate resources  10/9/2024 0758 by Norma Amado RN  Outcome: Progressing  Flowsheets (Taken 10/8/2024 2000)  Discharge to home or other facility with appropriate resources:   Identify barriers to discharge with patient and caregiver   Arrange for needed discharge resources and transportation as appropriate   Identify discharge learning needs (meds, wound care, etc)   Arrange for interpreters to assist at discharge as needed   Refer to discharge planning if patient needs post-hospital services based on physician order or complex needs related to functional status, cognitive ability or social support system  10/8/2024 1815 by Terry Blas, RN  Outcome: Progressing  Flowsheets (Taken 10/8/2024 1300)  Discharge to home or other facility with appropriate resources: Identify barriers to 
  Problem: Chronic Conditions and Co-morbidities  Goal: Patient's chronic conditions and co-morbidity symptoms are monitored and maintained or improved  9/29/2024 0132 by Dmitriy Rucker RN  Outcome: Progressing  9/28/2024 1829 by Missy Herrera RN  Outcome: Progressing     Problem: Discharge Planning  Goal: Discharge to home or other facility with appropriate resources  9/29/2024 0132 by Dmitriy Rucker RN  Outcome: Progressing  9/28/2024 1829 by Missy Herrera RN  Outcome: Progressing     Problem: Safety - Adult  Goal: Free from fall injury  9/29/2024 0132 by Dmitriy Rucker RN  Outcome: Progressing  9/28/2024 1829 by Missy Herrera RN  Outcome: Progressing     Problem: ABCDS Injury Assessment  Goal: Absence of physical injury  9/29/2024 0132 by Dmitriy Ruckre RN  Outcome: Progressing  9/28/2024 1829 by Missy Herrera RN  Outcome: Progressing     
  Problem: Chronic Conditions and Co-morbidities  Goal: Patient's chronic conditions and co-morbidity symptoms are monitored and maintained or improved  9/30/2024 0415 by Sydnie Anderson RN  Outcome: Progressing  9/29/2024 1827 by Missy Herrera RN  Outcome: Progressing     Problem: Discharge Planning  Goal: Discharge to home or other facility with appropriate resources  9/30/2024 0415 by Sydnie Anderson RN  Outcome: Progressing  9/29/2024 1827 by Missy Herrera RN  Outcome: Progressing     Problem: Safety - Adult  Goal: Free from fall injury  9/30/2024 0415 by Sydnie Anderson RN  Outcome: Progressing  9/29/2024 1827 by Missy Herrera RN  Outcome: Progressing     Problem: ABCDS Injury Assessment  Goal: Absence of physical injury  9/30/2024 0415 by Sydnie Anderson RN  Outcome: Progressing  9/29/2024 1827 by Missy Herrera RN  Outcome: Progressing     
  Problem: Chronic Conditions and Co-morbidities  Goal: Patient's chronic conditions and co-morbidity symptoms are monitored and maintained or improved  9/30/2024 1313 by Sydnie Lakhani RN  Outcome: Progressing  9/30/2024 0416 by Sydnie Anderson RN  Outcome: Progressing  9/30/2024 0415 by Sydnie Anderson RN  Outcome: Progressing     Problem: Discharge Planning  Goal: Discharge to home or other facility with appropriate resources  9/30/2024 1313 by Sydnie Lakhani RN  Outcome: Progressing  9/30/2024 0416 by Sydnie Anderson RN  Outcome: Progressing  9/30/2024 0415 by Sydnie Anderson RN  Outcome: Progressing     Problem: Safety - Adult  Goal: Free from fall injury  9/30/2024 1313 by Sydnie Lakhani RN  Outcome: Progressing  9/30/2024 0416 by Sydnie Anderson RN  Outcome: Progressing  9/30/2024 0415 by Sydnie Anderson RN  Outcome: Progressing     Problem: ABCDS Injury Assessment  Goal: Absence of physical injury  9/30/2024 1313 by Sydnie Lakhani RN  Outcome: Progressing  9/30/2024 0416 by Sydnie Anderson RN  Outcome: Progressing  9/30/2024 0415 by Sydnie Anderson RN  Outcome: Progressing     
  Problem: Chronic Conditions and Co-morbidities  Goal: Patient's chronic conditions and co-morbidity symptoms are monitored and maintained or improved  9/30/2024 2345 by Sydnie Anderson RN  Outcome: Progressing  Flowsheets (Taken 9/30/2024 2000)  Care Plan - Patient's Chronic Conditions and Co-Morbidity Symptoms are Monitored and Maintained or Improved: Monitor and assess patient's chronic conditions and comorbid symptoms for stability, deterioration, or improvement  9/30/2024 1313 by Sydnie Lakhani RN  Outcome: Progressing     Problem: Discharge Planning  Goal: Discharge to home or other facility with appropriate resources  9/30/2024 2345 by Sydnie Anderson, RN  Outcome: Progressing  Flowsheets (Taken 9/30/2024 2000)  Discharge to home or other facility with appropriate resources: Identify barriers to discharge with patient and caregiver  9/30/2024 1313 by Sydnie Lakhani RN  Outcome: Progressing     Problem: Safety - Adult  Goal: Free from fall injury  9/30/2024 2345 by Sydnie Anderson, RN  Outcome: Progressing  9/30/2024 1313 by Sydnie Lakhani RN  Outcome: Progressing     Problem: ABCDS Injury Assessment  Goal: Absence of physical injury  9/30/2024 2345 by Sydnie Anderson, RN  Outcome: Progressing  9/30/2024 1313 by Sydnie Lakhani RN  Outcome: Progressing     
  Problem: Chronic Conditions and Co-morbidities  Goal: Patient's chronic conditions and co-morbidity symptoms are monitored and maintained or improved  Outcome: Progressing     Problem: Discharge Planning  Goal: Discharge to home or other facility with appropriate resources  Outcome: Progressing     Problem: Safety - Adult  Goal: Free from fall injury  Outcome: Progressing     Problem: ABCDS Injury Assessment  Goal: Absence of physical injury  Outcome: Progressing     
  Problem: Chronic Conditions and Co-morbidities  Goal: Patient's chronic conditions and co-morbidity symptoms are monitored and maintained or improved  Outcome: Progressing     Problem: Discharge Planning  Goal: Discharge to home or other facility with appropriate resources  Outcome: Progressing     Problem: Safety - Adult  Goal: Free from fall injury  Outcome: Progressing     Problem: ABCDS Injury Assessment  Goal: Absence of physical injury  Outcome: Progressing     
  Problem: Chronic Conditions and Co-morbidities  Goal: Patient's chronic conditions and co-morbidity symptoms are monitored and maintained or improved  Outcome: Progressing     Problem: Discharge Planning  Goal: Discharge to home or other facility with appropriate resources  Outcome: Progressing     Problem: Safety - Adult  Goal: Free from fall injury  Outcome: Progressing     Problem: ABCDS Injury Assessment  Goal: Absence of physical injury  Outcome: Progressing     Problem: Nutrition Deficit:  Goal: Optimize nutritional status  Outcome: Progressing     
  Problem: Chronic Conditions and Co-morbidities  Goal: Patient's chronic conditions and co-morbidity symptoms are monitored and maintained or improved  Outcome: Progressing     Problem: Discharge Planning  Goal: Discharge to home or other facility with appropriate resources  Outcome: Progressing     Problem: Safety - Adult  Goal: Free from fall injury  Outcome: Progressing     Problem: ABCDS Injury Assessment  Goal: Absence of physical injury  Outcome: Progressing     Problem: Nutrition Deficit:  Goal: Optimize nutritional status  Outcome: Progressing     Problem: Pain  Goal: Verbalizes/displays adequate comfort level or baseline comfort level  Outcome: Progressing     Problem: Respiratory - Adult  Goal: Achieves optimal ventilation and oxygenation  Outcome: Progressing     Problem: Cardiovascular - Adult  Goal: Maintains optimal cardiac output and hemodynamic stability  Outcome: Progressing     Problem: Cardiovascular - Adult  Goal: Absence of cardiac dysrhythmias or at baseline  Outcome: Progressing     Problem: Skin/Tissue Integrity - Adult  Goal: Skin integrity remains intact  Outcome: Progressing     Problem: Genitourinary - Adult  Goal: Urinary catheter remains patent  Outcome: Progressing     Problem: Metabolic/Fluid and Electrolytes - Adult  Goal: Electrolytes maintained within normal limits  Outcome: Progressing     Problem: Metabolic/Fluid and Electrolytes - Adult  Goal: Hemodynamic stability and optimal renal function maintained  Outcome: Progressing     Problem: Metabolic/Fluid and Electrolytes - Adult  Goal: Glucose maintained within prescribed range  Outcome: Progressing     Problem: Hematologic - Adult  Goal: Maintains hematologic stability  Outcome: Progressing     Problem: Skin/Tissue Integrity  Goal: Absence of new skin breakdown  Description: 1.  Monitor for areas of redness and/or skin breakdown  2.  Assess vascular access sites hourly  3.  Every 4-6 hours minimum:  Change oxygen saturation 
  Problem: Chronic Conditions and Co-morbidities  Goal: Patient's chronic conditions and co-morbidity symptoms are monitored and maintained or improved  Outcome: Progressing     Problem: Discharge Planning  Goal: Discharge to home or other facility with appropriate resources  Outcome: Progressing     Problem: Safety - Adult  Goal: Free from fall injury  Outcome: Progressing     Problem: ABCDS Injury Assessment  Goal: Absence of physical injury  Outcome: Progressing     Problem: Nutrition Deficit:  Goal: Optimize nutritional status  Outcome: Progressing     Problem: Pain  Goal: Verbalizes/displays adequate comfort level or baseline comfort level  Outcome: Progressing     Problem: Respiratory - Adult  Goal: Achieves optimal ventilation and oxygenation  Outcome: Progressing     Problem: Cardiovascular - Adult  Goal: Maintains optimal cardiac output and hemodynamic stability  Outcome: Progressing  Goal: Absence of cardiac dysrhythmias or at baseline  Outcome: Progressing     Problem: Skin/Tissue Integrity - Adult  Goal: Skin integrity remains intact  Outcome: Progressing     Problem: Genitourinary - Adult  Goal: Urinary catheter remains patent  Outcome: Progressing     Problem: Metabolic/Fluid and Electrolytes - Adult  Goal: Electrolytes maintained within normal limits  Outcome: Progressing  Goal: Hemodynamic stability and optimal renal function maintained  Outcome: Progressing  Goal: Glucose maintained within prescribed range  Outcome: Progressing     Problem: Hematologic - Adult  Goal: Maintains hematologic stability  Outcome: Progressing     Problem: Skin/Tissue Integrity  Goal: Absence of new skin breakdown  Description: 1.  Monitor for areas of redness and/or skin breakdown  2.  Assess vascular access sites hourly  3.  Every 4-6 hours minimum:  Change oxygen saturation probe site  4.  Every 4-6 hours:  If on nasal continuous positive airway pressure, respiratory therapy assess nares and determine need for appliance 
  Problem: Chronic Conditions and Co-morbidities  Goal: Patient's chronic conditions and co-morbidity symptoms are monitored and maintained or improved  Outcome: Progressing     Problem: Discharge Planning  Goal: Discharge to home or other facility with appropriate resources  Outcome: Progressing     Problem: Safety - Adult  Goal: Free from fall injury  Outcome: Progressing     Problem: ABCDS Injury Assessment  Goal: Absence of physical injury  Outcome: Progressing     Problem: Nutrition Deficit:  Goal: Optimize nutritional status  Outcome: Progressing     Problem: Respiratory - Adult  Goal: Achieves optimal ventilation and oxygenation  Outcome: Progressing     Problem: Pain  Goal: Verbalizes/displays adequate comfort level or baseline comfort level  Recent Flowsheet Documentation  Taken 10/19/2024 1940 by Luz Jerry, RN  Verbalizes/displays adequate comfort level or baseline comfort level: Encourage patient to monitor pain and request assistance     Problem: Cardiovascular - Adult  Goal: Maintains optimal cardiac output and hemodynamic stability  Outcome: Progressing  Goal: Absence of cardiac dysrhythmias or at baseline  Outcome: Progressing     Problem: Genitourinary - Adult  Goal: Urinary catheter remains patent  Outcome: Progressing     Problem: Metabolic/Fluid and Electrolytes - Adult  Goal: Electrolytes maintained within normal limits  Outcome: Progressing  Goal: Hemodynamic stability and optimal renal function maintained  Outcome: Progressing     
  Problem: Chronic Conditions and Co-morbidities  Goal: Patient's chronic conditions and co-morbidity symptoms are monitored and maintained or improved  Outcome: Progressing     Problem: Discharge Planning  Goal: Discharge to home or other facility with appropriate resources  Outcome: Progressing     Problem: Safety - Adult  Goal: Free from fall injury  Outcome: Progressing  Flowsheets (Taken 9/26/2024 2200)  Free From Fall Injury: Instruct family/caregiver on patient safety     
  Problem: Chronic Conditions and Co-morbidities  Goal: Patient's chronic conditions and co-morbidity symptoms are monitored and maintained or improved  Outcome: Progressing     Problem: Safety - Adult  Goal: Free from fall injury  Outcome: Progressing     Problem: ABCDS Injury Assessment  Goal: Absence of physical injury  Outcome: Progressing     
  Problem: Chronic Conditions and Co-morbidities  Goal: Patient's chronic conditions and co-morbidity symptoms are monitored and maintained or improved  Outcome: Progressing     Problem: Safety - Adult  Goal: Free from fall injury  Outcome: Progressing     Problem: ABCDS Injury Assessment  Goal: Absence of physical injury  Outcome: Progressing     Problem: Pain  Goal: Verbalizes/displays adequate comfort level or baseline comfort level  Outcome: Progressing     Problem: Cardiovascular - Adult  Goal: Maintains optimal cardiac output and hemodynamic stability  Outcome: Progressing     
  Problem: Chronic Conditions and Co-morbidities  Goal: Patient's chronic conditions and co-morbidity symptoms are monitored and maintained or improved  Outcome: Progressing  Flowsheets (Taken 10/13/2024 0800)  Care Plan - Patient's Chronic Conditions and Co-Morbidity Symptoms are Monitored and Maintained or Improved: Monitor and assess patient's chronic conditions and comorbid symptoms for stability, deterioration, or improvement     Problem: Discharge Planning  Goal: Discharge to home or other facility with appropriate resources  Outcome: Progressing  Flowsheets (Taken 10/13/2024 0800)  Discharge to home or other facility with appropriate resources: Identify barriers to discharge with patient and caregiver     Problem: Safety - Adult  Goal: Free from fall injury  Outcome: Progressing  Flowsheets (Taken 10/13/2024 0920)  Free From Fall Injury: Instruct family/caregiver on patient safety     Problem: ABCDS Injury Assessment  Goal: Absence of physical injury  Outcome: Progressing  Flowsheets (Taken 10/13/2024 0920)  Absence of Physical Injury: Implement safety measures based on patient assessment     Problem: Nutrition Deficit:  Goal: Optimize nutritional status  Outcome: Progressing     Problem: Pain  Goal: Verbalizes/displays adequate comfort level or baseline comfort level  10/13/2024 0921 by Stella Miller, RN  Outcome: Progressing  10/13/2024 0035 by Sofia Romero, RN  Outcome: Progressing  Flowsheets (Taken 10/13/2024 0035)  Verbalizes/displays adequate comfort level or baseline comfort level:   Encourage patient to monitor pain and request assistance   Assess pain using appropriate pain scale   Administer analgesics based on type and severity of pain and evaluate response   Implement non-pharmacological measures as appropriate and evaluate response   Consider cultural and social influences on pain and pain management   Notify Licensed Independent Practitioner if interventions unsuccessful or patient 
  Problem: Chronic Conditions and Co-morbidities  Goal: Patient's chronic conditions and co-morbidity symptoms are monitored and maintained or improved  Outcome: Progressing  Flowsheets (Taken 10/14/2024 0800)  Care Plan - Patient's Chronic Conditions and Co-Morbidity Symptoms are Monitored and Maintained or Improved:   Monitor and assess patient's chronic conditions and comorbid symptoms for stability, deterioration, or improvement   Collaborate with multidisciplinary team to address chronic and comorbid conditions and prevent exacerbation or deterioration     Problem: Discharge Planning  Goal: Discharge to home or other facility with appropriate resources  Outcome: Progressing  Flowsheets (Taken 10/14/2024 0800)  Discharge to home or other facility with appropriate resources: Identify barriers to discharge with patient and caregiver     Problem: Safety - Adult  Goal: Free from fall injury  Outcome: Progressing  Flowsheets (Taken 10/14/2024 0821)  Free From Fall Injury: Instruct family/caregiver on patient safety     Problem: ABCDS Injury Assessment  Goal: Absence of physical injury  Outcome: Progressing  Flowsheets (Taken 10/14/2024 0821)  Absence of Physical Injury: Implement safety measures based on patient assessment     Problem: Nutrition Deficit:  Goal: Optimize nutritional status  Outcome: Progressing     Problem: Pain  Goal: Verbalizes/displays adequate comfort level or baseline comfort level  Outcome: Progressing  Flowsheets (Taken 10/14/2024 0800)  Verbalizes/displays adequate comfort level or baseline comfort level:   Implement non-pharmacological measures as appropriate and evaluate response   Administer analgesics based on type and severity of pain and evaluate response   Assess pain using appropriate pain scale   Encourage patient to monitor pain and request assistance     Problem: Respiratory - Adult  Goal: Achieves optimal ventilation and oxygenation  Outcome: Progressing  Flowsheets (Taken 
  Problem: Chronic Conditions and Co-morbidities  Goal: Patient's chronic conditions and co-morbidity symptoms are monitored and maintained or improved  Outcome: Progressing  Flowsheets (Taken 10/2/2024 2000)  Care Plan - Patient's Chronic Conditions and Co-Morbidity Symptoms are Monitored and Maintained or Improved: Monitor and assess patient's chronic conditions and comorbid symptoms for stability, deterioration, or improvement     Problem: Discharge Planning  Goal: Discharge to home or other facility with appropriate resources  Outcome: Progressing  Flowsheets (Taken 10/2/2024 2000)  Discharge to home or other facility with appropriate resources:   Identify barriers to discharge with patient and caregiver   Arrange for needed discharge resources and transportation as appropriate   Identify discharge learning needs (meds, wound care, etc)   Refer to discharge planning if patient needs post-hospital services based on physician order or complex needs related to functional status, cognitive ability or social support system     Problem: Safety - Adult  Goal: Free from fall injury  Outcome: Progressing  Flowsheets (Taken 10/3/2024 0118)  Free From Fall Injury: Instruct family/caregiver on patient safety     Problem: ABCDS Injury Assessment  Goal: Absence of physical injury  Outcome: Progressing  Flowsheets (Taken 10/3/2024 0118)  Absence of Physical Injury: Implement safety measures based on patient assessment     
  Problem: Chronic Conditions and Co-morbidities  Goal: Patient's chronic conditions and co-morbidity symptoms are monitored and maintained or improved  Outcome: Progressing  Flowsheets (Taken 10/8/2024 1300)  Care Plan - Patient's Chronic Conditions and Co-Morbidity Symptoms are Monitored and Maintained or Improved: Monitor and assess patient's chronic conditions and comorbid symptoms for stability, deterioration, or improvement     Problem: Discharge Planning  Goal: Discharge to home or other facility with appropriate resources  Outcome: Progressing  Flowsheets (Taken 10/8/2024 1300)  Discharge to home or other facility with appropriate resources: Identify barriers to discharge with patient and caregiver     Problem: Safety - Adult  Goal: Free from fall injury  Outcome: Progressing  Flowsheets (Taken 10/8/2024 1300)  Free From Fall Injury: Instruct family/caregiver on patient safety     Problem: ABCDS Injury Assessment  Goal: Absence of physical injury  Outcome: Progressing  Flowsheets (Taken 10/8/2024 1300)  Absence of Physical Injury: Implement safety measures based on patient assessment     Problem: Nutrition Deficit:  Goal: Optimize nutritional status  Outcome: Progressing     Problem: Pain  Goal: Verbalizes/displays adequate comfort level or baseline comfort level  Outcome: Progressing     Problem: Safety - Medical Restraint  Goal: Remains free of injury from restraints (Restraint for Interference with Medical Device)  Description: INTERVENTIONS:  1. Determine that other, less restrictive measures have been tried or would not be effective before applying the restraint  2. Evaluate the patient's condition at the time of restraint application  3. Inform patient/family regarding the reason for restraint  4. Q2H: Monitor safety, psychosocial status, comfort, nutrition and hydration  Outcome: Progressing  Flowsheets (Taken 10/8/2024 1632)  Remains free of injury from restraints (restraint for interference with 
  Problem: Chronic Conditions and Co-morbidities  Goal: Patient's chronic conditions and co-morbidity symptoms are monitored and maintained or improved  Outcome: Progressing  Flowsheets (Taken 10/9/2024 2000)  Care Plan - Patient's Chronic Conditions and Co-Morbidity Symptoms are Monitored and Maintained or Improved:   Monitor and assess patient's chronic conditions and comorbid symptoms for stability, deterioration, or improvement   Collaborate with multidisciplinary team to address chronic and comorbid conditions and prevent exacerbation or deterioration   Update acute care plan with appropriate goals if chronic or comorbid symptoms are exacerbated and prevent overall improvement and discharge     Problem: Discharge Planning  Goal: Discharge to home or other facility with appropriate resources  Outcome: Progressing  Flowsheets (Taken 10/9/2024 2000)  Discharge to home or other facility with appropriate resources:   Identify barriers to discharge with patient and caregiver   Arrange for needed discharge resources and transportation as appropriate   Identify discharge learning needs (meds, wound care, etc)   Arrange for interpreters to assist at discharge as needed   Refer to discharge planning if patient needs post-hospital services based on physician order or complex needs related to functional status, cognitive ability or social support system     Problem: Safety - Adult  Goal: Free from fall injury  10/10/2024 0907 by Norma Amado, RN  Outcome: Progressing  10/10/2024 0737 by Frommelt, Lauren, RN  Outcome: Progressing     Problem: ABCDS Injury Assessment  Goal: Absence of physical injury  Outcome: Progressing     Problem: Nutrition Deficit:  Goal: Optimize nutritional status  10/10/2024 0907 by Norma Amado, RN  Outcome: Progressing  10/10/2024 0737 by Frommelt, Lauren, RN  Outcome: Progressing     Problem: Pain  Goal: Verbalizes/displays adequate comfort level or baseline comfort level  10/10/2024 0907 by 
  Problem: Pain  Goal: Verbalizes/displays adequate comfort level or baseline comfort level  10/13/2024 0035 by Sofia Romero RN  Outcome: Progressing  Flowsheets (Taken 10/13/2024 0035)  Verbalizes/displays adequate comfort level or baseline comfort level:   Encourage patient to monitor pain and request assistance   Assess pain using appropriate pain scale   Administer analgesics based on type and severity of pain and evaluate response   Implement non-pharmacological measures as appropriate and evaluate response   Consider cultural and social influences on pain and pain management   Notify Licensed Independent Practitioner if interventions unsuccessful or patient reports new pain     Problem: Respiratory - Adult  Goal: Achieves optimal ventilation and oxygenation  10/13/2024 0035 by Sofia Romero RN  Outcome: Progressing  Flowsheets (Taken 10/13/2024 0035)  Achieves optimal ventilation and oxygenation:   Assess for changes in respiratory status   Assess for changes in mentation and behavior   Position to facilitate oxygenation and minimize respiratory effort   Oxygen supplementation based on oxygen saturation or arterial blood gases   Initiate smoking cessation protocol as indicated   Encourage broncho-pulmonary hygiene including cough, deep breathe, incentive spirometry   Assess the need for suctioning and aspirate as needed   Assess and instruct to report shortness of breath or any respiratory difficulty   Respiratory therapy support as indicated     Problem: Cardiovascular - Adult  Goal: Maintains optimal cardiac output and hemodynamic stability  10/13/2024 0035 by Sofia Romero RN  Outcome: Progressing  Flowsheets (Taken 10/13/2024 0035)  Maintains optimal cardiac output and hemodynamic stability:   Monitor blood pressure and heart rate   Monitor urine output and notify Licensed Independent Practitioner for values outside of normal range   Assess for signs of decreased cardiac output   
  Problem: Pain  Goal: Verbalizes/displays adequate comfort level or baseline comfort level  10/13/2024 1925 by Sofia Romero RN  Outcome: Progressing  Flowsheets (Taken 10/13/2024 1925)  Verbalizes/displays adequate comfort level or baseline comfort level:   Encourage patient to monitor pain and request assistance   Assess pain using appropriate pain scale   Administer analgesics based on type and severity of pain and evaluate response   Implement non-pharmacological measures as appropriate and evaluate response   Consider cultural and social influences on pain and pain management   Notify Licensed Independent Practitioner if interventions unsuccessful or patient reports new pain     Problem: Respiratory - Adult  Goal: Achieves optimal ventilation and oxygenation  10/13/2024 1925 by Sofia Romero RN  Outcome: Progressing  Flowsheets (Taken 10/13/2024 1925)  Achieves optimal ventilation and oxygenation:   Assess for changes in respiratory status   Assess for changes in mentation and behavior   Position to facilitate oxygenation and minimize respiratory effort   Oxygen supplementation based on oxygen saturation or arterial blood gases   Encourage broncho-pulmonary hygiene including cough, deep breathe, incentive spirometry   Assess the need for suctioning and aspirate as needed   Assess and instruct to report shortness of breath or any respiratory difficulty   Respiratory therapy support as indicated     Problem: Cardiovascular - Adult  Goal: Maintains optimal cardiac output and hemodynamic stability  10/13/2024 1925 by Sofia Romero RN  Outcome: Progressing  Flowsheets (Taken 10/13/2024 1925)  Maintains optimal cardiac output and hemodynamic stability:   Monitor blood pressure and heart rate   Monitor urine output and notify Licensed Independent Practitioner for values outside of normal range   Assess for signs of decreased cardiac output   Administer fluid and/or volume expanders as 
  Problem: Respiratory - Adult  Goal: Achieves optimal ventilation and oxygenation  10/9/2024 0136 by Mirta Chapman RCP  Outcome: Progressing     
  Problem: Respiratory - Adult  Goal: Achieves optimal ventilation and oxygenation  Outcome: Progressing     
  Problem: Safety - Adult  Goal: Free from fall injury  9/27/2024 1148 by Ludmila Tripp, RN  Outcome: Progressing  9/27/2024 0320 by Evan Virgen RN  Outcome: Progressing  Flowsheets (Taken 9/26/2024 2200)  Free From Fall Injury: Instruct family/caregiver on patient safety     Problem: ABCDS Injury Assessment  Goal: Absence of physical injury  Outcome: Progressing  Flowsheets (Taken 9/26/2024 2200 by Evan Virgen, RN)  Absence of Physical Injury: Implement safety measures based on patient assessment     
Met with patient and discussed our Home Heart Health Program and our outpatient Phase II Cardiac Rehabilitation program. Reviewed the benefits of cardiac rehabilitation based on their diagnosis and personal risk factors. Patient demonstrates strong interest in Cardiac Rehabilitation at this time. Cardiac Rehabilitation brochure provided to patient/family. The patient may call [facility] at [phone] for additional information or questions. Contact information for [facility] and other choices close to the patient's residence have been provided in the discharge instructions so that the patient may call and schedule an appointment when cleared by their physician.  
Patient's chart updated to reflect:      .    - HF care plan, HF education points and HF discharge instructions.  -Orders: 2 gram sodium diet, daily weights, I/O.  -PCP and cardiology follow up appointments to be scheduled within 7 days of hospital discharge.  -CHF education session will be provided to the patient prior to hospital discharge.    Alisson Trevino RN   Heart Failure Navigator    
Problem: Chronic Conditions and Co-morbidities  Goal: Patient's chronic conditions and co-morbidity symptoms are monitored and maintained or improved  Outcome: Progressing  Flowsheets (Taken 10/12/2024 0800)  Care Plan - Patient's Chronic Conditions and Co-Morbidity Symptoms are Monitored and Maintained or Improved: Monitor and assess patient's chronic conditions and comorbid symptoms for stability, deterioration, or improvement     Problem: Discharge Planning  Goal: Discharge to home or other facility with appropriate resources  Outcome: Progressing  Flowsheets (Taken 10/12/2024 0800)  Discharge to home or other facility with appropriate resources: Identify barriers to discharge with patient and caregiver     Problem: Safety - Adult  Goal: Free from fall injury  Outcome: Progressing  Flowsheets (Taken 10/12/2024 0800)  Free From Fall Injury: Instruct family/caregiver on patient safety     Problem: ABCDS Injury Assessment  Goal: Absence of physical injury  Outcome: Progressing  Flowsheets (Taken 10/12/2024 0800)  Absence of Physical Injury: Implement safety measures based on patient assessment     Problem: Nutrition Deficit:  Goal: Optimize nutritional status  Outcome: Progressing     Problem: Pain  Goal: Verbalizes/displays adequate comfort level or baseline comfort level  Outcome: Progressing  Flowsheets (Taken 10/12/2024 0800)  Verbalizes/displays adequate comfort level or baseline comfort level:   Encourage patient to monitor pain and request assistance   Assess pain using appropriate pain scale   Administer analgesics based on type and severity of pain and evaluate response   Implement non-pharmacological measures as appropriate and evaluate response     Problem: Respiratory - Adult  Goal: Achieves optimal ventilation and oxygenation  Outcome: Progressing  Flowsheets (Taken 10/12/2024 0800)  Achieves optimal ventilation and oxygenation:   Assess for changes in respiratory status   Assess for changes in 
Restraints removed, pt continues to reach for ET tube and other essential lines. Unable to verbally redirect. Skin intact, bilateral wrist restraints continued at this time.       Problem: Safety - Adult  Goal: Free from fall injury  Outcome: Progressing     Problem: ABCDS Injury Assessment  Goal: Absence of physical injury  Outcome: Progressing     Problem: Pain  Goal: Verbalizes/displays adequate comfort level or baseline comfort level  Outcome: Progressing     Problem: Safety - Medical Restraint  Goal: Remains free of injury from restraints (Restraint for Interference with Medical Device)  Description: INTERVENTIONS:  1. Determine that other, less restrictive measures have been tried or would not be effective before applying the restraint  2. Evaluate the patient's condition at the time of restraint application  3. Inform patient/family regarding the reason for restraint  4. Q2H: Monitor safety, psychosocial status, comfort, nutrition and hydration  Outcome: Progressing     Problem: Cardiovascular - Adult  Goal: Maintains optimal cardiac output and hemodynamic stability  Outcome: Progressing     Problem: Cardiovascular - Adult  Goal: Absence of cardiac dysrhythmias or at baseline  Outcome: Progressing     Problem: Genitourinary - Adult  Goal: Urinary catheter remains patent  Outcome: Progressing     Problem: Metabolic/Fluid and Electrolytes - Adult  Goal: Hemodynamic stability and optimal renal function maintained  Outcome: Progressing     Problem: Hematologic - Adult  Goal: Maintains hematologic stability  Outcome: Progressing     
Restraints removed, pt continues to reach for ET tube and other essential lines. Unable to verbally redirect. Skin intact, bilateral wrist restraints continued at this time.     Problem: Safety - Adult  Goal: Free from fall injury  Outcome: Progressing     Problem: Nutrition Deficit:  Goal: Optimize nutritional status  Outcome: Progressing     Problem: Pain  Goal: Verbalizes/displays adequate comfort level or baseline comfort level  Outcome: Progressing    Problem: Safety - Medical Restraint  Goal: Remains free of injury from restraints (Restraint for Interference with Medical Device)  Description: INTERVENTIONS:  1. Determine that other, less restrictive measures have been tried or would not be effective before applying the restraint  2. Evaluate the patient's condition at the time of restraint application  3. Inform patient/family regarding the reason for restraint  4. Q2H: Monitor safety, psychosocial status, comfort, nutrition and hydration  Outcome: Progressing    Problem: Respiratory - Adult  Goal: Achieves optimal ventilation and oxygenation  10/10/2024 0737 by Frommelt, Lauren, RN  Outcome: Progressing     Problem: Cardiovascular - Adult  Goal: Maintains optimal cardiac output and hemodynamic stability  Outcome: Progressing     Problem: Cardiovascular - Adult  Goal: Absence of cardiac dysrhythmias or at baseline  Outcome: Progressing     Problem: Skin/Tissue Integrity - Adult  Goal: Skin integrity remains intact  Outcome: Progressing     Problem: Genitourinary - Adult  Goal: Urinary catheter remains patent  Outcome: Progressing     Problem: Metabolic/Fluid and Electrolytes - Adult  Goal: Electrolytes maintained within normal limits  Outcome: Progressing     Problem: Hematologic - Adult  Goal: Maintains hematologic stability  Outcome: Progressing      
appliance change or resting period.  Outcome: Progressing     
within prescribed range  Outcome: Progressing     Problem: Hematologic - Adult  Goal: Maintains hematologic stability  Outcome: Progressing     Problem: Skin/Tissue Integrity  Goal: Absence of new skin breakdown  Description: 1.  Monitor for areas of redness and/or skin breakdown  2.  Assess vascular access sites hourly  3.  Every 4-6 hours minimum:  Change oxygen saturation probe site  4.  Every 4-6 hours:  If on nasal continuous positive airway pressure, respiratory therapy assess nares and determine need for appliance change or resting period.  Outcome: Progressing     
new skin breakdown  Description: 1.  Monitor for areas of redness and/or skin breakdown  2.  Assess vascular access sites hourly  3.  Every 4-6 hours minimum:  Change oxygen saturation probe site  4.  Every 4-6 hours:  If on nasal continuous positive airway pressure, respiratory therapy assess nares and determine need for appliance change or resting period.  Outcome: Progressing

## 2024-10-21 NOTE — CONSULTS
[facility]              [address] [place] [address]                              CONSULTATION      PATIENT NAME: BISHOP MILLER             : 1945  MED REC NO: [id number]                        ROOM: [id number]  ACCOUNT NO: [id number]                       ADMIT DATE: 2024  PROVIDER: Genaro Newby MD      CONSULT DATE: 10/21/2024    CHIEF COMPLAINT:  Hematuria.    PRESENT ILLNESS:  This 79-year-old male, who is postop from a CABG procedure, had a Long catheter in place for about 6 days, following which the catheter was removed.  He is able to void rather comfortably, although he has noticed some hematuria and he also was noted to have blood coming from his meatus.    The patient's past history reveals he was treated for prostatitis perhaps 20 to 30 years ago.  Lately, he has not had any difficulty with hematuria prior to this admission and he has noticed that his stream has been slowing, but he is not incontinent and he is not comfortable.  He has not taken medications such as Flomax or Proscar and has not seen a urologist that he is aware of.    The patient currently is being considered for starting Eliquis and he is also being considered to be discharged to an extended care facility in the near future.    PAST MEDICAL HISTORY:  Significant for diabetes.  He has had hyperlipidemia as well as coronary artery disease.  He also has had a kidney stone in the past.    PAST SURGICAL HISTORY:  Recent cardiac surgery.  He has had cataracts and colonoscopy.  He had retinal detachment surgery.    HOME MEDICATIONS:  Included Lasix, Aldactone, zinc, aspirin, turmeric, Crestor, Cozaar, Toprol-XL, NovoLog, and Claritin.    ALLERGIES:  TO ATORVASTATIN, SULFA, ERYTHROMYCIN, AND LIPITOR.      SOCIAL HISTORY:  The patient is .  He has never been a cigarette smoker.    PHYSICAL EXAMINATION:  GENERAL:  The patient is alert, oriented, sitting in a chair, 
      Inpatient Cardiology Consultation      Reason for Consult: Ischemic workup/?  Coronary angiography    Consulting Physician: Sirisha Mendoza MD    Requesting Physician:  Marycruz Mariano MD    Date of Consultation: 9/28/2024    HISTORY OF PRESENT ILLNESS:   Pantera is an 79-year-old male who is well-known to me and was last seen by me in the office in 1/2024 at which time he was well compensated from cardiac standpoint and denied any cardiac symptoms.  He was admitted 9/26/2024 after a syncopal episode [place].  He was seen by electrophysiology (Dr. Mariano).  He was noted to be in atrial fibrillation and underwent successful ANGELINA directed DCCV 9/27/2024 to sinus rhythm.  Of note, the ANGELINA showed severe LV systolic dysfunction with an estimated ejection fraction of 20 to 25% ( the prior echo in 3/2023 showing moderate LV systolic dysfunction with an ejection fraction of 40%)  In June of this year, Santi was noted to have problems with shortness of breath.  He had a sleep study done in August at home and a formal sleep study done at [facility] earlier this month which showed severe obstructive sleep apnea.  Pantera's wife Shoshana who is a nurse and we used to work at our office had contacted our office earlier via messaging informing me about Pantera's problem with worsening shortness of breath.  A proBNP requested by me and done on 9/20/2024 was elevated at 2,220.  Santi was on Maxide prior to that together with his other cardiac medications which included losartan and Toprol-XL.  I instructed him at the time to discontinue Maxide and start Lasix 40 mg daily together with spironolactone 25 mg daily.  Prior to the above current episode, and apparently, recommended noticed improvement in his breathing with the above medication adjustment.  He denies chest pain.  He apparently did have problems with orthopnea and PND's.  He denies any lower extremity swelling.  Also, and prior to the current event, he denies any 
    Kam Donovan M.D.,[facility]  Ángel Toledo D.O., F.EZRA., [facility]  Kate Mondragon M.D.  Agnieszka Saini M.D.   Milton Nova D.O.  Alcides Damon M.D.       Patient:  Pantera Payne 79 y.o. male MRN: [id number]           PULMONARY CONSULTATION    Reason for Consultation: follow up from sleep study completed as OP at [facility]    Primary Pulmonologist : Dr Hope Kemp MD    Referring Physician: Dr Darion Story MD    Communication with the referring physician will be sent via the electronic medical record.    Chief Complaint: shortness of breath     CODE STATUS: Full    SUBJECTIVE:  HPI:  Pantera Pyane is a 79 y.o. who asked to evaluate for follow up from sleep study completed as OP at [facility].  He has a past medical history significant for mitral valve prolapse, pulmonary hypertension, diabetes mellitus type 2, CAD, GERD placed on PPI, covid 19 2020, allergic rhinitis cardiomyopathy, basal cell cancer with excision x 2 left temporal area, detachment now requiring laser repair 2009 residual scar, hypertension, hyperlipidemia, kidney stones, inguinal hernia repair    He is known patient to [facility] pulmonary group followed for CANDICE. He was last seen in office by NP 9/16/2024.  He is a lifelong non-smoker.  He was evaluated for obstructive sleep apnea.  On 8/13/2024 he had a home sleep test through [facility] showing an AHI of 60 with O2 megan 75%.  He sleeps most of the night from 11 PM to 7:30 AM.  Gets up x 1 to go to the bathroom.  Per family history of CANDICE.  Woodworth sleep scale score 14.  He underwent a titration study through [facility] on 9/16/2024.  Results of this test have been pending, awaiting formal interpretation.    He presented to the ED 9/26/2024 after suffering a syncopal episode on the golf course.  He passed out and landed on his brothers shoulder approx 10 seconds total with LOC. He had several episodes of emesis.  He is intermittently having a dry cough not expectorating mucus.  
ENDOCRINOLOGY INITIAL CONSULTATION NOTE      Date of admission: 9/26/2024  Date of service: 10/2/2024  Admitting physician: Darion Story MD   Primary Care Physician: Terry Giraldo MD  Consultant physician: Perez Li MD     Reason for the consultation:  Uncontrolled DM    History of Present Illness:  The history is provided by the patient. Accuracy of the patient data is excellent    Pantera Payne is a very pleasant 79 y.o. old male with PMH of poorly controlled Type 2 DM; Afib; Mitral valve prolapse; Pulmonary HTN, CAD; Hyperlipidemia; Hypertension  and other listed below admitted to [facility] on 9/26/2024 because of Syncope a/w vomiting and high blood glucose 200 during syncope; Frequent PVC and runs of V-Tach as per EMS, endocrine service was consulted for diabetes management.    Prior to admission  The patient was diagnosed with type 2 DM. Prior to admission patient was on Inj Levemir 20 units twice a daily; Inj Novolog 5 units 4 times a daily. Patient has had no hypoglycemic episodes. Patient has not been eating consistent carbohydrate meals, self-blood glucose monitoring has been above goal prior to admission. In addition, patient denied macrovascular or microvascular complications. The patient is not up to date with yearly diabetic eye exam.       Lab Results   Component Value Date/Time    LABA1C 8.0 09/27/2024 06:34 AM       Inpatient diet:   Carb Restricted diet     Point of care glucose monitoring   (Independently reviewed)   Recent Labs     09/30/24  1136 09/30/24  1734 09/30/24  2026 10/01/24  0607 10/01/24  1113 10/01/24  2037 10/02/24  0600 10/02/24  1116   POCGLU 192* 315* 320* 182* 272* 347* 246* 303*       Past medical history:   Past Medical History:   Diagnosis Date    Atrial fibrillation (HCC) 1984, brief episode.    CAD (coronary artery disease)     Cancer (HCC)     Basal cell carcinoma, excisionx 2 (left temporal area).    Detached retina 8/20/2007    Left eye.  Laser repair.  8/2009: 
GENERAL SURGERY  CONSULT NOTE  10/14/2024    Physician Consulted: Dr. Ng  Reason for Consult: Nausea and vomiting  Referring Physician: Dr. Kenneth LUCIA  Pantera Payne is a 79 y.o. male who presents for evaluation of syncopal type episode at a golf course on 9/26.  Medical history was pertinent for CAD, DM, pulmonary hypertension and hyperlipidemia.  During his admission he was found to be in atrial fibrillation underwent cardioversion.  Patient underwent urgent sternotomy with coronary bypass on 10/8.  He reports that his diet was advanced yesterday and was the first real meal he has had in several days.  Later in the night he had an episode of emesis for which the general surgery team was consulted for.      Past Medical History:   Diagnosis Date    Atrial fibrillation (HCC) 1984, brief episode.    CAD (coronary artery disease)     Cancer (HCC)     Basal cell carcinoma, excisionx 2 (left temporal area).    Detached retina 8/20/2007    Left eye.  Laser repair.  8/2009: Residual scar tissue detected on exam in 8/2009.  Patient reported some deterioration in visual acuity.    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    Hearing problem     Hyperlipidemia     Hypertension     Kidney stone 1997.    Mild tricuspid regurgitation 11/18/13    Mitral valve prolapse 11/18/13    mild    Pulmonary hypertension (HCC) 11/18/13    Type II or unspecified type diabetes mellitus without mention of complication, not stated as uncontrolled Diagnosed in 1995.       Past Surgical History:   Procedure Laterality Date    CARDIAC PROCEDURE N/A 9/30/2024    Left heart cath / coronary angiography performed by Sirisha Mendoza MD at [facility] CARDIAC CATH LAB    CARDIOVASCULAR STRESS TEST  9/25/2002 Treadmill nuclear stress test:  Samy protocol.  11 minutes, 30 seconds.  98% of maximum predicted heart rate.     No chest pain. No ischemic EKG changes.  Physiologic BP response. Nuclear images showed significant attenuation artifacts, but non 
Patient not medically appropriate for inpatient cardiac rehab at this time. Please re-consult when patient is able to participate in therapy. Thank you for the Thank you.  
80.4 kg (177 lb 3.2 oz)   10/15/24 0645 75.1 kg (165 lb 8 oz)   10/14/24 0600 75.5 kg (166 lb 7.2 oz)     I/O:   Intake/Output Summary (Last 24 hours) at 10/16/2024 1153  Last data filed at 10/16/2024 1118  Gross per 24 hour   Intake --   Output 675 ml   Net -675 ml       [] Nursing staff/manager notified of inaccurate pimentel weights or I/O        Discharge Plan:  Above identified barriers reviewed and needs addressed    Patient/family educated on daily monitoring tools for CHF, made aware of signs and symptoms of worsening HF and to notify provider immediately of change in symptoms.     Heart Failure Home Instructions placed in patient's discharge instructions    Per AHA guidelines patient to be closely monitored following discharge with 7 day follow up appointment    Scheduling with the CHF clinic New consult to CHF clinic, appointment scheduled    Future Appointments   Date Time Provider Department Center   10/31/2024 12:15 PM Perez Li MD BDM ENDO [facility]   11/1/2024 12:00 PM [facility] CHF ROOM 2 [facility]   11/7/2024 11:15 AM Marcela Osuna, DO CARDIO SURG [facility]   11/14/2024 10:15 AM Dennys Coburn Jr., DPM [facility] POD [facility]   11/15/2024 11:30 AM Elsa Hernandez, APRN - CNP [facility]           Patient Education:  Self Monitoring/management:  Reviewed the introduction to Heart Failure, the HF zones, signs and symptoms to report on day 1 of onset, medications, medication compliance, the importance of obtaining daily weights, following a low sodium diet, reading food labels for the sodium content, keeping physician appointments, and smoking cessation.  Discussed writing / tracking daily weights on a calendar / log because a 5 pound gain in 1 week can sneak up if you are not tracking it.   Advised patient they can reduce the risk for Heart Failure exacerbations by modifying / controlling the risk factors.  Discussed self-managed care which includes the following: take medications as 
Frequent ventricular ectopy. The myocardial perfusion images were within normal limits showing soft tissue attenuation artifact with no convincing evidence of any scars or any  stress-induced ischemia with the gated views showing paradoxical septal motion with mild generalized hypokinesis with an estimated ejection fraction of 45-50% (calculated at 55).   d.  Echocardiogram done on 3/1/2023 showed a dilated left ventricle with normal left ventricular wall thickness with moderate generalized hypokinesis with an estimated ejection fraction of 40% with stage 1 left ventricular diastolic dysfunction.  Normal right ventricular size and function.  Severely dilated left atrium.  Aneurysmal interatrial septum bulging towards the right atrium with lipomatous hypertrophy.  Mild thickening of the mitral valve leaflets with subtle prolapse of the posterior mitral leaflets with mild mitral regurgitation.  Mildly sclerotic aortic valve with trivial aortic regurgitation.  Mild tricuspid regurgitation.   6.  Hypertension.  7.  LAFB/IVCD/LBBB.  8.  Frequent PVC's noted on ECG in 10/2013 and again on EKG in 11/2017:  a.  Holter monitor done on 11/29/2017 for 24 hours showed very frequent PVC's (23%).  b.  Holter monitor done on 12/29/2017 for 24 hours, after patient was started on beta blocker therapy, showed significant reduction in the PVC's at 9.8%.  9.  Kidney stone in 1997.   10.  Focal dystonia of right hand in 01/02.   11.  Basal cell carcinoma, excision x2 (left temporal area).   12.  Status post right inguinal herniorrhaphy in 1984.   13.  Focal dystonia right hand diagnosed in 01/02, for which he receives Botox injections.   14.  Detached retina, left eye on 8/20/07, laser repair. 08/09, residual scar tissue detected on exam in 08/09: Patient had reported some deterioration in visual acuity.   15.  Sudden left ear hearing loss (now comes and goes) in 1/2012.   16.  Covid-19 infection in 12/2020.  17.  Left eye cataract 
°C) (Axillary)   Resp 19   Ht 1.778 m (5' 10\")   Wt 85.5 kg (188 lb 9.6 oz)   SpO2 92%   BMI 27.06 kg/m²   Physical Exam  Vitals and nursing note reviewed.   Constitutional:       General: He is not in acute distress.     Appearance: Normal appearance. He is normal weight. He is not ill-appearing or toxic-appearing.   HENT:      Head: Normocephalic and atraumatic.      Nose: Nose normal. No congestion.      Mouth/Throat:      Mouth: Mucous membranes are moist.      Pharynx: Oropharynx is clear.   Eyes:      General: No scleral icterus.     Conjunctiva/sclera: Conjunctivae normal.   Cardiovascular:      Rate and Rhythm: Normal rate and regular rhythm.      Pulses: Normal pulses.   Pulmonary:      Effort: Pulmonary effort is normal. No respiratory distress.   Abdominal:      General: Abdomen is flat. There is no distension.      Palpations: Abdomen is soft.   Musculoskeletal:         General: No swelling. Normal range of motion.      Cervical back: Normal range of motion.   Skin:     General: Skin is warm and dry.      Capillary Refill: Capillary refill takes less than 2 seconds.   Neurological:      General: No focal deficit present.      Mental Status: He is alert and oriented to person, place, and time. Mental status is at baseline.   Psychiatric:         Mood and Affect: Mood normal.         Behavior: Behavior normal.         Thought Content: Thought content normal.         Judgment: Judgment normal.            Transesophageal Echocardiogram  Interpretation Summary  Show Result Comparison     Left Ventricle: Severely reduced left ventricular systolic function with a visually estimated EF of 20 - 25%. Left ventricle size is normal. Normal wall thickness. Severe global hypokinesis present.    Aortic Valve: No stenosis.  MALIKA by direct planimetry 2.9 cm².    Mitral Valve: Mild to moderate regurgitation with a centrally directed jet.    Tricuspid Valve: Mild to moderate regurgitation. Mildly elevated RVSP,

## 2024-10-21 NOTE — CARE COORDINATION
Transition of care update: Discharge order on chart. POD#13 CABG x 3. CT removed and wires cut. Labs noted. Urology to see pt- await recommendations to start eliquis. Met with pt in room. Up in chair and on RA. Pt said he was fitted with life vest. Plan is [facility]. No pre cert is needed. PASRR and ambulette form with transport envelope. [facility] aware to follow cardiac surgery rehab protocol, pt will have life vest and they will need to have bipap for pt at their facility. Spoke with [facility] and no facility van available to transport pt. Notified [facility] pt has been setup with a bipap with [facility] per prior  and [facility] will need to be notify [facility] when pt is discharging from there. Will check with pt's wife to see if she is able to transport pt. Pt said his wife will be in shortly. Cm/sw will follow.       1315  Met with pt and pt's family in room. Pt's wife will transport pt to [facility] at 4pm. Pt's nurse and [facility] with [facility] notified.

## 2024-10-22 LAB
MICROORGANISM SPEC CULT: NO GROWTH
MICROORGANISM SPEC CULT: NO GROWTH
SERVICE CMNT-IMP: NORMAL
SPECIMEN DESCRIPTION: NORMAL
SPECIMEN DESCRIPTION: NORMAL

## 2024-10-23 NOTE — DISCHARGE SUMMARY
MG EC tablet     Chromium 200 MCG Caps     COQ10 150 MG Caps     fish oil 1000 MG capsule     Garlic 500 MG Tabs     Grape Seed 100 MG Caps     loratadine 10 MG capsule  Commonly known as: CLARITIN     rosuvastatin 40 MG tablet  Commonly known as: CRESTOR     TURMERIC PO     vitamin C 500 MG tablet  Commonly known as: ASCORBIC ACID     Vitamin D3 75 MCG (3000 UT) Tabs     Zinc 100 MG Tabs            STOP taking these medications      furosemide 20 MG tablet  Commonly known as: LASIX     HYDROcodone-homatropine 5-1.5 MG/5ML syrup  Commonly known as: HYCODAN     insulin aspart 100 UNIT/ML injection vial  Commonly known as: NovoLOG FlexPen  Replaced by: NovoLOG FlexPen 100 UNIT/ML injection pen     losartan 50 MG tablet  Commonly known as: COZAAR     metoprolol succinate 50 MG extended release tablet  Commonly known as: TOPROL XL     spironolactone 25 MG tablet  Commonly known as: ALDACTONE     Therapeutic Tabs tablet     triamterene-hydroCHLOROthiazide 37.5-25 MG per tablet  Commonly known as: MAXZIDE-25     vitamin B complex Caps               Where to Get Your Medications        These medications were sent to [id number] [address] -  [phone] - F [phone]  [facility] [address]      Phone: [phone]   BD Pen Needle Micro U/F 32G X 6 MM Misc  insulin detemir 100 UNIT/ML injection pen  NovoLOG FlexPen 100 UNIT/ML injection pen       You can get these medications from any pharmacy    Bring a paper prescription for each of these medications  oxyCODONE-acetaminophen 5-325 MG per tablet       Information about where to get these medications is not yet available    Ask your nurse or doctor about these medications  amiodarone 200 MG tablet  apixaban 5 MG Tabs tablet  bumetanide 1 MG tablet  carvedilol 3.125 MG tablet  cephALEXin 500 MG capsule  ferrous sulfate 325 (65 Fe) MG tablet  folic acid 1 MG tablet  lisinopril 2.5 MG tablet  magnesium oxide 400 (240

## 2024-10-25 ENCOUNTER — TELEPHONE (OUTPATIENT)
Dept: CARDIOLOGY CLINIC | Age: 79
End: 2024-10-25

## 2024-10-25 NOTE — TELEPHONE ENCOUNTER
Patient wife called stating patient was place back on lisinopril 2.5 in the ER. Patient was taking off this years ago for reasons and placed on  lorsartan 25mg . Patient wife wants to know should she resume taking the lorsartan or lisinopril.     Please advise

## 2024-10-28 ENCOUNTER — HOSPITAL ENCOUNTER (OUTPATIENT)
Dept: OTHER | Age: 79
Setting detail: THERAPIES SERIES
Discharge: HOME | End: 2024-10-28
Payer: MEDICARE

## 2024-10-28 ENCOUNTER — PATIENT MESSAGE (OUTPATIENT)
Dept: CARDIOLOGY CLINIC | Age: 79
End: 2024-10-28

## 2024-10-28 VITALS
SYSTOLIC BLOOD PRESSURE: 93 MMHG | DIASTOLIC BLOOD PRESSURE: 53 MMHG | RESPIRATION RATE: 18 BRPM | HEART RATE: 80 BPM | BODY MASS INDEX: 26.11 KG/M2 | OXYGEN SATURATION: 96 % | WEIGHT: 182 LBS

## 2024-10-28 LAB
ANION GAP SERPL CALCULATED.3IONS-SCNC: 11 MMOL/L (ref 7–16)
BNP SERPL-MCNC: 1682 PG/ML (ref 0–450)
BUN SERPL-MCNC: 24 MG/DL (ref 6–23)
CALCIUM SERPL-MCNC: 8.6 MG/DL (ref 8.6–10.2)
CHLORIDE SERPL-SCNC: 97 MMOL/L (ref 98–107)
CO2 SERPL-SCNC: 29 MMOL/L (ref 22–29)
CREAT SERPL-MCNC: 1.2 MG/DL (ref 0.7–1.2)
GFR, ESTIMATED: 64 ML/MIN/1.73M2
GLUCOSE SERPL-MCNC: 168 MG/DL (ref 74–99)
POTASSIUM SERPL-SCNC: 3.7 MMOL/L (ref 3.5–5)
SODIUM SERPL-SCNC: 137 MMOL/L (ref 132–146)

## 2024-10-28 PROCEDURE — 2580000003 HC RX 258: Performed by: NURSE PRACTITIONER

## 2024-10-28 PROCEDURE — 83880 ASSAY OF NATRIURETIC PEPTIDE: CPT

## 2024-10-28 PROCEDURE — 80048 BASIC METABOLIC PNL TOTAL CA: CPT

## 2024-10-28 PROCEDURE — 6360000002 HC RX W HCPCS: Performed by: NURSE PRACTITIONER

## 2024-10-28 PROCEDURE — 99204 OFFICE O/P NEW MOD 45 MIN: CPT

## 2024-10-28 PROCEDURE — 96374 THER/PROPH/DIAG INJ IV PUSH: CPT

## 2024-10-28 RX ORDER — MULTIVITAMIN WITH IRON
1 TABLET ORAL DAILY
COMMUNITY

## 2024-10-28 RX ORDER — LOSARTAN POTASSIUM 50 MG/1
50 TABLET ORAL DAILY
COMMUNITY

## 2024-10-28 RX ORDER — BUMETANIDE 0.25 MG/ML
2 INJECTION, SOLUTION INTRAMUSCULAR; INTRAVENOUS ONCE
Status: COMPLETED | OUTPATIENT
Start: 2024-10-28 | End: 2024-10-28

## 2024-10-28 RX ORDER — SODIUM CHLORIDE 0.9 % (FLUSH) 0.9 %
10 SYRINGE (ML) INJECTION PRN
Status: DISCONTINUED | OUTPATIENT
Start: 2024-10-28 | End: 2024-10-29 | Stop reason: HOSPADM

## 2024-10-28 RX ADMIN — SODIUM CHLORIDE, PRESERVATIVE FREE 10 ML: 5 INJECTION INTRAVENOUS at 08:17

## 2024-10-28 RX ADMIN — BUMETANIDE 2 MG: 0.25 INJECTION INTRAMUSCULAR; INTRAVENOUS at 08:15

## 2024-10-28 ASSESSMENT — PATIENT HEALTH QUESTIONNAIRE - PHQ9
SUM OF ALL RESPONSES TO PHQ9 QUESTIONS 1 & 2: 0
SUM OF ALL RESPONSES TO PHQ QUESTIONS 1-9: 0
2. FEELING DOWN, DEPRESSED OR HOPELESS: NOT AT ALL
SUM OF ALL RESPONSES TO PHQ QUESTIONS 1-9: 0
1. LITTLE INTEREST OR PLEASURE IN DOING THINGS: NOT AT ALL

## 2024-10-28 NOTE — PLAN OF CARE
Problem: Chronic Conditions and Co-morbidities  Goal: Patient's chronic conditions and co-morbidity symptoms are monitored and maintained or improved  Flowsheets (Taken 10/28/2024 0812)  Care Plan - Patient's Chronic Conditions and Co-Morbidity Symptoms are Monitored and Maintained or Improved: Monitor and assess patient's chronic conditions and comorbid symptoms for stability, deterioration, or improvement

## 2024-10-28 NOTE — PROGRESS NOTES
Congestive Heart Failure Clinic   [facility]      Referring Provider: Dr Mendoza  Primary Care Physician: Terry Giraldo MD   Cardiologist: Dr Mendoza  Nephrologist: NARINDER      HISTORY OF PRESENT ILLNESS:     Pantera Payne is a 79 y.o. (1945) male with a history of HFrEF (EF < 40%)  Pre Cupid:     Lab Results   Component Value Date    LVEF 20 10/15/2024     Post Cupid:    Lab Results   Component Value Date    EFBP 36 (A) 10/01/2024         He presents to the CHF clinic for ongoing evaluation and monitoring of heart failure.    In the CHF clinic today he denies any adverse symptoms except:  [] Dizziness or lightheadedness   [] Syncope or near syncope  [] Recent Fall  [] Shortness of breath at rest   [x] Dyspnea with exertion  [] Decline in functional capacity (unable to perform activities they had previously been able to do)  [x] Fatigue   [x] Orthopnea  [x] PND  [x] Nocturnal cough  [] Hemoptysis  [] Chest pain, pressure, or discomfort  [] Palpitations  [] Abdominal distention  [] Abdominal bloating  [x] Early satiety  [] Blood in stool   [] Diarrhea  [] Constipation  [] Nausea/Vomiting  [] Decreased urinary response to oral diuretic   [] Scrotal swelling   [x] Lower extremity edemaLeft leg  [] Used PRN doses of oral diuretic   [] Weight gain    Wt Readings from Last 3 Encounters:   10/28/24 82.6 kg (182 lb)   10/21/24 89.9 kg (198 lb 3.2 oz)   09/27/24 82.6 kg (182 lb)           SOCIAL HISTORY:  [x] Denies tobacco, alcohol or illicit drug abuse  [] Tobacco use:  [] ETOH use:  [] Illicit drug use:        MEDICATIONS:    Allergies   Allergen Reactions    Atorvastatin     Bethaprim [Sulfamethoxazole-Trimethoprim]     Erythromycin     Lipitor      Muscle aches.    Lisinopril Cough     Prior to Visit Medications    Medication Sig Taking? Authorizing Provider   losartan (COZAAR) 50 MG tablet Take 1 tablet by mouth daily Yes Provider, MD Mike   Multiple

## 2024-10-29 DIAGNOSIS — E11.65 POORLY CONTROLLED TYPE 2 DIABETES MELLITUS (HCC): Primary | ICD-10-CM

## 2024-10-29 RX ORDER — METOPROLOL SUCCINATE 25 MG/1
25 TABLET, EXTENDED RELEASE ORAL DAILY
Qty: 90 TABLET | Refills: 1 | Status: SHIPPED | OUTPATIENT
Start: 2024-10-29

## 2024-10-30 RX ORDER — INSULIN DEGLUDEC 100 U/ML
18 INJECTION, SOLUTION SUBCUTANEOUS NIGHTLY
Qty: 15 ML | Refills: 3 | Status: SHIPPED | OUTPATIENT
Start: 2024-10-30

## 2024-10-30 RX ORDER — BUMETANIDE 1 MG/1
2 TABLET ORAL DAILY
Qty: 30 TABLET | Refills: 3 | Status: SHIPPED
Start: 2024-10-30 | End: 2024-10-30

## 2024-10-30 RX ORDER — BUMETANIDE 2 MG/1
2 TABLET ORAL DAILY
Qty: 90 TABLET | Refills: 3 | Status: SHIPPED | OUTPATIENT
Start: 2024-10-30

## 2024-10-30 NOTE — PROGRESS NOTES
Signed         Labs and CHF clinic note reviewed  Vitals: 93/53, 80     Stop Coreg  Start Toprol 25 mg daily     Follow up as scheduled                  Patient and wife notified of above orders. Demonstrates understanding.

## 2024-10-30 NOTE — RESULT ENCOUNTER NOTE
Labs and CHF clinic note reviewed  Vitals: 93/53, 80    Stop Coreg  Start Toprol 25 mg daily    Follow up as scheduled

## 2024-11-04 ENCOUNTER — TELEPHONE (OUTPATIENT)
Dept: OTHER | Age: 79
End: 2024-11-04

## 2024-11-04 ENCOUNTER — HOSPITAL ENCOUNTER (OUTPATIENT)
Dept: OTHER | Age: 79
Setting detail: THERAPIES SERIES
Discharge: HOME | End: 2024-11-04
Payer: MEDICARE

## 2024-11-04 VITALS
OXYGEN SATURATION: 98 % | WEIGHT: 178.4 LBS | SYSTOLIC BLOOD PRESSURE: 101 MMHG | DIASTOLIC BLOOD PRESSURE: 58 MMHG | HEART RATE: 86 BPM | BODY MASS INDEX: 25.6 KG/M2 | RESPIRATION RATE: 18 BRPM

## 2024-11-04 LAB
ANION GAP SERPL CALCULATED.3IONS-SCNC: 11 MMOL/L (ref 7–16)
BNP SERPL-MCNC: 840 PG/ML (ref 0–450)
BUN SERPL-MCNC: 32 MG/DL (ref 6–23)
CALCIUM SERPL-MCNC: 8.8 MG/DL (ref 8.6–10.2)
CHLORIDE SERPL-SCNC: 95 MMOL/L (ref 98–107)
CO2 SERPL-SCNC: 27 MMOL/L (ref 22–29)
CREAT SERPL-MCNC: 1.1 MG/DL (ref 0.7–1.2)
GFR, ESTIMATED: 69 ML/MIN/1.73M2
GLUCOSE SERPL-MCNC: 211 MG/DL (ref 74–99)
POTASSIUM SERPL-SCNC: 3.6 MMOL/L (ref 3.5–5)
SODIUM SERPL-SCNC: 133 MMOL/L (ref 132–146)

## 2024-11-04 PROCEDURE — 99214 OFFICE O/P EST MOD 30 MIN: CPT

## 2024-11-04 PROCEDURE — 80048 BASIC METABOLIC PNL TOTAL CA: CPT

## 2024-11-04 PROCEDURE — 83880 ASSAY OF NATRIURETIC PEPTIDE: CPT

## 2024-11-04 RX ORDER — BUMETANIDE 2 MG/1
1 TABLET ORAL DAILY
Qty: 90 TABLET | Refills: 3 | Status: SHIPPED | OUTPATIENT
Start: 2024-11-04

## 2024-11-04 NOTE — RESULT ENCOUNTER NOTE
CHF clinic visit and labs reviewed   VS: 101/58, 86    BNP trending down       GDMT:  Losartan 50 mg daily   Toprol XL 25 mg daily (changed from Coreg last visit)       Take Toprol XL at night   Decrease Bumex to 1 mg daily     Follow up as scheduled  Defer GDMT titration for now

## 2024-11-04 NOTE — PROGRESS NOTES
Cardiothoracic Surgery       CC: S/P CABG post-operative follow up visit      HPI:  Pantera Payne is a 79 y.o. male whom presents to the office today for routine post-operative follow-up status post: urgent CABG x3 (LIMA0-LAD, SVG-OM, SVG-R PDA), modified MAZE procedure, left atrial appendage ligation with 35 mm Atriclip, EVH, IABP placement  on 10/8/2024   Repeat TTE prior to DC showed EF of 20-25% and life vest was fitted.  He was DC'd to [facility] on POD 12 .  He continues to follow with CHF clinic . There have been no readmissions since discharge.  Currently, there are no complaints of any CP, SOB, major concerns, or incisional issues.     Review of Systems:   Constitutional: Negative for fever and chills.   Respiratory: Negative for cough, chest tightness and shortness of breath.    Cardiovascular: Negative for chest pain, palpitations and leg swelling.   Gastrointestinal: Negative for nausea, diarrhea and constipation.   Musculoskeletal: Negative for back pain.   Skin: Negative for color change.   Neurological: Negative for dizziness, syncope and light-headedness.          Objective:   Physical Exam   Constitutional: oriented to person, place, and time. No distress.   Cardiovascular: Normal rate.    Pulmonary/Chest: Effort normal. No respiratory distress.   midsternal and chest tube incisions well healed without evidence of infection. Sternum stable.   Abdominal: Soft. Bowel sounds are normal.   Musculoskeletal: Normal range of motion. Exhibits no edema.   Neurological: alert and oriented to person, place, and time.   Skin: Skin is warm and dry. No erythema.   Vein harvest incisions intact without evidence of infection   Psychiatric: normal mood and affect.       Assessment:      S/P urgent CABG x3 (LIMA0-LAD, SVG-OM, SVG-R PDA), modified MAZE procedure, left atrial appendage ligation with 35 mm Atriclip, EVH, IABP placement

## 2024-11-04 NOTE — PROGRESS NOTES
Congestive Heart Failure Clinic   [facility]      Referring Provider: Dr Mendoza  Primary Care Physician: Terry Giraldo MD   Cardiologist: Dr Mendoza  Nephrologist: NARINDER      HISTORY OF PRESENT ILLNESS:     Pantera Payne is a 79 y.o. (1945) male with a history of HFrEF (EF < 40%)  Pre Cupid:     Lab Results   Component Value Date    LVEF 20 10/15/2024     Post Cupid:    Lab Results   Component Value Date    EFBP 36 (A) 10/01/2024         He presents to the CHF clinic for ongoing evaluation and monitoring of heart failure.    In the CHF clinic today he denies any adverse symptoms except:  [] Dizziness or lightheadedness   [] Syncope or near syncope  [] Recent Fall  [] Shortness of breath at rest   [x] Dyspnea with exertion- only with long distances   [] Decline in functional capacity (unable to perform activities they had previously been able to do)  [] Fatigue   [] Orthopnea  [] PND  [] Nocturnal cough  [] Hemoptysis  [] Chest pain, pressure, or discomfort  [] Palpitations  [] Abdominal distention  [] Abdominal bloating  [x] Early satiety- improving   [] Blood in stool   [] Diarrhea  [] Constipation  [] Nausea/Vomiting  [] Decreased urinary response to oral diuretic   [] Scrotal swelling   [] Lower extremity edemaLeft leg  [] Used PRN doses of oral diuretic   [] Weight gain    Wt Readings from Last 3 Encounters:   11/04/24 80.9 kg (178 lb 6.4 oz)   10/28/24 82.6 kg (182 lb)   10/21/24 89.9 kg (198 lb 3.2 oz)           SOCIAL HISTORY:  [x] Denies tobacco, alcohol or illicit drug abuse  [] Tobacco use:  [] ETOH use:  [] Illicit drug use:        MEDICATIONS:    Allergies   Allergen Reactions    Atorvastatin     Bethaprim [Sulfamethoxazole-Trimethoprim]     Erythromycin     Lipitor      Muscle aches.    Lisinopril Cough    Neomycin      Prior to Visit Medications    Medication Sig Taking? Authorizing Provider   bumetanide (BUMEX) 2 MG tablet Take 1 tablet by

## 2024-11-04 NOTE — TELEPHONE ENCOUNTER
2:34 PM   Spoke with patients wife regarding medication adjustments/recommendations by Clara SEVILLA from today's CHF clinic appt:     Decrease Bumex to 1 mg daily     I have reviewed the provider's instructions with the patient, answering all questions to his satisfaction.    Electronically signed by Agnes Leonard RN on 11/4/2024 at 2:46 PM

## 2024-11-07 ENCOUNTER — OFFICE VISIT (OUTPATIENT)
Dept: CARDIOTHORACIC SURGERY | Age: 79
End: 2024-11-07

## 2024-11-07 VITALS
DIASTOLIC BLOOD PRESSURE: 58 MMHG | WEIGHT: 175 LBS | BODY MASS INDEX: 25.05 KG/M2 | SYSTOLIC BLOOD PRESSURE: 92 MMHG | HEART RATE: 86 BPM | OXYGEN SATURATION: 98 % | HEIGHT: 70 IN

## 2024-11-07 DIAGNOSIS — Z95.1 S/P CABG (CORONARY ARTERY BYPASS GRAFT): Primary | ICD-10-CM

## 2024-11-07 PROCEDURE — 99024 POSTOP FOLLOW-UP VISIT: CPT | Performed by: STUDENT IN AN ORGANIZED HEALTH CARE EDUCATION/TRAINING PROGRAM

## 2024-11-08 ENCOUNTER — TELEPHONE (OUTPATIENT)
Dept: CARDIOLOGY CLINIC | Age: 79
End: 2024-11-08

## 2024-11-08 ENCOUNTER — OFFICE VISIT (OUTPATIENT)
Dept: ENDOCRINOLOGY | Age: 79
End: 2024-11-08

## 2024-11-08 VITALS
WEIGHT: 184 LBS | DIASTOLIC BLOOD PRESSURE: 69 MMHG | BODY MASS INDEX: 26.34 KG/M2 | OXYGEN SATURATION: 97 % | HEIGHT: 70 IN | HEART RATE: 91 BPM | SYSTOLIC BLOOD PRESSURE: 111 MMHG

## 2024-11-08 DIAGNOSIS — Z91.119 DIETARY NONCOMPLIANCE: ICD-10-CM

## 2024-11-08 DIAGNOSIS — Z79.4 TYPE 2 DIABETES MELLITUS WITH HYPERGLYCEMIA, WITH LONG-TERM CURRENT USE OF INSULIN (HCC): Primary | ICD-10-CM

## 2024-11-08 DIAGNOSIS — E78.2 MIXED HYPERLIPIDEMIA: ICD-10-CM

## 2024-11-08 DIAGNOSIS — E11.65 TYPE 2 DIABETES MELLITUS WITH HYPERGLYCEMIA, WITH LONG-TERM CURRENT USE OF INSULIN (HCC): Primary | ICD-10-CM

## 2024-11-08 DIAGNOSIS — Z97.8 USES SELF-APPLIED CONTINUOUS GLUCOSE MONITORING DEVICE: ICD-10-CM

## 2024-11-08 RX ORDER — INSULIN ASPART 100 [IU]/ML
INJECTION, SOLUTION INTRAVENOUS; SUBCUTANEOUS
Qty: 5 ADJUSTABLE DOSE PRE-FILLED PEN SYRINGE | Refills: 3 | Status: SHIPPED | OUTPATIENT
Start: 2024-11-08

## 2024-11-08 RX ORDER — BLOOD-GLUCOSE SENSOR
EACH MISCELLANEOUS
Qty: 6 EACH | Refills: 3 | Status: SHIPPED | OUTPATIENT
Start: 2024-11-08 | End: 2024-11-12 | Stop reason: SDUPTHER

## 2024-11-08 RX ORDER — INSULIN GLARGINE 300 U/ML
INJECTION, SOLUTION SUBCUTANEOUS
Qty: 12 ADJUSTABLE DOSE PRE-FILLED PEN SYRINGE | Refills: 4 | Status: SHIPPED | OUTPATIENT
Start: 2024-11-08

## 2024-11-08 NOTE — PATIENT INSTRUCTIONS
Recommendations for today's visit  Take Lantus 28 units daily in the morning   Take Humalog 6 units with meals plus the following sliding   Blood sugar 150-200: add 2 Units of Humalog  Blood sugar 201-250 : Add 4 Units ofHumalog  Blood Sugar 251 - 300: Add 6 Units of Humalog  Blood Sugar 301-350: Add 8  Units of Humalog  Blood Sugar 350-400: Add 10 Units of Humalog  Blood sugar  >400: Add 12 Units of Humalog     Check Blood sugar 4 times/day before meals and at bedtime and send us sugar log in a week    I you have any questions please call Dr. Li's office     Perez Li MD  Endocrinologist, [address], [facility], [address]   Phone: [phone]  Fax: [phone]

## 2024-11-08 NOTE — PROGRESS NOTES
[facility]  [address], [address]  [facility] [address]  Dept: [phone]  Loc: [phone]        Date of service: 11/8/2024   Primary Care Physician: Terry Giraldo MD  Provider: Perez Li MD     Reason for the visit:  Uncontrolled DM    History of Present Illness:  The history is provided by the patient. Accuracy of the patient data is excellent    Pantera Payne is a very pleasant 79 y.o. old male with PMH of poorly controlled Type 2 DM; Afib; Mitral valve prolapse; Pulmonary HTN, CAD; Hyperlipidemia; Hypertension  and other listed below seen today for a follow-up visit.      Toujeo 30u AM, 5 units nightly   Humalog 5-6 with meals     The patient was diagnosed with type 2 DM.  He is currently on Toujeo 30 units in the morning and 5 units at night, Humalog per sliding scale. Patient has had no hypoglycemic episodes. Patient has not been eating consistent carbohydrate meals, self-blood glucose monitoring has been above goal. In addition, patient denied macrovascular or microvascular complications. The patient is not up to date with yearly diabetic eye exam.     Lab Results   Component Value Date/Time    LABA1C 8.0 09/27/2024 06:34 AM     Past medical history:   Past Medical History:   Diagnosis Date    Atrial fibrillation (HCC) 1984, brief episode.    CAD (coronary artery disease)     Cancer (HCC)     Basal cell carcinoma, excisionx 2 (left temporal area).    Detached retina 8/20/2007    Left eye.  Laser repair.  8/2009: Residual scar tissue detected on exam in 8/2009.  Patient reported some deterioration in visual acuity.    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    Hearing problem     Hyperlipidemia     Hypertension     Kidney stone 1997.    Mild tricuspid regurgitation 11/18/13    Mitral valve prolapse 11/18/13    mild    Pulmonary hypertension (HCC) 11/18/13    Type II or unspecified type diabetes mellitus without mention of complication, not

## 2024-11-08 NOTE — TELEPHONE ENCOUNTER
JAMEL CALLED BACK AND I GAVE HER DR MARADIAGA'S RESPONSE.  SHE FEELS IT IS LIKE THE REACTION HE HAD TO THE ACE INHIBITORS BEFORE.    PER DR MARADIAGA  HE HAS TO REMAIN ON THE AMIODARONE FOR 26 DAYS.  IF HIS CONDITION GETS WORSE GO TO ER.      I GAVE THE ABOVE INFORMATION TO JAMEL.  RLL

## 2024-11-08 NOTE — TELEPHONE ENCOUNTER
Wife called patient was given amiodarone while in the hospital on discharge with titrated dosing of Take 2 tablets by mouth 2 times daily for 5 days, THEN 1 tablet 2 times daily for 7 days, THEN 1 tablet daily for 14 days    He is on his last week  she feels coughing, chest crackles and   one occurrence of vomiting yesterday may be caused by amiodarone    Also says he was given  lisinopril in the hospital  10-21 but was  discontinued and was told it should be out of his system by now    Please advise

## 2024-11-11 ENCOUNTER — HOSPITAL ENCOUNTER (OUTPATIENT)
Dept: OTHER | Age: 79
Setting detail: THERAPIES SERIES
Discharge: HOME | End: 2024-11-11
Payer: MEDICARE

## 2024-11-11 VITALS
HEART RATE: 91 BPM | OXYGEN SATURATION: 96 % | DIASTOLIC BLOOD PRESSURE: 59 MMHG | WEIGHT: 178 LBS | BODY MASS INDEX: 25.54 KG/M2 | SYSTOLIC BLOOD PRESSURE: 110 MMHG | RESPIRATION RATE: 18 BRPM

## 2024-11-11 LAB
ANION GAP SERPL CALCULATED.3IONS-SCNC: 8 MMOL/L (ref 7–16)
BNP SERPL-MCNC: 1266 PG/ML (ref 0–450)
BUN SERPL-MCNC: 22 MG/DL (ref 6–23)
CALCIUM SERPL-MCNC: 9.3 MG/DL (ref 8.6–10.2)
CHLORIDE SERPL-SCNC: 94 MMOL/L (ref 98–107)
CO2 SERPL-SCNC: 32 MMOL/L (ref 22–29)
CREAT SERPL-MCNC: 1 MG/DL (ref 0.7–1.2)
GFR, ESTIMATED: 73 ML/MIN/1.73M2
GLUCOSE SERPL-MCNC: 181 MG/DL (ref 74–99)
POTASSIUM SERPL-SCNC: 3.6 MMOL/L (ref 3.5–5)
SODIUM SERPL-SCNC: 134 MMOL/L (ref 132–146)

## 2024-11-11 PROCEDURE — 99214 OFFICE O/P EST MOD 30 MIN: CPT

## 2024-11-11 PROCEDURE — 80048 BASIC METABOLIC PNL TOTAL CA: CPT

## 2024-11-11 PROCEDURE — 83880 ASSAY OF NATRIURETIC PEPTIDE: CPT

## 2024-11-11 RX ORDER — DOCUSATE SODIUM 100 MG/1
100 CAPSULE, LIQUID FILLED ORAL DAILY
COMMUNITY

## 2024-11-11 RX ORDER — BENZONATATE 100 MG/1
100 CAPSULE ORAL 3 TIMES DAILY PRN
COMMUNITY

## 2024-11-11 RX ORDER — HYDROCODONE BITARTRATE AND HOMATROPINE METHYLBROMIDE 5; 1.5 MG/1; MG/1
1 TABLET ORAL EVERY 6 HOURS PRN
COMMUNITY

## 2024-11-11 NOTE — PLAN OF CARE
Problem: Chronic Conditions and Co-morbidities  Goal: Patient's chronic conditions and co-morbidity symptoms are monitored and maintained or improved  Flowsheets (Taken 11/11/2024 3289)  Care Plan - Patient's Chronic Conditions and Co-Morbidity Symptoms are Monitored and Maintained or Improved: Monitor and assess patient's chronic conditions and comorbid symptoms for stability, deterioration, or improvement

## 2024-11-12 DIAGNOSIS — E11.65 TYPE 2 DIABETES MELLITUS WITH HYPERGLYCEMIA, WITH LONG-TERM CURRENT USE OF INSULIN (HCC): ICD-10-CM

## 2024-11-12 DIAGNOSIS — Z79.4 TYPE 2 DIABETES MELLITUS WITH HYPERGLYCEMIA, WITH LONG-TERM CURRENT USE OF INSULIN (HCC): ICD-10-CM

## 2024-11-13 ENCOUNTER — OFFICE VISIT (OUTPATIENT)
Dept: CARDIOLOGY CLINIC | Age: 79
End: 2024-11-13

## 2024-11-13 VITALS
RESPIRATION RATE: 16 BRPM | SYSTOLIC BLOOD PRESSURE: 116 MMHG | WEIGHT: 178 LBS | BODY MASS INDEX: 25.48 KG/M2 | DIASTOLIC BLOOD PRESSURE: 78 MMHG | HEART RATE: 90 BPM | HEIGHT: 70 IN

## 2024-11-13 DIAGNOSIS — I25.10 CAD IN NATIVE ARTERY: Primary | ICD-10-CM

## 2024-11-13 DIAGNOSIS — R05.8 ACE-INHIBITOR COUGH: ICD-10-CM

## 2024-11-13 DIAGNOSIS — Z87.19 HISTORY OF ESOPHAGEAL SPASM: ICD-10-CM

## 2024-11-13 DIAGNOSIS — E11.65 TYPE 2 DIABETES MELLITUS WITH HYPERGLYCEMIA, WITH LONG-TERM CURRENT USE OF INSULIN (HCC): ICD-10-CM

## 2024-11-13 DIAGNOSIS — Z95.1 HX OF CABG: ICD-10-CM

## 2024-11-13 DIAGNOSIS — R05.3 CHRONIC COUGH: ICD-10-CM

## 2024-11-13 DIAGNOSIS — Z79.01 ANTICOAGULATED BY ANTICOAGULATION TREATMENT: ICD-10-CM

## 2024-11-13 DIAGNOSIS — E11.9 INSULIN-REQUIRING OR DEPENDENT TYPE II DIABETES MELLITUS (HCC): ICD-10-CM

## 2024-11-13 DIAGNOSIS — G47.33 OBSTRUCTIVE SLEEP APNEA: ICD-10-CM

## 2024-11-13 DIAGNOSIS — I38 VHD (VALVULAR HEART DISEASE): ICD-10-CM

## 2024-11-13 DIAGNOSIS — I44.0 FIRST DEGREE ATRIOVENTRICULAR BLOCK: ICD-10-CM

## 2024-11-13 DIAGNOSIS — I47.20 VENTRICULAR TACHYCARDIA (HCC): ICD-10-CM

## 2024-11-13 DIAGNOSIS — G24.8 FOCAL DYSTONIA: ICD-10-CM

## 2024-11-13 DIAGNOSIS — Z87.898 HISTORY OF DYSPHAGIA: ICD-10-CM

## 2024-11-13 DIAGNOSIS — E78.2 MIXED HYPERLIPIDEMIA: ICD-10-CM

## 2024-11-13 DIAGNOSIS — I44.4 LAFB (LEFT ANTERIOR FASCICULAR BLOCK): ICD-10-CM

## 2024-11-13 DIAGNOSIS — I27.20 PULMONARY HTN (HCC): ICD-10-CM

## 2024-11-13 DIAGNOSIS — I49.3 PVC'S (PREMATURE VENTRICULAR CONTRACTIONS): ICD-10-CM

## 2024-11-13 DIAGNOSIS — N18.2 STAGE 2 CHRONIC KIDNEY DISEASE: ICD-10-CM

## 2024-11-13 DIAGNOSIS — Z79.4 TYPE 2 DIABETES MELLITUS WITH HYPERGLYCEMIA, WITH LONG-TERM CURRENT USE OF INSULIN (HCC): ICD-10-CM

## 2024-11-13 DIAGNOSIS — I10 PRIMARY HYPERTENSION: ICD-10-CM

## 2024-11-13 DIAGNOSIS — Z79.4 INSULIN-REQUIRING OR DEPENDENT TYPE II DIABETES MELLITUS (HCC): ICD-10-CM

## 2024-11-13 DIAGNOSIS — I45.4 IVCD (INTRAVENTRICULAR CONDUCTION DEFECT): ICD-10-CM

## 2024-11-13 DIAGNOSIS — Z87.898 HISTORY OF SYNCOPE: ICD-10-CM

## 2024-11-13 DIAGNOSIS — I50.22 CHRONIC SYSTOLIC HEART FAILURE (HCC): ICD-10-CM

## 2024-11-13 DIAGNOSIS — T46.4X5A ACE-INHIBITOR COUGH: ICD-10-CM

## 2024-11-13 DIAGNOSIS — I25.5 ISCHEMIC CARDIOMYOPATHY: ICD-10-CM

## 2024-11-13 DIAGNOSIS — I44.7 LBBB (LEFT BUNDLE BRANCH BLOCK): ICD-10-CM

## 2024-11-13 DIAGNOSIS — I48.0 PAF (PAROXYSMAL ATRIAL FIBRILLATION) (HCC): ICD-10-CM

## 2024-11-13 RX ORDER — BLOOD-GLUCOSE SENSOR
EACH MISCELLANEOUS
Qty: 6 EACH | Refills: 3 | Status: SHIPPED | OUTPATIENT
Start: 2024-11-13

## 2024-11-13 RX ORDER — METOPROLOL SUCCINATE 50 MG/1
50 TABLET, EXTENDED RELEASE ORAL 2 TIMES DAILY
Qty: 90 TABLET | Refills: 3 | Status: SHIPPED | OUTPATIENT
Start: 2024-11-13

## 2024-11-13 RX ORDER — SPIRONOLACTONE 25 MG/1
25 TABLET ORAL DAILY
Qty: 30 TABLET | Refills: 5 | Status: SHIPPED | OUTPATIENT
Start: 2024-11-13

## 2024-11-13 RX ORDER — AMIODARONE HYDROCHLORIDE 200 MG/1
200 TABLET ORAL DAILY
Qty: 90 TABLET | Refills: 3 | Status: SHIPPED
Start: 2024-11-13 | End: 2024-11-15

## 2024-11-13 RX ORDER — METOPROLOL SUCCINATE 25 MG/1
25 TABLET, EXTENDED RELEASE ORAL 2 TIMES DAILY
Qty: 90 TABLET | Refills: 1 | Status: SHIPPED | OUTPATIENT
Start: 2024-11-13 | End: 2024-11-13 | Stop reason: DRUGHIGH

## 2024-11-13 NOTE — PROGRESS NOTES
Signed         Labs and CHF Clinic note reviewed  Increase Toprol to 25 mg BID  Follow up as scheduled                  Patient's daughter notified of above. She demonstrates understanding.   
edema  [] Compression stockings provided  [] Decline in functional capacity (unable to perform activities they had previously been able to do)  [] Weight gain     [] IV diuretics given NO  [] Provider notified of recurrent IV diuretic use    Additional Notes:     Weight is stable despite decreasing bumex at last visit.  Only c/o is continued cough, which they state all physicians are aware of. Tessalon and Hycoden give by PCP with some relief.     Last week, Dr Giraldo instructed family to hold Tamsulosin . He was concerned that it may be contributing to dropping his BP.  Patient has noticed no decrease in his stream since holding.     [x]Lab work obtained    [x] Patient/Family Educated On:  [x] HF zones (Green, Yellow, Red) and aware of when to take action   [x] Daily weights  [] Scale provided   [x] Low sodium diet (2000 mg)  Barriers to compliance  [] Refuses to monitor diet  [] Socioeconomic difficulties  [] Unable to cook for self (use of frozen meals, can goods, etc)  [] CHF CHW consulted  [] Low sodium meal delivery options given to patient  [] Dietician consulted   [] Low sodium recipes provided  [] Sodium free seasoning provided   [x] Fluid intake 6-8 cups (around 64 oz)  [x] Reviewed currently prescribed medications with patient, educated on importance of compliance and answered any questions regarding their medication  [] Pill box provided to patient  [] Patient using pill packing pharmacy   [x] CPAP/BiPAP use (Yes, will start )   [] Low impact exercise / cardiac rehab   [x] LifeVest use (Yes)  [x] Patient aware of signs and symptoms of worsening HF, CHF clinic phone number provided and made aware to call clinic for sooner if evaluation if needed     [] Difficulty affording medications  [] CHF CHW consulted  [] Prescription assistance information given   [] [facility] medication assistance program information given   [] Sample medications provided to patient to help bridge gap until

## 2024-11-13 NOTE — PROGRESS NOTES
OFFICE VISIT        PRIMARY CARE PHYSICIAN:    Terry Giraldo MD         ALLERGIES / SENSITIVITIES:    Allergies   Allergen Reactions    Atorvastatin     Bethaprim [Sulfamethoxazole-Trimethoprim]     Erythromycin     Lipitor      Muscle aches.    Lisinopril Cough    Neomycin           REVIEWED MEDICATIONS:      Current Outpatient Medications:     Continuous Glucose Sensor (FREESTYLE SAMANTHA 3 SENSOR) MISC, Change every 14 days, Disp: 6 each, Rfl: 3    metoprolol succinate (TOPROL XL) 50 MG extended release tablet, Take 1 tablet by mouth 2 times daily, Disp: 90 tablet, Rfl: 3    sacubitril-valsartan (ENTRESTO) 24-26 MG per tablet, Take 1 tablet by mouth 2 times daily, Disp: 60 tablet, Rfl: 5    amiodarone (CORDARONE) 200 MG tablet, Take 1 tablet by mouth daily, Disp: 90 tablet, Rfl: 3    docusate sodium (COLACE) 100 MG capsule, Take 1 capsule by mouth daily, Disp: , Rfl:     HYDROcodone homatropine (HYCODAN) 5-1.5 MG TABS per tablet, Take 1 tablet by mouth every 6 hours as needed for Cough., Disp: , Rfl:     benzonatate (TESSALON) 100 MG capsule, Take 1 capsule by mouth 3 times daily as needed for Cough, Disp: , Rfl:     Insulin Glargine, 2 Unit Dial, (TOUJEO MAX SOLOSTAR) 300 UNIT/ML concentrated injection pen, Inject 28 units daily in the morning, Disp: 12 Adjustable Dose Pre-filled Pen Syringe, Rfl: 4    insulin aspart (NOVOLOG FLEXPEN) 100 UNIT/ML injection pen, Inject 6 units 3 times daily with meals plus the following sliding scale. -200 add 2U, -250 add 4U, -300 add 6U, -350 add 8U, -400 add 10U, BS over 400 add 12U, Disp: 5 Adjustable Dose Pre-filled Pen Syringe, Rfl: 3    apixaban (ELIQUIS) 5 MG TABS tablet, Take 1 tablet by mouth 2 times daily, Disp: 180 tablet, Rfl: 3    bumetanide (BUMEX) 2 MG tablet, Take 0.5 tablets by mouth daily, Disp: 90 tablet, Rfl: 3    Multiple Vitamin (MULTIVITAMIN) TABS tablet, Take 1 tablet by mouth daily, Disp: , Rfl:     amiodarone (CORDARONE) 200

## 2024-11-14 RX ORDER — BLOOD-GLUCOSE SENSOR
EACH MISCELLANEOUS
Refills: 3 | OUTPATIENT
Start: 2024-11-14

## 2024-11-15 ENCOUNTER — OFFICE VISIT (OUTPATIENT)
Age: 79
End: 2024-11-15
Payer: MEDICARE

## 2024-11-15 VITALS
DIASTOLIC BLOOD PRESSURE: 54 MMHG | RESPIRATION RATE: 16 BRPM | BODY MASS INDEX: 25.4 KG/M2 | HEART RATE: 76 BPM | SYSTOLIC BLOOD PRESSURE: 105 MMHG | WEIGHT: 177 LBS | OXYGEN SATURATION: 97 %

## 2024-11-15 DIAGNOSIS — I25.5 ISCHEMIC CARDIOMYOPATHY: Primary | ICD-10-CM

## 2024-11-15 DIAGNOSIS — I25.10 CAD IN NATIVE ARTERY: ICD-10-CM

## 2024-11-15 DIAGNOSIS — I50.22 CHRONIC HFREF (HEART FAILURE WITH REDUCED EJECTION FRACTION) (HCC): ICD-10-CM

## 2024-11-15 DIAGNOSIS — I48.0 PAF (PAROXYSMAL ATRIAL FIBRILLATION) (HCC): ICD-10-CM

## 2024-11-15 DIAGNOSIS — G47.33 OSA (OBSTRUCTIVE SLEEP APNEA): ICD-10-CM

## 2024-11-15 PROCEDURE — 93292 WCD DEVICE INTERROGATE: CPT | Performed by: NURSE PRACTITIONER

## 2024-11-15 RX ORDER — FERROUS SULFATE 325(65) MG
325 TABLET ORAL
COMMUNITY
End: 2024-11-25

## 2024-11-15 RX ORDER — LANOLIN ALCOHOL/MO/W.PET/CERES
400 CREAM (GRAM) TOPICAL DAILY
COMMUNITY

## 2024-11-15 RX ORDER — ZINC GLUCONATE 50 MG
50 TABLET ORAL DAILY
COMMUNITY

## 2024-11-15 RX ORDER — FOLIC ACID 1 MG/1
1 TABLET ORAL DAILY
COMMUNITY
End: 2024-11-25

## 2024-11-15 NOTE — PATIENT INSTRUCTIONS
Continue current cardiac medications, recent GDMT changes just started today  Follow up with CHF clinic as scheduled   Follow up with Dr. Mendoza as scheduled   Weigh yourself daily    -Stay Hydrated, 64 oz     -Diet should sodium restricted to 2 grams    -Again watch your daily weight trends and if you gain water weight please follow below instructions.    -If you gain 3-5 pounds in 2-3 days OR notice that you are retaining fluid in anyway just like you did before then take an extra dose of your water pill (Bumetanide/Bumex) every day until you lose the weight or feel better.     -If you notice that you have taken more than 2 extra doses in 1 week then please call and let us know.    -If at any time you feel that you are retaining fluid, your medications are not working, or you feel ill in anyway, then please call us for either same day appointment or the next day, and for instructions. Our goal is to keep you out of the emergency room and the hospital and we have ways to do it. You just need to call us in a timely manner.     -If you become sick for other reasons, and notice that you are not urinating as much, the urine is very dark, you have significant diarrhea or vomiting, then please DO NOT take your water pill and CALL US immediately.

## 2024-11-15 NOTE — PROGRESS NOTES
aortic stenosis    Mitral Valve: Mitral leaflets appear structurally normal with mild to moderate centrally directed mitral regurgitation    Tricuspid Valve: The tricuspid valve is structurally normal with mild to moderate tricuspid regurgitation.  A right ventricular systolic pressure of approximately 60 mmHg is calculated suggesting moderate pulmonary hypertension    Left Atrium: The left atrium is severely dilated with a volume index of 82 mL/m².  The interatrial septum is intact    Right Atrium: The right atrium is moderately dilated.    Image quality is adequate. Technically difficult study.    Telemetry  Sinus Rhythm    LifeVest Interrogation:  Patient wearing device in CHF clinic today  LifeVest battery disconnected and reconnected while in clinic today, no issues with reboot.  On evaluation of the LifeVest, it is fitting the patient properly.   Patient educated that the garments are being changed and washed approximate every other day.   LifeVest History:  Original date of LifeVest was placed: 10/18/2024  TTE (10/15/2024): 20-25%  Following with CHF clinic and GDMT being titrated   Follow up TTE scheduled for: TBD following GDMT titration  LifeVest trends reviewed:  Wear time reviewed: 23.22 hours/day  Recordings reviewed: No acute events  Trends reviewed:  Average HR: 79 bpm  Steps: 1229  Body angle reviewed   Above discussed with patient   The patient is encouraged to call the office with any cardiology-related questions or concerns that should arise, and call [address] tech support at [phone] with any LifeVest related questions.       ASSESSMENT:  Chronic HFrEF  ACC stage C / NYHA class III  Euvolemic   Ischemic cardiomyopathy  LVEF 20-25%, LVEDD 5.6, LVMI 113  CAD s/p CABG (10/2024) x 3 with LIMA to LAD, SVG to OM and SVG to PDA, modified maze procedure and left atrial appendage occlusion with a 35mm atrial clip.   PAF s/p DCCV (9/2024), maintaining SR. OAC with Eliquis   History of syncopal

## 2024-11-18 ENCOUNTER — APPOINTMENT (OUTPATIENT)
Dept: OTHER | Age: 79
End: 2024-11-18
Payer: MEDICARE

## 2024-11-18 ENCOUNTER — TELEPHONE (OUTPATIENT)
Dept: NON INVASIVE DIAGNOSTICS | Age: 79
End: 2024-11-18

## 2024-11-20 ENCOUNTER — HOSPITAL ENCOUNTER (OUTPATIENT)
Dept: CARDIAC REHAB | Age: 79
Setting detail: THERAPIES SERIES
Discharge: HOME | End: 2024-11-20
Payer: MEDICARE

## 2024-11-20 PROCEDURE — 93798 PHYS/QHP OP CAR RHAB W/ECG: CPT

## 2024-11-20 PROCEDURE — 93797 PHYS/QHP OP CAR RHAB WO ECG: CPT

## 2024-11-20 ASSESSMENT — PATIENT HEALTH QUESTIONNAIRE - PHQ9
7. TROUBLE CONCENTRATING ON THINGS, SUCH AS READING THE NEWSPAPER OR WATCHING TELEVISION: NOT AT ALL
2. FEELING DOWN, DEPRESSED OR HOPELESS: NOT AT ALL
SUM OF ALL RESPONSES TO PHQ QUESTIONS 1-9: 0
1. LITTLE INTEREST OR PLEASURE IN DOING THINGS: NOT AT ALL
5. POOR APPETITE OR OVEREATING: NOT AT ALL
10. IF YOU CHECKED OFF ANY PROBLEMS, HOW DIFFICULT HAVE THESE PROBLEMS MADE IT FOR YOU TO DO YOUR WORK, TAKE CARE OF THINGS AT HOME, OR GET ALONG WITH OTHER PEOPLE: NOT DIFFICULT AT ALL
6. FEELING BAD ABOUT YOURSELF - OR THAT YOU ARE A FAILURE OR HAVE LET YOURSELF OR YOUR FAMILY DOWN: NOT AT ALL
8. MOVING OR SPEAKING SO SLOWLY THAT OTHER PEOPLE COULD HAVE NOTICED. OR THE OPPOSITE, BEING SO FIGETY OR RESTLESS THAT YOU HAVE BEEN MOVING AROUND A LOT MORE THAN USUAL: NOT AT ALL
3. TROUBLE FALLING OR STAYING ASLEEP: NOT AT ALL
SUM OF ALL RESPONSES TO PHQ9 QUESTIONS 1 & 2: 0
SUM OF ALL RESPONSES TO PHQ QUESTIONS 1-9: 0
4. FEELING TIRED OR HAVING LITTLE ENERGY: NOT AT ALL
9. THOUGHTS THAT YOU WOULD BE BETTER OFF DEAD, OR OF HURTING YOURSELF: NOT AT ALL

## 2024-11-20 NOTE — CARDIO/PULMONARY
PT. SEEN IN CARDIAC REHAB TODAY FOR INITIAL EVALUATION AND EXERCISE. PT. S/P CABG X3 ON 10-8-24. PT. HAS A HX OF AFIB,V-TACH, DIABETES TYPE 1. PT IS CURRENTLY WEARING A LIFE VEST.  EJECTION FRACTION IS 20-25% FROM AN ECHO ON 10-15-24.  STERNAL AND LEFT LEG INCISIONS ARE HEALING WELL. NO EVIDENCE OF INFECTION. PT. STATES HE DOES HAVE SOME RECENT RIGHT KNEE PAIN AND FOCAL DYSTONIA OF HIS RIGHT HAND. HE STATES HE DOES NOT HAVE ANY EXERCISE OR EQUIPMENT LIMITATIONS AT THIS TIME. NO SWELLING OF HIS FEET OR ANKLES. PT DOES HAVE A CONTINUOUS FLEX STYLE SAMANTHA GLUCOSE MONITORING DEVICE. PHQ 9 SCORE IS A 0. NO HX OF DEPRESSION.  FALLS RISK IS A 3/8 LOW FALLS RISK. PT. DOES WALK AT HOME WITH HIS DAUGHTER FOR EXERCISE. PT.'S GOAL IS TO IMPROVE HIS STRENGTH AND STAMINA THROUGH EXERCISE AT CARDIAC REHAB. HE ALSO WANTS TO BETTER UNDERSTAND EXERCISE, DIET AND MEDICATIONS ON CARDIAC HEALTH.

## 2024-11-21 ENCOUNTER — TELEPHONE (OUTPATIENT)
Dept: ENDOCRINOLOGY | Age: 79
End: 2024-11-21

## 2024-11-21 NOTE — TELEPHONE ENCOUNTER
Kika attached     Will change diabetes regimen to Lantus 28 units daily in the morning, Humalog 6 units 3 times daily with meals plus medium dose sliding scale ACHS.     Included letter wife left describing issues they have been having

## 2024-11-22 ENCOUNTER — HOSPITAL ENCOUNTER (OUTPATIENT)
Dept: CARDIAC REHAB | Age: 79
Setting detail: THERAPIES SERIES
Discharge: HOME | End: 2024-11-22
Payer: MEDICARE

## 2024-11-22 PROCEDURE — 93798 PHYS/QHP OP CAR RHAB W/ECG: CPT

## 2024-11-25 ENCOUNTER — HOSPITAL ENCOUNTER (OUTPATIENT)
Dept: OTHER | Age: 79
Setting detail: THERAPIES SERIES
Discharge: HOME | End: 2024-11-25
Payer: MEDICARE

## 2024-11-25 ENCOUNTER — HOSPITAL ENCOUNTER (OUTPATIENT)
Dept: CARDIAC REHAB | Age: 79
Setting detail: THERAPIES SERIES
Discharge: HOME | End: 2024-11-25
Payer: MEDICARE

## 2024-11-25 VITALS
DIASTOLIC BLOOD PRESSURE: 60 MMHG | HEART RATE: 86 BPM | BODY MASS INDEX: 24.68 KG/M2 | WEIGHT: 172 LBS | RESPIRATION RATE: 18 BRPM | OXYGEN SATURATION: 96 % | SYSTOLIC BLOOD PRESSURE: 113 MMHG

## 2024-11-25 DIAGNOSIS — I50.20 HFREF (HEART FAILURE WITH REDUCED EJECTION FRACTION) (HCC): Primary | ICD-10-CM

## 2024-11-25 LAB
ANION GAP SERPL CALCULATED.3IONS-SCNC: 11 MMOL/L (ref 7–16)
BNP SERPL-MCNC: 1116 PG/ML (ref 0–450)
BUN SERPL-MCNC: 29 MG/DL (ref 6–23)
CALCIUM SERPL-MCNC: 9.5 MG/DL (ref 8.6–10.2)
CHLORIDE SERPL-SCNC: 98 MMOL/L (ref 98–107)
CO2 SERPL-SCNC: 27 MMOL/L (ref 22–29)
CREAT SERPL-MCNC: 1.2 MG/DL (ref 0.7–1.2)
GFR, ESTIMATED: 62 ML/MIN/1.73M2
GLUCOSE SERPL-MCNC: 255 MG/DL (ref 74–99)
POTASSIUM SERPL-SCNC: 5 MMOL/L (ref 3.5–5)
SODIUM SERPL-SCNC: 136 MMOL/L (ref 132–146)

## 2024-11-25 PROCEDURE — 80048 BASIC METABOLIC PNL TOTAL CA: CPT

## 2024-11-25 PROCEDURE — 99214 OFFICE O/P EST MOD 30 MIN: CPT

## 2024-11-25 PROCEDURE — 83880 ASSAY OF NATRIURETIC PEPTIDE: CPT

## 2024-11-25 PROCEDURE — 93798 PHYS/QHP OP CAR RHAB W/ECG: CPT

## 2024-11-25 NOTE — TELEPHONE ENCOUNTER
SPOKE TO PT'S WIFE PUT NEW SENSOR ON AND IS WORKING RIGHT SO WILL BRING IN READER IN 2 WEEKS TO HAVE DOWNLOADED.

## 2024-11-25 NOTE — PLAN OF CARE
Problem: Chronic Conditions and Co-morbidities  Goal: Patient's chronic conditions and co-morbidity symptoms are monitored and maintained or improved  Flowsheets (Taken 11/25/2024 7995)  Care Plan - Patient's Chronic Conditions and Co-Morbidity Symptoms are Monitored and Maintained or Improved: Monitor and assess patient's chronic conditions and comorbid symptoms for stability, deterioration, or improvement

## 2024-11-25 NOTE — PROGRESS NOTES
[facility]   12/4/2024 11:00 AM [facility] CARDIAC REHAB EX RM01 SEYZ [facility]   12/6/2024 11:00 AM [facility] EX RM01 SEYZ [facility]   12/11/2024 11:00 AM [facility] EX RM01 SEYZ [facility]   12/13/2024  9:15 AM [facility] CHF ROOM 3 SEYZ CHF [facility]   12/13/2024 11:00 AM [facility] EX RM01 SEYZ [facility]   12/18/2024 11:00 AM [facility] EX RM01 SEYZ [facility]   12/20/2024 11:00 AM [facility] EX RM01 SEYZ [facility]   12/27/2024 11:00 AM [facility] EX RM01 SEYZ [facility]   1/3/2025 11:00 AM [facility] EX RM01 SEYZ [facility]   1/8/2025 11:00 AM [facility]   1/10/2025 11:00 AM [facility] EX RM01 SEYZ [facility]   1/15/2025 11:00 AM [facility]   1/17/2025 11:00 AM [facility] EX RM01 SEYZ [facility]   1/22/2025 11:00 AM [facility] EX RM01 SEYZ [facility]   1/24/2025 11:00 AM [facility] EX RM01 SEYZ [facility]   1/29/2025 11:00 AM [facility] EX RM01 SEYZ [facility]   1/31/2025 11:00 AM [facility] EX RM01 SEYZ [facility]   2/5/2025 11:00 AM [facility] EX RM01 SEYZ [facility]   2/7/2025 11:00 AM [facility] EX RM01 SEYZ [facility]   2/10/2025  9:00 AM Perez Li MD [facility]   2/12/2025 11:00 AM [facility] CARDIAC REHAB EX RM01 SEYZ [facility]   2/14/2025 10:00 AM SEY YNG ECHO 1 SEYZ CARDIO [facility]   2/14/2025 11:00 AM [facility] CARDIAC REHAB EX RM01 SEYZ [facility]   2/19/2025 11:00 AM [facility] CARDIAC REHAB EX RM01 YZ [facility]   2/21/2025 11:00 AM [facility] CARDIAC REHAB EX RM01 SEYZ [facility]   2/24/2025 10:30 AM Sirisha Mendoza MD [place] CARDIO [facility]   2/26/2025 11:00 AM [facility] CARDIAC REHAB EX RM01 YZ [facility]  Pounds Preamble Statement (Weight Entered In Details Tab): Reported Weight in pounds:

## 2024-11-25 NOTE — TELEPHONE ENCOUNTER
Sounds like he is having a lot of trouble with ruel.  Compression lows at night are common with CGM.  All overnight low blood sugars that do not produce symptoms to be verified by fingerstick.     Additionally, a fingerstick will not always match his CGM as there is about a 10-minute delay.  CGM's use different body fluid to check blood sugars.  They are close enough that we are able to manage people with them, but will likely rarely match the fingerstick.    Sounds like they may have a bad batch of ruel's, but they will have to discuss that with Piper.    We can try to change to Dexcom if insurance will approve.    They can also just do fingersticks before meals and at bedtime if they prefer, but CGM is the safer method.

## 2024-11-26 ENCOUNTER — OFFICE VISIT (OUTPATIENT)
Dept: NON INVASIVE DIAGNOSTICS | Age: 79
End: 2024-11-26

## 2024-11-26 VITALS
RESPIRATION RATE: 18 BRPM | BODY MASS INDEX: 25.08 KG/M2 | HEART RATE: 86 BPM | DIASTOLIC BLOOD PRESSURE: 70 MMHG | WEIGHT: 175.2 LBS | HEIGHT: 70 IN | SYSTOLIC BLOOD PRESSURE: 104 MMHG

## 2024-11-26 DIAGNOSIS — I25.5 ISCHEMIC CARDIOMYOPATHY: Primary | ICD-10-CM

## 2024-11-26 RX ORDER — AMIODARONE HYDROCHLORIDE 200 MG/1
200 TABLET ORAL DAILY
COMMUNITY

## 2024-11-26 RX ORDER — METOPROLOL SUCCINATE 50 MG/1
50 TABLET, EXTENDED RELEASE ORAL DAILY
COMMUNITY

## 2024-11-26 NOTE — PROGRESS NOTES
Summary   The left ventricle is dilated.   Normal left ventricle wall thickness.   There is moderate generalized hypokinesis.   Ejection fraction is visually estimated at 40%.   There is doppler evidence of stage I diastolic dysfunction.   Normal right ventricular size and function.   The left atrium is severely dilated.   Interatrial septum is largely aneurysmal bulging towards the right atrium.   Lipomatous hypertrophy of the interatrial septum.   Mild thickening of the mitral valve leaflets.   There is subtle prolapse of the posterior mitral leaflet.   Mild mitral regurgitation.   The aortic valve appears mildly sclerotic.   Trivial aortic regurgitation.   Mild tricuspid regurgitation.    11/29/17  Summary   The left ventricle is mildly dilated   Normal left ventricle wall thickness.   No regional wall motion abnormalities seen.   Ejection fraction is visually estimated at 40-45%.   There is doppler evidence of stage I diastolic dysfunction.   Mildly dilated right ventricle.   Right ventricle global systolic function is normal .   The left atrium is moderately dilated.   Aneurysmal inter-atrial septum.   The right atrium is mildly dilated   Mild prolapse of the posterior mitral leaflet   Mild mitral regurgitation is present.   Mild tricuspid regurgitation.    11/11/09 11/18/13        Stress test 11/6/13 11/9/11        I have independently reviewed all of the ECGs and rhythm strips per above     Assessment:    Syncope--no premonitory symptoms  Patient with moderate to severe LV systolic dysfunction  No recurrence of episodes since his bypass surgery    Persistent atrial fibrillation--on presentation to the hospital in September  Related to worsening heart failure prior to his hospitalization.  Lasix had been started  [id number] score of more than 4  Urgent cardioversion during that hospitalization with amiodarone therapy  Subsequent cardiac workup led to CABG    3.  HFrEF--LVEF has been in the 40%

## 2024-11-27 ENCOUNTER — HOSPITAL ENCOUNTER (OUTPATIENT)
Dept: CARDIAC REHAB | Age: 79
Setting detail: THERAPIES SERIES
Discharge: HOME | End: 2024-11-27
Payer: MEDICARE

## 2024-11-27 ENCOUNTER — TELEPHONE (OUTPATIENT)
Dept: OTHER | Age: 79
End: 2024-11-27

## 2024-11-27 PROCEDURE — 93798 PHYS/QHP OP CAR RHAB W/ECG: CPT

## 2024-11-27 NOTE — TELEPHONE ENCOUNTER
Called patient to discuss new orders.     Elsa Hernandez APRN - CNP Pasek, Samara, RN  Labs and CHF clinic note reviewed  Follow up labs in 1 week    Patient expressed verbal understanding and repeated orders back correctly. Encouraged to call the clinic back with any questions.

## 2024-11-29 ENCOUNTER — APPOINTMENT (OUTPATIENT)
Dept: CARDIAC REHAB | Age: 79
End: 2024-11-29
Payer: MEDICARE

## 2024-12-03 ENCOUNTER — HOSPITAL ENCOUNTER (OUTPATIENT)
Dept: CT IMAGING | Age: 79
Discharge: HOME | End: 2024-12-05
Attending: INTERNAL MEDICINE
Payer: MEDICARE

## 2024-12-03 DIAGNOSIS — J84.9 ILD (INTERSTITIAL LUNG DISEASE) (HCC): ICD-10-CM

## 2024-12-03 PROCEDURE — 71250 CT THORAX DX C-: CPT

## 2024-12-04 ENCOUNTER — HOSPITAL ENCOUNTER (OUTPATIENT)
Age: 79
Discharge: HOME | End: 2024-12-04
Payer: MEDICARE

## 2024-12-04 DIAGNOSIS — I50.20 HFREF (HEART FAILURE WITH REDUCED EJECTION FRACTION) (HCC): ICD-10-CM

## 2024-12-04 LAB
ANION GAP SERPL CALCULATED.3IONS-SCNC: 12 MMOL/L (ref 7–16)
BUN SERPL-MCNC: 40 MG/DL (ref 6–23)
CALCIUM SERPL-MCNC: 9.8 MG/DL (ref 8.6–10.2)
CHLORIDE SERPL-SCNC: 94 MMOL/L (ref 98–107)
CO2 SERPL-SCNC: 27 MMOL/L (ref 22–29)
CREAT SERPL-MCNC: 1.9 MG/DL (ref 0.7–1.2)
GFR, ESTIMATED: 36 ML/MIN/1.73M2
GLUCOSE SERPL-MCNC: 250 MG/DL (ref 74–99)
POTASSIUM SERPL-SCNC: 5.1 MMOL/L (ref 3.5–5)
SODIUM SERPL-SCNC: 133 MMOL/L (ref 132–146)

## 2024-12-04 PROCEDURE — 36415 COLL VENOUS BLD VENIPUNCTURE: CPT

## 2024-12-04 PROCEDURE — 80048 BASIC METABOLIC PNL TOTAL CA: CPT

## 2024-12-04 RX ORDER — BUMETANIDE 2 MG/1
1 TABLET ORAL DAILY PRN
Qty: 90 TABLET | Refills: 3 | Status: SHIPPED | OUTPATIENT
Start: 2024-12-04

## 2024-12-04 NOTE — RESULT ENCOUNTER NOTE
Labs reviewed  Rise in renal function and potassium    Stop bumex and only use PRN  Increase hydration  Follow up labs Friday, if potassium doesn't improve consider reducing spironolactone    Thank you

## 2024-12-05 ENCOUNTER — TELEPHONE (OUTPATIENT)
Dept: OTHER | Age: 79
End: 2024-12-05

## 2024-12-05 NOTE — TELEPHONE ENCOUNTER
Called patient's spouse Shoshana to discuss abnormal labs.    Elsa Hernandez, APRN - Sandra Seymour RN  Labs reviewed  Rise in renal function and potassium    Stop bumex and only use PRN  Increase hydration  Follow up labs Friday, if potassium doesn't improve consider reducing spironolactone    Thank you    Shoshana expressed verbal understanding and repeated orders back correctly. Encouraged to call the clinic back with any questions.

## 2024-12-06 ENCOUNTER — HOSPITAL ENCOUNTER (OUTPATIENT)
Age: 79
Discharge: HOME | End: 2024-12-06
Payer: MEDICARE

## 2024-12-06 ENCOUNTER — HOSPITAL ENCOUNTER (OUTPATIENT)
Dept: CARDIAC REHAB | Age: 79
Setting detail: THERAPIES SERIES
Discharge: HOME | End: 2024-12-06

## 2024-12-06 ENCOUNTER — TELEPHONE (OUTPATIENT)
Dept: OTHER | Age: 79
End: 2024-12-06

## 2024-12-06 LAB
ANION GAP SERPL CALCULATED.3IONS-SCNC: 11 MMOL/L (ref 7–16)
BUN SERPL-MCNC: 34 MG/DL (ref 6–23)
CALCIUM SERPL-MCNC: 9.6 MG/DL (ref 8.6–10.2)
CHLORIDE SERPL-SCNC: 97 MMOL/L (ref 98–107)
CO2 SERPL-SCNC: 26 MMOL/L (ref 22–29)
CREAT SERPL-MCNC: 1.4 MG/DL (ref 0.7–1.2)
GFR, ESTIMATED: 53 ML/MIN/1.73M2
GLUCOSE SERPL-MCNC: 249 MG/DL (ref 74–99)
POTASSIUM SERPL-SCNC: 4.9 MMOL/L (ref 3.5–5)
SODIUM SERPL-SCNC: 134 MMOL/L (ref 132–146)

## 2024-12-06 PROCEDURE — 36415 COLL VENOUS BLD VENIPUNCTURE: CPT

## 2024-12-06 PROCEDURE — 80048 BASIC METABOLIC PNL TOTAL CA: CPT

## 2024-12-06 NOTE — RESULT ENCOUNTER NOTE
Labs reviewed  Improved renal function and potassium  Continue to use bumex PRN only  Is he doing ok from fluid standpoint   Has follow up scheduled for next week     Thank you

## 2024-12-06 NOTE — TELEPHONE ENCOUNTER
Called patient's spouse Shoshana to discuss follow up labs. She reports no change to his weight, no new sob, abdominal bloating/distention, or lower extremity swelling. Instructed to call the clinic for weight gain/sob/swelling or if he has to take his oral diuretic for three days in a row. Shoshana expressed verbal understanding.     Elsa Hernandez APRN - Sandra Seymour RN  Labs reviewed  Improved renal function and potassium  Continue to use bumex PRN only  Is he doing ok from fluid standpoint  Has follow up scheduled for next week    Thank you

## 2024-12-09 ENCOUNTER — PATIENT MESSAGE (OUTPATIENT)
Dept: CARDIOLOGY CLINIC | Age: 79
End: 2024-12-09

## 2024-12-11 ENCOUNTER — TELEPHONE (OUTPATIENT)
Dept: ENDOCRINOLOGY | Age: 79
End: 2024-12-11

## 2024-12-11 ENCOUNTER — HOSPITAL ENCOUNTER (OUTPATIENT)
Dept: CARDIAC REHAB | Age: 79
Setting detail: THERAPIES SERIES
Discharge: HOME | End: 2024-12-11
Payer: MEDICARE

## 2024-12-11 PROCEDURE — 93798 PHYS/QHP OP CAR RHAB W/ECG: CPT

## 2024-12-13 ENCOUNTER — TELEPHONE (OUTPATIENT)
Dept: OTHER | Age: 79
End: 2024-12-13

## 2024-12-13 ENCOUNTER — HOSPITAL ENCOUNTER (OUTPATIENT)
Dept: OTHER | Age: 79
Setting detail: THERAPIES SERIES
Discharge: HOME | End: 2024-12-13
Payer: MEDICARE

## 2024-12-13 ENCOUNTER — HOSPITAL ENCOUNTER (OUTPATIENT)
Dept: CARDIAC REHAB | Age: 79
Setting detail: THERAPIES SERIES
Discharge: HOME | End: 2024-12-13
Payer: MEDICARE

## 2024-12-13 VITALS
DIASTOLIC BLOOD PRESSURE: 61 MMHG | HEART RATE: 76 BPM | BODY MASS INDEX: 24.39 KG/M2 | RESPIRATION RATE: 18 BRPM | SYSTOLIC BLOOD PRESSURE: 107 MMHG | OXYGEN SATURATION: 98 % | WEIGHT: 170 LBS

## 2024-12-13 DIAGNOSIS — I50.20 HFREF (HEART FAILURE WITH REDUCED EJECTION FRACTION) (HCC): Primary | ICD-10-CM

## 2024-12-13 LAB
ANION GAP SERPL CALCULATED.3IONS-SCNC: 9 MMOL/L (ref 7–16)
BNP SERPL-MCNC: 823 PG/ML (ref 0–450)
BUN SERPL-MCNC: 27 MG/DL (ref 6–23)
CALCIUM SERPL-MCNC: 9.4 MG/DL (ref 8.6–10.2)
CHLORIDE SERPL-SCNC: 99 MMOL/L (ref 98–107)
CO2 SERPL-SCNC: 25 MMOL/L (ref 22–29)
CREAT SERPL-MCNC: 1.3 MG/DL (ref 0.7–1.2)
GFR, ESTIMATED: 58 ML/MIN/1.73M2
GLUCOSE SERPL-MCNC: 181 MG/DL (ref 74–99)
POTASSIUM SERPL-SCNC: 5.1 MMOL/L (ref 3.5–5)
SODIUM SERPL-SCNC: 133 MMOL/L (ref 132–146)

## 2024-12-13 PROCEDURE — 80048 BASIC METABOLIC PNL TOTAL CA: CPT

## 2024-12-13 PROCEDURE — 2580000003 HC RX 258: Performed by: NURSE PRACTITIONER

## 2024-12-13 PROCEDURE — 83880 ASSAY OF NATRIURETIC PEPTIDE: CPT

## 2024-12-13 PROCEDURE — 96374 THER/PROPH/DIAG INJ IV PUSH: CPT

## 2024-12-13 PROCEDURE — 6360000002 HC RX W HCPCS: Performed by: NURSE PRACTITIONER

## 2024-12-13 PROCEDURE — 93798 PHYS/QHP OP CAR RHAB W/ECG: CPT

## 2024-12-13 PROCEDURE — 99214 OFFICE O/P EST MOD 30 MIN: CPT

## 2024-12-13 RX ORDER — BUMETANIDE 1 MG/1
1 TABLET ORAL
Qty: 90 TABLET | Refills: 3 | Status: SHIPPED | OUTPATIENT
Start: 2024-12-13

## 2024-12-13 RX ORDER — SODIUM CHLORIDE 0.9 % (FLUSH) 0.9 %
10 SYRINGE (ML) INJECTION ONCE
Status: COMPLETED | OUTPATIENT
Start: 2024-12-13 | End: 2024-12-13

## 2024-12-13 RX ORDER — BUMETANIDE 0.25 MG/ML
2 INJECTION, SOLUTION INTRAMUSCULAR; INTRAVENOUS ONCE
Status: COMPLETED | OUTPATIENT
Start: 2024-12-13 | End: 2024-12-13

## 2024-12-13 RX ADMIN — SODIUM CHLORIDE, PRESERVATIVE FREE 10 ML: 5 INJECTION INTRAVENOUS at 10:15

## 2024-12-13 RX ADMIN — BUMETANIDE 2 MG: 0.25 INJECTION INTRAMUSCULAR; INTRAVENOUS at 10:15

## 2024-12-13 NOTE — PLAN OF CARE
Problem: Chronic Conditions and Co-morbidities  Goal: Patient's chronic conditions and co-morbidity symptoms are monitored and maintained or improved  Outcome: Progressing  Flowsheets (Taken 12/13/2024 1133)  Care Plan - Patient's Chronic Conditions and Co-Morbidity Symptoms are Monitored and Maintained or Improved:   Monitor and assess patient's chronic conditions and comorbid symptoms for stability, deterioration, or improvement   Collaborate with multidisciplinary team to address chronic and comorbid conditions and prevent exacerbation or deterioration

## 2024-12-13 NOTE — RESULT ENCOUNTER NOTE
Labs and CHF clinic note reviewed  Given IV bumex in clinic due to pleural effusion and cough    Please have him take Bumex 1mg three times weekly    Follow up labs in 7-10 days     Thank you

## 2024-12-13 NOTE — TELEPHONE ENCOUNTER
Spoke with Pantera regarding medication change. He is to take his Bumex three times a week, f/u labs in 7-10 days. Pt verbalized understanding.

## 2024-12-13 NOTE — PROGRESS NOTES
Congestive Heart Failure Clinic   [facility]      Referring Provider: Dr Mendoza  Primary Care Physician: Terry Giraldo MD   Cardiologist: Dr Mendoza  Nephrologist: NARINDER      HISTORY OF PRESENT ILLNESS:     Pantera Payne is a 79 y.o. (1945) male with a history of HFrEF (EF < 40%)  Pre Cupid:     Lab Results   Component Value Date    LVEF 20 10/15/2024     Post Cupid:    Lab Results   Component Value Date    EFBP 36 (A) 10/01/2024         He presents to the CHF clinic for ongoing evaluation and monitoring of heart failure.    In the CHF clinic today he denies any adverse symptoms except:  [] Dizziness or lightheadedness   [] Syncope or near syncope  [] Recent Fall  [] Shortness of breath at rest   [x] Dyspnea with exertion- only with long distances   [] Decline in functional capacity (unable to perform activities they had previously been able to do)  [] Fatigue   [] Orthopnea  [] PND  [] Nocturnal cough  [] Hemoptysis  [] Chest pain, pressure, or discomfort  [] Palpitations  [] Abdominal distention  [] Abdominal bloating  [] Early satiety  [] Blood in stool   [] Diarrhea  [] Constipation  [] Nausea/Vomiting  [] Decreased urinary response to oral diuretic   [] Scrotal swelling   [] Lower extremity edemaLeft leg  [] Used PRN doses of oral diuretic   [] Weight gain    Wt Readings from Last 3 Encounters:   12/13/24 77.1 kg (170 lb)   11/26/24 79.5 kg (175 lb 3.2 oz)   11/25/24 78 kg (172 lb)           SOCIAL HISTORY:  [x] Denies tobacco, alcohol or illicit drug abuse  [] Tobacco use:  [] ETOH use:  [] Illicit drug use:        MEDICATIONS:    Allergies   Allergen Reactions    Atorvastatin     Bethaprim [Sulfamethoxazole-Trimethoprim]     Erythromycin     Lipitor      Muscle aches.    Lisinopril Cough    Neomycin      Prior to Visit Medications    Medication Sig Taking? Authorizing Provider   bumetanide (BUMEX) 2 MG tablet Take 0.5 tablets by mouth daily as

## 2024-12-18 ENCOUNTER — HOSPITAL ENCOUNTER (OUTPATIENT)
Dept: CARDIAC REHAB | Age: 79
Setting detail: THERAPIES SERIES
Discharge: HOME | End: 2024-12-18
Payer: MEDICARE

## 2024-12-18 PROCEDURE — 93798 PHYS/QHP OP CAR RHAB W/ECG: CPT

## 2024-12-20 ENCOUNTER — HOSPITAL ENCOUNTER (OUTPATIENT)
Age: 79
Discharge: HOME | End: 2024-12-20
Payer: MEDICARE

## 2024-12-20 ENCOUNTER — HOSPITAL ENCOUNTER (OUTPATIENT)
Dept: CARDIAC REHAB | Age: 79
Setting detail: THERAPIES SERIES
Discharge: HOME | End: 2024-12-20
Payer: MEDICARE

## 2024-12-20 ENCOUNTER — TELEPHONE (OUTPATIENT)
Dept: OTHER | Age: 79
End: 2024-12-20

## 2024-12-20 DIAGNOSIS — I50.20 HFREF (HEART FAILURE WITH REDUCED EJECTION FRACTION) (HCC): ICD-10-CM

## 2024-12-20 LAB
ANION GAP SERPL CALCULATED.3IONS-SCNC: 10 MMOL/L (ref 7–16)
BUN SERPL-MCNC: 33 MG/DL (ref 6–23)
CALCIUM SERPL-MCNC: 9.7 MG/DL (ref 8.6–10.2)
CHLORIDE SERPL-SCNC: 99 MMOL/L (ref 98–107)
CO2 SERPL-SCNC: 25 MMOL/L (ref 22–29)
CREAT SERPL-MCNC: 1.2 MG/DL (ref 0.7–1.2)
GFR, ESTIMATED: 59 ML/MIN/1.73M2
GLUCOSE SERPL-MCNC: 189 MG/DL (ref 74–99)
POTASSIUM SERPL-SCNC: 5.1 MMOL/L (ref 3.5–5)
SODIUM SERPL-SCNC: 134 MMOL/L (ref 132–146)

## 2024-12-20 PROCEDURE — 93798 PHYS/QHP OP CAR RHAB W/ECG: CPT

## 2024-12-20 PROCEDURE — 80048 BASIC METABOLIC PNL TOTAL CA: CPT

## 2024-12-20 PROCEDURE — 36415 COLL VENOUS BLD VENIPUNCTURE: CPT

## 2024-12-20 RX ORDER — SPIRONOLACTONE 25 MG/1
12.5 TABLET ORAL DAILY
Qty: 30 TABLET | Refills: 5 | Status: SHIPPED | OUTPATIENT
Start: 2024-12-20

## 2024-12-20 NOTE — TELEPHONE ENCOUNTER
Called patient to discuss medication changes.    Elsa Hernandez APRN - Sandra Seymour RN  Labs reviewed  Improved renal function however potassium unchanged with diet changes  Decrease spironolactone 12.5 mg daily  Follow up in CHF clinic as scheduled    Thank you    Patient expressed verbal understanding and repeated orders back correctly. Encouraged to call the clinic back with any questions.

## 2024-12-27 ENCOUNTER — HOSPITAL ENCOUNTER (OUTPATIENT)
Dept: CARDIAC REHAB | Age: 79
Setting detail: THERAPIES SERIES
Discharge: HOME | End: 2024-12-27
Payer: MEDICARE

## 2024-12-27 PROCEDURE — 93798 PHYS/QHP OP CAR RHAB W/ECG: CPT

## 2025-01-02 ENCOUNTER — HOSPITAL ENCOUNTER (OUTPATIENT)
Dept: OTHER | Age: 80
Setting detail: THERAPIES SERIES
Discharge: HOME | End: 2025-01-02
Payer: MEDICARE

## 2025-01-02 VITALS
RESPIRATION RATE: 18 BRPM | SYSTOLIC BLOOD PRESSURE: 92 MMHG | DIASTOLIC BLOOD PRESSURE: 53 MMHG | WEIGHT: 172 LBS | OXYGEN SATURATION: 97 % | BODY MASS INDEX: 24.68 KG/M2 | HEART RATE: 78 BPM

## 2025-01-02 LAB
ANION GAP SERPL CALCULATED.3IONS-SCNC: 10 MMOL/L (ref 7–16)
BNP SERPL-MCNC: 545 PG/ML (ref 0–450)
BUN SERPL-MCNC: 32 MG/DL (ref 6–23)
CALCIUM SERPL-MCNC: 9.3 MG/DL (ref 8.6–10.2)
CHLORIDE SERPL-SCNC: 102 MMOL/L (ref 98–107)
CO2 SERPL-SCNC: 24 MMOL/L (ref 22–29)
CREAT SERPL-MCNC: 1.2 MG/DL (ref 0.7–1.2)
GFR, ESTIMATED: 64 ML/MIN/1.73M2
GLUCOSE SERPL-MCNC: 119 MG/DL (ref 74–99)
POTASSIUM SERPL-SCNC: 4.3 MMOL/L (ref 3.5–5)
SODIUM SERPL-SCNC: 136 MMOL/L (ref 132–146)

## 2025-01-02 PROCEDURE — 99214 OFFICE O/P EST MOD 30 MIN: CPT

## 2025-01-02 PROCEDURE — 83880 ASSAY OF NATRIURETIC PEPTIDE: CPT

## 2025-01-02 PROCEDURE — 80048 BASIC METABOLIC PNL TOTAL CA: CPT

## 2025-01-02 NOTE — PROGRESS NOTES
Congestive Heart Failure Clinic   [facility]      Referring Provider: Dr Mendoza  Primary Care Physician: Terry Giraldo MD   Cardiologist: Dr Mendoza  Nephrologist: NARINDER      HISTORY OF PRESENT ILLNESS:     Pantera Payne is a 79 y.o. (1945) male with a history of HFrEF (EF < 40%)  Pre Cupid:     Lab Results   Component Value Date    LVEF 20 10/15/2024     Post Cupid:    Lab Results   Component Value Date    EFBP 36 (A) 10/01/2024         He presents to the CHF clinic for ongoing evaluation and monitoring of heart failure.    In the CHF clinic today he denies any adverse symptoms except:  [] Dizziness or lightheadedness   [] Syncope or near syncope  [] Recent Fall  [] Shortness of breath at rest   [] Dyspnea with exertion  [] Decline in functional capacity (unable to perform activities they had previously been able to do)  [] Fatigue   [] Orthopnea  [] PND  [] Nocturnal cough  [] Hemoptysis  [] Chest pain, pressure, or discomfort  [] Palpitations  [] Abdominal distention  [] Abdominal bloating  [] Early satiety  [] Blood in stool   [] Diarrhea  [] Constipation  [] Nausea/Vomiting  [] Decreased urinary response to oral diuretic   [] Scrotal swelling   [] Lower extremity edemaLeft leg  [] Used PRN doses of oral diuretic   [] Weight gain    1/2/25- NO symptoms per pt     Wt Readings from Last 3 Encounters:   01/02/25 78 kg (172 lb)   12/13/24 77.1 kg (170 lb)   11/26/24 79.5 kg (175 lb 3.2 oz)           SOCIAL HISTORY:  [x] Denies tobacco, alcohol or illicit drug abuse  [] Tobacco use:  [] ETOH use:  [] Illicit drug use:        MEDICATIONS:    Allergies   Allergen Reactions    Atorvastatin     Bethaprim [Sulfamethoxazole-Trimethoprim]     Erythromycin     Lipitor      Muscle aches.    Lisinopril Cough    Neomycin      Prior to Visit Medications    Medication Sig Taking? Authorizing Provider   spironolactone (ALDACTONE) 25 MG tablet Take 0.5 tablets by mouth

## 2025-01-03 ENCOUNTER — HOSPITAL ENCOUNTER (OUTPATIENT)
Dept: CARDIAC REHAB | Age: 80
Setting detail: THERAPIES SERIES
End: 2025-01-03
Payer: MEDICARE

## 2025-01-07 ENCOUNTER — HOSPITAL ENCOUNTER (OUTPATIENT)
Dept: GENERAL RADIOLOGY | Age: 80
Discharge: HOME | End: 2025-01-09
Attending: INTERNAL MEDICINE
Payer: MEDICARE

## 2025-01-07 DIAGNOSIS — R13.10 DYSPHAGIA, UNSPECIFIED TYPE: ICD-10-CM

## 2025-01-07 PROCEDURE — 6370000000 HC RX 637 (ALT 250 FOR IP): Performed by: INTERNAL MEDICINE

## 2025-01-07 PROCEDURE — 74220 X-RAY XM ESOPHAGUS 1CNTRST: CPT

## 2025-01-07 PROCEDURE — 2500000003 HC RX 250 WO HCPCS: Performed by: INTERNAL MEDICINE

## 2025-01-07 RX ADMIN — BARIUM SULFATE 334 G: 980 POWDER, FOR SUSPENSION ORAL at 08:23

## 2025-01-07 RX ADMIN — ANTACID/ANTIFLATULENT 1 EACH: 380; 550; 10; 10 GRANULE, EFFERVESCENT ORAL at 08:24

## 2025-01-07 RX ADMIN — BARIUM SULFATE 176 G: 960 POWDER, FOR SUSPENSION ORAL at 08:23

## 2025-01-08 ENCOUNTER — HOSPITAL ENCOUNTER (OUTPATIENT)
Dept: CARDIAC REHAB | Age: 80
Setting detail: THERAPIES SERIES
Discharge: HOME | End: 2025-01-08
Payer: MEDICARE

## 2025-01-08 PROCEDURE — 93798 PHYS/QHP OP CAR RHAB W/ECG: CPT

## 2025-01-10 ENCOUNTER — HOSPITAL ENCOUNTER (OUTPATIENT)
Dept: CARDIAC REHAB | Age: 80
Setting detail: THERAPIES SERIES
Discharge: HOME | End: 2025-01-10
Payer: MEDICARE

## 2025-01-10 PROCEDURE — 93798 PHYS/QHP OP CAR RHAB W/ECG: CPT

## 2025-01-15 ENCOUNTER — HOSPITAL ENCOUNTER (OUTPATIENT)
Dept: CARDIAC REHAB | Age: 80
Setting detail: THERAPIES SERIES
Discharge: HOME | End: 2025-01-15
Payer: MEDICARE

## 2025-01-15 PROCEDURE — 93798 PHYS/QHP OP CAR RHAB W/ECG: CPT

## 2025-01-15 ASSESSMENT — PATIENT HEALTH QUESTIONNAIRE - PHQ9
8. MOVING OR SPEAKING SO SLOWLY THAT OTHER PEOPLE COULD HAVE NOTICED. OR THE OPPOSITE, BEING SO FIGETY OR RESTLESS THAT YOU HAVE BEEN MOVING AROUND A LOT MORE THAN USUAL: NOT AT ALL
SUM OF ALL RESPONSES TO PHQ9 QUESTIONS 1 & 2: 0
10. IF YOU CHECKED OFF ANY PROBLEMS, HOW DIFFICULT HAVE THESE PROBLEMS MADE IT FOR YOU TO DO YOUR WORK, TAKE CARE OF THINGS AT HOME, OR GET ALONG WITH OTHER PEOPLE: NOT DIFFICULT AT ALL
SUM OF ALL RESPONSES TO PHQ QUESTIONS 1-9: 1
6. FEELING BAD ABOUT YOURSELF - OR THAT YOU ARE A FAILURE OR HAVE LET YOURSELF OR YOUR FAMILY DOWN: NOT AT ALL
5. POOR APPETITE OR OVEREATING: NOT AT ALL
3. TROUBLE FALLING OR STAYING ASLEEP: NOT AT ALL
1. LITTLE INTEREST OR PLEASURE IN DOING THINGS: NOT AT ALL
9. THOUGHTS THAT YOU WOULD BE BETTER OFF DEAD, OR OF HURTING YOURSELF: NOT AT ALL
4. FEELING TIRED OR HAVING LITTLE ENERGY: SEVERAL DAYS
SUM OF ALL RESPONSES TO PHQ QUESTIONS 1-9: 1
SUM OF ALL RESPONSES TO PHQ QUESTIONS 1-9: 1
7. TROUBLE CONCENTRATING ON THINGS, SUCH AS READING THE NEWSPAPER OR WATCHING TELEVISION: NOT AT ALL
2. FEELING DOWN, DEPRESSED OR HOPELESS: NOT AT ALL
SUM OF ALL RESPONSES TO PHQ QUESTIONS 1-9: 1

## 2025-01-17 ENCOUNTER — HOSPITAL ENCOUNTER (OUTPATIENT)
Dept: CARDIAC REHAB | Age: 80
Setting detail: THERAPIES SERIES
Discharge: HOME | End: 2025-01-17
Payer: MEDICARE

## 2025-01-17 PROCEDURE — 93798 PHYS/QHP OP CAR RHAB W/ECG: CPT

## 2025-01-22 ENCOUNTER — HOSPITAL ENCOUNTER (OUTPATIENT)
Dept: CARDIAC REHAB | Age: 80
Setting detail: THERAPIES SERIES
Discharge: HOME | End: 2025-01-22
Payer: MEDICARE

## 2025-01-22 PROCEDURE — 93798 PHYS/QHP OP CAR RHAB W/ECG: CPT

## 2025-01-23 ENCOUNTER — PROCEDURE VISIT (OUTPATIENT)
Dept: PODIATRY | Age: 80
End: 2025-01-23
Payer: MEDICARE

## 2025-01-23 VITALS — WEIGHT: 172 LBS | HEIGHT: 70 IN | BODY MASS INDEX: 24.62 KG/M2

## 2025-01-23 DIAGNOSIS — M79.675 PAIN OF TOE OF LEFT FOOT: ICD-10-CM

## 2025-01-23 DIAGNOSIS — E11.51 TYPE II DIABETES MELLITUS WITH PERIPHERAL CIRCULATORY DISORDER (HCC): ICD-10-CM

## 2025-01-23 DIAGNOSIS — M79.674 PAIN OF TOE OF RIGHT FOOT: ICD-10-CM

## 2025-01-23 DIAGNOSIS — R26.2 DIFFICULTY WALKING: ICD-10-CM

## 2025-01-23 DIAGNOSIS — B35.1 ONYCHOMYCOSIS: Primary | ICD-10-CM

## 2025-01-23 DIAGNOSIS — I73.9 PVD (PERIPHERAL VASCULAR DISEASE) (HCC): ICD-10-CM

## 2025-01-23 DIAGNOSIS — I87.2 VENOUS INSUFFICIENCY (CHRONIC) (PERIPHERAL): ICD-10-CM

## 2025-01-23 PROCEDURE — 11721 DEBRIDE NAIL 6 OR MORE: CPT | Performed by: PODIATRIST

## 2025-01-23 PROCEDURE — 99999 PR OFFICE/OUTPT VISIT,PROCEDURE ONLY: CPT | Performed by: PODIATRIST

## 2025-01-23 NOTE — PROGRESS NOTES
Patient is in today for 2 month DM foot care. Pcp is Terry Giraldo MD  Last ov 12/9/24  
    Allergies:  Allergies   Allergen Reactions    Atorvastatin     Bethaprim [Sulfamethoxazole-Trimethoprim]     Erythromycin     Lipitor      Muscle aches.    Lisinopril Cough    Neomycin        Past Surgical History:   Procedure Laterality Date    CARDIAC PROCEDURE N/A 9/30/2024    Left heart cath / coronary angiography performed by Sirisha Mendoza MD at [facility] CARDIAC CATH LAB    CARDIOVASCULAR STRESS TEST  9/25/2002 Treadmill nuclear stress test:  Samy protocol.  11 minutes, 30 seconds.  98% of maximum predicted heart rate.     No chest pain. No ischemic EKG changes.  Physiologic BP response. Nuclear images showed significant attenuation artifacts, but non transmural MI with mild additional stress-induced ischemia involving the anterolateral wall could not be totally excluded.  The computer calculated EF was 44%.      CARDIOVASCULAR STRESS TEST  11/11/2009 Samy protocol. 9 minutes. Physiologic BP response.    No chest pain.  No ischemia EKG changes. Nuclear images showed soft tissue attenuation artifacts with no convincing evidence of any scars or any stress-induced ischemia with the gated views suggesting paradoxical septal motion and hypokinesis of the inferior wall and the interventricular septum with low normal LV systolic function and a computer-calculated  EF of 50%.      CATARACT REMOVAL WITH IMPLANT Left 01/23/2023    COLONOSCOPY      CORONARY ARTERY BYPASS GRAFT N/A 10/8/2024    CORONARY ARTERY BYPASS GRAFT, MAZE, INTRA-AORTIC BALLON PUMP performed by Marcela Osuna DO at [facility] OR    CYST REMOVAL      DIAGNOSTIC CARDIAC CATH LAB PROCEDURE  10/04/2003    Left main non existant, separate ostia to the LAD and the dominant vessel with no disease.  RCA, trivial nondominant vessel with no disease.  Normal left ventricular size with mildly impaired left ventricular systolic function with an EF estimated at 45%.      EYE SURGERY      HERNIA REPAIR      INGUINAL HERNIA REPAIR  1984    KIDNEY STONE SURGERY

## 2025-01-24 ENCOUNTER — HOSPITAL ENCOUNTER (OUTPATIENT)
Dept: CARDIAC REHAB | Age: 80
Setting detail: THERAPIES SERIES
Discharge: HOME | End: 2025-01-24
Payer: MEDICARE

## 2025-01-24 PROCEDURE — 93798 PHYS/QHP OP CAR RHAB W/ECG: CPT

## 2025-01-27 ENCOUNTER — TELEPHONE (OUTPATIENT)
Dept: OTHER | Age: 80
End: 2025-01-27

## 2025-01-27 ENCOUNTER — HOSPITAL ENCOUNTER (OUTPATIENT)
Dept: OTHER | Age: 80
Setting detail: THERAPIES SERIES
Discharge: HOME | End: 2025-01-27
Payer: MEDICARE

## 2025-01-27 VITALS
DIASTOLIC BLOOD PRESSURE: 58 MMHG | HEART RATE: 69 BPM | BODY MASS INDEX: 25.11 KG/M2 | SYSTOLIC BLOOD PRESSURE: 123 MMHG | RESPIRATION RATE: 18 BRPM | WEIGHT: 175 LBS | OXYGEN SATURATION: 98 %

## 2025-01-27 DIAGNOSIS — I50.20 HFREF (HEART FAILURE WITH REDUCED EJECTION FRACTION) (HCC): Primary | ICD-10-CM

## 2025-01-27 LAB
ANION GAP SERPL CALCULATED.3IONS-SCNC: 12 MMOL/L (ref 7–16)
BNP SERPL-MCNC: 564 PG/ML (ref 0–450)
BUN SERPL-MCNC: 40 MG/DL (ref 6–23)
CALCIUM SERPL-MCNC: 9.8 MG/DL (ref 8.6–10.2)
CHLORIDE SERPL-SCNC: 98 MMOL/L (ref 98–107)
CO2 SERPL-SCNC: 24 MMOL/L (ref 22–29)
CREAT SERPL-MCNC: 1.3 MG/DL (ref 0.7–1.2)
GFR, ESTIMATED: 55 ML/MIN/1.73M2
GLUCOSE SERPL-MCNC: 238 MG/DL (ref 74–99)
POTASSIUM SERPL-SCNC: 5 MMOL/L (ref 3.5–5)
SODIUM SERPL-SCNC: 134 MMOL/L (ref 132–146)

## 2025-01-27 PROCEDURE — 83880 ASSAY OF NATRIURETIC PEPTIDE: CPT

## 2025-01-27 PROCEDURE — 80048 BASIC METABOLIC PNL TOTAL CA: CPT

## 2025-01-27 PROCEDURE — 99214 OFFICE O/P EST MOD 30 MIN: CPT

## 2025-01-27 RX ORDER — BUMETANIDE 1 MG/1
1 TABLET ORAL
Qty: 90 TABLET | Refills: 3 | Status: SHIPPED | OUTPATIENT
Start: 2025-01-27

## 2025-01-27 ASSESSMENT — PATIENT HEALTH QUESTIONNAIRE - PHQ9
SUM OF ALL RESPONSES TO PHQ QUESTIONS 1-9: 0
2. FEELING DOWN, DEPRESSED OR HOPELESS: NOT AT ALL
SUM OF ALL RESPONSES TO PHQ QUESTIONS 1-9: 0
1. LITTLE INTEREST OR PLEASURE IN DOING THINGS: NOT AT ALL
SUM OF ALL RESPONSES TO PHQ QUESTIONS 1-9: 0
SUM OF ALL RESPONSES TO PHQ QUESTIONS 1-9: 0
SUM OF ALL RESPONSES TO PHQ9 QUESTIONS 1 & 2: 0

## 2025-01-27 NOTE — RESULT ENCOUNTER NOTE
Labs and CHF clinic note reviewed  Vitals: 123/58, 69    Current GDMT:  Entresto 24/26 mg BID  Toprol 50 mg daily   Spironolactone 12.5 mg daily   Jardiance 10 mg daily   Bumex 1 mg three times weekly     Increase Entresto 49/51 mg BID  Decrease Bumex twice weekly   Follow up labs in 1 week     Thank you

## 2025-01-27 NOTE — PROGRESS NOTES
Congestive Heart Failure Clinic   [facility]      Referring Provider: Dr Mendoza  Primary Care Physician: Terry Giraldo MD   Cardiologist: Dr Mendoza  Nephrologist: NARINDER      HISTORY OF PRESENT ILLNESS:     Pantera Payne is a 79 y.o. (1945) male with a history of HFrEF (EF < 40%)  Pre Cupid:     Lab Results   Component Value Date    LVEF 20 10/15/2024     Post Cupid:    Lab Results   Component Value Date    EFBP 36 (A) 10/01/2024         He presents to the CHF clinic for ongoing evaluation and monitoring of heart failure.    In the CHF clinic today he denies any adverse symptoms except:  [] Dizziness or lightheadedness   [] Syncope or near syncope  [] Recent Fall  [] Shortness of breath at rest   [] Dyspnea with exertion  [] Decline in functional capacity (unable to perform activities they had previously been able to do)  [] Fatigue   [] Orthopnea  [] PND  [] Nocturnal cough  [] Hemoptysis  [] Chest pain, pressure, or discomfort  [] Palpitations  [] Abdominal distention  [] Abdominal bloating  [] Early satiety  [] Blood in stool   [] Diarrhea  [] Constipation  [] Nausea/Vomiting  [] Decreased urinary response to oral diuretic   [] Scrotal swelling   [] Lower extremity edemaLeft leg  [] Used PRN doses of oral diuretic   [x] Weight gain        Wt Readings from Last 3 Encounters:   01/27/25 79.4 kg (175 lb)   01/23/25 78 kg (172 lb)   01/02/25 78 kg (172 lb)           SOCIAL HISTORY:  [x] Denies tobacco, alcohol or illicit drug abuse  [] Tobacco use:  [] ETOH use:  [] Illicit drug use:        MEDICATIONS:    Allergies   Allergen Reactions    Atorvastatin     Bethaprim [Sulfamethoxazole-Trimethoprim]     Erythromycin     Lipitor      Muscle aches.    Lisinopril Cough    Neomycin      Prior to Visit Medications    Medication Sig Taking? Authorizing Provider   spironolactone (ALDACTONE) 25 MG tablet Take 0.5 tablets by mouth daily Yes Elsa Hernandez, APRN -

## 2025-01-27 NOTE — TELEPHONE ENCOUNTER
Patient's wife Shoshana called the clinic back regarding new medication orders.     Labs and CHF clinic note reviewed  Vitals: 123/58, 69     Current GDMT:  Entresto 24/26 mg BID  Toprol 50 mg daily   Spironolactone 12.5 mg daily   Jardiance 10 mg daily   Bumex 1 mg three times weekly      Increase Entresto 49/51 mg BID  Decrease Bumex twice weekly   Follow up labs in 1 week      Thank you      Electronically signed by Elsa Hernandez APRN - CNP at 1/27/2025 11:25 AM    Shoshana expressed verbal understanding of new orders and repeated them back correctly. Encouraged to call the clinic for any questions or concerns.

## 2025-01-29 ENCOUNTER — HOSPITAL ENCOUNTER (OUTPATIENT)
Dept: CARDIAC REHAB | Age: 80
Setting detail: THERAPIES SERIES
Discharge: HOME | End: 2025-01-29
Payer: MEDICARE

## 2025-01-29 PROCEDURE — 93798 PHYS/QHP OP CAR RHAB W/ECG: CPT

## 2025-01-31 ENCOUNTER — HOSPITAL ENCOUNTER (OUTPATIENT)
Dept: CARDIAC REHAB | Age: 80
Setting detail: THERAPIES SERIES
Discharge: HOME | End: 2025-01-31
Payer: MEDICARE

## 2025-01-31 PROCEDURE — 93798 PHYS/QHP OP CAR RHAB W/ECG: CPT

## 2025-02-05 ENCOUNTER — HOSPITAL ENCOUNTER (OUTPATIENT)
Dept: CARDIAC REHAB | Age: 80
Setting detail: THERAPIES SERIES
Discharge: HOME | End: 2025-02-05
Payer: MEDICARE

## 2025-02-05 ENCOUNTER — HOSPITAL ENCOUNTER (OUTPATIENT)
Age: 80
Discharge: HOME | End: 2025-02-05
Payer: MEDICARE

## 2025-02-05 DIAGNOSIS — I50.20 HFREF (HEART FAILURE WITH REDUCED EJECTION FRACTION) (HCC): ICD-10-CM

## 2025-02-05 LAB
ANION GAP SERPL CALCULATED.3IONS-SCNC: 11 MMOL/L (ref 7–16)
BUN SERPL-MCNC: 46 MG/DL (ref 6–23)
CALCIUM SERPL-MCNC: 9.6 MG/DL (ref 8.6–10.2)
CHLORIDE SERPL-SCNC: 101 MMOL/L (ref 98–107)
CO2 SERPL-SCNC: 24 MMOL/L (ref 22–29)
CREAT SERPL-MCNC: 1.3 MG/DL (ref 0.7–1.2)
GFR, ESTIMATED: 54 ML/MIN/1.73M2
GLUCOSE SERPL-MCNC: 209 MG/DL (ref 74–99)
POTASSIUM SERPL-SCNC: 5.2 MMOL/L (ref 3.5–5)
SODIUM SERPL-SCNC: 136 MMOL/L (ref 132–146)

## 2025-02-05 PROCEDURE — 80048 BASIC METABOLIC PNL TOTAL CA: CPT

## 2025-02-05 PROCEDURE — 93798 PHYS/QHP OP CAR RHAB W/ECG: CPT

## 2025-02-05 PROCEDURE — 36415 COLL VENOUS BLD VENIPUNCTURE: CPT

## 2025-02-05 NOTE — RESULT ENCOUNTER NOTE
Labs and CHF clinic note reviewed   Decrease potassium in diet ensure that he is staying hydrated  Follow up as scheduled    Thank you

## 2025-02-06 ENCOUNTER — TELEPHONE (OUTPATIENT)
Dept: OTHER | Age: 80
End: 2025-02-06

## 2025-02-06 NOTE — TELEPHONE ENCOUNTER
Called patient's wife Shoshana to discuss abnormal labs.    Elsa Hernandez, APRN - Sandra Seymour RN  Cc: Mirtha Herrera RN; Alma Strickland RN  Labs and CHF clinic note reviewed  Decrease potassium in diet ensure that he is staying hydrated  Follow up as scheduled    Thank you    Shoshana expressed verbal understanding and repeated orders back correctly. Encouraged to call the clinic back with any questions.      Script sent.

## 2025-02-07 ENCOUNTER — HOSPITAL ENCOUNTER (OUTPATIENT)
Age: 80
Discharge: HOME | End: 2025-02-07
Payer: MEDICARE

## 2025-02-07 ENCOUNTER — HOSPITAL ENCOUNTER (OUTPATIENT)
Dept: CARDIAC REHAB | Age: 80
Setting detail: THERAPIES SERIES
Discharge: HOME | End: 2025-02-07
Payer: MEDICARE

## 2025-02-07 ENCOUNTER — TELEPHONE (OUTPATIENT)
Dept: ENDOCRINOLOGY | Age: 80
End: 2025-02-07

## 2025-02-07 DIAGNOSIS — E07.9 THYROID DYSFUNCTION: ICD-10-CM

## 2025-02-07 DIAGNOSIS — E11.65 TYPE 2 DIABETES MELLITUS WITH HYPERGLYCEMIA, WITH LONG-TERM CURRENT USE OF INSULIN (HCC): Primary | ICD-10-CM

## 2025-02-07 DIAGNOSIS — E11.65 TYPE 2 DIABETES MELLITUS WITH HYPERGLYCEMIA, WITH LONG-TERM CURRENT USE OF INSULIN (HCC): ICD-10-CM

## 2025-02-07 DIAGNOSIS — Z79.4 TYPE 2 DIABETES MELLITUS WITH HYPERGLYCEMIA, WITH LONG-TERM CURRENT USE OF INSULIN (HCC): ICD-10-CM

## 2025-02-07 DIAGNOSIS — Z79.4 TYPE 2 DIABETES MELLITUS WITH HYPERGLYCEMIA, WITH LONG-TERM CURRENT USE OF INSULIN (HCC): Primary | ICD-10-CM

## 2025-02-07 LAB
ANION GAP SERPL CALCULATED.3IONS-SCNC: 14 MMOL/L (ref 7–16)
BUN SERPL-MCNC: 51 MG/DL (ref 6–23)
CALCIUM SERPL-MCNC: 9.5 MG/DL (ref 8.6–10.2)
CHLORIDE SERPL-SCNC: 100 MMOL/L (ref 98–107)
CO2 SERPL-SCNC: 23 MMOL/L (ref 22–29)
CREAT SERPL-MCNC: 1.5 MG/DL (ref 0.7–1.2)
GFR, ESTIMATED: 47 ML/MIN/1.73M2
GLUCOSE SERPL-MCNC: 162 MG/DL (ref 74–99)
HBA1C MFR BLD: 7.8 % (ref 4–5.6)
POTASSIUM SERPL-SCNC: 4.5 MMOL/L (ref 3.5–5)
SODIUM SERPL-SCNC: 137 MMOL/L (ref 132–146)
T4 FREE SERPL-MCNC: 1.4 NG/DL (ref 0.9–1.7)
TSH SERPL DL<=0.05 MIU/L-ACNC: 2.17 UIU/ML (ref 0.27–4.2)

## 2025-02-07 PROCEDURE — 36415 COLL VENOUS BLD VENIPUNCTURE: CPT

## 2025-02-07 PROCEDURE — 83036 HEMOGLOBIN GLYCOSYLATED A1C: CPT

## 2025-02-07 PROCEDURE — 84443 ASSAY THYROID STIM HORMONE: CPT

## 2025-02-07 PROCEDURE — 84439 ASSAY OF FREE THYROXINE: CPT

## 2025-02-07 PROCEDURE — 93798 PHYS/QHP OP CAR RHAB W/ECG: CPT

## 2025-02-07 PROCEDURE — 80048 BASIC METABOLIC PNL TOTAL CA: CPT

## 2025-02-07 NOTE — TELEPHONE ENCOUNTER
Please call the patient and get her to do the blood work today or tomorrow so we will get the result before Monday

## 2025-02-10 ENCOUNTER — OFFICE VISIT (OUTPATIENT)
Dept: ENDOCRINOLOGY | Age: 80
End: 2025-02-10

## 2025-02-10 VITALS
RESPIRATION RATE: 18 BRPM | OXYGEN SATURATION: 99 % | HEIGHT: 70 IN | SYSTOLIC BLOOD PRESSURE: 115 MMHG | TEMPERATURE: 97.8 F | BODY MASS INDEX: 26.2 KG/M2 | HEART RATE: 65 BPM | DIASTOLIC BLOOD PRESSURE: 67 MMHG | WEIGHT: 183 LBS

## 2025-02-10 DIAGNOSIS — E78.2 MIXED HYPERLIPIDEMIA: ICD-10-CM

## 2025-02-10 DIAGNOSIS — E07.9 THYROID DYSFUNCTION: Primary | ICD-10-CM

## 2025-02-10 DIAGNOSIS — E11.65 TYPE 2 DIABETES MELLITUS WITH HYPERGLYCEMIA, WITH LONG-TERM CURRENT USE OF INSULIN (HCC): ICD-10-CM

## 2025-02-10 DIAGNOSIS — Z97.8 USES SELF-APPLIED CONTINUOUS GLUCOSE MONITORING DEVICE: ICD-10-CM

## 2025-02-10 DIAGNOSIS — Z79.4 TYPE 2 DIABETES MELLITUS WITH HYPERGLYCEMIA, WITH LONG-TERM CURRENT USE OF INSULIN (HCC): ICD-10-CM

## 2025-02-10 RX ORDER — INSULIN ASPART 100 [IU]/ML
INJECTION, SOLUTION INTRAVENOUS; SUBCUTANEOUS
Qty: 5 ADJUSTABLE DOSE PRE-FILLED PEN SYRINGE | Refills: 11 | Status: SHIPPED | OUTPATIENT
Start: 2025-02-10

## 2025-02-10 NOTE — PROGRESS NOTES
[facility]  [address], [id number]  [facility] [address]  Dept: [phone]  Loc: [phone]        Date of service: 2/10/2025   Primary Care Physician: Terry Giraldo MD  Provider: Perez Li MD     Reason for the visit:  Uncontrolled DM    History of Present Illness:  The history is provided by the patient. Accuracy of the patient data is excellent    Pantera Payne is a very pleasant 79 y.o. old male with PMH of poorly controlled Type 2 DM; Afib; Mitral valve prolapse; Pulmonary HTN, CAD; Hyperlipidemia; Hypertension  and other listed below seen today for a follow-up visit.      Toujeo 30u AM, 5 units nightly   Humalog 5-6 with meals     The patient was diagnosed with type 2 DM.  He is currently on Jardiance 10 mg daily, Toujeo 28 units in the morning and 7 units at night, Humalog per sliding scale. Patient has had no hypoglycemic episodes. Patient has not been eating consistent carbohydrate meals, self-blood glucose monitoring has been above goal. In addition, patient denied macrovascular or microvascular complications. The patient is not up to date with yearly diabetic eye exam.     Lab Results   Component Value Date/Time    LABA1C 7.8 02/07/2025 01:00 PM     Past medical history:   Past Medical History:   Diagnosis Date    Atrial fibrillation (HCC) 1984, brief episode.    CAD (coronary artery disease)     Cancer (HCC)     Basal cell carcinoma, excisionx 2 (left temporal area).    Detached retina 8/20/2007    Left eye.  Laser repair.  8/2009: Residual scar tissue detected on exam in 8/2009.  Patient reported some deterioration in visual acuity.    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    Hearing problem     Hyperlipidemia     Hypertension     Kidney stone 1997.    Mild tricuspid regurgitation 11/18/13    Mitral valve prolapse 11/18/13    mild    Pulmonary hypertension (HCC) 11/18/13    Type II or unspecified type diabetes mellitus without mention

## 2025-02-12 ENCOUNTER — HOSPITAL ENCOUNTER (OUTPATIENT)
Dept: CARDIAC REHAB | Age: 80
Setting detail: THERAPIES SERIES
Discharge: HOME | End: 2025-02-12
Payer: MEDICARE

## 2025-02-12 PROCEDURE — 93798 PHYS/QHP OP CAR RHAB W/ECG: CPT

## 2025-02-12 ASSESSMENT — PATIENT HEALTH QUESTIONNAIRE - PHQ9
2. FEELING DOWN, DEPRESSED OR HOPELESS: NOT AT ALL
7. TROUBLE CONCENTRATING ON THINGS, SUCH AS READING THE NEWSPAPER OR WATCHING TELEVISION: SEVERAL DAYS
3. TROUBLE FALLING OR STAYING ASLEEP: NOT AT ALL
4. FEELING TIRED OR HAVING LITTLE ENERGY: NOT AT ALL
10. IF YOU CHECKED OFF ANY PROBLEMS, HOW DIFFICULT HAVE THESE PROBLEMS MADE IT FOR YOU TO DO YOUR WORK, TAKE CARE OF THINGS AT HOME, OR GET ALONG WITH OTHER PEOPLE: NOT DIFFICULT AT ALL
SUM OF ALL RESPONSES TO PHQ QUESTIONS 1-9: 1
SUM OF ALL RESPONSES TO PHQ9 QUESTIONS 1 & 2: 0
8. MOVING OR SPEAKING SO SLOWLY THAT OTHER PEOPLE COULD HAVE NOTICED. OR THE OPPOSITE, BEING SO FIGETY OR RESTLESS THAT YOU HAVE BEEN MOVING AROUND A LOT MORE THAN USUAL: NOT AT ALL
9. THOUGHTS THAT YOU WOULD BE BETTER OFF DEAD, OR OF HURTING YOURSELF: NOT AT ALL
SUM OF ALL RESPONSES TO PHQ QUESTIONS 1-9: 1
5. POOR APPETITE OR OVEREATING: NOT AT ALL
1. LITTLE INTEREST OR PLEASURE IN DOING THINGS: NOT AT ALL
6. FEELING BAD ABOUT YOURSELF - OR THAT YOU ARE A FAILURE OR HAVE LET YOURSELF OR YOUR FAMILY DOWN: NOT AT ALL
SUM OF ALL RESPONSES TO PHQ QUESTIONS 1-9: 1
SUM OF ALL RESPONSES TO PHQ QUESTIONS 1-9: 1

## 2025-02-14 ENCOUNTER — HOSPITAL ENCOUNTER (OUTPATIENT)
Dept: CARDIAC REHAB | Age: 80
Setting detail: THERAPIES SERIES
Discharge: HOME | End: 2025-02-14
Payer: MEDICARE

## 2025-02-14 ENCOUNTER — HOSPITAL ENCOUNTER (OUTPATIENT)
Dept: CARDIOLOGY | Age: 80
Discharge: HOME | End: 2025-02-16
Payer: MEDICARE

## 2025-02-14 VITALS
BODY MASS INDEX: 24.62 KG/M2 | DIASTOLIC BLOOD PRESSURE: 78 MMHG | WEIGHT: 172 LBS | SYSTOLIC BLOOD PRESSURE: 116 MMHG | HEIGHT: 70 IN

## 2025-02-14 DIAGNOSIS — I25.5 ISCHEMIC CARDIOMYOPATHY: ICD-10-CM

## 2025-02-14 LAB
LEFT VENTRICULAR EJECTION FRACTION HIGH VALUE: 35 %
LEFT VENTRICULAR EJECTION FRACTION MODE: NORMAL
LV EF: 30 %

## 2025-02-14 PROCEDURE — 93798 PHYS/QHP OP CAR RHAB W/ECG: CPT

## 2025-02-14 PROCEDURE — 93306 TTE W/DOPPLER COMPLETE: CPT

## 2025-02-14 PROCEDURE — 2500000003 HC RX 250 WO HCPCS: Performed by: INTERNAL MEDICINE

## 2025-02-14 RX ORDER — SODIUM CHLORIDE 0.9 % (FLUSH) 0.9 %
10 SYRINGE (ML) INJECTION PRN
Status: DISCONTINUED | OUTPATIENT
Start: 2025-02-14 | End: 2025-02-17 | Stop reason: HOSPADM

## 2025-02-14 RX ADMIN — SODIUM CHLORIDE, PRESERVATIVE FREE 10 ML: 5 INJECTION INTRAVENOUS at 11:48

## 2025-02-14 RX ADMIN — SODIUM CHLORIDE, PRESERVATIVE FREE 10 ML: 5 INJECTION INTRAVENOUS at 11:50

## 2025-02-15 LAB
ECHO AR MAX VEL PISA: 2.6 M/S
ECHO AV AREA PEAK VELOCITY: 2.7 CM2
ECHO AV AREA VTI: 2.5 CM2
ECHO AV AREA/BSA PEAK VELOCITY: 1.4 CM2/M2
ECHO AV AREA/BSA VTI: 1.3 CM2/M2
ECHO AV CUSP MM: 1.3 CM
ECHO AV MEAN GRADIENT: 4 MMHG
ECHO AV MEAN VELOCITY: 0.9 M/S
ECHO AV PEAK GRADIENT: 8 MMHG
ECHO AV PEAK VELOCITY: 1.4 M/S
ECHO AV REGURGITANT PHT: 731.5 MS
ECHO AV VELOCITY RATIO: 0.71
ECHO AV VTI: 27.1 CM
ECHO BSA: 1.96 M2
ECHO EST RA PRESSURE: 3 MMHG
ECHO LA DIAMETER INDEX: 2.3 CM/M2
ECHO LA DIAMETER: 4.5 CM
ECHO LA VOL A-L A2C: 103 ML (ref 18–58)
ECHO LA VOL A-L A4C: 135 ML (ref 18–58)
ECHO LA VOL MOD A2C: 99 ML (ref 18–58)
ECHO LA VOL MOD A4C: 128 ML (ref 18–58)
ECHO LA VOLUME AREA LENGTH: 117 ML
ECHO LA VOLUME INDEX A-L A2C: 53 ML/M2 (ref 16–34)
ECHO LA VOLUME INDEX A-L A4C: 69 ML/M2 (ref 16–34)
ECHO LA VOLUME INDEX AREA LENGTH: 60 ML/M2 (ref 16–34)
ECHO LA VOLUME INDEX MOD A2C: 51 ML/M2 (ref 16–34)
ECHO LA VOLUME INDEX MOD A4C: 65 ML/M2 (ref 16–34)
ECHO LV EDV A2C: 105 ML
ECHO LV EDV A4C: 143 ML
ECHO LV EDV BP: 124 ML (ref 67–155)
ECHO LV EDV INDEX A4C: 73 ML/M2
ECHO LV EDV INDEX BP: 63 ML/M2
ECHO LV EDV NDEX A2C: 54 ML/M2
ECHO LV EF PHYSICIAN: 35 %
ECHO LV EJECTION FRACTION A2C: 35 %
ECHO LV EJECTION FRACTION A4C: 34 %
ECHO LV EJECTION FRACTION BIPLANE: 32 % (ref 55–100)
ECHO LV ESV A2C: 69 ML
ECHO LV ESV A4C: 94 ML
ECHO LV ESV BP: 84 ML (ref 22–58)
ECHO LV ESV INDEX A2C: 35 ML/M2
ECHO LV ESV INDEX A4C: 48 ML/M2
ECHO LV ESV INDEX BP: 43 ML/M2
ECHO LV FRACTIONAL SHORTENING: 17 % (ref 28–44)
ECHO LV INTERNAL DIMENSION DIASTOLE INDEX: 3.37 CM/M2
ECHO LV INTERNAL DIMENSION DIASTOLIC: 6.6 CM (ref 4.2–5.9)
ECHO LV INTERNAL DIMENSION SYSTOLIC INDEX: 2.81 CM/M2
ECHO LV INTERNAL DIMENSION SYSTOLIC: 5.5 CM
ECHO LV IVSD: 1.1 CM (ref 0.6–1)
ECHO LV MASS 2D: 309.3 G (ref 88–224)
ECHO LV MASS INDEX 2D: 157.8 G/M2 (ref 49–115)
ECHO LV POSTERIOR WALL DIASTOLIC: 1 CM (ref 0.6–1)
ECHO LV RELATIVE WALL THICKNESS RATIO: 0.3
ECHO LVOT AREA: 3.8 CM2
ECHO LVOT AV VTI INDEX: 0.68
ECHO LVOT DIAM: 2.2 CM
ECHO LVOT MEAN GRADIENT: 2 MMHG
ECHO LVOT PEAK GRADIENT: 4 MMHG
ECHO LVOT PEAK VELOCITY: 1 M/S
ECHO LVOT STROKE VOLUME INDEX: 35.7 ML/M2
ECHO LVOT SV: 69.9 ML
ECHO LVOT VTI: 18.4 CM
ECHO MV A VELOCITY: 0.65 M/S
ECHO MV AREA PHT: 3.5 CM2
ECHO MV AREA VTI: 3 CM2
ECHO MV E DECELERATION TIME (DT): 263.2 MS
ECHO MV E VELOCITY: 0.48 M/S
ECHO MV E/A RATIO: 0.74
ECHO MV LVOT VTI INDEX: 1.27
ECHO MV MAX VELOCITY: 0.7 M/S
ECHO MV MEAN GRADIENT: 1 MMHG
ECHO MV MEAN VELOCITY: 0.3 M/S
ECHO MV PEAK GRADIENT: 2 MMHG
ECHO MV PRESSURE HALF TIME (PHT): 62.8 MS
ECHO MV VTI: 23.3 CM
ECHO PV MAX VELOCITY: 1.2 M/S
ECHO PV MEAN GRADIENT: 2 MMHG
ECHO PV MEAN VELOCITY: 0.6 M/S
ECHO PV PEAK GRADIENT: 6 MMHG
ECHO PV VTI: 12.8 CM
ECHO RIGHT VENTRICULAR SYSTOLIC PRESSURE (RVSP): 30 MMHG
ECHO RV INTERNAL DIMENSION: 2.5 CM
ECHO TV REGURGITANT MAX VELOCITY: 2.58 M/S
ECHO TV REGURGITANT PEAK GRADIENT: 27 MMHG

## 2025-02-15 PROCEDURE — 93306 TTE W/DOPPLER COMPLETE: CPT | Performed by: INTERNAL MEDICINE

## 2025-02-18 DIAGNOSIS — I48.91 A-FIB (HCC): ICD-10-CM

## 2025-02-18 DIAGNOSIS — I50.9 CHF (CONGESTIVE HEART FAILURE) (HCC): Primary | ICD-10-CM

## 2025-02-18 DIAGNOSIS — I25.10 CAD (CORONARY ARTERY DISEASE): ICD-10-CM

## 2025-02-19 ENCOUNTER — HOSPITAL ENCOUNTER (OUTPATIENT)
Dept: CARDIAC REHAB | Age: 80
Setting detail: THERAPIES SERIES
Discharge: HOME | End: 2025-02-19
Payer: MEDICARE

## 2025-02-19 PROCEDURE — 93798 PHYS/QHP OP CAR RHAB W/ECG: CPT

## 2025-02-21 ENCOUNTER — HOSPITAL ENCOUNTER (OUTPATIENT)
Dept: OTHER | Age: 80
Setting detail: THERAPIES SERIES
Discharge: HOME | End: 2025-02-21
Payer: MEDICARE

## 2025-02-21 ENCOUNTER — HOSPITAL ENCOUNTER (OUTPATIENT)
Dept: CARDIAC REHAB | Age: 80
Setting detail: THERAPIES SERIES
Discharge: HOME | End: 2025-02-21
Payer: MEDICARE

## 2025-02-21 VITALS
WEIGHT: 176 LBS | SYSTOLIC BLOOD PRESSURE: 110 MMHG | HEART RATE: 66 BPM | RESPIRATION RATE: 18 BRPM | BODY MASS INDEX: 25.25 KG/M2 | OXYGEN SATURATION: 97 % | DIASTOLIC BLOOD PRESSURE: 56 MMHG

## 2025-02-21 DIAGNOSIS — I48.91 A-FIB (HCC): ICD-10-CM

## 2025-02-21 DIAGNOSIS — I50.9 CHF (CONGESTIVE HEART FAILURE) (HCC): ICD-10-CM

## 2025-02-21 DIAGNOSIS — I25.10 CAD (CORONARY ARTERY DISEASE): ICD-10-CM

## 2025-02-21 LAB
ANION GAP SERPL CALCULATED.3IONS-SCNC: 16 MMOL/L (ref 7–16)
BNP SERPL-MCNC: 386 PG/ML (ref 0–450)
BUN SERPL-MCNC: 43 MG/DL (ref 6–23)
CALCIUM SERPL-MCNC: 9.4 MG/DL (ref 8.6–10.2)
CHLORIDE SERPL-SCNC: 96 MMOL/L (ref 98–107)
CO2 SERPL-SCNC: 21 MMOL/L (ref 22–29)
CREAT SERPL-MCNC: 1.5 MG/DL (ref 0.7–1.2)
GFR, ESTIMATED: 48 ML/MIN/1.73M2
GLUCOSE SERPL-MCNC: 221 MG/DL (ref 74–99)
MAGNESIUM SERPL-MCNC: 2.6 MG/DL (ref 1.6–2.6)
POTASSIUM SERPL-SCNC: 4.4 MMOL/L (ref 3.5–5)
SODIUM SERPL-SCNC: 133 MMOL/L (ref 132–146)

## 2025-02-21 PROCEDURE — 80048 BASIC METABOLIC PNL TOTAL CA: CPT

## 2025-02-21 PROCEDURE — 83735 ASSAY OF MAGNESIUM: CPT

## 2025-02-21 PROCEDURE — 83880 ASSAY OF NATRIURETIC PEPTIDE: CPT

## 2025-02-21 PROCEDURE — 93798 PHYS/QHP OP CAR RHAB W/ECG: CPT

## 2025-02-21 PROCEDURE — 99214 OFFICE O/P EST MOD 30 MIN: CPT

## 2025-02-21 NOTE — PLAN OF CARE
Problem: Chronic Conditions and Co-morbidities  Goal: Patient's chronic conditions and co-morbidity symptoms are monitored and maintained or improved  Flowsheets (Taken 2/21/2025 1018)  Care Plan - Patient's Chronic Conditions and Co-Morbidity Symptoms are Monitored and Maintained or Improved: Monitor and assess patient's chronic conditions and comorbid symptoms for stability, deterioration, or improvement

## 2025-02-21 NOTE — PROGRESS NOTES
Congestive Heart Failure Clinic   [facility]      Referring Provider: Dr Mendoza  Primary Care Physician: Terry Giraldo MD   Cardiologist: Dr Mendoza  Nephrologist: NARINDER      HISTORY OF PRESENT ILLNESS:     Pantera Payne is a 79 y.o. (1945) male with a history of HFrEF (EF < 40%)  Pre Cupid:     Lab Results   Component Value Date    LVEF 30 02/14/2025     Post Cupid:    Lab Results   Component Value Date    EFBP 32 (A) 02/14/2025         He presents to the CHF clinic for ongoing evaluation and monitoring of heart failure.    In the CHF clinic today he denies any adverse symptoms except:  [] Dizziness or lightheadedness   [] Syncope or near syncope  [] Recent Fall  [] Shortness of breath at rest   [] Dyspnea with exertion  [] Decline in functional capacity (unable to perform activities they had previously been able to do)  [] Fatigue   [] Orthopnea  [] PND  [] Nocturnal cough  [] Hemoptysis  [] Chest pain, pressure, or discomfort  [] Palpitations  [] Abdominal distention  [] Abdominal bloating  [] Early satiety  [] Blood in stool   [] Diarrhea  [] Constipation  [] Nausea/Vomiting  [] Decreased urinary response to oral diuretic   [] Scrotal swelling   [] Lower extremity edemaLeft leg  [] Used PRN doses of oral diuretic   [] Weight gain        Wt Readings from Last 3 Encounters:   02/21/25 79.8 kg (176 lb)   02/14/25 78 kg (172 lb)   02/10/25 83 kg (183 lb)           SOCIAL HISTORY:  [x] Denies tobacco, alcohol or illicit drug abuse  [] Tobacco use:  [] ETOH use:  [] Illicit drug use:        MEDICATIONS:    Allergies   Allergen Reactions    Atorvastatin     Bethaprim [Sulfamethoxazole-Trimethoprim]     Erythromycin     Lipitor      Muscle aches.    Lisinopril Cough    Neomycin      Prior to Visit Medications    Medication Sig Taking? Authorizing Provider   insulin aspart (NOVOLOG FLEXPEN) 100 UNIT/ML injection pen Inject 7 units 3 times daily with meals

## 2025-02-24 ENCOUNTER — OFFICE VISIT (OUTPATIENT)
Dept: CARDIOLOGY CLINIC | Age: 80
End: 2025-02-24
Payer: MEDICARE

## 2025-02-24 VITALS
HEIGHT: 70 IN | BODY MASS INDEX: 26.05 KG/M2 | HEART RATE: 57 BPM | WEIGHT: 182 LBS | DIASTOLIC BLOOD PRESSURE: 52 MMHG | SYSTOLIC BLOOD PRESSURE: 102 MMHG | RESPIRATION RATE: 20 BRPM

## 2025-02-24 DIAGNOSIS — Z79.01 ANTICOAGULATED BY ANTICOAGULATION TREATMENT: ICD-10-CM

## 2025-02-24 DIAGNOSIS — R05.3 CHRONIC COUGH: ICD-10-CM

## 2025-02-24 DIAGNOSIS — N18.31 STAGE 3A CHRONIC KIDNEY DISEASE (HCC): ICD-10-CM

## 2025-02-24 DIAGNOSIS — E78.2 MIXED HYPERLIPIDEMIA: ICD-10-CM

## 2025-02-24 DIAGNOSIS — R05.8 ACE-INHIBITOR COUGH: ICD-10-CM

## 2025-02-24 DIAGNOSIS — G24.8 FOCAL DYSTONIA: ICD-10-CM

## 2025-02-24 DIAGNOSIS — Z79.4 INSULIN-REQUIRING OR DEPENDENT TYPE II DIABETES MELLITUS (HCC): ICD-10-CM

## 2025-02-24 DIAGNOSIS — Z87.19 HISTORY OF ESOPHAGEAL SPASM: ICD-10-CM

## 2025-02-24 DIAGNOSIS — I25.10 CAD IN NATIVE ARTERY: Primary | ICD-10-CM

## 2025-02-24 DIAGNOSIS — E11.9 INSULIN-REQUIRING OR DEPENDENT TYPE II DIABETES MELLITUS (HCC): ICD-10-CM

## 2025-02-24 DIAGNOSIS — I45.4 IVCD (INTRAVENTRICULAR CONDUCTION DEFECT): ICD-10-CM

## 2025-02-24 DIAGNOSIS — I44.4 LAFB (LEFT ANTERIOR FASCICULAR BLOCK): ICD-10-CM

## 2025-02-24 DIAGNOSIS — I48.0 PAF (PAROXYSMAL ATRIAL FIBRILLATION) (HCC): ICD-10-CM

## 2025-02-24 DIAGNOSIS — Z87.898 HISTORY OF DYSPHAGIA: ICD-10-CM

## 2025-02-24 DIAGNOSIS — T46.4X5A ACE-INHIBITOR COUGH: ICD-10-CM

## 2025-02-24 DIAGNOSIS — G47.33 OBSTRUCTIVE SLEEP APNEA: ICD-10-CM

## 2025-02-24 DIAGNOSIS — I50.22 CHRONIC HFREF (HEART FAILURE WITH REDUCED EJECTION FRACTION) (HCC): ICD-10-CM

## 2025-02-24 DIAGNOSIS — I25.5 ISCHEMIC CARDIOMYOPATHY: ICD-10-CM

## 2025-02-24 DIAGNOSIS — Z86.79 HISTORY OF VENTRICULAR TACHYCARDIA: ICD-10-CM

## 2025-02-24 DIAGNOSIS — I10 PRIMARY HYPERTENSION: ICD-10-CM

## 2025-02-24 DIAGNOSIS — Z86.79 HISTORY OF FIRST DEGREE AV BLOCK: ICD-10-CM

## 2025-02-24 DIAGNOSIS — Z95.1 HX OF CABG: ICD-10-CM

## 2025-02-24 DIAGNOSIS — I44.7 LBBB (LEFT BUNDLE BRANCH BLOCK): ICD-10-CM

## 2025-02-24 DIAGNOSIS — Z87.898 HISTORY OF SYNCOPE: ICD-10-CM

## 2025-02-24 PROCEDURE — 1159F MED LIST DOCD IN RCRD: CPT | Performed by: INTERNAL MEDICINE

## 2025-02-24 PROCEDURE — G8417 CALC BMI ABV UP PARAM F/U: HCPCS | Performed by: INTERNAL MEDICINE

## 2025-02-24 PROCEDURE — 1123F ACP DISCUSS/DSCN MKR DOCD: CPT | Performed by: INTERNAL MEDICINE

## 2025-02-24 PROCEDURE — 1036F TOBACCO NON-USER: CPT | Performed by: INTERNAL MEDICINE

## 2025-02-24 PROCEDURE — 93000 ELECTROCARDIOGRAM COMPLETE: CPT | Performed by: INTERNAL MEDICINE

## 2025-02-24 PROCEDURE — G8427 DOCREV CUR MEDS BY ELIG CLIN: HCPCS | Performed by: INTERNAL MEDICINE

## 2025-02-24 PROCEDURE — 1160F RVW MEDS BY RX/DR IN RCRD: CPT | Performed by: INTERNAL MEDICINE

## 2025-02-24 PROCEDURE — 3051F HG A1C>EQUAL 7.0%<8.0%: CPT | Performed by: INTERNAL MEDICINE

## 2025-02-24 PROCEDURE — 3078F DIAST BP <80 MM HG: CPT | Performed by: INTERNAL MEDICINE

## 2025-02-24 PROCEDURE — 99215 OFFICE O/P EST HI 40 MIN: CPT | Performed by: INTERNAL MEDICINE

## 2025-02-24 PROCEDURE — 3074F SYST BP LT 130 MM HG: CPT | Performed by: INTERNAL MEDICINE

## 2025-02-24 RX ORDER — METOPROLOL SUCCINATE 25 MG/1
25 TABLET, EXTENDED RELEASE ORAL 2 TIMES DAILY
Qty: 60 TABLET | Refills: 5 | Status: SHIPPED | OUTPATIENT
Start: 2025-02-24

## 2025-02-24 NOTE — PROGRESS NOTES
OFFICE VISIT        PRIMARY CARE PHYSICIAN:    Terry Giraldo MD         ALLERGIES / SENSITIVITIES:    Allergies   Allergen Reactions    Atorvastatin     Bethaprim [Sulfamethoxazole-Trimethoprim]     Erythromycin     Lipitor      Muscle aches.    Lisinopril Cough    Neomycin           REVIEWED MEDICATIONS:      Current Outpatient Medications:     empagliflozin (JARDIANCE) 25 MG tablet, Take 1 tablet by mouth daily, Disp: 30 tablet, Rfl: 5    metoprolol succinate (TOPROL XL) 25 MG extended release tablet, Take 1 tablet by mouth in the morning and at bedtime 25mg BID, Disp: 60 tablet, Rfl: 5    insulin aspart (NOVOLOG FLEXPEN) 100 UNIT/ML injection pen, Inject 7 units 3 times daily with meals plus the following sliding scale. -200 add 2U, -250 add 4U, -300 add 6U, -350 add 8U, -400 add 10U, BS over 400 add 12U. MAX 50 u/day, Disp: 5 Adjustable Dose Pre-filled Pen Syringe, Rfl: 11    omeprazole (PRILOSEC) 20 MG delayed release capsule, Take 1 capsule by mouth three times a week, Disp: , Rfl:     bumetanide (BUMEX) 1 MG tablet, Take 1 tablet by mouth Twice a Week (Patient taking differently: Take 1 tablet by mouth Twice a Week Indications: Monday & Thursday.), Disp: 90 tablet, Rfl: 3    sacubitril-valsartan (ENTRESTO) 49-51 MG per tablet, Take 1 tablet by mouth 2 times daily, Disp: 60 tablet, Rfl: 3    spironolactone (ALDACTONE) 25 MG tablet, Take 0.5 tablets by mouth daily, Disp: 30 tablet, Rfl: 5    amiodarone (CORDARONE) 200 MG tablet, Take 1 tablet by mouth daily Not taking any on Sunday/Wednesday, Disp: , Rfl:     magnesium oxide (MAG-OX) 400 (240 Mg) MG tablet, Take 1 tablet by mouth daily, Disp: , Rfl:     zinc gluconate 50 MG tablet, Take 1 tablet by mouth daily, Disp: , Rfl:     Insulin Glargine, 2 Unit Dial, (TOUJEO MAX SOLOSTAR) 300 UNIT/ML concentrated injection pen, Inject 28 units daily in the morning, Disp: 12 Adjustable Dose Pre-filled Pen Syringe, Rfl: 4    apixaban

## 2025-02-26 ENCOUNTER — HOSPITAL ENCOUNTER (OUTPATIENT)
Dept: CARDIAC REHAB | Age: 80
Setting detail: THERAPIES SERIES
Discharge: HOME | End: 2025-02-26
Payer: MEDICARE

## 2025-02-26 PROCEDURE — 93798 PHYS/QHP OP CAR RHAB W/ECG: CPT

## 2025-02-27 ENCOUNTER — OFFICE VISIT (OUTPATIENT)
Dept: NON INVASIVE DIAGNOSTICS | Age: 80
End: 2025-02-27

## 2025-02-27 ENCOUNTER — TELEPHONE (OUTPATIENT)
Dept: NON INVASIVE DIAGNOSTICS | Age: 80
End: 2025-02-27

## 2025-02-27 VITALS
HEIGHT: 70 IN | DIASTOLIC BLOOD PRESSURE: 60 MMHG | RESPIRATION RATE: 18 BRPM | SYSTOLIC BLOOD PRESSURE: 120 MMHG | WEIGHT: 180 LBS | BODY MASS INDEX: 25.77 KG/M2 | HEART RATE: 59 BPM

## 2025-02-27 DIAGNOSIS — I25.10 CAD IN NATIVE ARTERY: Primary | ICD-10-CM

## 2025-02-27 NOTE — PROGRESS NOTES
[facility]- [facility] [place]  Consultation Report  PATIENT: Pantera Payne  MEDICAL RECORD NUMBER: [id number]  DATE OF SERVICE:  2/27/2025  ATTENDING ELECTROPHYSIOLOGIST: Marycruz Mariano MD  REFERRING PHYSICIAN: No ref. provider found and Terry Giraldo MD  CHIEF COMPLAINT: syncope and NSVT    HPI:September 2024: This is a 79 y.o. male with a history of cardiomyopathy, coronary disease, diabetes mellitus, HLD, HTN, PVC's who presented to ER after an episode of syncope while walking [place] the [place].  The patient is followed by Dr. Mendoza from [facility] cardiology and has been maintained on  Maxzide, metoprolol 50 mg daily, losartan 50 mg daily, rosuvastatin 40 mg daily, aspirin, insulin.  He has been extremely active and has had no cardiac symptoms despite LV dysfunction until about June of this year.  He started noticing increasing symptoms of shortness of breath with physical activity and was initially seen by pulmonology.  He has also undergone a sleep study to rule out the presence of sleep apnea but most recently after laboratory workup which suggested the presence of decompensated heart failure he was prescribed Lasix.  He was on the golf course and after about 3 holes he became extremely lightheaded and checked his blood sugar.  He attempted to eat an energy bar but then passed out completely.  EMS was called and frequent ventricular ectopy was noted on monitoring by them as well as in the emergency room.  Emergency room notes also mentioned episodes of nonsustained VT and the patient was placed on IV amiodarone.  Per patient's wife,while in ED patient was having bigeminy, couplets, and NSVT .He was given a Lidocaine bolus and things settled down after that. He was also initiated on Amiodarone drip in ER.  On telemetry presently having frequent PVC's and in atrial fibrillation.  No prior history of sudden syncope.  However, he has as mentioned above, been

## 2025-02-28 ENCOUNTER — HOSPITAL ENCOUNTER (OUTPATIENT)
Dept: CARDIAC REHAB | Age: 80
Setting detail: THERAPIES SERIES
Discharge: HOME | End: 2025-02-28
Payer: MEDICARE

## 2025-02-28 PROCEDURE — 93798 PHYS/QHP OP CAR RHAB W/ECG: CPT

## 2025-03-03 RX ORDER — METOPROLOL SUCCINATE 25 MG/1
25 TABLET, EXTENDED RELEASE ORAL 2 TIMES DAILY
Qty: 60 TABLET | Refills: 5 | OUTPATIENT
Start: 2025-03-03

## 2025-03-05 ENCOUNTER — HOSPITAL ENCOUNTER (OUTPATIENT)
Dept: CARDIAC REHAB | Age: 80
Setting detail: THERAPIES SERIES
Discharge: HOME | End: 2025-03-05
Payer: MEDICARE

## 2025-03-05 PROCEDURE — 93798 PHYS/QHP OP CAR RHAB W/ECG: CPT

## 2025-03-07 ENCOUNTER — HOSPITAL ENCOUNTER (OUTPATIENT)
Dept: CARDIAC REHAB | Age: 80
Setting detail: THERAPIES SERIES
Discharge: HOME | End: 2025-03-07
Payer: MEDICARE

## 2025-03-07 PROCEDURE — 93798 PHYS/QHP OP CAR RHAB W/ECG: CPT

## 2025-03-12 ENCOUNTER — HOSPITAL ENCOUNTER (OUTPATIENT)
Dept: CARDIAC REHAB | Age: 80
Setting detail: THERAPIES SERIES
Discharge: HOME | End: 2025-03-12
Payer: MEDICARE

## 2025-03-12 PROCEDURE — 93798 PHYS/QHP OP CAR RHAB W/ECG: CPT

## 2025-03-14 ENCOUNTER — HOSPITAL ENCOUNTER (OUTPATIENT)
Dept: CARDIAC REHAB | Age: 80
Setting detail: THERAPIES SERIES
Discharge: HOME | End: 2025-03-14
Payer: MEDICARE

## 2025-03-14 PROCEDURE — 93798 PHYS/QHP OP CAR RHAB W/ECG: CPT

## 2025-03-14 ASSESSMENT — PATIENT HEALTH QUESTIONNAIRE - PHQ9
6. FEELING BAD ABOUT YOURSELF - OR THAT YOU ARE A FAILURE OR HAVE LET YOURSELF OR YOUR FAMILY DOWN: NOT AT ALL
SUM OF ALL RESPONSES TO PHQ QUESTIONS 1-9: 0
7. TROUBLE CONCENTRATING ON THINGS, SUCH AS READING THE NEWSPAPER OR WATCHING TELEVISION: NOT AT ALL
1. LITTLE INTEREST OR PLEASURE IN DOING THINGS: NOT AT ALL
3. TROUBLE FALLING OR STAYING ASLEEP: NOT AT ALL
2. FEELING DOWN, DEPRESSED OR HOPELESS: NOT AT ALL
SUM OF ALL RESPONSES TO PHQ QUESTIONS 1-9: 0
8. MOVING OR SPEAKING SO SLOWLY THAT OTHER PEOPLE COULD HAVE NOTICED. OR THE OPPOSITE, BEING SO FIGETY OR RESTLESS THAT YOU HAVE BEEN MOVING AROUND A LOT MORE THAN USUAL: NOT AT ALL
9. THOUGHTS THAT YOU WOULD BE BETTER OFF DEAD, OR OF HURTING YOURSELF: NOT AT ALL
4. FEELING TIRED OR HAVING LITTLE ENERGY: NOT AT ALL
SUM OF ALL RESPONSES TO PHQ QUESTIONS 1-9: 0
5. POOR APPETITE OR OVEREATING: NOT AT ALL
SUM OF ALL RESPONSES TO PHQ QUESTIONS 1-9: 0
10. IF YOU CHECKED OFF ANY PROBLEMS, HOW DIFFICULT HAVE THESE PROBLEMS MADE IT FOR YOU TO DO YOUR WORK, TAKE CARE OF THINGS AT HOME, OR GET ALONG WITH OTHER PEOPLE: NOT DIFFICULT AT ALL

## 2025-03-19 ENCOUNTER — HOSPITAL ENCOUNTER (OUTPATIENT)
Dept: CARDIAC REHAB | Age: 80
Setting detail: THERAPIES SERIES
Discharge: HOME | End: 2025-03-19
Payer: MEDICARE

## 2025-03-19 PROCEDURE — 93798 PHYS/QHP OP CAR RHAB W/ECG: CPT

## 2025-03-21 ENCOUNTER — HOSPITAL ENCOUNTER (OUTPATIENT)
Dept: CARDIAC REHAB | Age: 80
Setting detail: THERAPIES SERIES
Discharge: HOME | End: 2025-03-21
Payer: MEDICARE

## 2025-03-21 PROCEDURE — 93798 PHYS/QHP OP CAR RHAB W/ECG: CPT

## 2025-03-26 ENCOUNTER — HOSPITAL ENCOUNTER (OUTPATIENT)
Dept: CARDIAC REHAB | Age: 80
Setting detail: THERAPIES SERIES
Discharge: HOME | End: 2025-03-26
Payer: MEDICARE

## 2025-03-26 PROCEDURE — 93798 PHYS/QHP OP CAR RHAB W/ECG: CPT

## 2025-03-27 ENCOUNTER — PROCEDURE VISIT (OUTPATIENT)
Dept: PODIATRY | Age: 80
End: 2025-03-27

## 2025-03-27 VITALS — BODY MASS INDEX: 25.77 KG/M2 | WEIGHT: 180 LBS | HEIGHT: 70 IN

## 2025-03-27 DIAGNOSIS — R26.2 DIFFICULTY WALKING: ICD-10-CM

## 2025-03-27 DIAGNOSIS — E11.51 TYPE II DIABETES MELLITUS WITH PERIPHERAL CIRCULATORY DISORDER (HCC): ICD-10-CM

## 2025-03-27 DIAGNOSIS — I87.2 VENOUS INSUFFICIENCY (CHRONIC) (PERIPHERAL): ICD-10-CM

## 2025-03-27 DIAGNOSIS — M79.675 PAIN OF TOE OF LEFT FOOT: ICD-10-CM

## 2025-03-27 DIAGNOSIS — I73.9 PVD (PERIPHERAL VASCULAR DISEASE): ICD-10-CM

## 2025-03-27 DIAGNOSIS — M79.674 PAIN OF TOE OF RIGHT FOOT: ICD-10-CM

## 2025-03-27 DIAGNOSIS — B35.1 ONYCHOMYCOSIS: Primary | ICD-10-CM

## 2025-03-27 NOTE — PROGRESS NOTES
Patient in today for nail care. Patient does not have any complaints of pain at this time. Patient's PCP is Terry Giraldo MD date of last ov 3/10/25          Yeimy Lewis LPN

## 2025-03-28 ENCOUNTER — HOSPITAL ENCOUNTER (OUTPATIENT)
Dept: CARDIAC REHAB | Age: 80
Setting detail: THERAPIES SERIES
Discharge: HOME | End: 2025-03-28
Payer: MEDICARE

## 2025-03-28 PROCEDURE — 93798 PHYS/QHP OP CAR RHAB W/ECG: CPT

## 2025-03-28 NOTE — PROGRESS NOTES
3/27/25     Pantera Payne    : 1945  Sex: male  Age: 79 y.o.    Subjective:  The patient is seen today for evaluation regarding diabetic foot evaluation and mycotic nail care.  No other complaints noted.    Chief Complaint   Patient presents with    Nail Problem     Nail care       Current Medications:    Current Outpatient Medications:     empagliflozin (JARDIANCE) 25 MG tablet, Take 1 tablet by mouth daily, Disp: 30 tablet, Rfl: 5    metoprolol succinate (TOPROL XL) 25 MG extended release tablet, Take 1 tablet by mouth in the morning and at bedtime 25mg BID, Disp: 60 tablet, Rfl: 5    insulin aspart (NOVOLOG FLEXPEN) 100 UNIT/ML injection pen, Inject 7 units 3 times daily with meals plus the following sliding scale. -200 add 2U, -250 add 4U, -300 add 6U, -350 add 8U, -400 add 10U, BS over 400 add 12U. MAX 50 u/day, Disp: 5 Adjustable Dose Pre-filled Pen Syringe, Rfl: 11    omeprazole (PRILOSEC) 20 MG delayed release capsule, Take 1 capsule by mouth three times a week, Disp: , Rfl:     bumetanide (BUMEX) 1 MG tablet, Take 1 tablet by mouth Twice a Week (Patient taking differently: Take 1 tablet by mouth as needed Indications: Monday & Thursday.), Disp: 90 tablet, Rfl: 3    sacubitril-valsartan (ENTRESTO) 49-51 MG per tablet, Take 1 tablet by mouth 2 times daily, Disp: 60 tablet, Rfl: 3    spironolactone (ALDACTONE) 25 MG tablet, Take 0.5 tablets by mouth daily, Disp: 30 tablet, Rfl: 5    amiodarone (CORDARONE) 200 MG tablet, Take 1 tablet by mouth daily Not taking any on /Wednesday, Disp: , Rfl:     magnesium oxide (MAG-OX) 400 (240 Mg) MG tablet, Take 1 tablet by mouth daily, Disp: , Rfl:     zinc gluconate 50 MG tablet, Take 1 tablet by mouth daily, Disp: , Rfl:     Continuous Glucose Sensor (FREESTYLE SAMANTHA 3 SENSOR) MISC, Change every 14 days, Disp: 6 each, Rfl: 3    Insulin Glargine, 2 Unit Dial, (TOUJEO MAX SOLOSTAR) 300 UNIT/ML concentrated injection pen, Inject

## 2025-04-02 ENCOUNTER — HOSPITAL ENCOUNTER (OUTPATIENT)
Dept: CARDIAC REHAB | Age: 80
Setting detail: THERAPIES SERIES
Discharge: HOME | End: 2025-04-02
Payer: MEDICARE

## 2025-04-02 PROCEDURE — 93798 PHYS/QHP OP CAR RHAB W/ECG: CPT

## 2025-04-04 ENCOUNTER — RESULTS FOLLOW-UP (OUTPATIENT)
Age: 80
End: 2025-04-04

## 2025-04-04 ENCOUNTER — HOSPITAL ENCOUNTER (OUTPATIENT)
Dept: OTHER | Age: 80
Setting detail: THERAPIES SERIES
Discharge: HOME | End: 2025-04-04
Payer: MEDICARE

## 2025-04-04 VITALS
DIASTOLIC BLOOD PRESSURE: 54 MMHG | OXYGEN SATURATION: 97 % | HEART RATE: 60 BPM | RESPIRATION RATE: 18 BRPM | BODY MASS INDEX: 25.97 KG/M2 | SYSTOLIC BLOOD PRESSURE: 105 MMHG | WEIGHT: 181 LBS

## 2025-04-04 LAB
ANION GAP SERPL CALCULATED.3IONS-SCNC: 14 MMOL/L (ref 7–16)
BNP SERPL-MCNC: 284 PG/ML (ref 0–450)
BUN SERPL-MCNC: 41 MG/DL (ref 6–23)
CALCIUM SERPL-MCNC: 9.6 MG/DL (ref 8.6–10.2)
CHLORIDE SERPL-SCNC: 102 MMOL/L (ref 98–107)
CO2 SERPL-SCNC: 19 MMOL/L (ref 22–29)
CREAT SERPL-MCNC: 1.4 MG/DL (ref 0.7–1.2)
GFR, ESTIMATED: 52 ML/MIN/1.73M2
GLUCOSE SERPL-MCNC: 175 MG/DL (ref 74–99)
POTASSIUM SERPL-SCNC: 4.8 MMOL/L (ref 3.5–5)
SODIUM SERPL-SCNC: 135 MMOL/L (ref 132–146)

## 2025-04-04 PROCEDURE — 83880 ASSAY OF NATRIURETIC PEPTIDE: CPT

## 2025-04-04 PROCEDURE — 99214 OFFICE O/P EST MOD 30 MIN: CPT

## 2025-04-04 PROCEDURE — 80048 BASIC METABOLIC PNL TOTAL CA: CPT

## 2025-04-04 RX ORDER — BUMETANIDE 1 MG/1
1 TABLET ORAL DAILY PRN
Qty: 90 TABLET | Refills: 3 | Status: SHIPPED | OUTPATIENT
Start: 2025-04-04

## 2025-04-04 NOTE — PLAN OF CARE
Problem: Chronic Conditions and Co-morbidities  Goal: Patient's chronic conditions and co-morbidity symptoms are monitored and maintained or improved  Flowsheets (Taken 4/4/2025 1016)  Care Plan - Patient's Chronic Conditions and Co-Morbidity Symptoms are Monitored and Maintained or Improved: Monitor and assess patient's chronic conditions and comorbid symptoms for stability, deterioration, or improvement        Subjective      Patient ID: Bailey Ordonez is a 53 y.o. female.    She is here for physical. She is doing well ok overall. She has been dealing with weight gain, menopausal, and insomnia and did end up seeing hormonal specialists and did have a lot of labs done in Jan and put her on metformin and told her to follow plant based diet and intermittent fasting and will be going back for new labs soon. Stays well hydrated. She is doing pelethon bike few times a wk. Normal BMs and urination. Due for colonoscopy was in 2017 and due soon. UTD with eye and dental exam. UTD with mammo. UTD with immunizations. Irregular periods and last one was in Jan 2023. UTD with gyn care but willing to see another ob/gyn. She is thinking about weight loss meds as an option as well.       The following have been reviewed and updated as appropriate in this visit:   Tobacco  Allergies  Meds  Problems  Med Hx  Surg Hx  Fam Hx       Review of Systems   Constitutional: Negative for chills, fatigue and fever.        +weight gain   HENT: Negative for ear discharge, ear pain, postnasal drip, rhinorrhea and sore throat.    Respiratory: Negative for cough, shortness of breath and wheezing.    Cardiovascular: Negative for chest pain and palpitations.   Gastrointestinal: Negative for abdominal pain, blood in stool and constipation.   Genitourinary: Negative for dysuria, frequency and hematuria.        +menopausal symptoms- irregular periods   Musculoskeletal: Negative for arthralgias and myalgias.   Skin: Negative for rash.   Neurological: Negative for dizziness and headaches.   Psychiatric/Behavioral: Positive for sleep disturbance. Negative for suicidal ideas. The patient is not nervous/anxious.        Current Outpatient Medications   Medication Sig Dispense Refill   • ALPRAZolam (XANAX) 0.5 mg tablet Take 1 tablet (0.5 mg total) by mouth daily as needed for anxiety. 30 tablet 0   • metFORMIN XR (GLUCOPHAGE-XR) 500 mg 24 hr tablet TAKE 1  TABLET BY MOUTH IN THE EVENING WITH FOOD     • valACYclovir (VALTREX) 1 gram tablet Take 1 tablet (1,000 mg total) by mouth 3 (three) times a day as needed (cold sores) for up to 7 days. 30 tablet 0     No current facility-administered medications for this visit.     Past Medical History:   Diagnosis Date   • Anxiety and depression    • Factor V Leiden (CMS/HCC)    • Osteoarthritis of both hips    • Pneumonia 03/2023     Family History   Problem Relation Age of Onset   • Heart disease Biological Mother    • Hypertension Biological Mother    • Atrial fibrillation Biological Mother    • Heart failure Biological Mother    • Asthma Biological Mother    • Factor V Leiden deficiency Biological Mother    • Pancreatic cancer Biological Father    • Factor V Leiden deficiency Biological Brother      Past Surgical History:   Procedure Laterality Date   • KNEE SURGERY     • TONSILLECTOMY     • TOTAL HIP ARTHROPLASTY Bilateral     L 2019 and R 2021     Social History     Socioeconomic History   • Marital status:      Spouse name: Not on file   • Number of children: 3   • Years of education: Not on file   • Highest education level: Not on file   Occupational History   • Occupation: School Admin   Tobacco Use   • Smoking status: Never   • Smokeless tobacco: Never   Vaping Use   • Vaping status: Never Used   Substance and Sexual Activity   • Alcohol use: Yes     Comment: social    • Drug use: Never   • Sexual activity: Yes   Other Topics Concern   • Not on file   Social History Narrative   • Not on file     Social Determinants of Health     Financial Resource Strain: Not on file   Food Insecurity: Not on file   Transportation Needs: Not on file   Physical Activity: Not on file   Stress: Not on file   Social Connections: Not on file   Intimate Partner Violence: Not on file   Housing Stability: Not on file     Allergies   Allergen Reactions   • Prochlorperazine Other (see comments)     Seizure          Objective   Visit  Vitals  /82 (BP Location: Left upper arm, Patient Position: Sitting)   Pulse 82   Temp 36.5 °C (97.7 °F) (Temporal)   Resp 16   Ht 1.829 m (6')   Wt 125 kg (275 lb)   LMP  (LMP Unknown)   SpO2 99%   BMI 37.30 kg/m²     Physical Exam  Vitals and nursing note reviewed.   Constitutional:       Appearance: Normal appearance.   HENT:      Right Ear: Tympanic membrane normal. There is no impacted cerumen.      Left Ear: Tympanic membrane normal. There is no impacted cerumen.      Nose: Nose normal. No rhinorrhea.      Mouth/Throat:      Mouth: Mucous membranes are moist.      Pharynx: Oropharynx is clear. No posterior oropharyngeal erythema.   Cardiovascular:      Rate and Rhythm: Normal rate and regular rhythm.      Heart sounds: No murmur heard.  Pulmonary:      Effort: Pulmonary effort is normal.      Breath sounds: Normal breath sounds. No wheezing.   Abdominal:      General: Bowel sounds are normal.      Palpations: Abdomen is soft.      Tenderness: There is no abdominal tenderness.   Musculoskeletal:         General: Normal range of motion.      Cervical back: Normal range of motion and neck supple.   Skin:     General: Skin is warm.      Findings: No rash.   Neurological:      General: No focal deficit present.      Mental Status: She is alert and oriented to person, place, and time.      Cranial Nerves: No cranial nerve deficit.   Psychiatric:         Mood and Affect: Mood normal.         Behavior: Behavior normal.         Assessment/Plan   Problem List Items Addressed This Visit        Endocrine/Metabolic    Obesity (BMI 30-39.9)       Hematologic    Factor V Leiden (CMS/HCC)     Stable.            Mental Health    Anxiety and depression    Relevant Medications    ALPRAZolam (XANAX) 0.5 mg tablet   Other Visit Diagnoses     Routine physical examination    -  Primary      She is doing well overall. Discussed diet and exercise. Will check updated fasting labs when she goes back to specialist soon.   UTD with  gyn car and mammo. Will look into when colonoscopy is due. UTD with eye and dental exam. UTD with immunizations. Will refill xanax to use as needed. She will cont with her hormone specialist and keep me updated on next set of labs and the plan moving forward.   Layne Kramer, DO  5/12/2023

## 2025-04-04 NOTE — RESULT ENCOUNTER NOTE
Labs and CHF Clinic note reviewed    Wt Readings from Last 3 Encounters:  04/04/25 : 82.1 kg (181 lb)  03/27/25 : 81.6 kg (180 lb)  02/27/25 : 81.6 kg (180 lb)    BP Readings from Last 1 Encounters:  04/04/25 : (!) 105/54    Pulse Readings from Last 1 Encounters:  04/04/25 : 60      Decrease Bumex to 1 mg daily as needed (currently taking twice weekly)  Follow up in CHF clinic as scheduled  Anticipate increase Entresto as BP allows     Thank you

## 2025-04-04 NOTE — PROGRESS NOTES
She states that patient has been taking oral Bumex PRN and will continue to do so moving forward.    [x]Lab work obtained    [x] Patient/Family Educated On:  [x] HF zones (Green, Yellow, Red) and aware of when to take action   [x] Daily weights  [] Scale provided   [x] Low sodium diet (2000 mg)  Barriers to compliance  [] Refuses to monitor diet  [] Socioeconomic difficulties  [] Unable to cook for self (use of frozen meals, can goods, etc)  [] CHF CHW consulted  [] Low sodium meal delivery options given to patient  [] Dietician consulted   [] Low sodium recipes provided  [] Sodium free seasoning provided   [x] Fluid intake 6-8 cups (around 64 oz)  [x] Reviewed currently prescribed medications with patient, educated on importance of compliance and answered any questions regarding their medication  [] Pill box provided to patient  [] Patient using pill packing pharmacy   [x] CPAP/BiPAP use (Yes, will start )   [] Low impact exercise / cardiac rehab   [] LifeVest use  [x] Patient aware of signs and symptoms of worsening HF, CHF clinic phone number provided and made aware to call clinic for sooner if evaluation if needed     [] Difficulty affording medications  [] CHF CHW consulted  [] Prescription assistance information given   [] [facility] medication assistance program information given   [] Sample medications provided to patient to help bridge gap until affordability N/A          Scheduled to follow up in CHF clinic on:  Future Appointments   Date Time Provider Department Center   5/14/2025 12:00 PM Perez Li MD BDM ENDO [facility]   5/16/2025 10:00 AM [facility]   5/29/2025 10:00 AM Dennys Cobunr Jr., DPABDULAZIZ BLANC POD [facility]   8/5/2025 10:20 AM Kevin Osborn APRN - CNS [facility]   9/23/2025  9:00 AM Marycruz Mariano MD YTOWN ELECTR [facility]

## 2025-04-05 ASSESSMENT — PATIENT HEALTH QUESTIONNAIRE - PHQ9
8. MOVING OR SPEAKING SO SLOWLY THAT OTHER PEOPLE COULD HAVE NOTICED. OR THE OPPOSITE, BEING SO FIGETY OR RESTLESS THAT YOU HAVE BEEN MOVING AROUND A LOT MORE THAN USUAL: NOT AT ALL
2. FEELING DOWN, DEPRESSED OR HOPELESS: NOT AT ALL
9. THOUGHTS THAT YOU WOULD BE BETTER OFF DEAD, OR OF HURTING YOURSELF: NOT AT ALL
SUM OF ALL RESPONSES TO PHQ QUESTIONS 1-9: 0
10. IF YOU CHECKED OFF ANY PROBLEMS, HOW DIFFICULT HAVE THESE PROBLEMS MADE IT FOR YOU TO DO YOUR WORK, TAKE CARE OF THINGS AT HOME, OR GET ALONG WITH OTHER PEOPLE: NOT DIFFICULT AT ALL
SUM OF ALL RESPONSES TO PHQ QUESTIONS 1-9: 0
5. POOR APPETITE OR OVEREATING: NOT AT ALL
3. TROUBLE FALLING OR STAYING ASLEEP: NOT AT ALL
SUM OF ALL RESPONSES TO PHQ QUESTIONS 1-9: 0
7. TROUBLE CONCENTRATING ON THINGS, SUCH AS READING THE NEWSPAPER OR WATCHING TELEVISION: NOT AT ALL
1. LITTLE INTEREST OR PLEASURE IN DOING THINGS: NOT AT ALL
4. FEELING TIRED OR HAVING LITTLE ENERGY: NOT AT ALL
SUM OF ALL RESPONSES TO PHQ QUESTIONS 1-9: 0
6. FEELING BAD ABOUT YOURSELF - OR THAT YOU ARE A FAILURE OR HAVE LET YOURSELF OR YOUR FAMILY DOWN: NOT AT ALL

## 2025-04-27 ENCOUNTER — HOSPITAL ENCOUNTER (EMERGENCY)
Age: 80
Discharge: HOME | End: 2025-04-27
Payer: MEDICARE

## 2025-04-27 VITALS
BODY MASS INDEX: 24.82 KG/M2 | WEIGHT: 173 LBS | HEART RATE: 70 BPM | TEMPERATURE: 98.6 F | RESPIRATION RATE: 18 BRPM | DIASTOLIC BLOOD PRESSURE: 59 MMHG | OXYGEN SATURATION: 98 % | SYSTOLIC BLOOD PRESSURE: 114 MMHG

## 2025-04-27 DIAGNOSIS — J20.9 ACUTE BRONCHITIS, UNSPECIFIED ORGANISM: Primary | ICD-10-CM

## 2025-04-27 LAB
INFLUENZA A BY PCR: NOT DETECTED
INFLUENZA B BY PCR: NOT DETECTED

## 2025-04-27 PROCEDURE — 99211 OFF/OP EST MAY X REQ PHY/QHP: CPT

## 2025-04-27 PROCEDURE — 87502 INFLUENZA DNA AMP PROBE: CPT

## 2025-04-27 RX ORDER — DOXYCYCLINE HYCLATE 100 MG
100 TABLET ORAL 2 TIMES DAILY
Qty: 14 TABLET | Refills: 0 | Status: SHIPPED | OUTPATIENT
Start: 2025-04-27 | End: 2025-05-04

## 2025-04-27 ASSESSMENT — PAIN - FUNCTIONAL ASSESSMENT: PAIN_FUNCTIONAL_ASSESSMENT: NONE - DENIES PAIN

## 2025-04-27 NOTE — ED PROVIDER NOTES
Sinus: No maxillary sinus tenderness or frontal sinus tenderness.      Left Sinus: No maxillary sinus tenderness or frontal sinus tenderness.      Mouth/Throat:      Mouth: Mucous membranes are moist. No angioedema.      Pharynx: Oropharynx is clear. Uvula midline. Postnasal drip present. No pharyngeal swelling, oropharyngeal exudate, posterior oropharyngeal erythema or uvula swelling.      Tonsils: No tonsillar exudate.      Comments: Oropharynx is patent.     Eyes:      Extraocular Movements: Extraocular movements intact.      Conjunctiva/sclera: Conjunctivae normal.      Pupils: Pupils are equal, round, and reactive to light.   Cardiovascular:      Rate and Rhythm: Normal rate and regular rhythm.      Pulses: Normal pulses.      Heart sounds: Normal heart sounds.   Pulmonary:      Effort: Pulmonary effort is normal.      Breath sounds: Normal breath sounds.   Musculoskeletal:         General: Normal range of motion.      Cervical back: Full passive range of motion without pain, normal range of motion and neck supple.      Right lower leg: No edema.      Left lower leg: No edema.   Lymphadenopathy:      Cervical: No cervical adenopathy.   Skin:     General: Skin is warm and dry.      Findings: No rash.   Neurological:      General: No focal deficit present.      Mental Status: He is alert and oriented to person, place, and time.   Psychiatric:         Behavior: Behavior is cooperative.         -------------------------------------------------- RESULTS -------------------------------------------------  Results for orders placed or performed during the hospital encounter of 04/27/25   Influenza A+B, PCR    Specimen: Nasopharyngeal   Result Value Ref Range    Influenza A by PCR Not Detected Not Detected    Influenza B by PCR Not Detected Not Detected     No orders to display       Labs Reviewed   INFLUENZA A + B, PCR       When ordered only abnormal lab results are displayed. All other labs were within normal range

## 2025-05-14 ENCOUNTER — OFFICE VISIT (OUTPATIENT)
Dept: ENDOCRINOLOGY | Age: 80
End: 2025-05-14
Payer: MEDICARE

## 2025-05-14 VITALS
HEART RATE: 47 BPM | RESPIRATION RATE: 18 BRPM | TEMPERATURE: 97.6 F | WEIGHT: 182 LBS | BODY MASS INDEX: 26.05 KG/M2 | DIASTOLIC BLOOD PRESSURE: 56 MMHG | HEIGHT: 70 IN | OXYGEN SATURATION: 98 % | SYSTOLIC BLOOD PRESSURE: 96 MMHG

## 2025-05-14 DIAGNOSIS — E07.9 THYROID DYSFUNCTION: Primary | ICD-10-CM

## 2025-05-14 DIAGNOSIS — Z79.4 TYPE 2 DIABETES MELLITUS WITH HYPERGLYCEMIA, WITH LONG-TERM CURRENT USE OF INSULIN (HCC): ICD-10-CM

## 2025-05-14 DIAGNOSIS — Z97.8 USES SELF-APPLIED CONTINUOUS GLUCOSE MONITORING DEVICE: ICD-10-CM

## 2025-05-14 DIAGNOSIS — E11.65 TYPE 2 DIABETES MELLITUS WITH HYPERGLYCEMIA, WITH LONG-TERM CURRENT USE OF INSULIN (HCC): ICD-10-CM

## 2025-05-14 LAB — HBA1C MFR BLD: 7.3 %

## 2025-05-14 PROCEDURE — G8427 DOCREV CUR MEDS BY ELIG CLIN: HCPCS | Performed by: INTERNAL MEDICINE

## 2025-05-14 PROCEDURE — 1036F TOBACCO NON-USER: CPT | Performed by: INTERNAL MEDICINE

## 2025-05-14 PROCEDURE — 83036 HEMOGLOBIN GLYCOSYLATED A1C: CPT | Performed by: INTERNAL MEDICINE

## 2025-05-14 PROCEDURE — 3051F HG A1C>EQUAL 7.0%<8.0%: CPT | Performed by: INTERNAL MEDICINE

## 2025-05-14 PROCEDURE — 99214 OFFICE O/P EST MOD 30 MIN: CPT | Performed by: INTERNAL MEDICINE

## 2025-05-14 PROCEDURE — G8417 CALC BMI ABV UP PARAM F/U: HCPCS | Performed by: INTERNAL MEDICINE

## 2025-05-14 PROCEDURE — 1123F ACP DISCUSS/DSCN MKR DOCD: CPT | Performed by: INTERNAL MEDICINE

## 2025-05-14 PROCEDURE — 95251 CONT GLUC MNTR ANALYSIS I&R: CPT | Performed by: INTERNAL MEDICINE

## 2025-05-14 PROCEDURE — 1159F MED LIST DOCD IN RCRD: CPT | Performed by: INTERNAL MEDICINE

## 2025-05-14 PROCEDURE — 3074F SYST BP LT 130 MM HG: CPT | Performed by: INTERNAL MEDICINE

## 2025-05-14 PROCEDURE — 3078F DIAST BP <80 MM HG: CPT | Performed by: INTERNAL MEDICINE

## 2025-05-14 NOTE — PROGRESS NOTES
[facility] BD ENDO  [address], [id number]  [facility] [address]  Dept: [phone]  Loc: [phone]        Date of service: 5/14/2025   Primary Care Physician: Terry Giraldo MD  Provider: Perez Li MD     Reason for the visit:  Uncontrolled DM    History of Present Illness:  The history is provided by the patient. Accuracy of the patient data is excellent    The patient was diagnosed with type 2 DM.  He is currently on Jardiance 10 mg daily, Toujeo 28 units in the morning and 7 units at night, Humalog per sliding scale. Patient has had no hypoglycemic episodes. Patient has not been eating consistent carbohydrate meals, self-blood glucose monitoring has been above goal. In addition, patient denied macrovascular or microvascular complications. The patient is not up to date with yearly diabetic eye exam.   The patient currently is on the freestyle ruel CGM.  Most glucose level in range.    Lab Results   Component Value Date/Time    LABA1C 7.3 05/14/2025 11:55 AM     Past medical history:   Past Medical History:   Diagnosis Date    Atrial fibrillation (HCC) 1984, brief episode.    CAD (coronary artery disease)     Cancer (HCC)     Basal cell carcinoma, excisionx 2 (left temporal area).    Detached retina 8/20/2007    Left eye.  Laser repair.  8/2009: Residual scar tissue detected on exam in 8/2009.  Patient reported some deterioration in visual acuity.    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    Hearing problem     Hyperlipidemia     Hypertension     Kidney stone 1997.    Mild tricuspid regurgitation 11/18/13    Mitral valve prolapse 11/18/13    mild    Pulmonary hypertension (HCC) 11/18/13    Type II or unspecified type diabetes mellitus without mention of complication, not stated as uncontrolled Diagnosed in 1995.       Past surgical history:  Past Surgical History:   Procedure Laterality Date    CARDIAC PROCEDURE N/A 9/30/2024    Left heart cath / coronary

## 2025-05-16 ENCOUNTER — HOSPITAL ENCOUNTER (OUTPATIENT)
Dept: OTHER | Age: 80
Setting detail: THERAPIES SERIES
Discharge: HOME | End: 2025-05-16
Payer: MEDICARE

## 2025-05-16 ENCOUNTER — RESULTS FOLLOW-UP (OUTPATIENT)
Age: 80
End: 2025-05-16

## 2025-05-16 VITALS
HEART RATE: 61 BPM | WEIGHT: 180 LBS | DIASTOLIC BLOOD PRESSURE: 55 MMHG | RESPIRATION RATE: 18 BRPM | OXYGEN SATURATION: 97 % | BODY MASS INDEX: 25.83 KG/M2 | SYSTOLIC BLOOD PRESSURE: 101 MMHG

## 2025-05-16 LAB
ANION GAP SERPL CALCULATED.3IONS-SCNC: 11 MMOL/L (ref 7–16)
BNP SERPL-MCNC: 418 PG/ML (ref 0–450)
BUN SERPL-MCNC: 41 MG/DL (ref 8–23)
CALCIUM SERPL-MCNC: 9.2 MG/DL (ref 8.8–10.2)
CHLORIDE SERPL-SCNC: 105 MMOL/L (ref 98–107)
CO2 SERPL-SCNC: 22 MMOL/L (ref 22–29)
CREAT SERPL-MCNC: 1.4 MG/DL (ref 0.7–1.2)
GFR, ESTIMATED: 52 ML/MIN/1.73M2
GLUCOSE SERPL-MCNC: 183 MG/DL (ref 74–99)
POTASSIUM SERPL-SCNC: 4.5 MMOL/L (ref 3.5–5.1)
SODIUM SERPL-SCNC: 138 MMOL/L (ref 136–145)

## 2025-05-16 PROCEDURE — 83880 ASSAY OF NATRIURETIC PEPTIDE: CPT

## 2025-05-16 PROCEDURE — 80048 BASIC METABOLIC PNL TOTAL CA: CPT

## 2025-05-16 PROCEDURE — 99214 OFFICE O/P EST MOD 30 MIN: CPT

## 2025-05-16 ASSESSMENT — PATIENT HEALTH QUESTIONNAIRE - PHQ9
SUM OF ALL RESPONSES TO PHQ QUESTIONS 1-9: 0
2. FEELING DOWN, DEPRESSED OR HOPELESS: NOT AT ALL
SUM OF ALL RESPONSES TO PHQ QUESTIONS 1-9: 0
1. LITTLE INTEREST OR PLEASURE IN DOING THINGS: NOT AT ALL
SUM OF ALL RESPONSES TO PHQ QUESTIONS 1-9: 0
SUM OF ALL RESPONSES TO PHQ QUESTIONS 1-9: 0

## 2025-05-16 NOTE — PLAN OF CARE
Problem: Chronic Conditions and Co-morbidities  Goal: Patient's chronic conditions and co-morbidity symptoms are monitored and maintained or improved  Flowsheets (Taken 5/16/2025 4236)  Care Plan - Patient's Chronic Conditions and Co-Morbidity Symptoms are Monitored and Maintained or Improved: Monitor and assess patient's chronic conditions and comorbid symptoms for stability, deterioration, or improvement

## 2025-05-16 NOTE — PROGRESS NOTES
Congestive Heart Failure Clinic   [facility]      Referring Provider: Dr Mendoza  Primary Care Physician: Terry Giraldo MD   Cardiologist: Dr Mendoza  Nephrologist: NARINDER      HISTORY OF PRESENT ILLNESS:     Pantera Payne is a 79 y.o. (1945) male with a history of HFrEF (EF < 40%)  Pre Cupid:     Lab Results   Component Value Date    LVEF 30 02/14/2025     Post Cupid:    Lab Results   Component Value Date    EFBP 32 (A) 02/14/2025         He presents to the CHF clinic for ongoing evaluation and monitoring of heart failure.    In the CHF clinic today he denies any adverse symptoms except:  [x] Dizziness or lightheadedness - HAS VERTIGO  [] Syncope or near syncope  [] Recent Fall  [] Shortness of breath at rest   [] Dyspnea with exertion  [] Decline in functional capacity (unable to perform activities they had previously been able to do)  [] Fatigue   [] Orthopnea  [] PND  [] Nocturnal cough  [] Hemoptysis  [] Chest pain, pressure, or discomfort  [] Palpitations  [] Abdominal distention  [] Abdominal bloating  [] Early satiety  [] Blood in stool   [] Diarrhea  [] Constipation  [] Nausea/Vomiting  [] Decreased urinary response to oral diuretic   [] Scrotal swelling   [] Lower extremity edemaLeft leg  [] Used PRN doses of oral diuretic   [] Weight gain        Wt Readings from Last 3 Encounters:   05/16/25 81.6 kg (180 lb)   05/14/25 82.6 kg (182 lb)   04/27/25 78.5 kg (173 lb)           SOCIAL HISTORY:  [x] Denies tobacco, alcohol or illicit drug abuse  [] Tobacco use:  [] ETOH use:  [] Illicit drug use:        MEDICATIONS:    Allergies   Allergen Reactions    Atorvastatin     Bethaprim [Sulfamethoxazole-Trimethoprim]     Erythromycin     Lipitor      Muscle aches.    Lisinopril Cough    Neomycin      Ophthalmic suspension       Prior to Visit Medications    Medication Sig Taking? Authorizing Provider   bumetanide (BUMEX) 1 MG tablet Take 1 tablet by mouth

## 2025-05-16 NOTE — RESULT ENCOUNTER NOTE
Labs and CHF Clinic note reviewed    Wt Readings from Last 3 Encounters:  05/16/25 : 81.6 kg (180 lb)  05/14/25 : 82.6 kg (182 lb)  04/27/25 : 78.5 kg (173 lb)    BP Readings from Last 1 Encounters:  05/16/25 : (!) 101/55    Pulse Readings from Last 1 Encounters:  05/16/25 : 61      Current GDMT:  ARNI/ACE I/ARB: Entresto 49/51 mg BID  Hydralazine/Nitrates : No  Beta blocker: Metoprolol Succinate (Toprol) 25 mg BID  Aldosterone antagonist: Spironolactone 12.5 mg daily  SGLT2i: Jardiance 25 mg daily   Diuretic: Bumex 1 mg daily PRN  Supplemental Potassium: No    On maximum tolerated GDMT (limited by BP and hx of hyperkalemia)  Being evaluated for CRT-D  Follow up in CHF clinic as scheduled

## 2025-05-29 ENCOUNTER — PROCEDURE VISIT (OUTPATIENT)
Dept: PODIATRY | Age: 80
End: 2025-05-29

## 2025-05-29 VITALS — WEIGHT: 180 LBS | BODY MASS INDEX: 25.77 KG/M2 | HEIGHT: 70 IN

## 2025-05-29 DIAGNOSIS — M79.674 PAIN OF TOE OF RIGHT FOOT: ICD-10-CM

## 2025-05-29 DIAGNOSIS — B35.1 ONYCHOMYCOSIS: Primary | ICD-10-CM

## 2025-05-29 DIAGNOSIS — I73.9 PVD (PERIPHERAL VASCULAR DISEASE): ICD-10-CM

## 2025-05-29 DIAGNOSIS — I87.2 VENOUS INSUFFICIENCY (CHRONIC) (PERIPHERAL): ICD-10-CM

## 2025-05-29 DIAGNOSIS — E11.51 TYPE II DIABETES MELLITUS WITH PERIPHERAL CIRCULATORY DISORDER (HCC): ICD-10-CM

## 2025-05-29 DIAGNOSIS — R26.2 DIFFICULTY WALKING: ICD-10-CM

## 2025-05-29 DIAGNOSIS — M79.675 PAIN OF TOE OF LEFT FOOT: ICD-10-CM

## 2025-05-29 NOTE — PROGRESS NOTES
25     Pantera Payne    : 1945  Sex: male  Age: 79 y.o.    Subjective:  The patient is seen today for evaluation regarding diabetic foot evaluation and mycotic nail care.  No other complaints noted.    Chief Complaint   Patient presents with    Nail Problem     Nail care       Current Medications:    Current Outpatient Medications:     bumetanide (BUMEX) 1 MG tablet, Take 1 tablet by mouth daily as needed (weight gain, shortness of breath, swelling), Disp: 90 tablet, Rfl: 3    empagliflozin (JARDIANCE) 25 MG tablet, Take 1 tablet by mouth daily, Disp: 30 tablet, Rfl: 5    metoprolol succinate (TOPROL XL) 25 MG extended release tablet, Take 1 tablet by mouth in the morning and at bedtime 25mg BID, Disp: 60 tablet, Rfl: 5    insulin aspart (NOVOLOG FLEXPEN) 100 UNIT/ML injection pen, Inject 7 units 3 times daily with meals plus the following sliding scale. -200 add 2U, -250 add 4U, -300 add 6U, -350 add 8U, -400 add 10U, BS over 400 add 12U. MAX 50 u/day, Disp: 5 Adjustable Dose Pre-filled Pen Syringe, Rfl: 11    omeprazole (PRILOSEC) 20 MG delayed release capsule, Take 1 capsule by mouth three times a week, Disp: , Rfl:     sacubitril-valsartan (ENTRESTO) 49-51 MG per tablet, Take 1 tablet by mouth 2 times daily, Disp: 60 tablet, Rfl: 3    spironolactone (ALDACTONE) 25 MG tablet, Take 0.5 tablets by mouth daily, Disp: 30 tablet, Rfl: 5    amiodarone (CORDARONE) 200 MG tablet, Take 1 tablet by mouth daily Not taking any on /Wednesday, Disp: , Rfl:     magnesium oxide (MAG-OX) 400 (240 Mg) MG tablet, Take 1 tablet by mouth daily, Disp: , Rfl:     zinc gluconate 50 MG tablet, Take 1 tablet by mouth daily, Disp: , Rfl:     Continuous Glucose Sensor (FREESTYLE SAMANTHA 3 SENSOR) MISC, Change every 14 days, Disp: 6 each, Rfl: 3    Insulin Glargine, 2 Unit Dial, (TOUJEO MAX SOLOSTAR) 300 UNIT/ML concentrated injection pen, Inject 28 units daily in the morning, Disp: 12

## 2025-06-17 ENCOUNTER — TELEPHONE (OUTPATIENT)
Dept: AUDIOLOGY | Age: 80
End: 2025-06-17

## 2025-06-17 PROCEDURE — 88342 IMHCHEM/IMCYTCHM 1ST ANTB: CPT | Performed by: DERMATOLOGY

## 2025-06-17 PROCEDURE — 88305 TISSUE EXAM BY PATHOLOGIST: CPT | Performed by: DERMATOLOGY

## 2025-06-17 PROCEDURE — 88341 IMHCHEM/IMCYTCHM EA ADD ANTB: CPT | Performed by: DERMATOLOGY

## 2025-06-17 NOTE — TELEPHONE ENCOUNTER
Patient needs to establish care with ENT.  Was a [facility] patient for Meniere's, left side.  Wife has medical records.    Please call:  [phone] (C); [phone] (H); ok to leave voice mail.    Emery Obrien CCC/A  Audiologist  [id number]  NPI#:  [id number]     Resident

## 2025-06-18 ENCOUNTER — LAB REQUISITION (OUTPATIENT)
Dept: DERMATOPATHOLOGY | Facility: CLINIC | Age: 80
End: 2025-06-18
Payer: MEDICARE

## 2025-06-18 DIAGNOSIS — D48.5 NEOPLASM OF UNCERTAIN BEHAVIOR OF SKIN: ICD-10-CM

## 2025-06-20 LAB
LAB AP ASR DISCLAIMER: NORMAL
LABORATORY COMMENT REPORT: NORMAL
PATH REPORT.FINAL DX SPEC: NORMAL
PATH REPORT.GROSS SPEC: NORMAL
PATH REPORT.MICROSCOPIC SPEC OTHER STN: NORMAL
PATH REPORT.RELEVANT HX SPEC: NORMAL
PATH REPORT.TOTAL CANCER: NORMAL

## 2025-06-30 ENCOUNTER — PROCEDURE VISIT (OUTPATIENT)
Dept: AUDIOLOGY | Age: 80
End: 2025-06-30

## 2025-06-30 DIAGNOSIS — H81.02 MENIERE'S DISEASE OF LEFT EAR: Primary | ICD-10-CM

## 2025-07-02 ENCOUNTER — HOSPITAL ENCOUNTER (OUTPATIENT)
Dept: OTHER | Age: 80
Setting detail: THERAPIES SERIES
Discharge: HOME | End: 2025-07-02
Payer: MEDICARE

## 2025-07-02 VITALS
HEART RATE: 60 BPM | BODY MASS INDEX: 25.83 KG/M2 | SYSTOLIC BLOOD PRESSURE: 119 MMHG | WEIGHT: 180 LBS | DIASTOLIC BLOOD PRESSURE: 58 MMHG | RESPIRATION RATE: 18 BRPM | OXYGEN SATURATION: 98 %

## 2025-07-02 LAB
ANION GAP SERPL CALCULATED.3IONS-SCNC: 11 MMOL/L (ref 7–16)
BNP SERPL-MCNC: 412 PG/ML (ref 0–450)
BUN SERPL-MCNC: 32 MG/DL (ref 8–23)
CALCIUM SERPL-MCNC: 9 MG/DL (ref 8.8–10.2)
CHLORIDE SERPL-SCNC: 103 MMOL/L (ref 98–107)
CO2 SERPL-SCNC: 20 MMOL/L (ref 22–29)
CREAT SERPL-MCNC: 1.2 MG/DL (ref 0.7–1.2)
GFR, ESTIMATED: 62 ML/MIN/1.73M2
GLUCOSE SERPL-MCNC: 227 MG/DL (ref 74–99)
POTASSIUM SERPL-SCNC: 4.6 MMOL/L (ref 3.5–5.1)
SODIUM SERPL-SCNC: 134 MMOL/L (ref 136–145)

## 2025-07-02 PROCEDURE — 83880 ASSAY OF NATRIURETIC PEPTIDE: CPT

## 2025-07-02 PROCEDURE — 99214 OFFICE O/P EST MOD 30 MIN: CPT

## 2025-07-02 PROCEDURE — 80048 BASIC METABOLIC PNL TOTAL CA: CPT

## 2025-07-02 NOTE — PROGRESS NOTES
Congestive Heart Failure Clinic   [facility]      Referring Provider: Dr Mendoza  Primary Care Physician: Terry Giraldo MD   Cardiologist: Dr Mendoza  Nephrologist: NARINDER      HISTORY OF PRESENT ILLNESS:     Pantera Payne is a 79 y.o. (1945) male with a history of HFrEF (EF < 40%)  Pre Cupid:     Lab Results   Component Value Date    LVEF 30 02/14/2025     Post Cupid:    Lab Results   Component Value Date    EFBP 32 (A) 02/14/2025         He presents to the CHF clinic for ongoing evaluation and monitoring of heart failure.    In the CHF clinic today he denies any adverse symptoms except:  [x] Dizziness or lightheadedness - HAS VERTIGO  [] Syncope or near syncope  [] Recent Fall  [] Shortness of breath at rest   [] Dyspnea with exertion  [] Decline in functional capacity (unable to perform activities they had previously been able to do)  [] Fatigue   [] Orthopnea  [] PND  [] Nocturnal cough  [] Hemoptysis  [] Chest pain, pressure, or discomfort  [] Palpitations  [] Abdominal distention  [] Abdominal bloating  [] Early satiety  [] Blood in stool   [] Diarrhea  [] Constipation  [] Nausea/Vomiting  [] Decreased urinary response to oral diuretic   [] Scrotal swelling   [] Lower extremity edemaLeft leg  [] Used PRN doses of oral diuretic   [] Weight gain        Wt Readings from Last 3 Encounters:   07/02/25 81.6 kg (180 lb)   05/29/25 81.6 kg (180 lb)   05/16/25 81.6 kg (180 lb)           SOCIAL HISTORY:  [x] Denies tobacco, alcohol or illicit drug abuse  [] Tobacco use:  [] ETOH use:  [] Illicit drug use:        MEDICATIONS:    Allergies   Allergen Reactions    Atorvastatin     Bethaprim [Sulfamethoxazole-Trimethoprim]     Erythromycin     Lipitor      Muscle aches.    Lisinopril Cough    Neomycin      Ophthalmic suspension       Prior to Visit Medications    Medication Sig Taking? Authorizing Provider   bumetanide (BUMEX) 1 MG tablet Take 1 tablet by mouth

## 2025-07-02 NOTE — PLAN OF CARE
Problem: Chronic Conditions and Co-morbidities  Goal: Patient's chronic conditions and co-morbidity symptoms are monitored and maintained or improved  Flowsheets (Taken 7/2/2025 7024)  Care Plan - Patient's Chronic Conditions and Co-Morbidity Symptoms are Monitored and Maintained or Improved: Monitor and assess patient's chronic conditions and comorbid symptoms for stability, deterioration, or improvement

## 2025-07-08 NOTE — PROGRESS NOTES
Ear mold impression of left ear taken without incident.    Hearing aid/ear mold and remote control ordered for this patient.    Patient scheduled for HA fitting on 7/21/25.    Emery Obrien CCC/A  Audiologist  [id number]  NPI#:  [id number]

## 2025-07-21 ENCOUNTER — PROCEDURE VISIT (OUTPATIENT)
Dept: AUDIOLOGY | Age: 80
End: 2025-07-21

## 2025-07-21 DIAGNOSIS — H81.02 MENIERE'S DISEASE OF LEFT EAR: Primary | ICD-10-CM

## 2025-07-21 NOTE — PROGRESS NOTES
Fit with monaural  [employer]  hearing aid. Instructed in use and care. Gave  battery charger, warranty information and scheduled one week check for 7/30/2025.  Made following adjustments: N/A.       Patient was satisfied and will follow up on above date, unless problems arise.     Emery Obrien CCC/A  Audiologist  [id number]  NPI#:  [id number]      Electronically signed by Ko Gleason on 7/21/2025 at 1:31 PM

## 2025-07-22 ENCOUNTER — PROCEDURE VISIT (OUTPATIENT)
Dept: AUDIOLOGY | Age: 80
End: 2025-07-22

## 2025-07-22 DIAGNOSIS — H90.42 SENSORINEURAL HEARING LOSS, UNILATERAL, LEFT EAR, WITH UNRESTRICTED HEARING ON THE CONTRALATERAL SIDE: Primary | ICD-10-CM

## 2025-07-23 ENCOUNTER — TELEPHONE (OUTPATIENT)
Dept: CARDIOLOGY CLINIC | Age: 80
End: 2025-07-23

## 2025-07-23 NOTE — PROGRESS NOTES
Earmold/hearing aid and  returned to [facility], due to the  not staying in the custom mold.    Will contact patent when it arrives in department.    Emery Obrien CCC/A  Audiologist  [id number]  NPI#:  [id number]

## 2025-07-30 ENCOUNTER — TELEPHONE (OUTPATIENT)
Dept: AUDIOLOGY | Age: 80
End: 2025-07-30

## 2025-07-30 NOTE — TELEPHONE ENCOUNTER
RETURNED VOICE MAIL  TOLD PATIENT'S WIFE HEARING AID NOT BACK FROM REPAIR YET.  WILL CONTACT WHEN IT ARRIVES IN DEPARTMENT.    Emery Obrien CCC/SOL  Audiologist  [id number]  NPI#:  [id number]

## 2025-07-31 ENCOUNTER — PROCEDURE VISIT (OUTPATIENT)
Dept: PODIATRY | Age: 80
End: 2025-07-31

## 2025-07-31 ENCOUNTER — TELEPHONE (OUTPATIENT)
Dept: AUDIOLOGY | Age: 80
End: 2025-07-31

## 2025-07-31 VITALS
SYSTOLIC BLOOD PRESSURE: 139 MMHG | WEIGHT: 181.6 LBS | HEART RATE: 58 BPM | TEMPERATURE: 98.4 F | DIASTOLIC BLOOD PRESSURE: 69 MMHG | BODY MASS INDEX: 26.06 KG/M2

## 2025-07-31 DIAGNOSIS — R26.2 DIFFICULTY WALKING: ICD-10-CM

## 2025-07-31 DIAGNOSIS — I73.9 PVD (PERIPHERAL VASCULAR DISEASE): ICD-10-CM

## 2025-07-31 DIAGNOSIS — M79.675 PAIN OF TOE OF LEFT FOOT: ICD-10-CM

## 2025-07-31 DIAGNOSIS — M79.674 PAIN OF TOE OF RIGHT FOOT: ICD-10-CM

## 2025-07-31 DIAGNOSIS — E11.51 TYPE II DIABETES MELLITUS WITH PERIPHERAL CIRCULATORY DISORDER (HCC): ICD-10-CM

## 2025-07-31 DIAGNOSIS — B35.1 ONYCHOMYCOSIS: Primary | ICD-10-CM

## 2025-07-31 DIAGNOSIS — I87.2 VENOUS INSUFFICIENCY (CHRONIC) (PERIPHERAL): ICD-10-CM

## 2025-07-31 NOTE — PROGRESS NOTES
25     Pantera Payne    : 1945  Sex: male  Age: 80 y.o.    Subjective:  The patient is seen today for evaluation regarding diabetic foot evaluation and mycotic nail care.  No other complaints noted.    Chief Complaint   Patient presents with    Diabetes    Nail Problem       Current Medications:    Current Outpatient Medications:     bumetanide (BUMEX) 1 MG tablet, Take 1 tablet by mouth daily as needed (weight gain, shortness of breath, swelling), Disp: 90 tablet, Rfl: 3    empagliflozin (JARDIANCE) 25 MG tablet, Take 1 tablet by mouth daily, Disp: 30 tablet, Rfl: 5    metoprolol succinate (TOPROL XL) 25 MG extended release tablet, Take 1 tablet by mouth in the morning and at bedtime 25mg BID, Disp: 60 tablet, Rfl: 5    insulin aspart (NOVOLOG FLEXPEN) 100 UNIT/ML injection pen, Inject 7 units 3 times daily with meals plus the following sliding scale. -200 add 2U, -250 add 4U, -300 add 6U, -350 add 8U, -400 add 10U, BS over 400 add 12U. MAX 50 u/day, Disp: 5 Adjustable Dose Pre-filled Pen Syringe, Rfl: 11    omeprazole (PRILOSEC) 20 MG delayed release capsule, Take 1 capsule by mouth three times a week, Disp: , Rfl:     sacubitril-valsartan (ENTRESTO) 49-51 MG per tablet, Take 1 tablet by mouth 2 times daily, Disp: 60 tablet, Rfl: 3    spironolactone (ALDACTONE) 25 MG tablet, Take 0.5 tablets by mouth daily, Disp: 30 tablet, Rfl: 5    amiodarone (CORDARONE) 200 MG tablet, Take 1 tablet by mouth daily Not taking any on /Wednesday, Disp: , Rfl:     magnesium oxide (MAG-OX) 400 (240 Mg) MG tablet, Take 1 tablet by mouth daily, Disp: , Rfl:     zinc gluconate 50 MG tablet, Take 1 tablet by mouth daily, Disp: , Rfl:     Continuous Glucose Sensor (FREESTYLE SAMANTHA 3 SENSOR) MISC, Change every 14 days, Disp: 6 each, Rfl: 3    Insulin Glargine, 2 Unit Dial, (TOUJEO MAX SOLOSTAR) 300 UNIT/ML concentrated injection pen, Inject 28 units daily in the morning, Disp: 12

## 2025-07-31 NOTE — TELEPHONE ENCOUNTER
Called to schedule hearing aid fitting.  Patient had his cardiac surgery rescheduled to 8/4/25.  Patient's wife will call after the procedure and schedule HA fitting, based on his follow-up appointments.    Emery Obrein CCC/SOL  Audiologist  [id number]  NPI#:  [id number]

## 2025-08-07 ENCOUNTER — TELEPHONE (OUTPATIENT)
Dept: AUDIOLOGY | Age: 80
End: 2025-08-07

## 2025-08-18 ENCOUNTER — PROCEDURE VISIT (OUTPATIENT)
Dept: AUDIOLOGY | Age: 80
End: 2025-08-18

## 2025-08-18 DIAGNOSIS — H90.A22 SENSORINEURAL HEARING LOSS, UNILATERAL, LEFT EAR, WITH RESTRICTED HEARING ON THE CONTRALATERAL SIDE: ICD-10-CM

## 2025-08-18 DIAGNOSIS — H81.02 MENIERE'S DISEASE OF LEFT EAR: Primary | ICD-10-CM

## 2025-08-19 DIAGNOSIS — Z79.4 TYPE 2 DIABETES MELLITUS WITH HYPERGLYCEMIA, WITH LONG-TERM CURRENT USE OF INSULIN (HCC): ICD-10-CM

## 2025-08-19 DIAGNOSIS — E11.65 TYPE 2 DIABETES MELLITUS WITH HYPERGLYCEMIA, WITH LONG-TERM CURRENT USE OF INSULIN (HCC): ICD-10-CM

## 2025-08-19 RX ORDER — INSULIN ASPART 100 [IU]/ML
INJECTION, SOLUTION INTRAVENOUS; SUBCUTANEOUS
Qty: 15 ML | Refills: 5 | Status: SHIPPED | OUTPATIENT
Start: 2025-08-19

## 2025-08-21 PROBLEM — H90.A22 SENSORINEURAL HEARING LOSS, UNILATERAL, LEFT EAR, WITH RESTRICTED HEARING ON THE CONTRALATERAL SIDE: Status: ACTIVE | Noted: 2025-08-21

## 2025-08-21 PROBLEM — C44.91 BASAL CELL CARCINOMA OF SKIN: Status: ACTIVE | Noted: 2020-04-17

## 2025-08-21 PROBLEM — K21.9 GASTROESOPHAGEAL REFLUX DISEASE: Status: ACTIVE | Noted: 2018-09-19

## 2025-08-21 PROBLEM — E10.9: Status: ACTIVE | Noted: 2018-12-27

## 2025-08-21 PROBLEM — I50.22 CHRONIC SYSTOLIC HEART FAILURE (HCC): Status: ACTIVE | Noted: 2024-12-09

## 2025-08-21 PROBLEM — N40.0 BENIGN PROSTATIC HYPERPLASIA WITHOUT URINARY OBSTRUCTION: Status: ACTIVE | Noted: 2018-09-19

## 2025-08-21 PROBLEM — H61.23 BILATERAL IMPACTED CERUMEN: Status: ACTIVE | Noted: 2021-08-09

## 2025-08-21 PROBLEM — J84.9 INTERSTITIAL LUNG DISEASE (HCC): Status: ACTIVE | Noted: 2025-03-09

## 2025-08-21 PROBLEM — G47.33 OBSTRUCTIVE SLEEP APNEA SYNDROME: Status: ACTIVE | Noted: 2024-12-09

## 2025-08-21 PROBLEM — H81.01 MENIERE'S DISEASE OF RIGHT EAR: Status: ACTIVE | Noted: 2025-06-17

## 2025-08-21 PROBLEM — E10.51 TYPE 1 DIABETES MELLITUS WITH DIABETIC PERIPHERAL ANGIOPATHY WITHOUT GANGRENE (HCC): Status: ACTIVE | Noted: 2025-03-09

## 2025-08-27 ENCOUNTER — PROCEDURE VISIT (OUTPATIENT)
Dept: AUDIOLOGY | Age: 80
End: 2025-08-27

## 2025-08-27 DIAGNOSIS — H90.A22 SENSORINEURAL HEARING LOSS, UNILATERAL, LEFT EAR, WITH RESTRICTED HEARING ON THE CONTRALATERAL SIDE: Primary | ICD-10-CM

## (undated) DEVICE — 1LYRTR 16FR10ML100%SILTMPS SNP: Brand: MEDLINE INDUSTRIES, INC.

## (undated) DEVICE — GOWN,SIRUS,FABRNF,L,20/CS: Brand: MEDLINE

## (undated) DEVICE — CATHETER IABP 8FR 50CC FBROPT CONN W INSRT KT TWO STATLOK

## (undated) DEVICE — BAND COMPR L24CM REG CLR PLAS HEMSTAT EXT HK AND LOOP RETEN

## (undated) DEVICE — BASIC SINGLE BASIN 1-LF: Brand: MEDLINE INDUSTRIES, INC.

## (undated) DEVICE — PAD, DEFIB, ADULT, RADIOTRAN, PHYSIO, LO: Brand: MEDLINE

## (undated) DEVICE — GLIDESHEATH NITINOL HYDROPHILIC COATED INTRODUCER SHEATH: Brand: GLIDESHEATH

## (undated) DEVICE — LIQUIBAND RAPID ADHESIVE 36/CS 0.8ML: Brand: MEDLINE

## (undated) DEVICE — CANNULA INJ L2.5IN BLNT TIP 3MM CLR BODY W/ 1 W VLV DLP

## (undated) DEVICE — GUIDEWIRE VASC L260CM 0.035IN J TIP L3MM PTFE FIX COR NAMIC

## (undated) DEVICE — SOLUTION IV 50ML 0.9% SOD CHL PLAS CONT USP VIAFLX

## (undated) DEVICE — CATHETER THOR 32FR L23IN PVC 6 EYELET STR ATRAUM

## (undated) DEVICE — ELECTRODE PT RET AD L9FT HI MOIST COND ADH HYDRGEL CORDED

## (undated) DEVICE — STRAP POS MP 30X3 IN HK LOOP CLOSURE FOAM DISP

## (undated) DEVICE — KIT ANGIO W/ AT P65 PREM HND CTRL FOR CNTRST DEL ANGIOTOUCH

## (undated) DEVICE — ALCOHOL RUBBING ISO 16OZ 70%

## (undated) DEVICE — TUBING, SUCTION, 3/16" X 12', STRAIGHT: Brand: MEDLINE

## (undated) DEVICE — GLOVE SURG SZ 65 THK91MIL LTX FREE SYN POLYISOPRENE

## (undated) DEVICE — GLOVE ORANGE PI 7   MSG9070

## (undated) DEVICE — CANNULA NSL CANN NSL L25FT TBNG AD O2 SUP SFT UC

## (undated) DEVICE — DRAPE THER FLUID WARMING 66X44 IN FLAT SLUSH DBL DISC ORS

## (undated) DEVICE — Device

## (undated) DEVICE — GOWN,SIRUS,FABRNF,XL,20/CS: Brand: MEDLINE

## (undated) DEVICE — AORTIC PUNCHES ARE USED TO CREATE A UNIFORM OPENING IN BLOOD VESSELS DURING CARDIOVASCULAR SURGERY. THE VESSEL GRAFT IS INSERTED INTO THE CREATED OPENING AND SUTURED TO THE VESSEL WALL. AORTIC LANCETS ARE USED TO MAKE A SMALL UNIFORM CUT IN A BLOOD VESSEL TO FACILITATE INSERTION OF AN AORTIC PUNCH.  PUNCHES COME IN VARIOUS LENGTHS, DIAMETERS AND TIP CONFIGURATIONS.: Brand: CLEANCUT ROTATING AORTIC PUNCH

## (undated) DEVICE — PICO 7 10CM X 30CM: Brand: PICO™ 7

## (undated) DEVICE — GLOVE SURG SZ 6 THK91MIL LTX FREE SYN POLYISOPRENE ANTI

## (undated) DEVICE — CATHETER DIAG 6FR L100CM LUMN ID0.056IN JR4 CRV 0 SIDE H

## (undated) DEVICE — TOWEL,OR,DSP,ST,WHITE,DLX,4/PK,20PK/CS: Brand: MEDLINE

## (undated) DEVICE — STERILE LATEX POWDER FREE SURGICAL GLOVES WITH HYDROGEL COATING: Brand: PROTEXIS

## (undated) DEVICE — SYRINGE MEDICAL 3ML CLEAR PLASTIC STANDARD NON CONTROL LUERLOCK TIP DISPOSABLE

## (undated) DEVICE — BLOWER COR ART L16.5CM PLAS SHFT MAL W/ MIST IV SET AXIUS

## (undated) DEVICE — SOLUTION IV 1000ML 140MEQ/L SOD 5MEQ/L K 3MEQ/L MG 27MEQ/L

## (undated) DEVICE — DOUBLE BASIN SET: Brand: MEDLINE INDUSTRIES, INC.

## (undated) DEVICE — CATHETER DIAG 6FR L110CM PIGTAILS CRV STYL PIG145 DXTERITY

## (undated) DEVICE — 6 FOOT DISPOSABLE EXTENSION CABLE WITH SAFE CONNECT / SCREW-DOWN

## (undated) DEVICE — DRAIN SURG SGL COLL PT TB FOR ATS BG OASIS

## (undated) DEVICE — BATTERY PACK FOR VARISPEED: Brand: STRYKER VARISPEED

## (undated) DEVICE — SYRINGE 20ML LL S/C 50

## (undated) DEVICE — AGENT HEMSTAT W4XL4IN OXIDIZED REGENERATED CELOS STRUCTURED

## (undated) DEVICE — CATHETER COR DIAG JUDKINS L 3.5 CRV 6FR 100CM 0 SIDE H

## (undated) DEVICE — TOWEL,OR,DSP,ST,BLUE,STD,6/PK,12PK/CS: Brand: MEDLINE

## (undated) DEVICE — SURGICAL PROCEDURE KIT 2 W

## (undated) DEVICE — SYRINGE MED 20ML STD CLR PLAS LUERSLIP TIP N CTRL DISP

## (undated) DEVICE — KIT MFLD ISOLATN NACL CNTRST PRT TBNG SPIK W/ PRSS TRNSDUC

## (undated) DEVICE — SYRINGE MED 10ML LUERLOCK TIP W/O SFTY DISP

## (undated) DEVICE — CLAMP SURG ISOLATOR SYNERGY